# Patient Record
Sex: MALE | Race: WHITE | NOT HISPANIC OR LATINO
[De-identification: names, ages, dates, MRNs, and addresses within clinical notes are randomized per-mention and may not be internally consistent; named-entity substitution may affect disease eponyms.]

---

## 2017-01-09 ENCOUNTER — APPOINTMENT (OUTPATIENT)
Dept: INTERNAL MEDICINE | Facility: CLINIC | Age: 77
End: 2017-01-09

## 2017-01-09 VITALS — DIASTOLIC BLOOD PRESSURE: 80 MMHG | SYSTOLIC BLOOD PRESSURE: 140 MMHG | WEIGHT: 190 LBS

## 2017-01-09 VITALS — DIASTOLIC BLOOD PRESSURE: 80 MMHG | SYSTOLIC BLOOD PRESSURE: 140 MMHG

## 2017-01-09 DIAGNOSIS — Z78.9 OTHER SPECIFIED HEALTH STATUS: ICD-10-CM

## 2017-01-09 DIAGNOSIS — H61.23 IMPACTED CERUMEN, BILATERAL: ICD-10-CM

## 2017-01-09 DIAGNOSIS — Z82.49 FAMILY HISTORY OF ISCHEMIC HEART DISEASE AND OTHER DISEASES OF THE CIRCULATORY SYSTEM: ICD-10-CM

## 2017-01-09 DIAGNOSIS — Z83.3 FAMILY HISTORY OF DIABETES MELLITUS: ICD-10-CM

## 2017-01-10 ENCOUNTER — TRANSCRIPTION ENCOUNTER (OUTPATIENT)
Age: 77
End: 2017-01-10

## 2017-01-10 LAB
25(OH)D3 SERPL-MCNC: 33 NG/ML
ALBUMIN SERPL ELPH-MCNC: 4 G/DL
ALP BLD-CCNC: 55 U/L
ALT SERPL-CCNC: 24 U/L
ANION GAP SERPL CALC-SCNC: 13 MMOL/L
APPEARANCE: CLEAR
AST SERPL-CCNC: 26 U/L
BASOPHILS # BLD AUTO: 0.03 K/UL
BASOPHILS NFR BLD AUTO: 0.3 %
BILIRUB SERPL-MCNC: 0.7 MG/DL
BILIRUBIN URINE: NEGATIVE
BLOOD URINE: NEGATIVE
BUN SERPL-MCNC: 28 MG/DL
CALCIUM SERPL-MCNC: 9.7 MG/DL
CHLORIDE SERPL-SCNC: 99 MMOL/L
CHOLEST SERPL-MCNC: 145 MG/DL
CHOLEST/HDLC SERPL: 2.7 RATIO
CO2 SERPL-SCNC: 26 MMOL/L
COLOR: YELLOW
CREAT SERPL-MCNC: 1.18 MG/DL
EOSINOPHIL # BLD AUTO: 0.2 K/UL
EOSINOPHIL NFR BLD AUTO: 1.8 %
GLUCOSE QUALITATIVE U: NORMAL MG/DL
GLUCOSE SERPL-MCNC: 80 MG/DL
HCT VFR BLD CALC: 42.5 %
HDLC SERPL-MCNC: 53 MG/DL
HGB BLD-MCNC: 13.5 G/DL
IMM GRANULOCYTES NFR BLD AUTO: 0.6 %
KETONES URINE: NEGATIVE
LDLC SERPL CALC-MCNC: 80 MG/DL
LEUKOCYTE ESTERASE URINE: NEGATIVE
LYMPHOCYTES # BLD AUTO: 3.16 K/UL
LYMPHOCYTES NFR BLD AUTO: 29.2 %
MAN DIFF?: NORMAL
MCHC RBC-ENTMCNC: 31.5 PG
MCHC RBC-ENTMCNC: 31.8 GM/DL
MCV RBC AUTO: 99.1 FL
MONOCYTES # BLD AUTO: 1.14 K/UL
MONOCYTES NFR BLD AUTO: 10.5 %
NEUTROPHILS # BLD AUTO: 6.23 K/UL
NEUTROPHILS NFR BLD AUTO: 57.6 %
NITRITE URINE: NEGATIVE
PH URINE: 6
PLATELET # BLD AUTO: 182 K/UL
POTASSIUM SERPL-SCNC: 4.8 MMOL/L
PROT SERPL-MCNC: 8.1 G/DL
PROTEIN URINE: NEGATIVE MG/DL
PSA SERPL-MCNC: 5.78 NG/ML
RBC # BLD: 4.29 M/UL
RBC # FLD: 14.9 %
SODIUM SERPL-SCNC: 138 MMOL/L
SPECIFIC GRAVITY URINE: 1.01
T4 SERPL-MCNC: 5.8 UG/DL
TRIGL SERPL-MCNC: 61 MG/DL
TSH SERPL-ACNC: 2.87 UU/ML
UROBILINOGEN URINE: NORMAL MG/DL
WBC # FLD AUTO: 10.82 K/UL

## 2017-03-28 ENCOUNTER — APPOINTMENT (OUTPATIENT)
Dept: INTERNAL MEDICINE | Facility: CLINIC | Age: 77
End: 2017-03-28

## 2017-05-11 ENCOUNTER — NON-APPOINTMENT (OUTPATIENT)
Age: 77
End: 2017-05-11

## 2017-05-11 ENCOUNTER — APPOINTMENT (OUTPATIENT)
Dept: INTERNAL MEDICINE | Facility: CLINIC | Age: 77
End: 2017-05-11
Payer: MEDICARE

## 2017-05-11 VITALS
RESPIRATION RATE: 16 BRPM | BODY MASS INDEX: 24.92 KG/M2 | SYSTOLIC BLOOD PRESSURE: 120 MMHG | HEART RATE: 70 BPM | DIASTOLIC BLOOD PRESSURE: 70 MMHG | WEIGHT: 188 LBS | HEIGHT: 73 IN

## 2017-05-11 DIAGNOSIS — Z83.3 FAMILY HISTORY OF DIABETES MELLITUS: ICD-10-CM

## 2017-05-11 DIAGNOSIS — Z84.89 FAMILY HISTORY OF OTHER SPECIFIED CONDITIONS: ICD-10-CM

## 2017-05-11 DIAGNOSIS — Z87.19 PERSONAL HISTORY OF OTHER DISEASES OF THE DIGESTIVE SYSTEM: ICD-10-CM

## 2017-05-11 DIAGNOSIS — Z82.3 FAMILY HISTORY OF STROKE: ICD-10-CM

## 2017-05-11 DIAGNOSIS — Z82.49 FAMILY HISTORY OF ISCHEMIC HEART DISEASE AND OTHER DISEASES OF THE CIRCULATORY SYSTEM: ICD-10-CM

## 2017-05-11 PROCEDURE — 99213 OFFICE O/P EST LOW 20 MIN: CPT | Mod: 25

## 2017-05-11 PROCEDURE — G0439: CPT

## 2017-05-11 PROCEDURE — 99497 ADVNCD CARE PLAN 30 MIN: CPT | Mod: 33

## 2017-05-11 PROCEDURE — 93000 ELECTROCARDIOGRAM COMPLETE: CPT

## 2017-05-15 ENCOUNTER — RX RENEWAL (OUTPATIENT)
Age: 77
End: 2017-05-15

## 2017-05-15 DIAGNOSIS — R76.11 NONSPECIFIC REACTION TO TUBERCULIN SKIN TEST W/OUT ACTIVE TUBERCULOSIS: ICD-10-CM

## 2017-12-12 ENCOUNTER — APPOINTMENT (OUTPATIENT)
Dept: INTERNAL MEDICINE | Facility: CLINIC | Age: 77
End: 2017-12-12
Payer: MEDICARE

## 2017-12-12 DIAGNOSIS — Z23 ENCOUNTER FOR IMMUNIZATION: ICD-10-CM

## 2017-12-12 PROCEDURE — G0008: CPT

## 2017-12-12 PROCEDURE — 90662 IIV NO PRSV INCREASED AG IM: CPT

## 2017-12-14 LAB — PSA SERPL-MCNC: 6.02 NG/ML

## 2018-06-26 ENCOUNTER — APPOINTMENT (OUTPATIENT)
Dept: INTERNAL MEDICINE | Facility: CLINIC | Age: 78
End: 2018-06-26
Payer: MEDICARE

## 2018-06-26 ENCOUNTER — LABORATORY RESULT (OUTPATIENT)
Age: 78
End: 2018-06-26

## 2018-06-26 PROCEDURE — 36415 COLL VENOUS BLD VENIPUNCTURE: CPT

## 2018-06-28 LAB
25(OH)D3 SERPL-MCNC: 35.4 NG/ML
ALBUMIN SERPL ELPH-MCNC: 3.7 G/DL
ALP BLD-CCNC: 50 U/L
ALT SERPL-CCNC: 20 U/L
ANION GAP SERPL CALC-SCNC: 12 MMOL/L
APPEARANCE: CLEAR
AST SERPL-CCNC: 22 U/L
BASOPHILS # BLD AUTO: 0.04 K/UL
BASOPHILS NFR BLD AUTO: 0.5 %
BILIRUB SERPL-MCNC: 0.7 MG/DL
BILIRUBIN URINE: NEGATIVE
BLOOD URINE: NEGATIVE
BUN SERPL-MCNC: 30 MG/DL
CALCIUM SERPL-MCNC: 9.3 MG/DL
CHLORIDE SERPL-SCNC: 105 MMOL/L
CHOLEST SERPL-MCNC: 126 MG/DL
CHOLEST/HDLC SERPL: 2.3 RATIO
CO2 SERPL-SCNC: 25 MMOL/L
COLOR: YELLOW
CREAT SERPL-MCNC: 1.29 MG/DL
EOSINOPHIL # BLD AUTO: 0.16 K/UL
EOSINOPHIL NFR BLD AUTO: 1.9 %
GLUCOSE QUALITATIVE U: NEGATIVE MG/DL
GLUCOSE SERPL-MCNC: 99 MG/DL
HCT VFR BLD CALC: 40.5 %
HDLC SERPL-MCNC: 56 MG/DL
HGB BLD-MCNC: 12.7 G/DL
IMM GRANULOCYTES NFR BLD AUTO: 0.6 %
KETONES URINE: NEGATIVE
LDLC SERPL CALC-MCNC: 61 MG/DL
LEUKOCYTE ESTERASE URINE: NEGATIVE
LYMPHOCYTES # BLD AUTO: 2.44 K/UL
LYMPHOCYTES NFR BLD AUTO: 28.4 %
MAN DIFF?: NORMAL
MCHC RBC-ENTMCNC: 30.6 PG
MCHC RBC-ENTMCNC: 31.4 GM/DL
MCV RBC AUTO: 97.6 FL
MONOCYTES # BLD AUTO: 0.7 K/UL
MONOCYTES NFR BLD AUTO: 8.1 %
NEUTROPHILS # BLD AUTO: 5.21 K/UL
NEUTROPHILS NFR BLD AUTO: 60.5 %
NITRITE URINE: NEGATIVE
PH URINE: 5.5
PLATELET # BLD AUTO: 181 K/UL
POTASSIUM SERPL-SCNC: 4.7 MMOL/L
PROT SERPL-MCNC: 7.9 G/DL
PROTEIN URINE: NEGATIVE MG/DL
PSA SERPL-MCNC: 5.7 NG/ML
RBC # BLD: 4.15 M/UL
RBC # FLD: 14.8 %
SODIUM SERPL-SCNC: 142 MMOL/L
SPECIFIC GRAVITY URINE: 1.02
T4 SERPL-MCNC: 5.8 UG/DL
TRIGL SERPL-MCNC: 46 MG/DL
TSH SERPL-ACNC: 2.7 UIU/ML
UROBILINOGEN URINE: NEGATIVE MG/DL
WBC # FLD AUTO: 8.6 K/UL

## 2018-07-06 ENCOUNTER — LABORATORY RESULT (OUTPATIENT)
Age: 78
End: 2018-07-06

## 2018-07-06 ENCOUNTER — NON-APPOINTMENT (OUTPATIENT)
Age: 78
End: 2018-07-06

## 2018-07-06 ENCOUNTER — APPOINTMENT (OUTPATIENT)
Dept: INTERNAL MEDICINE | Facility: CLINIC | Age: 78
End: 2018-07-06
Payer: MEDICARE

## 2018-07-06 VITALS
DIASTOLIC BLOOD PRESSURE: 65 MMHG | BODY MASS INDEX: 25.31 KG/M2 | RESPIRATION RATE: 16 BRPM | SYSTOLIC BLOOD PRESSURE: 120 MMHG | HEART RATE: 56 BPM | HEIGHT: 73 IN | WEIGHT: 191 LBS

## 2018-07-06 DIAGNOSIS — R53.83 OTHER FATIGUE: ICD-10-CM

## 2018-07-06 DIAGNOSIS — M25.512 PAIN IN LEFT SHOULDER: ICD-10-CM

## 2018-07-06 DIAGNOSIS — Z23 ENCOUNTER FOR IMMUNIZATION: ICD-10-CM

## 2018-07-06 PROCEDURE — 99213 OFFICE O/P EST LOW 20 MIN: CPT | Mod: 25

## 2018-07-06 PROCEDURE — G0009: CPT

## 2018-07-06 PROCEDURE — 36415 COLL VENOUS BLD VENIPUNCTURE: CPT

## 2018-07-06 PROCEDURE — 99497 ADVNCD CARE PLAN 30 MIN: CPT | Mod: 33

## 2018-07-06 PROCEDURE — 93000 ELECTROCARDIOGRAM COMPLETE: CPT

## 2018-07-06 PROCEDURE — G0439: CPT

## 2018-07-06 PROCEDURE — 90670 PCV13 VACCINE IM: CPT

## 2018-07-06 NOTE — ASSESSMENT
[FreeTextEntry1] : Pt with above medical issues which requires extra work in addition to the well visit.\par New labs sent\par To see ENT  -for hoarseness\par To see ortho  - for shoulder and hip pain\par Fatigue may be multifactorial  -labs sent - pt may be depressed but declines meds\par No chest pains, palpitations or shortness of breath\par Health counseling given

## 2018-07-06 NOTE — HEALTH RISK ASSESSMENT
[Fair] :  ~his/her~ mood as fair [No falls in past year] : Patient reported no falls in the past year [None] : None [With Family] : lives with family [Retired] : retired [Graduate School] : graduate school [Feels Safe at Home] : Feels safe at home [Fully functional (bathing, dressing, toileting, transferring, walking, feeding)] : Fully functional (bathing, dressing, toileting, transferring, walking, feeding) [Fully functional (using the telephone, shopping, preparing meals, housekeeping, doing laundry, using] : Fully functional and needs no help or supervision to perform IADLs (using the telephone, shopping, preparing meals, housekeeping, doing laundry, using transportation, managing medications and managing finances) [Smoke Detector] : smoke detector [Seat Belt] :  uses seat belt [Discussed at today's visit] : Advance Directives Discussed at today's visit [] : No [Sunscreen] : does not use sunscreen [FreeTextEntry4] : HCP  -wife  -paperwork done - wishes known - will comply  -T=16

## 2018-07-06 NOTE — REVIEW OF SYSTEMS
[Hearing Loss] : hearing loss [Nocturia] : nocturia [Negative] : Heme/Lymph [FreeTextEntry3] : last optho  -1-2 years  -OK [FreeTextEntry8] : nocturia x 1 [FreeTextEntry9] : see HPI

## 2018-07-06 NOTE — PHYSICAL EXAM
[No Acute Distress] : no acute distress [Well Nourished] : well nourished [Well Developed] : well developed [Well-Appearing] : well-appearing [Normal Sclera/Conjunctiva] : normal sclera/conjunctiva [PERRL] : pupils equal round and reactive to light [EOMI] : extraocular movements intact [Normal Outer Ear/Nose] : the outer ears and nose were normal in appearance [Normal Oropharynx] : the oropharynx was normal [Normal TMs] : both tympanic membranes were normal [No JVD] : no jugular venous distention [Supple] : supple [No Lymphadenopathy] : no lymphadenopathy [Thyroid Normal, No Nodules] : the thyroid was normal and there were no nodules present [No Respiratory Distress] : no respiratory distress  [Clear to Auscultation] : lungs were clear to auscultation bilaterally [No Accessory Muscle Use] : no accessory muscle use [Normal Rate] : normal rate  [Regular Rhythm] : with a regular rhythm [Normal S1, S2] : normal S1 and S2 [No Murmur] : no murmur heard [No Carotid Bruits] : no carotid bruits [No Abdominal Bruit] : a ~M bruit was not heard ~T in the abdomen [No Varicosities] : no varicosities [Pedal Pulses Present] : the pedal pulses are present [No Edema] : there was no peripheral edema [No Extremity Clubbing/Cyanosis] : no extremity clubbing/cyanosis [No Palpable Aorta] : no palpable aorta [Soft] : abdomen soft [Non Tender] : non-tender [Non-distended] : non-distended [No Masses] : no abdominal mass palpated [No HSM] : no HSM [Normal Bowel Sounds] : normal bowel sounds [Inguinal Hernia Left] : a left inguinal hernia was present [] : which was reducible [Normal Sphincter Tone] : normal sphincter tone [No Mass] : no mass [Penis Abnormality] : normal circumcised penis [Testes Tenderness] : no tenderness of the testes [Testes Mass (___cm)] : there were no testicular masses [Prostate Tenderness] : the prostate was not tender [No Prostate Nodules] : no prostate nodules [Prostate Size ___ (0-4)] : prostate size [unfilled] (scale: 0-4) [Normal Posterior Cervical Nodes] : no posterior cervical lymphadenopathy [Normal Anterior Cervical Nodes] : no anterior cervical lymphadenopathy [No CVA Tenderness] : no CVA  tenderness [No Spinal Tenderness] : no spinal tenderness [No Joint Swelling] : no joint swelling [Grossly Normal Strength/Tone] : grossly normal strength/tone [No Rash] : no rash [Normal Gait] : normal gait [Coordination Grossly Intact] : coordination grossly intact [No Focal Deficits] : no focal deficits [Deep Tendon Reflexes (DTR)] : deep tendon reflexes were 2+ and symmetric [Speech Grossly Normal] : speech grossly normal [Memory Grossly Normal] : memory grossly normal [Normal Affect] : the affect was normal [Alert and Oriented x3] : oriented to person, place, and time [Normal Mood] : the mood was normal [Normal Insight/Judgement] : insight and judgment were intact [de-identified] : Left shoulder ratcheting

## 2018-07-06 NOTE — HISTORY OF PRESENT ILLNESS
[FreeTextEntry1] : Well visit and follow up for management of:\par Hyperlipidemia  -on meds\par \par Feels very fatigued\par Hoarse voice\par Left shoulder pain and left hip discomfort

## 2018-07-07 LAB
B BURGDOR IGG+IGM SER QL IB: NORMAL
BASOPHILS # BLD AUTO: 0.02 K/UL
BASOPHILS NFR BLD AUTO: 0.2 %
CORTIS SERPL-MCNC: 10.4 UG/DL
EOSINOPHIL # BLD AUTO: 0.13 K/UL
EOSINOPHIL NFR BLD AUTO: 1.6 %
HCT VFR BLD CALC: 42.2 %
HGB BLD-MCNC: 13.7 G/DL
IMM GRANULOCYTES NFR BLD AUTO: 0.4 %
IRON SATN MFR SERPL: 21 %
IRON SERPL-MCNC: 61 UG/DL
LYMPHOCYTES # BLD AUTO: 2.31 K/UL
LYMPHOCYTES NFR BLD AUTO: 27.7 %
MAN DIFF?: NORMAL
MCHC RBC-ENTMCNC: 32 PG
MCHC RBC-ENTMCNC: 32.5 GM/DL
MCV RBC AUTO: 98.6 FL
MONOCYTES # BLD AUTO: 0.83 K/UL
MONOCYTES NFR BLD AUTO: 10 %
NEUTROPHILS # BLD AUTO: 5.02 K/UL
NEUTROPHILS NFR BLD AUTO: 60.1 %
PLATELET # BLD AUTO: 201 K/UL
RBC # BLD: 4.28 M/UL
RBC # FLD: 14.9 %
TIBC SERPL-MCNC: 284 UG/DL
UIBC SERPL-MCNC: 223 UG/DL
WBC # FLD AUTO: 8.34 K/UL

## 2018-07-09 ENCOUNTER — TRANSCRIPTION ENCOUNTER (OUTPATIENT)
Age: 78
End: 2018-07-09

## 2018-07-09 LAB
FERRITIN SERPL-MCNC: 145 NG/ML
VIT B12 SERPL-MCNC: 478 PG/ML

## 2019-06-27 ENCOUNTER — TRANSCRIPTION ENCOUNTER (OUTPATIENT)
Age: 79
End: 2019-06-27

## 2019-08-21 ENCOUNTER — APPOINTMENT (OUTPATIENT)
Dept: INTERNAL MEDICINE | Facility: CLINIC | Age: 79
End: 2019-08-21
Payer: MEDICARE

## 2019-08-21 PROCEDURE — 36415 COLL VENOUS BLD VENIPUNCTURE: CPT

## 2019-08-22 DIAGNOSIS — R71.8 OTHER ABNORMALITY OF RED BLOOD CELLS: ICD-10-CM

## 2019-08-22 LAB
25(OH)D3 SERPL-MCNC: 36.8 NG/ML
ALBUMIN SERPL ELPH-MCNC: 4 G/DL
ALP BLD-CCNC: 54 U/L
ALT SERPL-CCNC: 17 U/L
ANION GAP SERPL CALC-SCNC: 13 MMOL/L
APPEARANCE: CLEAR
AST SERPL-CCNC: 17 U/L
BASOPHILS # BLD AUTO: 0.05 K/UL
BASOPHILS NFR BLD AUTO: 0.5 %
BILIRUB SERPL-MCNC: 0.5 MG/DL
BILIRUBIN URINE: NEGATIVE
BLOOD URINE: NEGATIVE
BUN SERPL-MCNC: 27 MG/DL
CALCIUM SERPL-MCNC: 9.4 MG/DL
CHLORIDE SERPL-SCNC: 104 MMOL/L
CHOLEST SERPL-MCNC: 146 MG/DL
CHOLEST/HDLC SERPL: 2.8 RATIO
CO2 SERPL-SCNC: 25 MMOL/L
COLOR: YELLOW
CREAT SERPL-MCNC: 1.09 MG/DL
EOSINOPHIL # BLD AUTO: 0.14 K/UL
EOSINOPHIL NFR BLD AUTO: 1.5 %
GLUCOSE QUALITATIVE U: NEGATIVE
GLUCOSE SERPL-MCNC: 95 MG/DL
HCT VFR BLD CALC: 41.2 %
HDLC SERPL-MCNC: 53 MG/DL
HGB BLD-MCNC: 12.9 G/DL
IMM GRANULOCYTES NFR BLD AUTO: 0.6 %
KETONES URINE: NEGATIVE
LDLC SERPL CALC-MCNC: 84 MG/DL
LEUKOCYTE ESTERASE URINE: NEGATIVE
LYMPHOCYTES # BLD AUTO: 2.81 K/UL
LYMPHOCYTES NFR BLD AUTO: 30 %
MAN DIFF?: NORMAL
MCHC RBC-ENTMCNC: 31.3 GM/DL
MCHC RBC-ENTMCNC: 31.9 PG
MCV RBC AUTO: 102 FL
MONOCYTES # BLD AUTO: 0.87 K/UL
MONOCYTES NFR BLD AUTO: 9.3 %
NEUTROPHILS # BLD AUTO: 5.44 K/UL
NEUTROPHILS NFR BLD AUTO: 58.1 %
NITRITE URINE: NEGATIVE
PH URINE: 6
PLATELET # BLD AUTO: 184 K/UL
POTASSIUM SERPL-SCNC: 4.5 MMOL/L
PROT SERPL-MCNC: 7.3 G/DL
PROTEIN URINE: NEGATIVE
PSA SERPL-MCNC: 8.47 NG/ML
RBC # BLD: 4.04 M/UL
RBC # FLD: 14.6 %
SODIUM SERPL-SCNC: 142 MMOL/L
SPECIFIC GRAVITY URINE: 1.02
T4 SERPL-MCNC: 5.1 UG/DL
TRIGL SERPL-MCNC: 45 MG/DL
TSH SERPL-ACNC: 2.04 UIU/ML
UROBILINOGEN URINE: NORMAL
WBC # FLD AUTO: 9.37 K/UL

## 2019-08-26 ENCOUNTER — APPOINTMENT (OUTPATIENT)
Dept: INTERNAL MEDICINE | Facility: CLINIC | Age: 79
End: 2019-08-26
Payer: MEDICARE

## 2019-08-26 ENCOUNTER — NON-APPOINTMENT (OUTPATIENT)
Age: 79
End: 2019-08-26

## 2019-08-26 VITALS
BODY MASS INDEX: 25.98 KG/M2 | RESPIRATION RATE: 16 BRPM | DIASTOLIC BLOOD PRESSURE: 70 MMHG | SYSTOLIC BLOOD PRESSURE: 110 MMHG | WEIGHT: 196 LBS | HEIGHT: 73 IN | HEART RATE: 72 BPM

## 2019-08-26 PROCEDURE — G0439: CPT

## 2019-08-26 PROCEDURE — 99213 OFFICE O/P EST LOW 20 MIN: CPT | Mod: 25

## 2019-08-26 PROCEDURE — 93000 ELECTROCARDIOGRAM COMPLETE: CPT

## 2019-08-26 PROCEDURE — G0444 DEPRESSION SCREEN ANNUAL: CPT | Mod: 59

## 2019-08-26 PROCEDURE — 99497 ADVNCD CARE PLAN 30 MIN: CPT | Mod: 33

## 2019-08-26 NOTE — PHYSICAL EXAM
[No Acute Distress] : no acute distress [Well Nourished] : well nourished [Well Developed] : well developed [Well-Appearing] : well-appearing [PERRL] : pupils equal round and reactive to light [Normal Sclera/Conjunctiva] : normal sclera/conjunctiva [Normal Oropharynx] : the oropharynx was normal [EOMI] : extraocular movements intact [Normal Outer Ear/Nose] : the outer ears and nose were normal in appearance [Normal TMs] : both tympanic membranes were normal [No JVD] : no jugular venous distention [Supple] : supple [No Lymphadenopathy] : no lymphadenopathy [No Respiratory Distress] : no respiratory distress  [Thyroid Normal, No Nodules] : the thyroid was normal and there were no nodules present [No Accessory Muscle Use] : no accessory muscle use [Clear to Auscultation] : lungs were clear to auscultation bilaterally [Regular Rhythm] : with a regular rhythm [Normal Rate] : normal rate  [Normal S1, S2] : normal S1 and S2 [No Carotid Bruits] : no carotid bruits [No Murmur] : no murmur heard [No Abdominal Bruit] : a ~M bruit was not heard ~T in the abdomen [Pedal Pulses Present] : the pedal pulses are present [No Varicosities] : no varicosities [No Palpable Aorta] : no palpable aorta [No Edema] : there was no peripheral edema [Soft] : abdomen soft [No Extremity Clubbing/Cyanosis] : no extremity clubbing/cyanosis [Non-distended] : non-distended [Non Tender] : non-tender [No Masses] : no abdominal mass palpated [No HSM] : no HSM [] : which was reducible [Inguinal Hernia Left] : a left inguinal hernia was present [Normal Bowel Sounds] : normal bowel sounds [No Mass] : no mass [Normal Sphincter Tone] : normal sphincter tone [Penis Abnormality] : normal circumcised penis [Testes Tenderness] : no tenderness of the testes [Testes Mass (___cm)] : there were no testicular masses [No Prostate Nodules] : no prostate nodules [Prostate Tenderness] : the prostate was not tender [Normal Supraclavicular Nodes] : no supraclavicular lymphadenopathy [Prostate Size ___ (0-4)] : prostate size [unfilled] (scale: 0-4) [Normal Posterior Cervical Nodes] : no posterior cervical lymphadenopathy [Normal Axillary Nodes] : no axillary lymphadenopathy [Normal Anterior Cervical Nodes] : no anterior cervical lymphadenopathy [Normal Inguinal Nodes] : no inguinal lymphadenopathy [Normal Femoral Nodes] : no femoral lymphadenopathy [No CVA Tenderness] : no CVA  tenderness [No Joint Swelling] : no joint swelling [No Spinal Tenderness] : no spinal tenderness [No Rash] : no rash [Coordination Grossly Intact] : coordination grossly intact [Grossly Normal Strength/Tone] : grossly normal strength/tone [No Focal Deficits] : no focal deficits [Normal Gait] : normal gait [Normal Affect] : the affect was normal [Deep Tendon Reflexes (DTR)] : deep tendon reflexes were 2+ and symmetric [Normal Insight/Judgement] : insight and judgment were intact [Normal] : affect was normal and insight and judgment were intact

## 2019-08-26 NOTE — HEALTH RISK ASSESSMENT
[Good] : ~his/her~  mood as  good [] : No [No falls in past year] : Patient reported no falls in the past year [No] : No [de-identified] : Walk dog [de-identified] : Low fat [Change in mental status noted] : No change in mental status noted [None] : None [With Family] : lives with family [Retired] : retired [] :  [Feels Safe at Home] : Feels safe at home [Fully functional (using the telephone, shopping, preparing meals, housekeeping, doing laundry, using] : Fully functional and needs no help or supervision to perform IADLs (using the telephone, shopping, preparing meals, housekeeping, doing laundry, using transportation, managing medications and managing finances) [Fully functional (bathing, dressing, toileting, transferring, walking, feeding)] : Fully functional (bathing, dressing, toileting, transferring, walking, feeding) [Smoke Detector] : smoke detector [Seat Belt] :  uses seat belt [Carbon Monoxide Detector] : carbon monoxide detector [Sunscreen] : does not use sunscreen [FreeTextEntry2] : Runs volunteer group at shelter [With Patient/Caregiver] : With Patient/Caregiver [Designated Healthcare Proxy] : Designated healthcare proxy [Name: ___] : Health Care Proxy's Name: [unfilled]  [AdvancecareDate] : 08/19 [FreeTextEntry4] : Had long discussion with the patient.\par Discussed with pt the need for a health care proxy.\par Discussed who can be a proxy, forms given if needed, and the need for the proxy to know their wishes and to make sure they will comply.\par The proxy will need to have a copy in their home and the patient should have a copy.\par \par

## 2019-08-26 NOTE — ASSESSMENT
[FreeTextEntry1] : Pt with above medical issues which requires extra work in addition to the well visit.\par B12 levels added to labs\par Discussed PSA  -pt wishes to observe.\par Strongly advised screening colonoscopy\par Health counseling given\par FU 6 months.

## 2019-08-26 NOTE — REVIEW OF SYSTEMS
[Fever] : no fever [Recent Change In Weight] : ~T recent weight change [Hearing Loss] : hearing loss [Negative] : Heme/Lymph [FreeTextEntry3] : last optho - last year  -OK [FreeTextEntry9] : shoulder issues

## 2019-08-27 LAB
FOLATE SERPL-MCNC: 11.9 NG/ML
VIT B12 SERPL-MCNC: 300 PG/ML

## 2019-08-28 ENCOUNTER — TRANSCRIPTION ENCOUNTER (OUTPATIENT)
Age: 79
End: 2019-08-28

## 2021-02-23 ENCOUNTER — APPOINTMENT (OUTPATIENT)
Dept: INTERNAL MEDICINE | Facility: CLINIC | Age: 81
End: 2021-02-23
Payer: MEDICARE

## 2021-02-23 ENCOUNTER — LABORATORY RESULT (OUTPATIENT)
Age: 81
End: 2021-02-23

## 2021-02-23 LAB — PSA SERPL-MCNC: 9.66 NG/ML

## 2021-02-23 PROCEDURE — 36415 COLL VENOUS BLD VENIPUNCTURE: CPT

## 2021-02-24 LAB
ALBUMIN SERPL ELPH-MCNC: 3.9 G/DL
ALP BLD-CCNC: 60 U/L
ALT SERPL-CCNC: 18 U/L
ANION GAP SERPL CALC-SCNC: 11 MMOL/L
APPEARANCE: CLEAR
AST SERPL-CCNC: 18 U/L
BASOPHILS # BLD AUTO: 0.04 K/UL
BASOPHILS NFR BLD AUTO: 0.5 %
BILIRUB SERPL-MCNC: 0.6 MG/DL
BILIRUBIN URINE: NEGATIVE
BLOOD URINE: NEGATIVE
BUN SERPL-MCNC: 30 MG/DL
CALCIUM SERPL-MCNC: 9.4 MG/DL
CHLORIDE SERPL-SCNC: 100 MMOL/L
CHOLEST SERPL-MCNC: 127 MG/DL
CO2 SERPL-SCNC: 26 MMOL/L
COLOR: NORMAL
CREAT SERPL-MCNC: 1.29 MG/DL
EOSINOPHIL # BLD AUTO: 0.26 K/UL
EOSINOPHIL NFR BLD AUTO: 3 %
GLUCOSE QUALITATIVE U: NEGATIVE
GLUCOSE SERPL-MCNC: 92 MG/DL
HCT VFR BLD CALC: 40.4 %
HDLC SERPL-MCNC: 49 MG/DL
HGB BLD-MCNC: 12.8 G/DL
IMM GRANULOCYTES NFR BLD AUTO: 0.3 %
KETONES URINE: NEGATIVE
LDLC SERPL CALC-MCNC: 69 MG/DL
LEUKOCYTE ESTERASE URINE: NEGATIVE
LYMPHOCYTES # BLD AUTO: 2.15 K/UL
LYMPHOCYTES NFR BLD AUTO: 24.8 %
MAN DIFF?: NORMAL
MCHC RBC-ENTMCNC: 31.4 PG
MCHC RBC-ENTMCNC: 31.7 GM/DL
MCV RBC AUTO: 99.3 FL
MONOCYTES # BLD AUTO: 0.98 K/UL
MONOCYTES NFR BLD AUTO: 11.3 %
NEUTROPHILS # BLD AUTO: 5.22 K/UL
NEUTROPHILS NFR BLD AUTO: 60.1 %
NITRITE URINE: NEGATIVE
NONHDLC SERPL-MCNC: 78 MG/DL
PH URINE: 6
PLATELET # BLD AUTO: 176 K/UL
POTASSIUM SERPL-SCNC: 4.3 MMOL/L
PROT SERPL-MCNC: 7.9 G/DL
PROTEIN URINE: ABNORMAL
RBC # BLD: 4.07 M/UL
RBC # FLD: 14.4 %
SODIUM SERPL-SCNC: 137 MMOL/L
SPECIFIC GRAVITY URINE: 1.02
T4 SERPL-MCNC: 5.7 UG/DL
TRIGL SERPL-MCNC: 46 MG/DL
TSH SERPL-ACNC: 2.26 UIU/ML
UROBILINOGEN URINE: NORMAL
WBC # FLD AUTO: 8.68 K/UL

## 2021-03-02 ENCOUNTER — NON-APPOINTMENT (OUTPATIENT)
Age: 81
End: 2021-03-02

## 2021-03-02 ENCOUNTER — APPOINTMENT (OUTPATIENT)
Dept: INTERNAL MEDICINE | Facility: CLINIC | Age: 81
End: 2021-03-02
Payer: MEDICARE

## 2021-03-02 VITALS
TEMPERATURE: 97.3 F | RESPIRATION RATE: 16 BRPM | HEIGHT: 73 IN | HEART RATE: 66 BPM | BODY MASS INDEX: 25.31 KG/M2 | WEIGHT: 191 LBS | SYSTOLIC BLOOD PRESSURE: 115 MMHG | DIASTOLIC BLOOD PRESSURE: 70 MMHG

## 2021-03-02 DIAGNOSIS — M54.5 LOW BACK PAIN: ICD-10-CM

## 2021-03-02 DIAGNOSIS — K40.90 UNILATERAL INGUINAL HERNIA, W/OUT OBSTRUCTION OR GANGRENE, NOT SPECIFIED AS RECURRENT: ICD-10-CM

## 2021-03-02 PROCEDURE — 93000 ELECTROCARDIOGRAM COMPLETE: CPT | Mod: 59

## 2021-03-02 PROCEDURE — 99213 OFFICE O/P EST LOW 20 MIN: CPT | Mod: 25

## 2021-03-02 PROCEDURE — G0444 DEPRESSION SCREEN ANNUAL: CPT | Mod: 59

## 2021-03-02 PROCEDURE — 99497 ADVNCD CARE PLAN 30 MIN: CPT | Mod: 33

## 2021-03-02 PROCEDURE — G0439: CPT

## 2021-03-02 NOTE — ASSESSMENT
[FreeTextEntry1] : Pt with above medical issues which requires extra work in addition to the well visit.\par Labs reviewed\par Meds adjusted\par Discussed hernia and size and should consider repair of hernia.\par Discussed elevated PSA - pt prefers watchful waiting - understands that he could have early prostate cancer\par Can do PT for low back pain\par Health counseling given\par FU 6 months.

## 2021-03-02 NOTE — PHYSICAL EXAM
[No Acute Distress] : no acute distress [Well Nourished] : well nourished [Well Developed] : well developed [Well-Appearing] : well-appearing [Normal Sclera/Conjunctiva] : normal sclera/conjunctiva [PERRL] : pupils equal round and reactive to light [EOMI] : extraocular movements intact [Normal Outer Ear/Nose] : the outer ears and nose were normal in appearance [Normal Oropharynx] : the oropharynx was normal [Normal TMs] : both tympanic membranes were normal [No JVD] : no jugular venous distention [No Lymphadenopathy] : no lymphadenopathy [Supple] : supple [Thyroid Normal, No Nodules] : the thyroid was normal and there were no nodules present [No Respiratory Distress] : no respiratory distress  [No Accessory Muscle Use] : no accessory muscle use [Clear to Auscultation] : lungs were clear to auscultation bilaterally [Normal Rate] : normal rate  [Regular Rhythm] : with a regular rhythm [Normal S1, S2] : normal S1 and S2 [No Murmur] : no murmur heard [No Carotid Bruits] : no carotid bruits [No Abdominal Bruit] : a ~M bruit was not heard ~T in the abdomen [No Varicosities] : no varicosities [Pedal Pulses Present] : the pedal pulses are present [No Edema] : there was no peripheral edema [No Palpable Aorta] : no palpable aorta [No Extremity Clubbing/Cyanosis] : no extremity clubbing/cyanosis [Soft] : abdomen soft [Non Tender] : non-tender [Non-distended] : non-distended [No Masses] : no abdominal mass palpated [No HSM] : no HSM [Normal Bowel Sounds] : normal bowel sounds [Normal Sphincter Tone] : normal sphincter tone [No Mass] : no mass [Penis Abnormality] : normal circumcised penis [Testes Tenderness] : no tenderness of the testes [Testes Mass (___cm)] : there were no testicular masses [Prostate Tenderness] : the prostate was not tender [No Prostate Nodules] : no prostate nodules [Prostate Size ___ (0-4)] : prostate size [unfilled] (scale: 0-4) [Normal Posterior Cervical Nodes] : no posterior cervical lymphadenopathy [Normal Anterior Cervical Nodes] : no anterior cervical lymphadenopathy [No CVA Tenderness] : no CVA  tenderness [No Spinal Tenderness] : no spinal tenderness [No Joint Swelling] : no joint swelling [Grossly Normal Strength/Tone] : grossly normal strength/tone [No Rash] : no rash [Coordination Grossly Intact] : coordination grossly intact [No Focal Deficits] : no focal deficits [Normal Gait] : normal gait [Deep Tendon Reflexes (DTR)] : deep tendon reflexes were 2+ and symmetric [Normal Affect] : the affect was normal [Normal Insight/Judgement] : insight and judgment were intact [Normal] : affect was normal and insight and judgment were intact [de-identified] : Large left inguinal hernia

## 2021-03-02 NOTE — REVIEW OF SYSTEMS
[Hearing Loss] : hearing loss [Nocturia] : nocturia [Back Pain] : back pain [Negative] : Heme/Lymph [FreeTextEntry3] : last optho - 2019 [FreeTextEntry8] : nocturia x 1

## 2021-03-09 ENCOUNTER — TRANSCRIPTION ENCOUNTER (OUTPATIENT)
Age: 81
End: 2021-03-09

## 2021-08-31 ENCOUNTER — APPOINTMENT (OUTPATIENT)
Dept: INTERNAL MEDICINE | Facility: CLINIC | Age: 81
End: 2021-08-31

## 2021-08-31 ENCOUNTER — APPOINTMENT (OUTPATIENT)
Dept: INTERNAL MEDICINE | Facility: CLINIC | Age: 81
End: 2021-08-31
Payer: MEDICARE

## 2021-08-31 PROCEDURE — 99442: CPT | Mod: 95

## 2021-08-31 NOTE — HISTORY OF PRESENT ILLNESS
[Home] : at home, [unfilled] , at the time of the visit. [Medical Office: ([facility])___] : at the medical office located in  [Verbal consent obtained from patient] : the patient, [unfilled] [FreeTextEntry1] : CO prostatism - and is likely going to have arthroscopic surgery and is concerned about urinary retention.\par Has nocturia x 2 - and urinary frequency during the day.\par

## 2021-08-31 NOTE — ASSESSMENT
[FreeTextEntry1] : Pt with BPH and symptoms - will start Uroxatral - discussed pros and cons\par To call for adverse reactions.\par Will fu in 1 month

## 2021-09-27 ENCOUNTER — RX CHANGE (OUTPATIENT)
Age: 81
End: 2021-09-27

## 2021-10-04 ENCOUNTER — LABORATORY RESULT (OUTPATIENT)
Age: 81
End: 2021-10-04

## 2021-10-04 ENCOUNTER — APPOINTMENT (OUTPATIENT)
Dept: INTERNAL MEDICINE | Facility: CLINIC | Age: 81
End: 2021-10-04
Payer: MEDICARE

## 2021-10-04 ENCOUNTER — NON-APPOINTMENT (OUTPATIENT)
Age: 81
End: 2021-10-04

## 2021-10-04 VITALS
BODY MASS INDEX: 25.31 KG/M2 | HEIGHT: 73 IN | DIASTOLIC BLOOD PRESSURE: 80 MMHG | SYSTOLIC BLOOD PRESSURE: 130 MMHG | HEART RATE: 70 BPM | WEIGHT: 191 LBS | RESPIRATION RATE: 16 BRPM

## 2021-10-04 DIAGNOSIS — R23.4 CHANGES IN SKIN TEXTURE: ICD-10-CM

## 2021-10-04 PROCEDURE — 99213 OFFICE O/P EST LOW 20 MIN: CPT | Mod: 25

## 2021-10-04 PROCEDURE — 36415 COLL VENOUS BLD VENIPUNCTURE: CPT

## 2021-10-04 NOTE — HISTORY OF PRESENT ILLNESS
[FreeTextEntry1] : FU for 10 days of indurated - areas on right leg  -faint came - got better and now a new area on higher area on leg.\par Not sick \par No fevers\par Started Alfuzosin few weeks ago.\par No bites\par

## 2021-10-04 NOTE — PHYSICAL EXAM
[Normal Sclera/Conjunctiva] : normal sclera/conjunctiva [No Edema] : there was no peripheral edema [Normal] : no posterior cervical lymphadenopathy and no anterior cervical lymphadenopathy [de-identified] : Right lower extremity - below knee- 2 areas of mild induration and warmth - minimally tender. Nothing on foot. Normal DP pulses

## 2021-10-04 NOTE — ASSESSMENT
[FreeTextEntry1] : Indurated skin - mild- unclear etiology\par Bloods drawn in office.\par Further evaluation based on labs.

## 2021-10-05 ENCOUNTER — NON-APPOINTMENT (OUTPATIENT)
Age: 81
End: 2021-10-05

## 2021-10-05 LAB
BASOPHILS # BLD AUTO: 0.03 K/UL
BASOPHILS NFR BLD AUTO: 0.4 %
EOSINOPHIL # BLD AUTO: 0.15 K/UL
EOSINOPHIL NFR BLD AUTO: 2 %
ERYTHROCYTE [SEDIMENTATION RATE] IN BLOOD BY WESTERGREN METHOD: 81 MM/HR
HCT VFR BLD CALC: 38.2 %
HGB BLD-MCNC: 12.3 G/DL
IMM GRANULOCYTES NFR BLD AUTO: 0.4 %
LYMPHOCYTES # BLD AUTO: 2.01 K/UL
LYMPHOCYTES NFR BLD AUTO: 27.2 %
MAN DIFF?: NORMAL
MCHC RBC-ENTMCNC: 32.2 GM/DL
MCHC RBC-ENTMCNC: 33.1 PG
MCV RBC AUTO: 102.7 FL
MONOCYTES # BLD AUTO: 0.73 K/UL
MONOCYTES NFR BLD AUTO: 9.9 %
NEUTROPHILS # BLD AUTO: 4.43 K/UL
NEUTROPHILS NFR BLD AUTO: 60.1 %
PLATELET # BLD AUTO: 173 K/UL
RBC # BLD: 3.72 M/UL
RBC # FLD: 14.3 %
WBC # FLD AUTO: 7.38 K/UL

## 2021-10-06 LAB
ALBUMIN SERPL ELPH-MCNC: 4.2 G/DL
ALP BLD-CCNC: 60 U/L
ALT SERPL-CCNC: 21 U/L
ANION GAP SERPL CALC-SCNC: 14 MMOL/L
AST SERPL-CCNC: 17 U/L
B BURGDOR IGG+IGM SER QL IB: NORMAL
BILIRUB SERPL-MCNC: 0.5 MG/DL
BUN SERPL-MCNC: 23 MG/DL
CALCIUM SERPL-MCNC: 9.4 MG/DL
CHLORIDE SERPL-SCNC: 101 MMOL/L
CO2 SERPL-SCNC: 24 MMOL/L
CREAT SERPL-MCNC: 1.17 MG/DL
GLUCOSE SERPL-MCNC: 65 MG/DL
POTASSIUM SERPL-SCNC: 4.2 MMOL/L
PROT SERPL-MCNC: 7.8 G/DL
SODIUM SERPL-SCNC: 140 MMOL/L

## 2021-10-08 ENCOUNTER — APPOINTMENT (OUTPATIENT)
Dept: DERMATOLOGY | Facility: CLINIC | Age: 81
End: 2021-10-08
Payer: MEDICARE

## 2021-10-08 ENCOUNTER — RESULT REVIEW (OUTPATIENT)
Age: 81
End: 2021-10-08

## 2021-10-08 ENCOUNTER — NON-APPOINTMENT (OUTPATIENT)
Age: 81
End: 2021-10-08

## 2021-10-08 PROCEDURE — 99203 OFFICE O/P NEW LOW 30 MIN: CPT

## 2021-10-12 ENCOUNTER — APPOINTMENT (OUTPATIENT)
Dept: INTERNAL MEDICINE | Facility: CLINIC | Age: 81
End: 2021-10-12
Payer: MEDICARE

## 2021-10-12 ENCOUNTER — APPOINTMENT (OUTPATIENT)
Dept: INTERNAL MEDICINE | Facility: CLINIC | Age: 81
End: 2021-10-12

## 2021-10-12 VITALS — SYSTOLIC BLOOD PRESSURE: 120 MMHG | HEART RATE: 72 BPM | DIASTOLIC BLOOD PRESSURE: 60 MMHG | RESPIRATION RATE: 16 BRPM

## 2021-10-12 DIAGNOSIS — H61.21 IMPACTED CERUMEN, RIGHT EAR: ICD-10-CM

## 2021-10-12 PROCEDURE — 69209 REMOVE IMPACTED EAR WAX UNI: CPT | Mod: 59

## 2021-10-12 PROCEDURE — G0008: CPT

## 2021-10-12 PROCEDURE — 36415 COLL VENOUS BLD VENIPUNCTURE: CPT

## 2021-10-12 PROCEDURE — 90662 IIV NO PRSV INCREASED AG IM: CPT

## 2021-10-12 PROCEDURE — 99214 OFFICE O/P EST MOD 30 MIN: CPT | Mod: 25

## 2021-10-12 RX ORDER — ASPIRIN 325 MG/1
325 TABLET, FILM COATED ORAL EVERY 6 HOURS
Refills: 0 | Status: DISCONTINUED | COMMUNITY
Start: 2017-01-09 | End: 2021-10-12

## 2021-10-12 NOTE — ASSESSMENT
[FreeTextEntry1] : Pt with above medical issues.\par Indurated areas have clearly resolved - unclear etiology\par Wax removed\par Bloods drawn in office.\par Meds to be adjusted.

## 2021-10-12 NOTE — HISTORY OF PRESENT ILLNESS
[FreeTextEntry1] : FU for hyperlipidemia, elevated PSA, and dermal issues which are much better\par BPH better - Alfuzosin helping - nocturia x 1\par Legs feel better\par

## 2021-10-12 NOTE — PHYSICAL EXAM
[Normal Sclera/Conjunctiva] : normal sclera/conjunctiva [Normal TMs] : both tympanic membranes were normal [No Edema] : there was no peripheral edema [Normal] : no posterior cervical lymphadenopathy and no anterior cervical lymphadenopathy [de-identified] : Right canal impacted with wax - irrigated and wax removed [de-identified] : Resolved indurated areas

## 2021-10-13 ENCOUNTER — NON-APPOINTMENT (OUTPATIENT)
Age: 81
End: 2021-10-13

## 2021-10-13 ENCOUNTER — APPOINTMENT (OUTPATIENT)
Dept: ULTRASOUND IMAGING | Facility: CLINIC | Age: 81
End: 2021-10-13
Payer: MEDICARE

## 2021-10-13 ENCOUNTER — OUTPATIENT (OUTPATIENT)
Dept: OUTPATIENT SERVICES | Facility: HOSPITAL | Age: 81
LOS: 1 days | End: 2021-10-13
Payer: MEDICARE

## 2021-10-13 DIAGNOSIS — R22.9 LOCALIZED SWELLING, MASS AND LUMP, UNSPECIFIED: ICD-10-CM

## 2021-10-13 LAB
ALBUMIN SERPL ELPH-MCNC: 4 G/DL
ALP BLD-CCNC: 66 U/L
ALT SERPL-CCNC: 16 U/L
ANION GAP SERPL CALC-SCNC: 11 MMOL/L
AST SERPL-CCNC: 16 U/L
BASOPHILS # BLD AUTO: 0.05 K/UL
BASOPHILS NFR BLD AUTO: 0.7 %
BILIRUB SERPL-MCNC: 0.5 MG/DL
BUN SERPL-MCNC: 24 MG/DL
CALCIUM SERPL-MCNC: 9.1 MG/DL
CHLORIDE SERPL-SCNC: 100 MMOL/L
CHOLEST SERPL-MCNC: 136 MG/DL
CO2 SERPL-SCNC: 28 MMOL/L
CREAT SERPL-MCNC: 1.11 MG/DL
EOSINOPHIL # BLD AUTO: 0.11 K/UL
EOSINOPHIL NFR BLD AUTO: 1.4 %
ERYTHROCYTE [SEDIMENTATION RATE] IN BLOOD BY WESTERGREN METHOD: 39 MM/HR
GLUCOSE SERPL-MCNC: 88 MG/DL
HCT VFR BLD CALC: 39.3 %
HDLC SERPL-MCNC: 49 MG/DL
HGB BLD-MCNC: 12.8 G/DL
IMM GRANULOCYTES NFR BLD AUTO: 0.4 %
LDLC SERPL CALC-MCNC: 69 MG/DL
LYMPHOCYTES # BLD AUTO: 1.5 K/UL
LYMPHOCYTES NFR BLD AUTO: 19.7 %
MAN DIFF?: NORMAL
MCHC RBC-ENTMCNC: 32.6 GM/DL
MCHC RBC-ENTMCNC: 33.5 PG
MCV RBC AUTO: 102.9 FL
MONOCYTES # BLD AUTO: 0.78 K/UL
MONOCYTES NFR BLD AUTO: 10.3 %
NEUTROPHILS # BLD AUTO: 5.13 K/UL
NEUTROPHILS NFR BLD AUTO: 67.5 %
NONHDLC SERPL-MCNC: 87 MG/DL
PLATELET # BLD AUTO: 192 K/UL
POTASSIUM SERPL-SCNC: 4.6 MMOL/L
PROT SERPL-MCNC: 7.9 G/DL
PSA SERPL-MCNC: 11.3 NG/ML
RBC # BLD: 3.82 M/UL
RBC # FLD: 14.3 %
SODIUM SERPL-SCNC: 139 MMOL/L
TRIGL SERPL-MCNC: 86 MG/DL
WBC # FLD AUTO: 7.6 K/UL

## 2021-10-13 PROCEDURE — 76882 US LMTD JT/FCL EVL NVASC XTR: CPT

## 2021-10-13 PROCEDURE — 76882 US LMTD JT/FCL EVL NVASC XTR: CPT | Mod: 26,RT

## 2021-10-13 PROCEDURE — 76882 US LMTD JT/FCL EVL NVASC XTR: CPT | Mod: 26,LT

## 2021-10-14 ENCOUNTER — TRANSCRIPTION ENCOUNTER (OUTPATIENT)
Age: 81
End: 2021-10-14

## 2021-10-14 ENCOUNTER — NON-APPOINTMENT (OUTPATIENT)
Age: 81
End: 2021-10-14

## 2021-11-03 NOTE — HEALTH RISK ASSESSMENT
[Good] : ~his/her~  mood as  good [No] : No [No falls in past year] : Patient reported no falls in the past year [Patient declined colonoscopy] : Patient declined colonoscopy [None] : None [With Family] : lives with family [Retired] : retired [] :  [Feels Safe at Home] : Feels safe at home [Fully functional (bathing, dressing, toileting, transferring, walking, feeding)] : Fully functional (bathing, dressing, toileting, transferring, walking, feeding) [Fully functional (using the telephone, shopping, preparing meals, housekeeping, doing laundry, using] : Fully functional and needs no help or supervision to perform IADLs (using the telephone, shopping, preparing meals, housekeeping, doing laundry, using transportation, managing medications and managing finances) [Smoke Detector] : smoke detector [Carbon Monoxide Detector] : carbon monoxide detector [Seat Belt] :  uses seat belt [With Patient/Caregiver] : With Patient/Caregiver [Designated Healthcare Proxy] : Designated healthcare proxy [Name: ___] : Health Care Proxy's Name: [unfilled]  [] : No [de-identified] : Walking [de-identified] : Low fat [[facility]] : 10 [Change in mental status noted] : No change in mental status noted [Sunscreen] : does not use sunscreen [FreeTextEntry2] : Rund [employer] [facility] [AdvancecareDate] : 03/21 [FreeTextEntry4] : Had long discussion with the patient.\par Discussed with pt the need for a health care proxy.\par Discussed who can be a proxy, forms given if needed, and the need for the proxy to know their wishes and to make sure they will comply.\par The proxy will need to have a copy in their home and the patient should have a copy.\par \par  Dressing: dry sterile dressing

## 2022-03-07 ENCOUNTER — APPOINTMENT (OUTPATIENT)
Dept: INTERNAL MEDICINE | Facility: CLINIC | Age: 82
End: 2022-03-07
Payer: MEDICARE

## 2022-03-07 VITALS
RESPIRATION RATE: 16 BRPM | HEART RATE: 70 BPM | WEIGHT: 191 LBS | SYSTOLIC BLOOD PRESSURE: 118 MMHG | DIASTOLIC BLOOD PRESSURE: 60 MMHG | HEIGHT: 73 IN | BODY MASS INDEX: 25.31 KG/M2

## 2022-03-07 PROCEDURE — 99213 OFFICE O/P EST LOW 20 MIN: CPT | Mod: 25

## 2022-03-07 PROCEDURE — 36415 COLL VENOUS BLD VENIPUNCTURE: CPT

## 2022-03-07 NOTE — PHYSICAL EXAM
[Normal] : no acute distress, well nourished, well developed and well-appearing [Normal Sclera/Conjunctiva] : normal sclera/conjunctiva [Normal TMs] : both tympanic membranes were normal [de-identified] : Large left inguinal hernia - mildly tender. Not incarerated

## 2022-03-07 NOTE — ASSESSMENT
[FreeTextEntry1] : Pt with large left hernia for years - now agrees that it is time to repair\par Given surgeons name\par Bloods drawn in office for upcoming physical.

## 2022-03-08 LAB
25(OH)D3 SERPL-MCNC: 25.1 NG/ML
ALBUMIN SERPL ELPH-MCNC: 4.4 G/DL
ALP BLD-CCNC: 70 U/L
ALT SERPL-CCNC: 17 U/L
ANION GAP SERPL CALC-SCNC: 12 MMOL/L
APPEARANCE: CLEAR
AST SERPL-CCNC: 18 U/L
BASOPHILS # BLD AUTO: 0.03 K/UL
BASOPHILS NFR BLD AUTO: 0.4 %
BILIRUB SERPL-MCNC: 0.6 MG/DL
BILIRUBIN URINE: NEGATIVE
BLOOD URINE: NEGATIVE
BUN SERPL-MCNC: 28 MG/DL
CALCIUM SERPL-MCNC: 9.2 MG/DL
CHLORIDE SERPL-SCNC: 105 MMOL/L
CHOLEST SERPL-MCNC: 140 MG/DL
CO2 SERPL-SCNC: 23 MMOL/L
COLOR: YELLOW
CREAT SERPL-MCNC: 1.12 MG/DL
EGFR: 66 ML/MIN/1.73M2
EOSINOPHIL # BLD AUTO: 0.11 K/UL
EOSINOPHIL NFR BLD AUTO: 1.3 %
GLUCOSE QUALITATIVE U: NEGATIVE
GLUCOSE SERPL-MCNC: 90 MG/DL
HCT VFR BLD CALC: 40.3 %
HDLC SERPL-MCNC: 54 MG/DL
HGB BLD-MCNC: 12.9 G/DL
IMM GRANULOCYTES NFR BLD AUTO: 0.2 %
KETONES URINE: NEGATIVE
LDLC SERPL CALC-MCNC: 73 MG/DL
LEUKOCYTE ESTERASE URINE: NEGATIVE
LYMPHOCYTES # BLD AUTO: 2.45 K/UL
LYMPHOCYTES NFR BLD AUTO: 28.9 %
MAN DIFF?: NORMAL
MCHC RBC-ENTMCNC: 31.8 PG
MCHC RBC-ENTMCNC: 32 GM/DL
MCV RBC AUTO: 99.3 FL
MONOCYTES # BLD AUTO: 0.88 K/UL
MONOCYTES NFR BLD AUTO: 10.4 %
NEUTROPHILS # BLD AUTO: 5 K/UL
NEUTROPHILS NFR BLD AUTO: 58.8 %
NITRITE URINE: NEGATIVE
NONHDLC SERPL-MCNC: 87 MG/DL
PH URINE: 5.5
PLATELET # BLD AUTO: 200 K/UL
POTASSIUM SERPL-SCNC: 4.4 MMOL/L
PROT SERPL-MCNC: 8.2 G/DL
PROTEIN URINE: NORMAL
PSA SERPL-MCNC: 11.6 NG/ML
RBC # BLD: 4.06 M/UL
RBC # FLD: 14.2 %
SODIUM SERPL-SCNC: 140 MMOL/L
SPECIFIC GRAVITY URINE: 1.03
TRIGL SERPL-MCNC: 70 MG/DL
TSH SERPL-ACNC: 3.03 UIU/ML
UROBILINOGEN URINE: NORMAL
WBC # FLD AUTO: 8.49 K/UL

## 2022-03-24 ENCOUNTER — RX RENEWAL (OUTPATIENT)
Age: 82
End: 2022-03-24

## 2022-03-30 ENCOUNTER — APPOINTMENT (OUTPATIENT)
Dept: SURGERY | Facility: CLINIC | Age: 82
End: 2022-03-30
Payer: MEDICARE

## 2022-03-30 VITALS
TEMPERATURE: 97 F | WEIGHT: 190 LBS | RESPIRATION RATE: 18 BRPM | SYSTOLIC BLOOD PRESSURE: 128 MMHG | DIASTOLIC BLOOD PRESSURE: 70 MMHG | BODY MASS INDEX: 25.18 KG/M2 | HEIGHT: 73 IN | OXYGEN SATURATION: 99 % | HEART RATE: 69 BPM

## 2022-03-30 PROCEDURE — 99204 OFFICE O/P NEW MOD 45 MIN: CPT

## 2022-03-30 NOTE — CONSULT LETTER
[Dear  ___] : Dear ~ANA, [FreeTextEntry2] : Dr. Piter Patricio [FreeTextEntry1] : At your recommendation I saw Rachid Ambrose in the office on March 30th for evaluation of a groin hernia. He stated that he has been aware of a left inguinal hernia for a number of years and that the hernia has gradually enlarged over time. More recently he has been experiencing a greater degree of left groin discomfort but he denied generalized abdominal symptoms or change in bowel habits.\par \par On exam, the abdomen was soft, nondistended and nontender without palpable mass or organomegaly. Examination of the right groin was unremarkable but on the left, a rather large, nontender, bowel containing hernia was present which was reducible with some manipulation. The remainder of the exam was noncontributory.\par \par I discussed the situation with Dr. Ambrose and advised elective repair of his gradually enlarging left inguinal hernia in the ambulatory OR. Once he comes to surgery I will update you on his status and thanks very much for allowing me to participate in this pleasant gentleman's care.

## 2022-03-30 NOTE — PHYSICAL EXAM
[Normal Breath Sounds] : Normal breath sounds [Normal Heart Sounds] : normal heart sounds [No Rash or Lesion] : No rash or lesion [Alert] : alert [Oriented to Person] : oriented to person [Oriented to Place] : oriented to place [Oriented to Time] : oriented to time [Calm] : calm [JVD] : no jugular venous distention  [de-identified] : NAD [de-identified] : Neuro- Cranial nerve grossly intact. Normal gait.  [de-identified] : Soft, nondistended, nontender. No palpable mass or organomegaly. Right groin-  no palpable hernia. Left groin-  fairly large, nontender, bowel containing inguinal hernia, reducible with some manipulation. [de-identified] : ROM WNL

## 2022-03-30 NOTE — HISTORY OF PRESENT ILLNESS
[de-identified] : MAEGAN  is a 82 year  male  here for a consultation for possible let inguinal hernia.  He reports left groin lump which has slowly enlarged over time.  He report occasional discomfort. No generalized abdominal symptoms or change in bowel habits noted.  Denies decreased in appetite. He is able to reduce the bulge. slowly enlarged over time. He denies the use of analgesics.  [de-identified] : Patient has had a known left inguinal hernia for a number of years which has gradually enlarged over time. More recently he has been experiencing an average degree of local discomfort generalized abdominal symptoms or change in bowel habits. Hernia remains readily reducible but patient has noted "gurgling sounds" in the area of the bulge.

## 2022-04-19 ENCOUNTER — APPOINTMENT (OUTPATIENT)
Dept: INTERNAL MEDICINE | Facility: CLINIC | Age: 82
End: 2022-04-19
Payer: MEDICARE

## 2022-04-19 ENCOUNTER — NON-APPOINTMENT (OUTPATIENT)
Age: 82
End: 2022-04-19

## 2022-04-19 VITALS
SYSTOLIC BLOOD PRESSURE: 120 MMHG | HEIGHT: 73 IN | HEART RATE: 60 BPM | BODY MASS INDEX: 25.18 KG/M2 | WEIGHT: 190 LBS | RESPIRATION RATE: 16 BRPM | DIASTOLIC BLOOD PRESSURE: 62 MMHG

## 2022-04-19 PROCEDURE — G0439: CPT

## 2022-04-19 PROCEDURE — 93000 ELECTROCARDIOGRAM COMPLETE: CPT | Mod: 59

## 2022-04-19 PROCEDURE — 99497 ADVNCD CARE PLAN 30 MIN: CPT

## 2022-04-19 PROCEDURE — 99213 OFFICE O/P EST LOW 20 MIN: CPT | Mod: 25

## 2022-04-19 PROCEDURE — G0444 DEPRESSION SCREEN ANNUAL: CPT | Mod: 59

## 2022-04-19 NOTE — REVIEW OF SYSTEMS
[Hearing Loss] : hearing loss [Negative] : Heme/Lymph [FreeTextEntry3] : last optho - 1 year [FreeTextEntry8] : nocturia x 1

## 2022-04-19 NOTE — PHYSICAL EXAM
[No Acute Distress] : no acute distress [Well Nourished] : well nourished [Well Developed] : well developed [Well-Appearing] : well-appearing [Normal Sclera/Conjunctiva] : normal sclera/conjunctiva [PERRL] : pupils equal round and reactive to light [EOMI] : extraocular movements intact [Normal Outer Ear/Nose] : the outer ears and nose were normal in appearance [Normal Oropharynx] : the oropharynx was normal [Normal TMs] : both tympanic membranes were normal [No JVD] : no jugular venous distention [No Lymphadenopathy] : no lymphadenopathy [Supple] : supple [Thyroid Normal, No Nodules] : the thyroid was normal and there were no nodules present [No Respiratory Distress] : no respiratory distress  [No Accessory Muscle Use] : no accessory muscle use [Clear to Auscultation] : lungs were clear to auscultation bilaterally [Normal Rate] : normal rate  [Regular Rhythm] : with a regular rhythm [Normal S1, S2] : normal S1 and S2 [No Murmur] : no murmur heard [No Carotid Bruits] : no carotid bruits [No Abdominal Bruit] : a ~M bruit was not heard ~T in the abdomen [No Varicosities] : no varicosities [Pedal Pulses Present] : the pedal pulses are present [No Edema] : there was no peripheral edema [No Palpable Aorta] : no palpable aorta [No Extremity Clubbing/Cyanosis] : no extremity clubbing/cyanosis [Soft] : abdomen soft [Non Tender] : non-tender [Non-distended] : non-distended [No Masses] : no abdominal mass palpated [No HSM] : no HSM [Normal Bowel Sounds] : normal bowel sounds [Normal Sphincter Tone] : normal sphincter tone [No Mass] : no mass [Penis Abnormality] : normal circumcised penis [Testes Tenderness] : no tenderness of the testes [Testes Mass (___cm)] : there were no testicular masses [Prostate Tenderness] : the prostate was not tender [No Prostate Nodules] : no prostate nodules [Prostate Size ___ (0-4)] : prostate size [unfilled] (scale: 0-4) [Normal Supraclavicular Nodes] : no supraclavicular lymphadenopathy [Normal Axillary Nodes] : no axillary lymphadenopathy [Normal Posterior Cervical Nodes] : no posterior cervical lymphadenopathy [Normal Anterior Cervical Nodes] : no anterior cervical lymphadenopathy [Normal Femoral Nodes] : no femoral lymphadenopathy [Normal Inguinal Nodes] : no inguinal lymphadenopathy [No CVA Tenderness] : no CVA  tenderness [No Spinal Tenderness] : no spinal tenderness [No Joint Swelling] : no joint swelling [Grossly Normal Strength/Tone] : grossly normal strength/tone [No Rash] : no rash [Coordination Grossly Intact] : coordination grossly intact [No Focal Deficits] : no focal deficits [Normal Gait] : normal gait [Deep Tendon Reflexes (DTR)] : deep tendon reflexes were 2+ and symmetric [Normal Affect] : the affect was normal [Alert and Oriented x3] : oriented to person, place, and time [Normal Insight/Judgement] : insight and judgment were intact [de-identified] : Large left inguinal hernia [de-identified] : JUAN DIEGO

## 2022-04-19 NOTE — ASSESSMENT
[FreeTextEntry1] : Pt with above medical issues which requires extra work in addition to the well visit.\par Labs reviewed.\par Meds adjusted.\par Is going to proceed with hernia repair next month.\par Discussed PSA  -pt does not wish to see urology at this time\par To start Vitamin D 1000 units per day\par Health counseling given.\par FU 6 months.

## 2022-04-19 NOTE — HISTORY OF PRESENT ILLNESS
[FreeTextEntry1] : Well visit and follow up for management of:\par Hyperlipidemia - on meds\par GERD  -on meds\par BPH  -on meds

## 2022-05-09 ENCOUNTER — APPOINTMENT (OUTPATIENT)
Dept: INTERNAL MEDICINE | Facility: CLINIC | Age: 82
End: 2022-05-09
Payer: MEDICARE

## 2022-05-09 VITALS
HEIGHT: 73 IN | RESPIRATION RATE: 16 BRPM | BODY MASS INDEX: 24.73 KG/M2 | HEART RATE: 72 BPM | DIASTOLIC BLOOD PRESSURE: 70 MMHG | WEIGHT: 186.6 LBS | SYSTOLIC BLOOD PRESSURE: 120 MMHG

## 2022-05-09 DIAGNOSIS — Z01.818 ENCOUNTER FOR OTHER PREPROCEDURAL EXAMINATION: ICD-10-CM

## 2022-05-09 PROCEDURE — 99214 OFFICE O/P EST MOD 30 MIN: CPT

## 2022-05-09 NOTE — PHYSICAL EXAM
[Normal Sclera/Conjunctiva] : normal sclera/conjunctiva [No JVD] : no jugular venous distention [Supple] : supple [No Carotid Bruits] : no carotid bruits [No Edema] : there was no peripheral edema [Normal] : soft, non-tender, non-distended, no masses palpated, no HSM and normal bowel sounds [No Focal Deficits] : no focal deficits [Alert and Oriented x3] : oriented to person, place, and time

## 2022-05-10 ENCOUNTER — APPOINTMENT (OUTPATIENT)
Dept: SURGERY | Facility: HOSPITAL | Age: 82
End: 2022-05-10

## 2022-05-13 ENCOUNTER — NON-APPOINTMENT (OUTPATIENT)
Age: 82
End: 2022-05-13

## 2022-05-13 DIAGNOSIS — R19.5 OTHER FECAL ABNORMALITIES: ICD-10-CM

## 2022-05-19 NOTE — ASSESSMENT
[Patient Optimized for Surgery] : Patient optimized for surgery [No Further Testing Recommended] : no further testing recommended [Continue medications as is] : Continue current medications [FreeTextEntry4] : Pt is an acceptable candidate for planned surgery.

## 2022-05-19 NOTE — RESULTS/DATA
[] : results reviewed [de-identified] : Incomplete RBBB - no significance [de-identified] : WNL [de-identified] : WNL [de-identified] : WNL

## 2022-05-19 NOTE — CONSULT LETTER
[Dear  ___] : Dear  [unfilled], [Consult Letter:] : I had the pleasure of evaluating your patient, [unfilled]. [Please see my note below.] : Please see my note below. [Consult Closing:] : Thank you very much for allowing me to participate in the care of this patient.  If you have any questions, please do not hesitate to contact me. [Sincerely,] : Sincerely, [FreeTextEntry1] : Pre-Op evaluation. [FreeTextEntry3] : Piter Patricio MD

## 2022-05-19 NOTE — HISTORY OF PRESENT ILLNESS
[No Pertinent Cardiac History] : no history of aortic stenosis, atrial fibrillation, coronary artery disease, recent myocardial infarction, or implantable device/pacemaker [No Pertinent Pulmonary History] : no history of asthma, COPD, sleep apnea, or smoking [No Adverse Anesthesia Reaction] : no adverse anesthesia reaction in self or family member [(Patient denies any chest pain, claudication, dyspnea on exertion, orthopnea, palpitations or syncope)] : Patient denies any chest pain, claudication, dyspnea on exertion, orthopnea, palpitations or syncope [Chronic Anticoagulation] : no chronic anticoagulation [Chronic Kidney Disease] : no chronic kidney disease [Diabetes] : no diabetes [FreeTextEntry1] : Left inguinal hernia repair [FreeTextEntry2] : May 26, 2022 [FreeTextEntry3] : Dr. Martinez

## 2022-05-26 ENCOUNTER — APPOINTMENT (OUTPATIENT)
Dept: SURGERY | Facility: AMBULATORY MEDICAL SERVICES | Age: 82
End: 2022-05-26
Payer: MEDICARE

## 2022-05-26 PROCEDURE — 49505 PRP I/HERN INIT REDUC >5 YR: CPT | Mod: LT

## 2022-05-26 PROCEDURE — 55520 REMOVAL OF SPERM CORD LESION: CPT | Mod: 59,LT

## 2022-06-08 ENCOUNTER — APPOINTMENT (OUTPATIENT)
Dept: SURGERY | Facility: CLINIC | Age: 82
End: 2022-06-08
Payer: MEDICARE

## 2022-06-08 VITALS
RESPIRATION RATE: 16 BRPM | TEMPERATURE: 98 F | OXYGEN SATURATION: 97 % | HEART RATE: 62 BPM | SYSTOLIC BLOOD PRESSURE: 111 MMHG | DIASTOLIC BLOOD PRESSURE: 68 MMHG

## 2022-06-08 PROCEDURE — 99024 POSTOP FOLLOW-UP VISIT: CPT

## 2022-06-08 NOTE — HISTORY OF PRESENT ILLNESS
[de-identified] : Rachid is a 81 y/o male here for post op. S/p left inguinal hernia repair with ventrio patch on 05/26/2022 [de-identified] : Status post left inguinal hernia repair on 5/26/22. At present has no complaints of pain but notes a fair amount of swelling below the incision.

## 2022-06-08 NOTE — PHYSICAL EXAM
[de-identified] : Soft, nondistended, nontender. Left groin incision healing well with very prominent ridge. Repair fully intact. Moderate sized seroma around the external inguinal ring but no tenderness or any signs of infection. Left testicle normal.

## 2022-06-08 NOTE — PLAN
[FreeTextEntry1] : Patient is to gradually liberalize activities as tolerated and return here in 2 weeks for recheck.

## 2022-06-08 NOTE — ASSESSMENT
[FreeTextEntry1] : Status post left femoral hernia repair; normal postop course except for moderate sized seroma.

## 2022-06-22 ENCOUNTER — APPOINTMENT (OUTPATIENT)
Dept: SURGERY | Facility: CLINIC | Age: 82
End: 2022-06-22
Payer: MEDICARE

## 2022-06-22 VITALS
HEART RATE: 58 BPM | TEMPERATURE: 96.6 F | DIASTOLIC BLOOD PRESSURE: 73 MMHG | SYSTOLIC BLOOD PRESSURE: 147 MMHG | RESPIRATION RATE: 15 BRPM | OXYGEN SATURATION: 98 %

## 2022-06-22 DIAGNOSIS — K40.90 UNILATERAL INGUINAL HERNIA, W/OUT OBSTRUCTION OR GANGRENE, NOT SPECIFIED AS RECURRENT: ICD-10-CM

## 2022-06-22 PROCEDURE — 99024 POSTOP FOLLOW-UP VISIT: CPT

## 2022-06-22 NOTE — HISTORY OF PRESENT ILLNESS
[de-identified] : Rachid is a 83 y/o male here for post op. S/p left inguinal hernia repair with ventrio patch on 05/26/2022.  [de-identified] : Status post left inguinal hernia repair on 5/26/22. Course complicated by development of a seroma in the left groin. No decrease in size since last exam.

## 2022-06-22 NOTE — PLAN
[FreeTextEntry1] : Patient to be scheduled for ultrasound and possible aspiration. Return in 2 weeks for recheck.

## 2022-06-22 NOTE — PHYSICAL EXAM
[de-identified] : Soft, nondistended, nontender. Left groin incision clean and healing well with prominent ridge. Repair fully intact. Seroma over pubic tubercle area approximately 6 x 8 cm (no smaller than on prior exam).

## 2022-06-23 ENCOUNTER — APPOINTMENT (OUTPATIENT)
Dept: INTERVENTIONAL RADIOLOGY/VASCULAR | Facility: CLINIC | Age: 82
End: 2022-06-23
Payer: MEDICARE

## 2022-06-23 DIAGNOSIS — L76.34 POSTPROCEDURAL SEROMA OF SKIN AND SUBCUTANEOUS TISSUE FOLLOWING OTHER PROCEDURE: ICD-10-CM

## 2022-06-23 PROCEDURE — XXXXX: CPT | Mod: 1L

## 2022-06-28 ENCOUNTER — OUTPATIENT (OUTPATIENT)
Dept: OUTPATIENT SERVICES | Facility: HOSPITAL | Age: 82
LOS: 1 days | End: 2022-06-28
Payer: MEDICARE

## 2022-06-28 DIAGNOSIS — Z11.52 ENCOUNTER FOR SCREENING FOR COVID-19: ICD-10-CM

## 2022-06-28 LAB — SARS-COV-2 RNA SPEC QL NAA+PROBE: SIGNIFICANT CHANGE UP

## 2022-06-28 PROCEDURE — U0005: CPT

## 2022-06-28 PROCEDURE — C9803: CPT

## 2022-06-28 PROCEDURE — U0003: CPT

## 2022-07-01 ENCOUNTER — RESULT REVIEW (OUTPATIENT)
Age: 82
End: 2022-07-01

## 2022-07-01 ENCOUNTER — OUTPATIENT (OUTPATIENT)
Dept: OUTPATIENT SERVICES | Facility: HOSPITAL | Age: 82
LOS: 1 days | End: 2022-07-01
Payer: MEDICARE

## 2022-07-01 ENCOUNTER — TRANSCRIPTION ENCOUNTER (OUTPATIENT)
Age: 82
End: 2022-07-01

## 2022-07-01 VITALS
DIASTOLIC BLOOD PRESSURE: 77 MMHG | RESPIRATION RATE: 16 BRPM | OXYGEN SATURATION: 100 % | HEART RATE: 67 BPM | HEIGHT: 73 IN | WEIGHT: 190.04 LBS | TEMPERATURE: 98 F | SYSTOLIC BLOOD PRESSURE: 134 MMHG

## 2022-07-01 DIAGNOSIS — T81.89XA OTHER COMPLICATIONS OF PROCEDURES, NOT ELSEWHERE CLASSIFIED, INITIAL ENCOUNTER: ICD-10-CM

## 2022-07-01 DIAGNOSIS — L76.34 POSTPROCEDURAL SEROMA OF SKIN AND SUBCUTANEOUS TISSUE FOLLOWING OTHER PROCEDURE: ICD-10-CM

## 2022-07-01 LAB
GRAM STN FLD: SIGNIFICANT CHANGE UP
SPECIMEN SOURCE: SIGNIFICANT CHANGE UP

## 2022-07-01 PROCEDURE — 76942 ECHO GUIDE FOR BIOPSY: CPT

## 2022-07-01 PROCEDURE — 87205 SMEAR GRAM STAIN: CPT

## 2022-07-01 PROCEDURE — 87070 CULTURE OTHR SPECIMN AEROBIC: CPT

## 2022-07-01 PROCEDURE — 76942 ECHO GUIDE FOR BIOPSY: CPT | Mod: 26

## 2022-07-01 PROCEDURE — 10160 PNXR ASPIR ABSC HMTMA BULLA: CPT

## 2022-07-01 PROCEDURE — 87075 CULTR BACTERIA EXCEPT BLOOD: CPT

## 2022-07-01 PROCEDURE — C1729: CPT

## 2022-07-01 NOTE — ASU PATIENT PROFILE, ADULT - REASON FOR ADMISSION, PROFILE
Seroma Drainage no vertigo/no nasal congestion/no tinnitus/no hearing difficulty/no ear pain/no dysphagia/no sinus symptoms

## 2022-07-01 NOTE — ASU DISCHARGE PLAN (ADULT/PEDIATRIC) - NS MD DC FALL RISK RISK
For information on Fall & Injury Prevention, visit: [email or web address] OR  [email or web address] OR  https://www.cdc.gov/steadi/patient.html

## 2022-07-01 NOTE — ASU DISCHARGE PLAN (ADULT/PEDIATRIC) - ASU DC SPECIAL INSTRUCTIONSFT
You had the fluid collection in your groin aspirated. If it comes back, please contact our office for a repeat drainage.

## 2022-07-01 NOTE — PROCEDURE NOTE - PROCEDURE FINDINGS AND DETAILS
Successful right inguinal fluid aspiration yielding 200 mL serosanginous fluid. Patient deferred drain placement.

## 2022-07-01 NOTE — PRE PROCEDURE NOTE - PRE PROCEDURE EVALUATION
Interventional Radiology Pre Procedure Note    HPI: 82y Male with post operative fluid collection. Drainage is requested.     Allergies:   Medications (Abx/Cardiac/Anticoagulation/Blood Products)      Data:  185.4  86.2  T(C): 36.6  HR: 67  BP: 134/77  RR: 16  SpO2: 100%    Exam  General: No acute distress  Chest: Non labored breathing    Plan: 82y Male presents for post operative fluid collection. Procedure and risks discussed with patient and he is agreeable to proceed.   -Risks/Benefits/alternatives explained with the patient and/or healthcare proxy and witnessed informed consent obtained.

## 2022-07-01 NOTE — ASU DISCHARGE PLAN (ADULT/PEDIATRIC) - NURSING INSTRUCTIONS
Please feel free to contact us at [phone] if any problems arise. After 6PM, Monday through Friday, on weekends and on holidays, please call [phone] and ask for the radiology resident on call to be paged. 1.Ongoing staff support and encouragement

## 2022-07-06 LAB
CULTURE RESULTS: SIGNIFICANT CHANGE UP
SPECIMEN SOURCE: SIGNIFICANT CHANGE UP

## 2022-07-07 ENCOUNTER — OUTPATIENT (OUTPATIENT)
Dept: OUTPATIENT SERVICES | Facility: HOSPITAL | Age: 82
LOS: 1 days | End: 2022-07-07
Payer: MEDICARE

## 2022-07-07 DIAGNOSIS — Z11.52 ENCOUNTER FOR SCREENING FOR COVID-19: ICD-10-CM

## 2022-07-07 LAB
BASOPHILS # BLD AUTO: 0.03 K/UL
BASOPHILS NFR BLD AUTO: 0.3 %
EOSINOPHIL # BLD AUTO: 0.11 K/UL
EOSINOPHIL NFR BLD AUTO: 1.2 %
HCT VFR BLD CALC: 37.7 %
HGB BLD-MCNC: 11.9 G/DL
IMM GRANULOCYTES NFR BLD AUTO: 0.4 %
LYMPHOCYTES # BLD AUTO: 2.92 K/UL
LYMPHOCYTES NFR BLD AUTO: 31.7 %
MAN DIFF?: NORMAL
MCHC RBC-ENTMCNC: 31.4 PG
MCHC RBC-ENTMCNC: 31.6 GM/DL
MCV RBC AUTO: 99.5 FL
MONOCYTES # BLD AUTO: 1.04 K/UL
MONOCYTES NFR BLD AUTO: 11.3 %
NEUTROPHILS # BLD AUTO: 5.06 K/UL
NEUTROPHILS NFR BLD AUTO: 55.1 %
PLATELET # BLD AUTO: 199 K/UL
RBC # BLD: 3.79 M/UL
RBC # FLD: 14.2 %
SARS-COV-2 RNA SPEC QL NAA+PROBE: SIGNIFICANT CHANGE UP
WBC # FLD AUTO: 9.2 K/UL

## 2022-07-07 PROCEDURE — C9803: CPT

## 2022-07-07 PROCEDURE — U0005: CPT

## 2022-07-07 PROCEDURE — U0003: CPT

## 2022-07-15 PROBLEM — E78.5 HYPERLIPIDEMIA, UNSPECIFIED: Chronic | Status: ACTIVE | Noted: 2022-07-08

## 2022-07-17 ENCOUNTER — OUTPATIENT (OUTPATIENT)
Dept: OUTPATIENT SERVICES | Facility: HOSPITAL | Age: 82
LOS: 1 days | End: 2022-07-17
Payer: MEDICARE

## 2022-07-17 DIAGNOSIS — Z11.52 ENCOUNTER FOR SCREENING FOR COVID-19: ICD-10-CM

## 2022-07-17 LAB — SARS-COV-2 RNA SPEC QL NAA+PROBE: SIGNIFICANT CHANGE UP

## 2022-07-17 PROCEDURE — U0005: CPT

## 2022-07-17 PROCEDURE — C9803: CPT

## 2022-07-17 PROCEDURE — U0003: CPT

## 2022-07-20 ENCOUNTER — TRANSCRIPTION ENCOUNTER (OUTPATIENT)
Age: 82
End: 2022-07-20

## 2022-07-20 ENCOUNTER — RESULT REVIEW (OUTPATIENT)
Age: 82
End: 2022-07-20

## 2022-07-20 ENCOUNTER — OUTPATIENT (OUTPATIENT)
Dept: OUTPATIENT SERVICES | Facility: HOSPITAL | Age: 82
LOS: 1 days | End: 2022-07-20
Payer: MEDICARE

## 2022-07-20 VITALS
OXYGEN SATURATION: 97 % | TEMPERATURE: 98 F | HEART RATE: 65 BPM | RESPIRATION RATE: 19 BRPM | HEIGHT: 73 IN | WEIGHT: 190.04 LBS

## 2022-07-20 DIAGNOSIS — L76.34 POSTPROCEDURAL SEROMA OF SKIN AND SUBCUTANEOUS TISSUE FOLLOWING OTHER PROCEDURE: ICD-10-CM

## 2022-07-20 PROCEDURE — 10160 PNXR ASPIR ABSC HMTMA BULLA: CPT

## 2022-07-20 PROCEDURE — 76942 ECHO GUIDE FOR BIOPSY: CPT | Mod: 26

## 2022-07-20 PROCEDURE — C1769: CPT

## 2022-07-20 PROCEDURE — 76942 ECHO GUIDE FOR BIOPSY: CPT

## 2022-07-20 PROCEDURE — C1729: CPT

## 2022-07-20 NOTE — ASU PATIENT PROFILE, ADULT - FALL HARM RISK - UNIVERSAL INTERVENTIONS
Bed in lowest position, wheels locked, appropriate side rails in place/Call bell, personal items and telephone in reach/Instruct patient to call for assistance before getting out of bed or chair/Non-slip footwear when patient is out of bed/[place] to call system/Physically safe environment - no spills, clutter or unnecessary equipment/Purposeful Proactive Rounding/Room/bathroom lighting operational, light cord in reach

## 2022-07-20 NOTE — ASU DISCHARGE PLAN (ADULT/PEDIATRIC) - NS MD DC FALL RISK RISK
For information on Fall & Injury Prevention, visit: [email or web address] OR  [email or web address] OR  https://www.cdc.gov/steadi/patient.html

## 2022-07-20 NOTE — ASU DISCHARGE PLAN (ADULT/PEDIATRIC) - NURSING INSTRUCTIONS
Please feel free to contact us at [phone] if any problems arise. After 6PM, Monday through Friday, on weekends and on holidays, please call [phone] and ask for the radiology resident on call to be paged.

## 2022-07-20 NOTE — ASU DISCHARGE PLAN (ADULT/PEDIATRIC) - ASU DC SPECIAL INSTRUCTIONSFT
Fluid collection aspiration    Discharge Instructions  - You have had an aspiration of a fluid collection.  - You may shower in 48 hours. No soaking or swimming until the site is completely healed.  - Keep the area covered and dry for the next 48 hours.  - Do not perform any heavy lifting for the next few days or until the site is healed.  - You may resume your normal diet.  - It is normal to experience some pain over the site for the next few days. You may take apply ice to the area (20 minutes on, 20 minutes off) and take Tylenol for that pain. Do not take more frequently than every 6 hours and do not exceed more than 3000mg of Tylenol in a 24 hour period.    Notify your primary physician and/or Interventional Radiology IMMEDIATELY if you experience any of the following       - Fever of 100.4F or 38C       - Chills or Rigors/ Shakes       - Swelling and/or Redness in the area around the catheter removal site       - Worsening Pain       - Blood soaked bandages or worsening bleeding       - Lightheadedness and/or dizziness upon standing       - Chest Pain/ Tightness       - Shortness of Breath       - Difficulty walking    If you have a problem that you believe requires IMMEDIATE attention, please go to your NEAREST Emergency Room. If you believe your problem can safely wait until you speak to a physician, please call Interventional Radiology for any concerns.    During Normal Weekday Business Hours- You can contact the Interventional Radiology department during normal business hours via telephone.  During Evenings and Weekends- If you need to contact Interventional Radiology during off hours, do so by calling the hospital and requesting to be connected to the Interventional Radiologist on call.

## 2022-07-20 NOTE — PRE PROCEDURE NOTE - PRE PROCEDURE EVALUATION
Interventional Radiology    HPI: 82y Male with   Presents to IR for left inguinal seroma aspiration.    Allergies: penicillin (Other)    Medications (Abx/Cardiac/Anticoagulation/Blood Products)      Data:  185.4  86.2  T(C): 36.6  HR: 65  BP: --  RR: 19  SpO2: 97%    Postprocedural seroma of skin and subcutaneous tissue following other procedure    HLD (hyperlipidemia)    SysAdmin_VstLnk        Exam  General: No acute distress  Chest: Non labored breathing          Plan: 82y Male presents for left inguinal seroma aspiration.  - Discussed with patient regarding recommending drain placement, but patient defers drain placement and would rather have repeat aspirations than a drain. Discussed between patient and Dr. Woods.  -Risks/Benefits/alternatives explained with the patient and/or healthcare proxy and witnessed informed consent obtained.

## 2022-07-20 NOTE — PROCEDURE NOTE - PROCEDURE FINDINGS AND DETAILS
Successful ultrasound guided left inguinal fluid aspiration of loculated collection, yielded total of 70cc dark red serosanguinous fluid.     Patient deferred drain placement. Successful ultrasound guided left inguinal fluid aspiration of loculated collection, yielded total of 70cc serosanguinous fluid.     Patient deferred drain placement.

## 2022-08-29 ENCOUNTER — NON-APPOINTMENT (OUTPATIENT)
Age: 82
End: 2022-08-29

## 2022-09-25 ENCOUNTER — RX RENEWAL (OUTPATIENT)
Age: 82
End: 2022-09-25

## 2022-10-23 ENCOUNTER — NON-APPOINTMENT (OUTPATIENT)
Age: 82
End: 2022-10-23

## 2023-01-09 ENCOUNTER — NON-APPOINTMENT (OUTPATIENT)
Age: 83
End: 2023-01-09

## 2023-03-28 ENCOUNTER — NON-APPOINTMENT (OUTPATIENT)
Age: 83
End: 2023-03-28

## 2023-04-06 ENCOUNTER — RX RENEWAL (OUTPATIENT)
Age: 83
End: 2023-04-06

## 2023-06-23 ENCOUNTER — APPOINTMENT (OUTPATIENT)
Dept: INTERNAL MEDICINE | Facility: CLINIC | Age: 83
End: 2023-06-23
Payer: MEDICARE

## 2023-06-23 PROCEDURE — 36415 COLL VENOUS BLD VENIPUNCTURE: CPT

## 2023-06-24 LAB
25(OH)D3 SERPL-MCNC: 31.8 NG/ML
ALBUMIN SERPL ELPH-MCNC: 4.2 G/DL
ALP BLD-CCNC: 63 U/L
ALT SERPL-CCNC: 27 U/L
ANION GAP SERPL CALC-SCNC: 10 MMOL/L
APPEARANCE: CLEAR
AST SERPL-CCNC: 23 U/L
BASOPHILS # BLD AUTO: 0.04 K/UL
BASOPHILS NFR BLD AUTO: 0.5 %
BILIRUB SERPL-MCNC: 0.7 MG/DL
BILIRUBIN URINE: NEGATIVE
BLOOD URINE: NEGATIVE
BUN SERPL-MCNC: 31 MG/DL
CALCIUM SERPL-MCNC: 9.5 MG/DL
CHLORIDE SERPL-SCNC: 104 MMOL/L
CHOLEST SERPL-MCNC: 146 MG/DL
CO2 SERPL-SCNC: 27 MMOL/L
COLOR: YELLOW
CREAT SERPL-MCNC: 1.09 MG/DL
EGFR: 67 ML/MIN/1.73M2
EOSINOPHIL # BLD AUTO: 0.13 K/UL
EOSINOPHIL NFR BLD AUTO: 1.6 %
FERRITIN SERPL-MCNC: 180 NG/ML
GLUCOSE QUALITATIVE U: NEGATIVE MG/DL
GLUCOSE SERPL-MCNC: 93 MG/DL
HCT VFR BLD CALC: 39.9 %
HDLC SERPL-MCNC: 57 MG/DL
HGB BLD-MCNC: 12.8 G/DL
IMM GRANULOCYTES NFR BLD AUTO: 0.5 %
IRON SATN MFR SERPL: 26 %
IRON SERPL-MCNC: 78 UG/DL
KETONES URINE: NEGATIVE MG/DL
LDLC SERPL CALC-MCNC: 77 MG/DL
LEUKOCYTE ESTERASE URINE: NEGATIVE
LYMPHOCYTES # BLD AUTO: 2.2 K/UL
LYMPHOCYTES NFR BLD AUTO: 26.8 %
MAN DIFF?: NORMAL
MCHC RBC-ENTMCNC: 32.1 GM/DL
MCHC RBC-ENTMCNC: 32.6 PG
MCV RBC AUTO: 101.5 FL
MONOCYTES # BLD AUTO: 0.78 K/UL
MONOCYTES NFR BLD AUTO: 9.5 %
NEUTROPHILS # BLD AUTO: 5.03 K/UL
NEUTROPHILS NFR BLD AUTO: 61.1 %
NITRITE URINE: NEGATIVE
NONHDLC SERPL-MCNC: 89 MG/DL
PH URINE: 5.5
PLATELET # BLD AUTO: 166 K/UL
POTASSIUM SERPL-SCNC: 4.4 MMOL/L
PROT SERPL-MCNC: 8.2 G/DL
PROTEIN URINE: NEGATIVE MG/DL
PSA SERPL-MCNC: 14.3 NG/ML
RBC # BLD: 3.93 M/UL
RBC # FLD: 14.9 %
SODIUM SERPL-SCNC: 142 MMOL/L
SPECIFIC GRAVITY URINE: 1.02
T4 SERPL-MCNC: 5.8 UG/DL
TIBC SERPL-MCNC: 303 UG/DL
TRIGL SERPL-MCNC: 60 MG/DL
TSH SERPL-ACNC: 2.54 UIU/ML
UIBC SERPL-MCNC: 225 UG/DL
UROBILINOGEN URINE: 0.2 MG/DL
WBC # FLD AUTO: 8.22 K/UL

## 2023-06-27 ENCOUNTER — APPOINTMENT (OUTPATIENT)
Dept: INTERNAL MEDICINE | Facility: CLINIC | Age: 83
End: 2023-06-27
Payer: MEDICARE

## 2023-06-27 ENCOUNTER — NON-APPOINTMENT (OUTPATIENT)
Age: 83
End: 2023-06-27

## 2023-06-27 VITALS
BODY MASS INDEX: 24.52 KG/M2 | WEIGHT: 185 LBS | DIASTOLIC BLOOD PRESSURE: 70 MMHG | HEIGHT: 73 IN | HEART RATE: 52 BPM | SYSTOLIC BLOOD PRESSURE: 120 MMHG | RESPIRATION RATE: 14 BRPM

## 2023-06-27 DIAGNOSIS — Z00.00 ENCOUNTER FOR GENERAL ADULT MEDICAL EXAMINATION W/OUT ABNORMAL FINDINGS: ICD-10-CM

## 2023-06-27 DIAGNOSIS — N40.0 BENIGN PROSTATIC HYPERPLASIA WITHOUT LOWER URINARY TRACT SYMPMS: ICD-10-CM

## 2023-06-27 DIAGNOSIS — I80.9 PHLEBITIS AND THROMBOPHLEBITIS OF UNSPECIFIED SITE: ICD-10-CM

## 2023-06-27 DIAGNOSIS — E55.9 VITAMIN D DEFICIENCY, UNSPECIFIED: ICD-10-CM

## 2023-06-27 PROCEDURE — 93000 ELECTROCARDIOGRAM COMPLETE: CPT

## 2023-06-27 PROCEDURE — G0439: CPT

## 2023-06-27 PROCEDURE — 99497 ADVNCD CARE PLAN 30 MIN: CPT

## 2023-06-27 PROCEDURE — 99213 OFFICE O/P EST LOW 20 MIN: CPT | Mod: 25

## 2023-06-27 RX ORDER — CHOLECALCIFEROL (VITAMIN D3) 25 MCG
25 MCG TABLET ORAL DAILY
Qty: 90 | Refills: 2 | Status: DISCONTINUED | COMMUNITY
Start: 2022-04-19 | End: 2023-06-27

## 2023-06-27 RX ORDER — MULTIVIT-MIN/FA/LYCOPEN/LUTEIN .4-300-25
TABLET ORAL DAILY
Qty: 90 | Refills: 2 | Status: ACTIVE | COMMUNITY
Start: 2023-06-27

## 2023-06-27 NOTE — REVIEW OF SYSTEMS
[Hearing Loss] : hearing loss [Negative] : Heme/Lymph [FreeTextEntry3] : last optho - 1 year [FreeTextEntry8] : nocturia x 1-2

## 2023-06-27 NOTE — ASSESSMENT
[FreeTextEntry1] : Pt with above medical issues which requires extra work in addition to the well visit.\par Labs reviewed\par meds adjusted\par Can do US of upper extremity vein\par Discussed PSA - pt offered urology opinion - does not want to do yet\par Health counseling given.\par FU 6 months

## 2023-06-27 NOTE — HEALTH RISK ASSESSMENT
[Good] : ~his/her~  mood as  good [No] : No [No falls in past year] : Patient reported no falls in the past year [PHQ-2 Negative - No further assessment needed] : PHQ-2 Negative - No further assessment needed [None] : None [With Family] : lives with family [Retired] : retired [] :  [Feels Safe at Home] : Feels safe at home [Fully functional (bathing, dressing, toileting, transferring, walking, feeding)] : Fully functional (bathing, dressing, toileting, transferring, walking, feeding) [Fully functional (using the telephone, shopping, preparing meals, housekeeping, doing laundry, using] : Fully functional and needs no help or supervision to perform IADLs (using the telephone, shopping, preparing meals, housekeeping, doing laundry, using transportation, managing medications and managing finances) [Smoke Detector] : smoke detector [Carbon Monoxide Detector] : carbon monoxide detector [Seat Belt] :  uses seat belt [Sunscreen] : uses sunscreen [With Patient/Caregiver] : , with patient/caregiver [Designated Healthcare Proxy] : Designated healthcare proxy [Name: ___] : Health Care Proxy's Name: [unfilled]  [Time Spent: ___ minutes] : Time Spent: [unfilled] minutes [Former] : Former [> 15 Years] : > 15 Years [de-identified] : Walking [de-identified] : Low fat [Change in mental status noted] : No change in mental status noted [AdvancecareDate] : 06/23 [FreeTextEntry4] : Had long discussion with the patient.\par Discussed with pt the need for a health care proxy.\par Discussed who can be a proxy, forms given if needed, and the need for the proxy to know their wishes and to make sure they will comply.\par The proxy will need to have a copy in their home and the patient should have a copy.\par \par Copy is in the house - wishes to be discussed.

## 2023-06-27 NOTE — HISTORY OF PRESENT ILLNESS
[FreeTextEntry1] : Well visit and fu\par Hyperlipidemia - on meds\par BPH - on meds\par Low Vit D - on meds

## 2023-06-27 NOTE — PHYSICAL EXAM
[No Acute Distress] : no acute distress [Well Nourished] : well nourished [Well Developed] : well developed [Well-Appearing] : well-appearing [Normal Sclera/Conjunctiva] : normal sclera/conjunctiva [PERRL] : pupils equal round and reactive to light [EOMI] : extraocular movements intact [Normal Outer Ear/Nose] : the outer ears and nose were normal in appearance [Normal Oropharynx] : the oropharynx was normal [Normal TMs] : both tympanic membranes were normal [No JVD] : no jugular venous distention [No Lymphadenopathy] : no lymphadenopathy [Supple] : supple [Thyroid Normal, No Nodules] : the thyroid was normal and there were no nodules present [No Respiratory Distress] : no respiratory distress  [No Accessory Muscle Use] : no accessory muscle use [Clear to Auscultation] : lungs were clear to auscultation bilaterally [Regular Rhythm] : with a regular rhythm [Normal S1, S2] : normal S1 and S2 [No Murmur] : no murmur heard [Bradycardia] : bradycardic [No Carotid Bruits] : no carotid bruits [No Abdominal Bruit] : a ~M bruit was not heard ~T in the abdomen [No Varicosities] : no varicosities [Pedal Pulses Present] : the pedal pulses are present [No Edema] : there was no peripheral edema [No Palpable Aorta] : no palpable aorta [No Extremity Clubbing/Cyanosis] : no extremity clubbing/cyanosis [Soft] : abdomen soft [Non Tender] : non-tender [Non-distended] : non-distended [No Masses] : no abdominal mass palpated [No HSM] : no HSM [Normal Bowel Sounds] : normal bowel sounds [No Hernias] : no hernias [Normal Sphincter Tone] : normal sphincter tone [No Mass] : no mass [Penis Abnormality] : normal circumcised penis [Testes Tenderness] : no tenderness of the testes [Testes Mass (___cm)] : there were no testicular masses [Prostate Tenderness] : the prostate was not tender [No Prostate Nodules] : no prostate nodules [Prostate Size ___ (0-4)] : prostate size [unfilled] (scale: 0-4) [Normal Supraclavicular Nodes] : no supraclavicular lymphadenopathy [Normal Axillary Nodes] : no axillary lymphadenopathy [Normal Posterior Cervical Nodes] : no posterior cervical lymphadenopathy [Normal Anterior Cervical Nodes] : no anterior cervical lymphadenopathy [Normal Inguinal Nodes] : no inguinal lymphadenopathy [Normal Femoral Nodes] : no femoral lymphadenopathy [No CVA Tenderness] : no CVA  tenderness [No Spinal Tenderness] : no spinal tenderness [No Joint Swelling] : no joint swelling [Grossly Normal Strength/Tone] : grossly normal strength/tone [No Rash] : no rash [Coordination Grossly Intact] : coordination grossly intact [No Focal Deficits] : no focal deficits [Normal Gait] : normal gait [Deep Tendon Reflexes (DTR)] : deep tendon reflexes were 2+ and symmetric [Normal Affect] : the affect was normal [Alert and Oriented x3] : oriented to person, place, and time [Normal Insight/Judgement] : insight and judgment were intact [de-identified] : superficial phlebitis in right upper arm - not tender - at an old IV site [de-identified] : JUAN DIEGO

## 2023-07-05 ENCOUNTER — OUTPATIENT (OUTPATIENT)
Dept: OUTPATIENT SERVICES | Facility: HOSPITAL | Age: 83
LOS: 1 days | End: 2023-07-05
Payer: MEDICARE

## 2023-07-05 ENCOUNTER — APPOINTMENT (OUTPATIENT)
Dept: ULTRASOUND IMAGING | Facility: CLINIC | Age: 83
End: 2023-07-05
Payer: MEDICARE

## 2023-07-05 DIAGNOSIS — I80.9 PHLEBITIS AND THROMBOPHLEBITIS OF UNSPECIFIED SITE: ICD-10-CM

## 2023-07-05 PROCEDURE — 93971 EXTREMITY STUDY: CPT

## 2023-07-05 PROCEDURE — 93971 EXTREMITY STUDY: CPT | Mod: 26,RT

## 2023-07-06 ENCOUNTER — NON-APPOINTMENT (OUTPATIENT)
Age: 83
End: 2023-07-06

## 2023-09-20 ENCOUNTER — RX RENEWAL (OUTPATIENT)
Age: 83
End: 2023-09-20

## 2024-01-02 ENCOUNTER — APPOINTMENT (OUTPATIENT)
Dept: INTERNAL MEDICINE | Facility: CLINIC | Age: 84
End: 2024-01-02
Payer: MEDICARE

## 2024-01-02 VITALS — SYSTOLIC BLOOD PRESSURE: 135 MMHG | DIASTOLIC BLOOD PRESSURE: 70 MMHG | RESPIRATION RATE: 14 BRPM | HEART RATE: 60 BPM

## 2024-01-02 DIAGNOSIS — M79.18 MYALGIA, OTHER SITE: ICD-10-CM

## 2024-01-02 DIAGNOSIS — R97.20 ELEVATED PROSTATE, SPECIFIC ANTIGEN [PSA]: ICD-10-CM

## 2024-01-02 DIAGNOSIS — R19.7 DIARRHEA, UNSPECIFIED: ICD-10-CM

## 2024-01-02 PROCEDURE — 99214 OFFICE O/P EST MOD 30 MIN: CPT | Mod: 25

## 2024-01-02 PROCEDURE — 36415 COLL VENOUS BLD VENIPUNCTURE: CPT

## 2024-01-02 NOTE — ASSESSMENT
[FreeTextEntry1] : Pt. with above medical issues To do stoo studies Bloods drawn in office If PSA elevated significantly more  - will get bone scan To see orthopedics

## 2024-01-02 NOTE — PHYSICAL EXAM
[Normal Sclera/Conjunctiva] : normal sclera/conjunctiva [Normal] : soft, non-tender, non-distended, no masses palpated, no HSM and normal bowel sounds [No CVA Tenderness] : no CVA  tenderness [No Spinal Tenderness] : no spinal tenderness [de-identified] : Left patellar tenderness  - not red or swollen  Full motion in left hip. No sciatic notch tenderness

## 2024-01-02 NOTE — HISTORY OF PRESENT ILLNESS
[FreeTextEntry1] : Dog with Giardia - pt having more gas and soft stools and some watery- No fever, chills No BRBPR Using Pepto Bismaol  Right Patella tendon bothering him Trouble bending over at right hip

## 2024-01-03 LAB
ALBUMIN SERPL ELPH-MCNC: 4.4 G/DL
ALP BLD-CCNC: 69 U/L
ALT SERPL-CCNC: 24 U/L
ANION GAP SERPL CALC-SCNC: 13 MMOL/L
AST SERPL-CCNC: 22 U/L
BILIRUB SERPL-MCNC: 0.4 MG/DL
BUN SERPL-MCNC: 27 MG/DL
CALCIUM SERPL-MCNC: 9.7 MG/DL
CHLORIDE SERPL-SCNC: 101 MMOL/L
CHOLEST SERPL-MCNC: 154 MG/DL
CK SERPL-CCNC: 122 U/L
CO2 SERPL-SCNC: 26 MMOL/L
CREAT SERPL-MCNC: 1.04 MG/DL
EGFR: 71 ML/MIN/1.73M2
GLUCOSE SERPL-MCNC: 90 MG/DL
HDLC SERPL-MCNC: 55 MG/DL
LDLC SERPL CALC-MCNC: 86 MG/DL
NONHDLC SERPL-MCNC: 99 MG/DL
POTASSIUM SERPL-SCNC: 4.3 MMOL/L
PROT SERPL-MCNC: 8.7 G/DL
PSA SERPL-MCNC: 17.9 NG/ML
SODIUM SERPL-SCNC: 140 MMOL/L
TRIGL SERPL-MCNC: 60 MG/DL

## 2024-01-04 ENCOUNTER — NON-APPOINTMENT (OUTPATIENT)
Age: 84
End: 2024-01-04

## 2024-01-04 ENCOUNTER — TRANSCRIPTION ENCOUNTER (OUTPATIENT)
Age: 84
End: 2024-01-04

## 2024-01-04 LAB
CDIFF BY PCR: NOT DETECTED
GI PCR PANEL: NOT DETECTED

## 2024-01-10 ENCOUNTER — APPOINTMENT (OUTPATIENT)
Dept: NUCLEAR MEDICINE | Facility: IMAGING CENTER | Age: 84
End: 2024-01-10
Payer: MEDICARE

## 2024-01-10 ENCOUNTER — RESULT REVIEW (OUTPATIENT)
Age: 84
End: 2024-01-10

## 2024-01-10 ENCOUNTER — OUTPATIENT (OUTPATIENT)
Dept: OUTPATIENT SERVICES | Facility: HOSPITAL | Age: 84
LOS: 1 days | End: 2024-01-10
Payer: MEDICARE

## 2024-01-10 DIAGNOSIS — R97.20 ELEVATED PROSTATE SPECIFIC ANTIGEN [PSA]: ICD-10-CM

## 2024-01-10 PROCEDURE — 78306 BONE IMAGING WHOLE BODY: CPT | Mod: 26,MH

## 2024-01-10 PROCEDURE — 78306 BONE IMAGING WHOLE BODY: CPT

## 2024-01-10 PROCEDURE — 78830 RP LOCLZJ TUM SPECT W/CT 1: CPT | Mod: 26

## 2024-01-10 PROCEDURE — 78830 RP LOCLZJ TUM SPECT W/CT 1: CPT

## 2024-01-10 PROCEDURE — A9561: CPT

## 2024-01-17 DIAGNOSIS — R94.8 ABNORMAL RESULTS OF FUNCTION STUDIES OF OTHER ORGANS AND SYSTEMS: ICD-10-CM

## 2024-02-12 ENCOUNTER — EMERGENCY (EMERGENCY)
Facility: HOSPITAL | Age: 84
LOS: 0 days | Discharge: HOME | End: 2024-02-12
Attending: EMERGENCY MEDICINE
Payer: MEDICARE

## 2024-02-12 ENCOUNTER — APPOINTMENT (OUTPATIENT)
Dept: INTERNAL MEDICINE | Facility: CLINIC | Age: 84
End: 2024-02-12
Payer: MEDICARE

## 2024-02-12 ENCOUNTER — NON-APPOINTMENT (OUTPATIENT)
Age: 84
End: 2024-02-12

## 2024-02-12 VITALS — HEIGHT: 73 IN | WEIGHT: 184.97 LBS | OXYGEN SATURATION: 95 % | HEART RATE: 82 BPM

## 2024-02-12 VITALS
OXYGEN SATURATION: 95 % | SYSTOLIC BLOOD PRESSURE: 155 MMHG | TEMPERATURE: 98 F | HEART RATE: 74 BPM | DIASTOLIC BLOOD PRESSURE: 79 MMHG | RESPIRATION RATE: 17 BRPM

## 2024-02-12 DIAGNOSIS — K21.9 GASTRO-ESOPHAGEAL REFLUX DISEASE WITHOUT ESOPHAGITIS: ICD-10-CM

## 2024-02-12 DIAGNOSIS — R51.9 HEADACHE, UNSPECIFIED: ICD-10-CM

## 2024-02-12 DIAGNOSIS — G44.52 NEW DAILY PERSISTENT HEADACHE (NDPH): ICD-10-CM

## 2024-02-12 DIAGNOSIS — E78.5 HYPERLIPIDEMIA, UNSPECIFIED: ICD-10-CM

## 2024-02-12 DIAGNOSIS — H53.2 DIPLOPIA: ICD-10-CM

## 2024-02-12 DIAGNOSIS — Z88.0 ALLERGY STATUS TO PENICILLIN: ICD-10-CM

## 2024-02-12 DIAGNOSIS — Z86.16 PERSONAL HISTORY OF COVID-19: ICD-10-CM

## 2024-02-12 DIAGNOSIS — N40.0 BENIGN PROSTATIC HYPERPLASIA WITHOUT LOWER URINARY TRACT SYMPTOMS: ICD-10-CM

## 2024-02-12 LAB
ABO RH CONFIRMATION: SIGNIFICANT CHANGE UP
ALBUMIN SERPL ELPH-MCNC: 2.7 G/DL — LOW (ref 3.3–5)
ALP SERPL-CCNC: 73 U/L — SIGNIFICANT CHANGE UP (ref 40–120)
ALT FLD-CCNC: 31 U/L — SIGNIFICANT CHANGE UP (ref 12–78)
APTT BLD: 30.9 SEC — SIGNIFICANT CHANGE UP (ref 24.5–35.6)
AST SERPL-CCNC: 14 U/L — LOW (ref 15–37)
BASOPHILS # BLD AUTO: 0.03 K/UL — SIGNIFICANT CHANGE UP (ref 0–0.2)
BASOPHILS NFR BLD AUTO: 0.4 % — SIGNIFICANT CHANGE UP (ref 0–2)
BILIRUB SERPL-MCNC: 0.4 MG/DL — SIGNIFICANT CHANGE UP (ref 0.2–1.2)
BLD GP AB SCN SERPL QL: SIGNIFICANT CHANGE UP
BUN SERPL-MCNC: 35 MG/DL — HIGH (ref 7–23)
CALCIUM SERPL-MCNC: 8.4 MG/DL — LOW (ref 8.5–10.1)
CHLORIDE SERPL-SCNC: 110 MMOL/L — HIGH (ref 96–108)
CO2 SERPL-SCNC: 32 MMOL/L — HIGH (ref 22–31)
CREAT SERPL-MCNC: 1.13 MG/DL — SIGNIFICANT CHANGE UP (ref 0.5–1.3)
EGFR: 64 ML/MIN/1.73M2 — SIGNIFICANT CHANGE UP
EOSINOPHIL # BLD AUTO: 0.12 K/UL — SIGNIFICANT CHANGE UP (ref 0–0.5)
EOSINOPHIL NFR BLD AUTO: 1.4 % — SIGNIFICANT CHANGE UP (ref 0–6)
GLUCOSE SERPL-MCNC: 103 MG/DL — HIGH (ref 70–99)
HCT VFR BLD CALC: 31.1 % — LOW (ref 39–50)
HGB BLD-MCNC: 10.2 G/DL — LOW (ref 13–17)
IMM GRANULOCYTES NFR BLD AUTO: 0.5 % — SIGNIFICANT CHANGE UP (ref 0–0.9)
INR BLD: 1.05 RATIO — SIGNIFICANT CHANGE UP (ref 0.85–1.18)
LYMPHOCYTES # BLD AUTO: 1.6 K/UL — SIGNIFICANT CHANGE UP (ref 1–3.3)
LYMPHOCYTES # BLD AUTO: 19.1 % — SIGNIFICANT CHANGE UP (ref 13–44)
MCHC RBC-ENTMCNC: 32.5 PG — SIGNIFICANT CHANGE UP (ref 27–34)
MCHC RBC-ENTMCNC: 32.8 GM/DL — SIGNIFICANT CHANGE UP (ref 32–36)
MCV RBC AUTO: 99 FL — SIGNIFICANT CHANGE UP (ref 80–100)
MONOCYTES # BLD AUTO: 1.12 K/UL — HIGH (ref 0–0.9)
MONOCYTES NFR BLD AUTO: 13.4 % — SIGNIFICANT CHANGE UP (ref 2–14)
NEUTROPHILS # BLD AUTO: 5.46 K/UL — SIGNIFICANT CHANGE UP (ref 1.8–7.4)
NEUTROPHILS NFR BLD AUTO: 65.2 % — SIGNIFICANT CHANGE UP (ref 43–77)
PLATELET # BLD AUTO: 224 K/UL — SIGNIFICANT CHANGE UP (ref 150–400)
POTASSIUM SERPL-MCNC: 4.1 MMOL/L — SIGNIFICANT CHANGE UP (ref 3.5–5.3)
POTASSIUM SERPL-SCNC: 4.1 MMOL/L — SIGNIFICANT CHANGE UP (ref 3.5–5.3)
PROT SERPL-MCNC: 7.5 GM/DL — SIGNIFICANT CHANGE UP (ref 6–8.3)
PROTHROM AB SERPL-ACNC: 11.8 SEC — SIGNIFICANT CHANGE UP (ref 9.5–13)
RBC # BLD: 3.14 M/UL — LOW (ref 4.2–5.8)
RBC # FLD: 14.5 % — SIGNIFICANT CHANGE UP (ref 10.3–14.5)
SODIUM SERPL-SCNC: 142 MMOL/L — SIGNIFICANT CHANGE UP (ref 135–145)
TROPONIN I, HIGH SENSITIVITY RESULT: 10.01 NG/L — SIGNIFICANT CHANGE UP
WBC # BLD: 8.37 K/UL — SIGNIFICANT CHANGE UP (ref 3.8–10.5)
WBC # FLD AUTO: 8.37 K/UL — SIGNIFICANT CHANGE UP (ref 3.8–10.5)

## 2024-02-12 PROCEDURE — 93010 ELECTROCARDIOGRAM REPORT: CPT

## 2024-02-12 PROCEDURE — 85730 THROMBOPLASTIN TIME PARTIAL: CPT

## 2024-02-12 PROCEDURE — 0042T: CPT | Mod: MA

## 2024-02-12 PROCEDURE — 80053 COMPREHEN METABOLIC PANEL: CPT

## 2024-02-12 PROCEDURE — 99442: CPT | Mod: 93

## 2024-02-12 PROCEDURE — 86850 RBC ANTIBODY SCREEN: CPT

## 2024-02-12 PROCEDURE — 70496 CT ANGIOGRAPHY HEAD: CPT | Mod: 26,MA

## 2024-02-12 PROCEDURE — 99285 EMERGENCY DEPT VISIT HI MDM: CPT

## 2024-02-12 PROCEDURE — 36415 COLL VENOUS BLD VENIPUNCTURE: CPT

## 2024-02-12 PROCEDURE — 85610 PROTHROMBIN TIME: CPT

## 2024-02-12 PROCEDURE — 86901 BLOOD TYPING SEROLOGIC RH(D): CPT

## 2024-02-12 PROCEDURE — 70496 CT ANGIOGRAPHY HEAD: CPT | Mod: MA

## 2024-02-12 PROCEDURE — 70498 CT ANGIOGRAPHY NECK: CPT | Mod: 26,MA

## 2024-02-12 PROCEDURE — 86900 BLOOD TYPING SEROLOGIC ABO: CPT

## 2024-02-12 PROCEDURE — 70498 CT ANGIOGRAPHY NECK: CPT | Mod: MA

## 2024-02-12 PROCEDURE — 99285 EMERGENCY DEPT VISIT HI MDM: CPT | Mod: 25

## 2024-02-12 PROCEDURE — 93005 ELECTROCARDIOGRAM TRACING: CPT

## 2024-02-12 PROCEDURE — 84484 ASSAY OF TROPONIN QUANT: CPT

## 2024-02-12 PROCEDURE — 85025 COMPLETE CBC W/AUTO DIFF WBC: CPT

## 2024-02-12 NOTE — ED ADULT TRIAGE NOTE - CHIEF COMPLAINT QUOTE
Pt A&OX3, presenting to the ER with c/o headache and double vision. Pt reports having COVID back on January 20th. On Saturday began having a a diffuse headache and double vision. Spoke with his primary care MD today that advised he come to the ER immediately.   Denies CP, SOB, fevers. Pt A&OX3, presenting to the ER with c/o headache and double vision. Pt reports having COVID back on January 20th. On Saturday began having a a diffuse headache and double vision. Spoke with his primary care MD today that advised he come to the ER immediately.   Denies CP, SOB, fevers.  Taken for EKG

## 2024-02-12 NOTE — ASU PREOP CHECKLIST - HEIGHT IN INCHES
Pt received in intake 2. Pt alert and oriented x 3, ambulatory at baseline. Hx of asthma. Pt c/o congestion, body aches, fever, chills, that began 2 days ago. Respirations even and unlabored, NAD. Pt denies chest pain, shortness of breath, N/V/D, headache, dizziness, weakness, fever, chills,  symptoms. 20G IV in the right AC, labs drawn and pt medicated per MD orders. 1

## 2024-02-12 NOTE — ED PROVIDER NOTE - CARE PROVIDER_API CALL
Piter Patricio  Internal Medicine  [address], [address]  [place] [address]  Phone: [phone]  Fax: [phone]  Follow Up Time:

## 2024-02-12 NOTE — ED ADULT NURSE NOTE - NSICDXPASTMEDICALHX_GEN_ALL_CORE_FT
PAST MEDICAL HISTORY:  HLD (hyperlipidemia)       BPH (benign prostatic hyperplasia)     GERD (gastroesophageal reflux disease)

## 2024-02-12 NOTE — ED ADULT NURSE NOTE - OBJECTIVE STATEMENT
Pt arrived to ED with c/o headache and double vision. Pt states he was dx with covid on January 20th and since then his symptoms have been going away but he started having headaches 2 weeks ago on and off. Pt states today he had double vision and headache. Pt states he has been taking Advil and Tylenol on and off last dose of Tylenol was 500mg at 5pm. Pt AOx4 denies chest pain, sob, fevers, n/v/d, dizziness, or weakness. Pt states he spoke with his primary and they advised him to come to ER. Pt arrived to ED with c/o headache and double vision. Pt states he was dx with covid on January 20th and since then his symptoms have been going away but he started having headaches 2 weeks ago on and off. Pt states for the past 2 days he had double vision and headache. Pt states he has been taking Advil and Tylenol on and off last dose of Tylenol was 500mg at 5pm. Pt AOx4 denies chest pain, sob, fevers, n/v/d, dizziness, or weakness. Pt states he spoke with his primary and they advised him to come to ER.

## 2024-02-12 NOTE — ED PROVIDER NOTE - CLINICAL SUMMARY MEDICAL DECISION MAKING FREE TEXT BOX
Pt presents with double vision and HA. Will get CT, CTA r/o stroke, labs. Pt presents with double vision and HA. Will get CT, CTA r/o stroke, labs.    JG: Labs, CTs reviewed with patient.  Offered admission for MRI but refused.  Will get MRI within 1-2 days with internist or neurologist. Will also follow up with eye doctor and take calcium and aspirin at home.  Due to follow up for PET scan as PSA levels increased outpatient before Covid and thought to have prostate cancer.  Understands he needs to follow up.

## 2024-02-12 NOTE — ED ADULT NURSE NOTE - NSFALLRISKINTERV_ED_ALL_ED

## 2024-02-12 NOTE — ED PROVIDER NOTE - OBJECTIVE STATEMENT
83 y/o M with PMHx of HLD, BPH, GERD presents to the ED c/o HA and double vision. Pt reports that he had COVID on January 20th, but was not on any medication for it. 2 days ago, pt began having a diffuse double vision. Pt endorses having the HA for weeks that is all over. Pt endorses that each individual eye, he can see fine, but when he uses both eyes, he sees double, with the image being side by side. Denies use of contacts. Pt spoke with his PCP today and was advised to come to the ED immediately for evaluation. Pt on Atorvastatin. No hx of TIA or stroke, and no kidney issues. Pt took Tylenol and Advil but no ASA.

## 2024-02-12 NOTE — ED PROVIDER NOTE - PATIENT PORTAL LINK FT
You can access the FollowMyHealth Patient Portal offered by [facility] by registering at the following website: [email or web address]. By joining [employer]’s FollowMyHealth portal, you will also be able to view your health information using other applications (apps) compatible with our system.

## 2024-02-12 NOTE — HISTORY OF PRESENT ILLNESS
[FreeTextEntry1] : Covid 19 [de-identified] : Started with Covid - January 20th - tested positive. Did not treat except for symptoms Got better - in terms of cough, sore throat - not SOB Developed a headache - took Ibuprofen and Tylenol Developed Diplopia Now tests negative for Covid. No loss of field of vision.

## 2024-02-12 NOTE — ED PROVIDER NOTE - NSFOLLOWUPCLINICS_GEN_ALL_ED_FT
[facility]  Ophthalmology  [address] [place] [address], [facility] [address]  [place] [address]  Phone: [phone]  Fax:

## 2024-02-12 NOTE — ED PROVIDER NOTE - NSFOLLOWUPINSTRUCTIONS_ED_ALL_ED_FT
SEE A YOUR INTERNIST OR A NEUROLOGIST WITHIN 1-2 days for outpatient MRI.  Take aspirin 81mg daily.  SEE AN OPHTHALMOLOGIST WITHIN 1-2 days.    Take calcium carbonate (2 tums) due to slightly low calcium.    Return for any returned or worsening symptoms.

## 2024-02-13 ENCOUNTER — NON-APPOINTMENT (OUTPATIENT)
Age: 84
End: 2024-02-13

## 2024-02-15 PROBLEM — K21.9 GASTRO-ESOPHAGEAL REFLUX DISEASE WITHOUT ESOPHAGITIS: Chronic | Status: ACTIVE | Noted: 2024-02-12

## 2024-02-15 PROBLEM — N40.0 BENIGN PROSTATIC HYPERPLASIA WITHOUT LOWER URINARY TRACT SYMPTOMS: Chronic | Status: ACTIVE | Noted: 2024-02-12

## 2024-02-17 ENCOUNTER — APPOINTMENT (OUTPATIENT)
Dept: INTERNAL MEDICINE | Facility: CLINIC | Age: 84
End: 2024-02-17
Payer: MEDICARE

## 2024-02-17 ENCOUNTER — LABORATORY RESULT (OUTPATIENT)
Age: 84
End: 2024-02-17

## 2024-02-17 ENCOUNTER — NON-APPOINTMENT (OUTPATIENT)
Age: 84
End: 2024-02-17

## 2024-02-17 VITALS — HEIGHT: 73 IN | WEIGHT: 180 LBS | BODY MASS INDEX: 23.86 KG/M2

## 2024-02-17 VITALS — RESPIRATION RATE: 14 BRPM | DIASTOLIC BLOOD PRESSURE: 60 MMHG | HEART RATE: 62 BPM | SYSTOLIC BLOOD PRESSURE: 120 MMHG

## 2024-02-17 DIAGNOSIS — G44.201 TENSION-TYPE HEADACHE, UNSPECIFIED, INTRACTABLE: ICD-10-CM

## 2024-02-17 LAB
DATE COLLECTED: NORMAL
HEMOCCULT SP1 STL QL: NEGATIVE

## 2024-02-17 PROCEDURE — 99214 OFFICE O/P EST MOD 30 MIN: CPT

## 2024-02-17 PROCEDURE — 82272 OCCULT BLD FECES 1-3 TESTS: CPT

## 2024-02-17 PROCEDURE — 36415 COLL VENOUS BLD VENIPUNCTURE: CPT

## 2024-02-17 NOTE — HISTORY OF PRESENT ILLNESS
[FreeTextEntry1] : Had Covid - tested positive - 1/20/2024 Developed double vision - 1 week ago and headaches on 1/30/2024 No n,v Was in ER - had anemia and low albumin Has been eating No BRBPR,or melena No fever Occasional chills Saw optho - told of right 6th nerve palsy Still with headaches - using Ibuprofen and Tylenol. Headache is diffuse.

## 2024-02-17 NOTE — ASSESSMENT
[FreeTextEntry1] : Pt. with above medical issues Bloods drawn in office. [facility] believes this is from [facility] Seeing neurology Monday ROCHA in ER was negative with negative CT scans

## 2024-02-17 NOTE — PHYSICAL EXAM
[No Acute Distress] : no acute distress [Well Nourished] : well nourished [Normal Sclera/Conjunctiva] : normal sclera/conjunctiva [PERRL] : pupils equal round and reactive to light [Normal Oropharynx] : the oropharynx was normal [No Edema] : there was no peripheral edema [Normal Sphincter Tone] : normal sphincter tone [No Mass] : no mass [Stool Occult Blood] : stool negative for occult blood [Normal] : no CVA or spinal tenderness [Coordination Grossly Intact] : coordination grossly intact [Normal Gait] : normal gait [Deep Tendon Reflexes (DTR)] : deep tendon reflexes were 2+ and symmetric [Alert and Oriented x3] : oriented to person, place, and time [de-identified] : Right 6th nerve palsy  [de-identified] : No TA prominence or tenderness [de-identified] : right 6th nerve palsy  - otherwise normal

## 2024-02-18 LAB
ALBUMIN SERPL ELPH-MCNC: 3.9 G/DL
ALP BLD-CCNC: 81 U/L
ALT SERPL-CCNC: 23 U/L
ANION GAP SERPL CALC-SCNC: 12 MMOL/L
AST SERPL-CCNC: 22 U/L
BASOPHILS # BLD AUTO: 0.03 K/UL
BASOPHILS NFR BLD AUTO: 0.3 %
BILIRUB SERPL-MCNC: 0.6 MG/DL
BUN SERPL-MCNC: 24 MG/DL
CALCIUM SERPL-MCNC: 9.1 MG/DL
CHLORIDE SERPL-SCNC: 100 MMOL/L
CO2 SERPL-SCNC: 26 MMOL/L
CREAT SERPL-MCNC: 0.85 MG/DL
CRP SERPL-MCNC: 4 MG/L
EGFR: 86 ML/MIN/1.73M2
EOSINOPHIL # BLD AUTO: 0.1 K/UL
EOSINOPHIL NFR BLD AUTO: 1.1 %
ERYTHROCYTE [SEDIMENTATION RATE] IN BLOOD BY WESTERGREN METHOD: 87 MM/HR
FERRITIN SERPL-MCNC: 263 NG/ML
FOLATE SERPL-MCNC: >20 NG/ML
GLUCOSE SERPL-MCNC: 95 MG/DL
HAPTOGLOB SERPL-MCNC: 306 MG/DL
HCT VFR BLD CALC: 33.6 %
HGB BLD-MCNC: 10.9 G/DL
IMM GRANULOCYTES NFR BLD AUTO: 0.7 %
IRON SATN MFR SERPL: 16 %
IRON SERPL-MCNC: 45 UG/DL
LYMPHOCYTES # BLD AUTO: 1.42 K/UL
LYMPHOCYTES NFR BLD AUTO: 16.2 %
MAN DIFF?: NORMAL
MCHC RBC-ENTMCNC: 32.2 PG
MCHC RBC-ENTMCNC: 32.4 GM/DL
MCV RBC AUTO: 99.1 FL
MONOCYTES # BLD AUTO: 0.98 K/UL
MONOCYTES NFR BLD AUTO: 11.2 %
NEUTROPHILS # BLD AUTO: 6.19 K/UL
NEUTROPHILS NFR BLD AUTO: 70.5 %
PLATELET # BLD AUTO: 236 K/UL
POTASSIUM SERPL-SCNC: 3.9 MMOL/L
PROT SERPL-MCNC: 8 G/DL
RBC # BLD: 3.39 M/UL
RBC # BLD: 3.39 M/UL
RBC # FLD: 14.8 %
RETICS # AUTO: 1.8 %
RETICS AGGREG/RBC NFR: 62.4 K/UL
SODIUM SERPL-SCNC: 138 MMOL/L
TIBC SERPL-MCNC: 284 UG/DL
UIBC SERPL-MCNC: 239 UG/DL
VIT B12 SERPL-MCNC: 401 PG/ML
WBC # FLD AUTO: 8.78 K/UL

## 2024-02-20 ENCOUNTER — NON-APPOINTMENT (OUTPATIENT)
Age: 84
End: 2024-02-20

## 2024-02-20 LAB — M PROTEIN SPEC IFE-MCNC: NORMAL

## 2024-02-23 ENCOUNTER — APPOINTMENT (OUTPATIENT)
Dept: MRI IMAGING | Facility: CLINIC | Age: 84
End: 2024-02-23
Payer: MEDICARE

## 2024-02-23 ENCOUNTER — OUTPATIENT (OUTPATIENT)
Dept: OUTPATIENT SERVICES | Facility: HOSPITAL | Age: 84
LOS: 1 days | End: 2024-02-23
Payer: MEDICARE

## 2024-02-23 DIAGNOSIS — R94.8 ABNORMAL RESULTS OF FUNCTION STUDIES OF OTHER ORGANS AND SYSTEMS: ICD-10-CM

## 2024-02-23 PROCEDURE — 73718 MRI LOWER EXTREMITY W/O DYE: CPT | Mod: 26,RT,MH

## 2024-02-23 PROCEDURE — 73718 MRI LOWER EXTREMITY W/O DYE: CPT

## 2024-02-27 ENCOUNTER — NON-APPOINTMENT (OUTPATIENT)
Age: 84
End: 2024-02-27

## 2024-02-28 ENCOUNTER — NON-APPOINTMENT (OUTPATIENT)
Age: 84
End: 2024-02-28

## 2024-03-06 ENCOUNTER — APPOINTMENT (OUTPATIENT)
Dept: OPHTHALMOLOGY | Facility: CLINIC | Age: 84
End: 2024-03-06
Payer: MEDICARE

## 2024-03-06 ENCOUNTER — NON-APPOINTMENT (OUTPATIENT)
Age: 84
End: 2024-03-06

## 2024-03-06 PROCEDURE — 92083 EXTENDED VISUAL FIELD XM: CPT

## 2024-03-06 PROCEDURE — 99204 OFFICE O/P NEW MOD 45 MIN: CPT

## 2024-03-06 PROCEDURE — 92133 CPTRZD OPH DX IMG PST SGM ON: CPT

## 2024-03-07 ENCOUNTER — NON-APPOINTMENT (OUTPATIENT)
Age: 84
End: 2024-03-07

## 2024-03-08 ENCOUNTER — NON-APPOINTMENT (OUTPATIENT)
Age: 84
End: 2024-03-08

## 2024-03-08 ENCOUNTER — APPOINTMENT (OUTPATIENT)
Dept: RHEUMATOLOGY | Facility: CLINIC | Age: 84
End: 2024-03-08
Payer: MEDICARE

## 2024-03-08 VITALS
WEIGHT: 175 LBS | OXYGEN SATURATION: 97 % | HEIGHT: 73 IN | HEART RATE: 75 BPM | SYSTOLIC BLOOD PRESSURE: 134 MMHG | DIASTOLIC BLOOD PRESSURE: 76 MMHG | RESPIRATION RATE: 15 BRPM | BODY MASS INDEX: 23.19 KG/M2

## 2024-03-08 DIAGNOSIS — U07.1 COVID-19: ICD-10-CM

## 2024-03-08 DIAGNOSIS — Z29.89 ENCOUNTER. FOR OTHER SPECIFIED PROPHYLACTIC MEASURES: ICD-10-CM

## 2024-03-08 LAB
ALBUMIN SERPL ELPH-MCNC: 3.9 G/DL
ALP BLD-CCNC: 74 U/L
ALT SERPL-CCNC: 22 U/L
ANION GAP SERPL CALC-SCNC: 13 MMOL/L
AST SERPL-CCNC: 14 U/L
BASOPHILS # BLD AUTO: 0.03 K/UL
BASOPHILS NFR BLD AUTO: 0.2 %
BILIRUB SERPL-MCNC: 0.6 MG/DL
BUN SERPL-MCNC: 29 MG/DL
CALCIUM SERPL-MCNC: 9.2 MG/DL
CHLORIDE SERPL-SCNC: 100 MMOL/L
CO2 SERPL-SCNC: 27 MMOL/L
CREAT SERPL-MCNC: 1.07 MG/DL
CRP SERPL-MCNC: <3 MG/L
EGFR: 68 ML/MIN/1.73M2
EOSINOPHIL # BLD AUTO: 0.01 K/UL
EOSINOPHIL NFR BLD AUTO: 0.1 %
ERYTHROCYTE [SEDIMENTATION RATE] IN BLOOD BY WESTERGREN METHOD: 71 MM/HR
GLUCOSE SERPL-MCNC: 118 MG/DL
HCT VFR BLD CALC: 39 %
HGB BLD-MCNC: 12 G/DL
IMM GRANULOCYTES NFR BLD AUTO: 1.1 %
LYMPHOCYTES # BLD AUTO: 1.01 K/UL
LYMPHOCYTES NFR BLD AUTO: 7.1 %
MAN DIFF?: NORMAL
MCHC RBC-ENTMCNC: 30.7 PG
MCHC RBC-ENTMCNC: 30.8 GM/DL
MCV RBC AUTO: 99.7 FL
MONOCYTES # BLD AUTO: 0.55 K/UL
MONOCYTES NFR BLD AUTO: 3.9 %
NEUTROPHILS # BLD AUTO: 12.48 K/UL
NEUTROPHILS NFR BLD AUTO: 87.6 %
PLATELET # BLD AUTO: 209 K/UL
POTASSIUM SERPL-SCNC: 4.2 MMOL/L
PROT SERPL-MCNC: 7.8 G/DL
RBC # BLD: 3.91 M/UL
RBC # FLD: 15.2 %
SODIUM SERPL-SCNC: 139 MMOL/L
WBC # FLD AUTO: 14.23 K/UL

## 2024-03-08 PROCEDURE — 99204 OFFICE O/P NEW MOD 45 MIN: CPT

## 2024-03-08 PROCEDURE — G2211 COMPLEX E/M VISIT ADD ON: CPT

## 2024-03-09 PROBLEM — U07.1 COVID-19 VIRUS INFECTION: Status: RESOLVED | Noted: 2024-02-12 | Resolved: 2024-03-09

## 2024-03-09 PROBLEM — Z29.89 NEED FOR PNEUMOCYSTIS PROPHYLAXIS: Status: ACTIVE | Noted: 2024-03-09

## 2024-03-09 RX ORDER — CALCIUM CARBONATE/VITAMIN D3 600MG-5MCG
600-5 TABLET ORAL
Qty: 180 | Refills: 0 | Status: ACTIVE | COMMUNITY
Start: 2024-03-09 | End: 1900-01-01

## 2024-03-09 NOTE — PHYSICAL EXAM
[Sclera] : the sclera and conjunctiva were normal [General Appearance - Alert] : alert [General Appearance - In No Acute Distress] : in no acute distress [Outer Ear] : the ears and nose were normal in appearance [Jugular Venous Distention Increased] : there was no jugular-venous distention [] : no respiratory distress [Nasal Cavity] : the nasal mucosa and septum were normal [Heart Rate And Rhythm] : heart rate was normal and rhythm regular [Respiration, Rhythm And Depth] : normal respiratory rhythm and effort [Heart Sounds] : normal S1 and S2 [Edema] : there was no peripheral edema [Abdomen Tenderness] : non-tender [Abdomen Soft] : soft [Axillary Lymph Nodes Enlarged Bilaterally] : axillary [Cervical Lymph Nodes Enlarged Anterior Bilaterally] : anterior cervical [Cervical Lymph Nodes Enlarged Posterior Bilaterally] : posterior cervical [Abnormal Walk] : normal gait [Skin Color & Pigmentation] : normal skin color and pigmentation [Skin Turgor] : normal skin turgor [Oriented To Time, Place, And Person] : oriented to person, place, and time [Impaired Insight] : insight and judgment were intact [No Focal Deficits] : no focal deficits [FreeTextEntry1] : temporal aa palpable but not tender, decreased pulse on right and no pulse on left

## 2024-03-09 NOTE — ASSESSMENT
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 Although no jaw claudication, no temporal tenderness ESR 84-78 and CRP4 on hIgh dose prednisone on and off since 2/21/2024 Extensive vascular srudies with MRA- negative Planned PET scan to evaluate for malignancy   = repeat labs = we discussed the methods of dx of GCA - and importance of temporal artery bx , while risk of low yeild with long exposure to steroids - discussed with [facility] sx -plan to schedule for 3/12/2024 = we discussed the high concern for diagnosis and importance of tx ( steroids and anti ILD to allow lower exposure to steroids - will continue Prednisone 40 mg a day for now - I counseled the patient on side effects and benefits of Actemra We reviewed side effects: infusion reaction, liver toxicity, cytopenia and risk of bowel perforation.  = will need to add PCP priohylaxis = will add ca and VIt D  RTO 2-3 weeks

## 2024-03-09 NOTE — HISTORY OF PRESENT ILLNESS
[FreeTextEntry1] : The patient was referred for rheumatological evaluation to r/o GCA  1/20/24- had covid with moderate upper respiratory symptoms,  1/30/24- developed diffuse mild to moderate headache, began taking advil and Tylenol fairly consistently because of headache 2/10/24- diplopia side to side, mostly on distance. 3/12/24-ER [facility] had CT Head, CT Angio BraiN, CT Angio neck, CT Brain Perfusion Maps Stroke 2/14/24- Ophthalmologist Dr. 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 Patricio: Neuro exam- no deficits, labs drawn 2/18/24- Dr. Patricio: ESR 87; start 60mg Prednisone daily because of possibi of Temporal Arteritis, Prednisone 60mg. 2/19/24- headache gone, diplopia improved 2/19/24- Neurology consult (attached). Prednisone 60 mg. 2/18 to 2/20 Prednisone 60mg daily 2/21 ;. Ev headache returned. Prednisone 40 mg 2/22 Prednisone 20mg 2/23 No Prednisone 2/24- Total loss of vision right eye, lasted approximately half hour; late eye had shade across upper right eyefield, lower remained and returned. No Prednisone 2/25- intermittent loss of vision right eye; scotomata, intermittent diplopia; headache. Called Dr. Garrison- told to stay on Prednisone 60mg daily 2/26- seen in office, told to see Dr. Lowery as quickly as possible. Prednisone 60mg. 2/27- Prednisone 60 mg. Visual defect right eye persists. Intermittent mild headache. MRI of brain and orbits- vascular atherosclerotic changes 2/28- 2/29 - Prednisone 60 mg. 3/1-3/3 - Prednisone 50 mg.   3/6- Had consult with Rosanna Ram: - optic disc atrophy, no edema 3/7- Prednisone 40 mg. ESR 71; CRP less than 3 (all lab results attached) 3/8- Prednisone 40 mg  PMH : Remote history of GI bleed, no history of diverticulitis; BPH; Hyperlipidemia

## 2024-03-09 NOTE — REVIEW OF SYSTEMS
[Fever] : fever [Chills] : chills [Recent Weight Loss (___ Lbs)] : recent [unfilled] ~Ulb weight loss [As noted in HPI] : as noted in HPI [As Noted in HPI] : as noted in HPI [Negative] : Neurological [Feeling Tired] : not feeling tired

## 2024-03-09 NOTE — DATA REVIEWED
[FreeTextEntry1] : MRA chest- no aneurisms MRA of the carotid arteries was performed utilizing time-of-flight technique. The examination was performed on a 1.5T MR system. Comparison: None 3/8/2524 Findings: The right common carotid artery demonstrates an appropriate course and appearance. The carotid bifurcation appears widely patent. The right internal carotid artery is seen to the skull base without evidence of stenosis. The right external carotid artery is within normal limits. The left common carotid artery demonstrates an appropriate course and appearance. The left carotid bifurcation is widely patent. The left internal carotid artery is seen to the skull base without evidence of stenosis. The left external carotid artery is unremarkable. There are bilateral patent vertebral arteries. Impression: No evidence of carotid artery stenosis. Measurement of carotid stenosis are based on parameters that correlate to the residual internal carotid diameter of the proximal and distal vessels in accordance with a method utilized in the [employer] Symptomatic Carotid Endarterectomy Trial (NASCET).

## 2024-03-09 NOTE — CONSULT LETTER
[Dear  ___] : Dear  [unfilled], [Consult Letter:] : I had the pleasure of evaluating your patient, [unfilled]. [Please see my note below.] : Please see my note below. [Sincerely,] : Sincerely, [Consult Closing:] : Thank you very much for allowing me to participate in the care of this patient.  If you have any questions, please do not hesitate to contact me. [FreeTextEntry2] : Harley Marte MD [FreeTextEntry3] : Nicole De La Garza MD Director, [facility],  Rheumatology Division, Department of Medicine ,  [employer]  at [address], [address] [place] [address] Tel: [phone]

## 2024-03-11 LAB
ALBUMIN SERPL ELPH-MCNC: 3.8 G/DL
ALP BLD-CCNC: 66 U/L
ALT SERPL-CCNC: 18 U/L
ANION GAP SERPL CALC-SCNC: 12 MMOL/L
AST SERPL-CCNC: 12 U/L
BASOPHILS # BLD AUTO: 0.02 K/UL
BASOPHILS NFR BLD AUTO: 0.2 %
BILIRUB SERPL-MCNC: 0.5 MG/DL
BUN SERPL-MCNC: 25 MG/DL
CALCIUM SERPL-MCNC: 9.3 MG/DL
CHLORIDE SERPL-SCNC: 100 MMOL/L
CO2 SERPL-SCNC: 28 MMOL/L
CREAT SERPL-MCNC: 0.99 MG/DL
CRP SERPL-MCNC: <3 MG/L
EGFR: 75 ML/MIN/1.73M2
EOSINOPHIL # BLD AUTO: 0 K/UL
EOSINOPHIL NFR BLD AUTO: 0 %
ERYTHROCYTE [SEDIMENTATION RATE] IN BLOOD BY WESTERGREN METHOD: 41 MM/HR
GLUCOSE SERPL-MCNC: 141 MG/DL
HBV CORE IGG+IGM SER QL: REACTIVE
HBV CORE IGM SER QL: NONREACTIVE
HBV SURFACE AB SER QL: REACTIVE
HBV SURFACE AG SER QL: NONREACTIVE
HCT VFR BLD CALC: 37.9 %
HCV AB SER QL: NONREACTIVE
HCV S/CO RATIO: 0.06 S/CO
HGB BLD-MCNC: 12.1 G/DL
IMM GRANULOCYTES NFR BLD AUTO: 1.4 %
LYMPHOCYTES # BLD AUTO: 1.07 K/UL
LYMPHOCYTES NFR BLD AUTO: 8.7 %
MAN DIFF?: NORMAL
MCHC RBC-ENTMCNC: 31.4 PG
MCHC RBC-ENTMCNC: 31.9 GM/DL
MCV RBC AUTO: 98.4 FL
MONOCYTES # BLD AUTO: 0.46 K/UL
MONOCYTES NFR BLD AUTO: 3.7 %
NEUTROPHILS # BLD AUTO: 10.58 K/UL
NEUTROPHILS NFR BLD AUTO: 86 %
PLATELET # BLD AUTO: 203 K/UL
POTASSIUM SERPL-SCNC: 4.5 MMOL/L
PROT SERPL-MCNC: 8.1 G/DL
RBC # BLD: 3.85 M/UL
RBC # FLD: 15.3 %
SODIUM SERPL-SCNC: 140 MMOL/L
WBC # FLD AUTO: 12.3 K/UL

## 2024-03-12 ENCOUNTER — OUTPATIENT (OUTPATIENT)
Dept: OUTPATIENT SERVICES | Facility: HOSPITAL | Age: 84
LOS: 1 days | End: 2024-03-12
Payer: MEDICARE

## 2024-03-12 VITALS
OXYGEN SATURATION: 97 % | DIASTOLIC BLOOD PRESSURE: 79 MMHG | WEIGHT: 175.27 LBS | HEIGHT: 73 IN | SYSTOLIC BLOOD PRESSURE: 135 MMHG | RESPIRATION RATE: 18 BRPM | TEMPERATURE: 98 F | HEART RATE: 73 BPM

## 2024-03-12 DIAGNOSIS — Z98.890 OTHER SPECIFIED POSTPROCEDURAL STATES: Chronic | ICD-10-CM

## 2024-03-12 DIAGNOSIS — Z01.818 ENCOUNTER FOR OTHER PREPROCEDURAL EXAMINATION: ICD-10-CM

## 2024-03-12 DIAGNOSIS — M31.6 OTHER GIANT CELL ARTERITIS: ICD-10-CM

## 2024-03-12 DIAGNOSIS — Z87.39 PERSONAL HISTORY OF OTHER DISEASES OF THE MUSCULOSKELETAL SYSTEM AND CONNECTIVE TISSUE: ICD-10-CM

## 2024-03-12 PROCEDURE — G0463: CPT

## 2024-03-12 RX ORDER — FAMOTIDINE 10 MG/ML
0 INJECTION INTRAVENOUS
Qty: 0 | Refills: 0 | DISCHARGE

## 2024-03-12 NOTE — H&P PST ADULT - NSICDXPASTSURGICALHX_GEN_ALL_CORE_FT
PAST SURGICAL HISTORY:  History of colonoscopy     S/P left inguinal hernia repair     Status post ORIF of fracture of ankle

## 2024-03-12 NOTE — H&P PST ADULT - NSICDXPASTMEDICALHX_GEN_ALL_CORE_FT
PAST MEDICAL HISTORY:  BPH (benign prostatic hyperplasia)     GERD (gastroesophageal reflux disease)     H/O constipation     HLD (hyperlipidemia)     [place] (hard of hearing)     Snores

## 2024-03-12 NOTE — H&P PST ADULT - ASSESSMENT
Dasi activities: walking 30 mins daily   Patient with removable top front teeth  Dasi activities: walking 30 mins daily   Dasi Score: 5.79  Patient with removable top front teeth

## 2024-03-12 NOTE — H&P PST ADULT - HISTORY OF PRESENT ILLNESS
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 His medical history significant BPH, hypercholesterolemia, GERD, snores, constipation.

## 2024-03-12 NOTE — H&P PST ADULT - PROBLEM SELECTOR PLAN 1
Scheduled right temporal artery biopsy  preop instruction provided in writing and verbally, both patient and his wife verbalized understanding

## 2024-03-13 ENCOUNTER — APPOINTMENT (OUTPATIENT)
Dept: NUCLEAR MEDICINE | Facility: CLINIC | Age: 84
End: 2024-03-13
Payer: MEDICARE

## 2024-03-13 ENCOUNTER — OUTPATIENT (OUTPATIENT)
Dept: OUTPATIENT SERVICES | Facility: HOSPITAL | Age: 84
LOS: 1 days | End: 2024-03-13
Payer: MEDICARE

## 2024-03-13 DIAGNOSIS — Z98.890 OTHER SPECIFIED POSTPROCEDURAL STATES: Chronic | ICD-10-CM

## 2024-03-13 DIAGNOSIS — R94.8 ABNORMAL RESULTS OF FUNCTION STUDIES OF OTHER ORGANS AND SYSTEMS: ICD-10-CM

## 2024-03-13 PROBLEM — R06.83 SNORING: Chronic | Status: ACTIVE | Noted: 2024-03-12

## 2024-03-13 PROBLEM — H91.90 UNSPECIFIED HEARING LOSS, UNSPECIFIED EAR: Chronic | Status: ACTIVE | Noted: 2024-03-12

## 2024-03-13 PROBLEM — Z87.19 PERSONAL HISTORY OF OTHER DISEASES OF THE DIGESTIVE SYSTEM: Chronic | Status: ACTIVE | Noted: 2024-03-12

## 2024-03-13 LAB
M TB IFN-G BLD-IMP: NEGATIVE
QUANTIFERON TB PLUS MITOGEN MINUS NIL: 0.85 IU/ML
QUANTIFERON TB PLUS NIL: 0.02 IU/ML
QUANTIFERON TB PLUS TB1 MINUS NIL: 0.05 IU/ML
QUANTIFERON TB PLUS TB2 MINUS NIL: 0.03 IU/ML

## 2024-03-13 PROCEDURE — 78816 PET IMAGE W/CT FULL BODY: CPT | Mod: 26,PI,MH

## 2024-03-13 PROCEDURE — 78816 PET IMAGE W/CT FULL BODY: CPT

## 2024-03-13 PROCEDURE — A9595: CPT

## 2024-03-14 ENCOUNTER — TRANSCRIPTION ENCOUNTER (OUTPATIENT)
Age: 84
End: 2024-03-14

## 2024-03-14 LAB
ALBUMIN MFR SERPL ELPH: 45.4 %
ALBUMIN SERPL-MCNC: 4.1 G/DL
ALBUMIN/GLOB SERPL: 0.8 RATIO
ALPHA1 GLOB MFR SERPL ELPH: 4.2 %
ALPHA1 GLOB SERPL ELPH-MCNC: 0.4 G/DL
ALPHA2 GLOB MFR SERPL ELPH: 12 %
ALPHA2 GLOB SERPL ELPH-MCNC: 1.1 G/DL
B-GLOBULIN MFR SERPL ELPH: 8.7 %
B-GLOBULIN SERPL ELPH-MCNC: 0.8 G/DL
DEPRECATED KAPPA LC FREE/LAMBDA SER: 10.48 RATIO
GAMMA GLOB FLD ELPH-MCNC: 2.7 G/DL
GAMMA GLOB MFR SERPL ELPH: 29.7 %
IGA SER QL IEP: 94 MG/DL
IGG SER QL IEP: 2383 MG/DL
IGM SER QL IEP: 60 MG/DL
INTERPRETATION SERPL IEP-IMP: NORMAL
KAPPA LC CSF-MCNC: 0.86 MG/DL
KAPPA LC SERPL-MCNC: 9.01 MG/DL
M PROTEIN MFR SERPL ELPH: 25.2 %
M PROTEIN SPEC IFE-MCNC: NORMAL
MONOCLON BAND OBS SERPL: 2.3 G/DL
PROT SERPL-MCNC: 9 G/DL
PROT SERPL-MCNC: 9 G/DL

## 2024-03-15 ENCOUNTER — OUTPATIENT (OUTPATIENT)
Dept: OUTPATIENT SERVICES | Facility: HOSPITAL | Age: 84
LOS: 1 days | End: 2024-03-15
Payer: MEDICARE

## 2024-03-15 ENCOUNTER — RESULT REVIEW (OUTPATIENT)
Age: 84
End: 2024-03-15

## 2024-03-15 ENCOUNTER — APPOINTMENT (OUTPATIENT)
Dept: VASCULAR SURGERY | Facility: HOSPITAL | Age: 84
End: 2024-03-15

## 2024-03-15 ENCOUNTER — TRANSCRIPTION ENCOUNTER (OUTPATIENT)
Age: 84
End: 2024-03-15

## 2024-03-15 VITALS
SYSTOLIC BLOOD PRESSURE: 116 MMHG | RESPIRATION RATE: 14 BRPM | OXYGEN SATURATION: 95 % | HEART RATE: 64 BPM | DIASTOLIC BLOOD PRESSURE: 60 MMHG | TEMPERATURE: 97 F

## 2024-03-15 VITALS
HEIGHT: 73 IN | TEMPERATURE: 99 F | DIASTOLIC BLOOD PRESSURE: 65 MMHG | RESPIRATION RATE: 18 BRPM | HEART RATE: 60 BPM | OXYGEN SATURATION: 97 % | SYSTOLIC BLOOD PRESSURE: 126 MMHG | WEIGHT: 175.27 LBS

## 2024-03-15 DIAGNOSIS — Z98.890 OTHER SPECIFIED POSTPROCEDURAL STATES: Chronic | ICD-10-CM

## 2024-03-15 DIAGNOSIS — M31.6 OTHER GIANT CELL ARTERITIS: ICD-10-CM

## 2024-03-15 PROCEDURE — 37609 LIGATION/BX TEMPORAL ARTERY: CPT | Mod: RT

## 2024-03-15 PROCEDURE — 88313 SPECIAL STAINS GROUP 2: CPT | Mod: 26

## 2024-03-15 PROCEDURE — 88305 TISSUE EXAM BY PATHOLOGIST: CPT

## 2024-03-15 PROCEDURE — C1889: CPT

## 2024-03-15 PROCEDURE — 88313 SPECIAL STAINS GROUP 2: CPT

## 2024-03-15 PROCEDURE — 88305 TISSUE EXAM BY PATHOLOGIST: CPT | Mod: 26

## 2024-03-15 DEVICE — SURGICEL FIBRILLAR 2 X 4": Type: IMPLANTABLE DEVICE | Site: RIGHT | Status: FUNCTIONAL

## 2024-03-15 DEVICE — CLIP APPLIER COVIDIEN SURGICLIP III 9" SM: Type: IMPLANTABLE DEVICE | Site: RIGHT | Status: FUNCTIONAL

## 2024-03-15 RX ORDER — ONDANSETRON 8 MG/1
4 TABLET, FILM COATED ORAL ONCE
Refills: 0 | Status: DISCONTINUED | OUTPATIENT
Start: 2024-03-15 | End: 2024-03-15

## 2024-03-15 RX ORDER — HYDROMORPHONE HYDROCHLORIDE 2 MG/ML
0.25 INJECTION INTRAMUSCULAR; INTRAVENOUS; SUBCUTANEOUS
Refills: 0 | Status: DISCONTINUED | OUTPATIENT
Start: 2024-03-15 | End: 2024-03-15

## 2024-03-15 RX ORDER — LIDOCAINE HCL 20 MG/ML
0.2 VIAL (ML) INJECTION ONCE
Refills: 0 | Status: DISCONTINUED | OUTPATIENT
Start: 2024-03-15 | End: 2024-03-15

## 2024-03-15 RX ORDER — SODIUM CHLORIDE 9 MG/ML
3 INJECTION INTRAMUSCULAR; INTRAVENOUS; SUBCUTANEOUS EVERY 8 HOURS
Refills: 0 | Status: DISCONTINUED | OUTPATIENT
Start: 2024-03-15 | End: 2024-03-15

## 2024-03-15 NOTE — ASU PATIENT PROFILE, ADULT - FALL HARM RISK - HARM RISK INTERVENTIONS

## 2024-03-15 NOTE — ASU PATIENT PROFILE, ADULT - VISION (WITH CORRECTIVE LENSES IF THE PATIENT USUALLY WEARS THEM):
Patient reports right eye blurry vision/Partially impaired: cannot see medication labels or newsprint, but can see obstacles in path, and the surrounding layout; can count fingers at arm's length

## 2024-03-15 NOTE — ASU PATIENT PROFILE, ADULT - REASON FOR ADMISSION, PROFILE
I am here for temporal artery biopsy Detail Level: Detailed Depth Of Biopsy: dermis Was A Bandage Applied: Yes Size Of Lesion In Cm: 0.5 Biopsy Type: H and E Biopsy Method: Personna blade Anesthesia Type: 1% lidocaine with epinephrine Additional Anesthesia Volume In Cc (Will Not Render If 0): 0 Hemostasis: Electrocautery Wound Care: Bacitracin Dressing: bandage Destruction After The Procedure: No Type Of Destruction Used: Curettage Cryotherapy Text: The wound bed was treated with cryotherapy after the biopsy was performed. Electrodesiccation Text: The wound bed was treated with electrodesiccation after the biopsy was performed. Electrodesiccation And Curettage Text: The wound bed was treated with electrodesiccation and curettage after the biopsy was performed. Silver Nitrate Text: The wound bed was treated with silver nitrate after the biopsy was performed. Lab: [id number] Lab Facility: [id number] Consent was obtained and risks were reviewed including but not limited to scarring, infection, bleeding, scabbing, incomplete removal, nerve damage and allergy to anesthesia. Post-Care Instructions: I reviewed with the patient in detail post-care instructions. Patient is to keep the biopsy site dry overnight, and then apply bacitracin twice daily until healed. Patient may apply hydrogen peroxide soaks to remove any crusting. Notification Instructions: Patient will be notified of biopsy results. However, patient instructed to call the office if not contacted within 2 weeks. Billing Type: Patient Bill Information: Selecting Yes will display possible errors in your note based on the variables you have selected. This validation is only offered as a suggestion for you. PLEASE NOTE THAT THE VALIDATION TEXT WILL BE REMOVED WHEN YOU FINALIZE YOUR NOTE. IF YOU WANT TO FAX A PRELIMINARY NOTE YOU WILL NEED TO TOGGLE THIS TO 'NO' IF YOU DO NOT WANT IT IN YOUR FAXED NOTE.

## 2024-03-15 NOTE — ASU DISCHARGE PLAN (ADULT/PEDIATRIC) - CARE PROVIDER_API CALL
Bart Silva  Vascular Surgery  [address] [place] [address]  Phone: [phone]  Fax: [phone]  Follow Up Time: 2 weeks

## 2024-03-15 NOTE — ASU PATIENT PROFILE, ADULT - NSICDXPASTMEDICALHX_GEN_ALL_CORE_FT
PAST MEDICAL HISTORY:  BPH (benign prostatic hyperplasia)     GERD (gastroesophageal reflux disease)     H/O constipation     HLD (hyperlipidemia)     [place] (hard of hearing)     Snores

## 2024-03-19 DIAGNOSIS — R91.8 OTHER NONSPECIFIC ABNORMAL FINDING OF LUNG FIELD: ICD-10-CM

## 2024-03-20 ENCOUNTER — APPOINTMENT (OUTPATIENT)
Dept: MRI IMAGING | Facility: CLINIC | Age: 84
End: 2024-03-20

## 2024-03-20 ENCOUNTER — NON-APPOINTMENT (OUTPATIENT)
Age: 84
End: 2024-03-20

## 2024-03-20 ENCOUNTER — APPOINTMENT (OUTPATIENT)
Dept: NEUROLOGY | Facility: CLINIC | Age: 84
End: 2024-03-20
Payer: MEDICARE

## 2024-03-20 ENCOUNTER — EMERGENCY (EMERGENCY)
Facility: HOSPITAL | Age: 84
LOS: 0 days | Discharge: LEFT BEFORE DISCHARGE | End: 2024-03-20
Payer: MEDICARE

## 2024-03-20 ENCOUNTER — INPATIENT (INPATIENT)
Facility: HOSPITAL | Age: 84
LOS: 1 days | Discharge: HOME HEALTH | DRG: 66 | End: 2024-03-22
Attending: STUDENT IN AN ORGANIZED HEALTH CARE EDUCATION/TRAINING PROGRAM | Admitting: STUDENT IN AN ORGANIZED HEALTH CARE EDUCATION/TRAINING PROGRAM
Payer: MEDICARE

## 2024-03-20 ENCOUNTER — APPOINTMENT (OUTPATIENT)
Dept: MRI IMAGING | Facility: CLINIC | Age: 84
End: 2024-03-20
Payer: MEDICARE

## 2024-03-20 ENCOUNTER — OUTPATIENT (OUTPATIENT)
Dept: OUTPATIENT SERVICES | Facility: HOSPITAL | Age: 84
LOS: 1 days | End: 2024-03-20
Payer: MEDICARE

## 2024-03-20 VITALS
HEART RATE: 80 BPM | DIASTOLIC BLOOD PRESSURE: 58 MMHG | HEIGHT: 73 IN | OXYGEN SATURATION: 98 % | WEIGHT: 178.35 LBS | SYSTOLIC BLOOD PRESSURE: 123 MMHG | RESPIRATION RATE: 15 BRPM

## 2024-03-20 VITALS — HEIGHT: 73 IN | WEIGHT: 169.98 LBS

## 2024-03-20 VITALS
WEIGHT: 175 LBS | DIASTOLIC BLOOD PRESSURE: 67 MMHG | HEART RATE: 65 BPM | BODY MASS INDEX: 23.19 KG/M2 | HEIGHT: 73 IN | SYSTOLIC BLOOD PRESSURE: 129 MMHG

## 2024-03-20 DIAGNOSIS — Z98.890 OTHER SPECIFIED POSTPROCEDURAL STATES: Chronic | ICD-10-CM

## 2024-03-20 DIAGNOSIS — Z53.21 PROCEDURE AND TREATMENT NOT CARRIED OUT DUE TO PATIENT LEAVING PRIOR TO BEING SEEN BY HEALTH CARE PROVIDER: ICD-10-CM

## 2024-03-20 DIAGNOSIS — Z79.899 OTHER LONG TERM (CURRENT) DRUG THERAPY: ICD-10-CM

## 2024-03-20 DIAGNOSIS — I63.40 CEREBRAL INFARCTION DUE TO EMBOLISM OF UNSPECIFIED CEREBRAL ARTERY: ICD-10-CM

## 2024-03-20 DIAGNOSIS — Z79.52 LONG TERM (CURRENT) USE OF SYSTEMIC STEROIDS: ICD-10-CM

## 2024-03-20 DIAGNOSIS — H53.2 DIPLOPIA: ICD-10-CM

## 2024-03-20 DIAGNOSIS — I63.9 CEREBRAL INFARCTION, UNSPECIFIED: ICD-10-CM

## 2024-03-20 DIAGNOSIS — K21.9 GASTRO-ESOPHAGEAL REFLUX DISEASE WITHOUT ESOPHAGITIS: ICD-10-CM

## 2024-03-20 DIAGNOSIS — I10 ESSENTIAL (PRIMARY) HYPERTENSION: ICD-10-CM

## 2024-03-20 DIAGNOSIS — Z88.0 ALLERGY STATUS TO PENICILLIN: ICD-10-CM

## 2024-03-20 DIAGNOSIS — T38.0X5A ADVERSE EFFECT OF GLUCOCORTICOIDS AND SYNTHETIC ANALOGUES, INITIAL ENCOUNTER: ICD-10-CM

## 2024-03-20 DIAGNOSIS — D72.829 ELEVATED WHITE BLOOD CELL COUNT, UNSPECIFIED: ICD-10-CM

## 2024-03-20 DIAGNOSIS — R73.03 PREDIABETES: ICD-10-CM

## 2024-03-20 DIAGNOSIS — Z86.16 PERSONAL HISTORY OF COVID-19: ICD-10-CM

## 2024-03-20 DIAGNOSIS — E78.5 HYPERLIPIDEMIA, UNSPECIFIED: ICD-10-CM

## 2024-03-20 DIAGNOSIS — Z86.73 PERSONAL HISTORY OF TRANSIENT ISCHEMIC ATTACK (TIA), AND CEREBRAL INFARCTION WITHOUT RESIDUAL DEFICITS: ICD-10-CM

## 2024-03-20 DIAGNOSIS — N40.0 BENIGN PROSTATIC HYPERPLASIA WITHOUT LOWER URINARY TRACT SYMPTOMS: ICD-10-CM

## 2024-03-20 DIAGNOSIS — M31.6 OTHER GIANT CELL ARTERITIS: ICD-10-CM

## 2024-03-20 DIAGNOSIS — R29.702 NIHSS SCORE 2: ICD-10-CM

## 2024-03-20 DIAGNOSIS — E43 UNSPECIFIED SEVERE PROTEIN-CALORIE MALNUTRITION: ICD-10-CM

## 2024-03-20 DIAGNOSIS — Z79.02 LONG TERM (CURRENT) USE OF ANTITHROMBOTICS/ANTIPLATELETS: ICD-10-CM

## 2024-03-20 LAB
ALBUMIN SERPL ELPH-MCNC: 3.1 G/DL — LOW (ref 3.3–5)
ALP SERPL-CCNC: 60 U/L — SIGNIFICANT CHANGE UP (ref 40–120)
ALT FLD-CCNC: 23 U/L — SIGNIFICANT CHANGE UP (ref 12–78)
ANION GAP SERPL CALC-SCNC: 5 MMOL/L — SIGNIFICANT CHANGE UP (ref 5–17)
APPEARANCE UR: ABNORMAL
AST SERPL-CCNC: 14 U/L — LOW (ref 15–37)
BACTERIA # UR AUTO: NEGATIVE /HPF — SIGNIFICANT CHANGE UP
BASOPHILS # BLD AUTO: 0.01 K/UL — SIGNIFICANT CHANGE UP (ref 0–0.2)
BASOPHILS NFR BLD AUTO: 0.1 % — SIGNIFICANT CHANGE UP (ref 0–2)
BILIRUB SERPL-MCNC: 0.6 MG/DL — SIGNIFICANT CHANGE UP (ref 0.2–1.2)
BILIRUB UR-MCNC: NEGATIVE — SIGNIFICANT CHANGE UP
BUN SERPL-MCNC: 25 MG/DL — HIGH (ref 7–23)
CALCIUM SERPL-MCNC: 9.8 MG/DL — SIGNIFICANT CHANGE UP (ref 8.5–10.1)
CAST: 2 /LPF — SIGNIFICANT CHANGE UP (ref 0–4)
CHLORIDE SERPL-SCNC: 104 MMOL/L — SIGNIFICANT CHANGE UP (ref 96–108)
CO2 SERPL-SCNC: 29 MMOL/L — SIGNIFICANT CHANGE UP (ref 22–31)
COLOR SPEC: YELLOW — SIGNIFICANT CHANGE UP
CREAT SERPL-MCNC: 1.07 MG/DL — SIGNIFICANT CHANGE UP (ref 0.5–1.3)
DIFF PNL FLD: NEGATIVE — SIGNIFICANT CHANGE UP
EGFR: 68 ML/MIN/1.73M2 — SIGNIFICANT CHANGE UP
EOSINOPHIL # BLD AUTO: 0.01 K/UL — SIGNIFICANT CHANGE UP (ref 0–0.5)
EOSINOPHIL NFR BLD AUTO: 0.1 % — SIGNIFICANT CHANGE UP (ref 0–6)
GLUCOSE SERPL-MCNC: 116 MG/DL — HIGH (ref 70–99)
GLUCOSE UR QL: 250 MG/DL
HCT VFR BLD CALC: 37.5 % — LOW (ref 39–50)
HGB BLD-MCNC: 12.4 G/DL — LOW (ref 13–17)
IMM GRANULOCYTES NFR BLD AUTO: 0.6 % — SIGNIFICANT CHANGE UP (ref 0–0.9)
KETONES UR-MCNC: NEGATIVE MG/DL — SIGNIFICANT CHANGE UP
LEUKOCYTE ESTERASE UR-ACNC: NEGATIVE — SIGNIFICANT CHANGE UP
LYMPHOCYTES # BLD AUTO: 1.55 K/UL — SIGNIFICANT CHANGE UP (ref 1–3.3)
LYMPHOCYTES # BLD AUTO: 13.8 % — SIGNIFICANT CHANGE UP (ref 13–44)
MCHC RBC-ENTMCNC: 32.6 PG — SIGNIFICANT CHANGE UP (ref 27–34)
MCHC RBC-ENTMCNC: 33.1 GM/DL — SIGNIFICANT CHANGE UP (ref 32–36)
MCV RBC AUTO: 98.7 FL — SIGNIFICANT CHANGE UP (ref 80–100)
MONOCYTES # BLD AUTO: 0.77 K/UL — SIGNIFICANT CHANGE UP (ref 0–0.9)
MONOCYTES NFR BLD AUTO: 6.8 % — SIGNIFICANT CHANGE UP (ref 2–14)
NEUTROPHILS # BLD AUTO: 8.85 K/UL — HIGH (ref 1.8–7.4)
NEUTROPHILS NFR BLD AUTO: 78.6 % — HIGH (ref 43–77)
NITRITE UR-MCNC: NEGATIVE — SIGNIFICANT CHANGE UP
NT-PROBNP SERPL-SCNC: 175 PG/ML — SIGNIFICANT CHANGE UP (ref 0–450)
PH UR: 5.5 — SIGNIFICANT CHANGE UP (ref 5–8)
PLATELET # BLD AUTO: 197 K/UL — SIGNIFICANT CHANGE UP (ref 150–400)
POTASSIUM SERPL-MCNC: 4 MMOL/L — SIGNIFICANT CHANGE UP (ref 3.5–5.3)
POTASSIUM SERPL-SCNC: 4 MMOL/L — SIGNIFICANT CHANGE UP (ref 3.5–5.3)
PROT SERPL-MCNC: 7.9 GM/DL — SIGNIFICANT CHANGE UP (ref 6–8.3)
PROT UR-MCNC: 30 MG/DL
RBC # BLD: 3.8 M/UL — LOW (ref 4.2–5.8)
RBC # FLD: 14.7 % — HIGH (ref 10.3–14.5)
RBC CASTS # UR COMP ASSIST: 1 /HPF — SIGNIFICANT CHANGE UP (ref 0–4)
SODIUM SERPL-SCNC: 138 MMOL/L — SIGNIFICANT CHANGE UP (ref 135–145)
SP GR SPEC: 1.03 — SIGNIFICANT CHANGE UP (ref 1–1.03)
SQUAMOUS # UR AUTO: 1 /HPF — SIGNIFICANT CHANGE UP (ref 0–5)
SURGICAL PATHOLOGY STUDY: SIGNIFICANT CHANGE UP
TROPONIN I, HIGH SENSITIVITY RESULT: 13.11 NG/L — SIGNIFICANT CHANGE UP
UROBILINOGEN FLD QL: 1 MG/DL — SIGNIFICANT CHANGE UP (ref 0.2–1)
WBC # BLD: 11.26 K/UL — HIGH (ref 3.8–10.5)
WBC # FLD AUTO: 11.26 K/UL — HIGH (ref 3.8–10.5)
WBC UR QL: 2 /HPF — SIGNIFICANT CHANGE UP (ref 0–5)

## 2024-03-20 PROCEDURE — 99205 OFFICE O/P NEW HI 60 MIN: CPT

## 2024-03-20 PROCEDURE — 99285 EMERGENCY DEPT VISIT HI MDM: CPT

## 2024-03-20 PROCEDURE — 80061 LIPID PANEL: CPT

## 2024-03-20 PROCEDURE — 97530 THERAPEUTIC ACTIVITIES: CPT | Mod: GO

## 2024-03-20 PROCEDURE — 92523 SPEECH SOUND LANG COMPREHEN: CPT | Mod: GN

## 2024-03-20 PROCEDURE — L9991: CPT

## 2024-03-20 PROCEDURE — 93010 ELECTROCARDIOGRAM REPORT: CPT

## 2024-03-20 PROCEDURE — 92610 EVALUATE SWALLOWING FUNCTION: CPT | Mod: GN

## 2024-03-20 PROCEDURE — 70496 CT ANGIOGRAPHY HEAD: CPT | Mod: 26,MC

## 2024-03-20 PROCEDURE — 97163 PT EVAL HIGH COMPLEX 45 MIN: CPT | Mod: GP

## 2024-03-20 PROCEDURE — 83036 HEMOGLOBIN GLYCOSYLATED A1C: CPT

## 2024-03-20 PROCEDURE — 71045 X-RAY EXAM CHEST 1 VIEW: CPT | Mod: 26

## 2024-03-20 PROCEDURE — 70551 MRI BRAIN STEM W/O DYE: CPT | Mod: 26,MH

## 2024-03-20 PROCEDURE — G2211 COMPLEX E/M VISIT ADD ON: CPT

## 2024-03-20 PROCEDURE — 93306 TTE W/DOPPLER COMPLETE: CPT

## 2024-03-20 PROCEDURE — 97166 OT EVAL MOD COMPLEX 45 MIN: CPT | Mod: GO

## 2024-03-20 PROCEDURE — 36415 COLL VENOUS BLD VENIPUNCTURE: CPT

## 2024-03-20 PROCEDURE — 70551 MRI BRAIN STEM W/O DYE: CPT

## 2024-03-20 PROCEDURE — 80048 BASIC METABOLIC PNL TOTAL CA: CPT

## 2024-03-20 PROCEDURE — 70498 CT ANGIOGRAPHY NECK: CPT | Mod: 26,MC

## 2024-03-20 PROCEDURE — 97116 GAIT TRAINING THERAPY: CPT | Mod: GP

## 2024-03-20 PROCEDURE — 85027 COMPLETE CBC AUTOMATED: CPT

## 2024-03-20 RX ORDER — ASPIRIN/CALCIUM CARB/MAGNESIUM 324 MG
325 TABLET ORAL ONCE
Refills: 0 | Status: COMPLETED | OUTPATIENT
Start: 2024-03-20 | End: 2024-03-20

## 2024-03-20 RX ORDER — CLOPIDOGREL BISULFATE 75 MG/1
1 TABLET, FILM COATED ORAL
Refills: 0 | DISCHARGE
Start: 2024-03-20

## 2024-03-20 RX ORDER — CLOPIDOGREL BISULFATE 75 MG/1
225 TABLET, FILM COATED ORAL ONCE
Refills: 0 | Status: COMPLETED | OUTPATIENT
Start: 2024-03-20 | End: 2024-03-20

## 2024-03-20 RX ADMIN — Medication 325 MILLIGRAM(S): at 22:55

## 2024-03-20 RX ADMIN — CLOPIDOGREL BISULFATE 225 MILLIGRAM(S): 75 TABLET, FILM COATED ORAL at 22:55

## 2024-03-20 NOTE — ED PROVIDER NOTE - NEUROLOGICAL, MLM
Alert and oriented, no focal deficits, mild L pronator drift, loss of dexterity of L arm, no slurred speech

## 2024-03-20 NOTE — ED ADULT NURSE NOTE - NSICDXPASTMEDICALHX_GEN_ALL_CORE_FT
PAST MEDICAL HISTORY:  BPH (benign prostatic hyperplasia)     GERD (gastroesophageal reflux disease)     H/O constipation     HLD (hyperlipidemia)     [place] (hard of hearing)     Snores

## 2024-03-20 NOTE — ED PROVIDER NOTE - OBJECTIVE STATEMENT
85 y/o M with PMHx of HLD, BPH, GERD presents to the ED c/o difficulty finding words, remembering things, and decreased fine motor skills since 11am this morning. Pt had MRI at 6pm today at [facility], but unsure of results. 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 Pt is currently on steroids. 83 y/o M with PMHx of HLD, BPH, GERD presents to the ED c/o difficulty finding words, remembering things, and decreased fine motor skills for the past couple days. Pt had visit with his neurologist yesterday, who ordered him to get an MRI.  Pt had MRI at 6pm today at [facility], but unsure of results and was told to come to the ED. 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 Pt is currently on steroids.

## 2024-03-20 NOTE — PATIENT PROFILE ADULT - FALL HARM RISK - HARM RISK INTERVENTIONS
Communicate Risk of Fall with Harm to all staff/Reinforce activity limits and safety measures with patient and family/Tailored Fall Risk Interventions/Visual Cue: Yellow wristband and red socks/Bed in lowest position, wheels locked, appropriate side rails in place/Call bell, personal items and telephone in reach/Instruct patient to call for assistance before getting out of bed or chair/Non-slip footwear when patient is out of bed/[place] to call system/Physically safe environment - no spills, clutter or unnecessary equipment/Purposeful Proactive Rounding/Room/bathroom lighting operational, light cord in reach Assistance with ambulation/Assistance OOB with selected safe patient handling equipment/Communicate Risk of Fall with Harm to all staff/Reinforce activity limits and safety measures with patient and family/Tailored Fall Risk Interventions/Visual Cue: Yellow wristband and red socks/Bed in lowest position, wheels locked, appropriate side rails in place/Call bell, personal items and telephone in reach/Instruct patient to call for assistance before getting out of bed or chair/Non-slip footwear when patient is out of bed/[place] to call system/Physically safe environment - no spills, clutter or unnecessary equipment/Purposeful Proactive Rounding/Room/bathroom lighting operational, light cord in reach

## 2024-03-20 NOTE — PHYSICAL EXAM
[FreeTextEntry1] : General physical examination: The patient is well-appearing. VS are stable There is no tenderness over the scalp or neck and no bruits over the eyes or at the neck. There is no proptosis, lid swelling, conjunctival injection, or chemosis. Cardiac exam shows a regular rate and no murmur. Neurologic examination: Mental status: The patient is alert, attentive, and oriented but he needed about 5 minutes to recall date. Speech is clear and fluent with good repetition, comprehension, and naming. Unable to recall 2/3 items at 5 minutes.  Cranial nerves: CN II: Visual fields are full to confrontation. Pupils are 4 mm and briskly reactive to light. bilaterally. CN III, IV, VI: At primary gaze, there is no eye deviation.EOMI. No ptosis but patient reports his right vision is dark and blurry.  CN V: Facial sensation is intact bilaterally.  CN VII: Face is symmetric with normal eye closure and smile. CN VII: Hearing is normal to rubbing fingers CN IX, X: Palate elevates symmetrically. Phonation is normal. CN XI: Head turning and shoulder shrug are intact CN XII: Tongue is midline with normal movements and no atrophy. Motor: slight left sided pronator drift  4+/5 on left. 5/5 on right  Sensory: decreased on left hand.  Coordination: moderate dysmetria on left hand  Gait/Stance: Posture is normal. Gait is hesitant with shuffled gait.

## 2024-03-20 NOTE — DISCUSSION/SUMMARY
[Antithrombotic therapy with ___] : antithrombotic therapy with  [unfilled] [Lipid Lowering Therapy] : lipid lowering therapy [Intensive Blood Pressure Control] : intensive blood pressure control [Patient encouraged to discuss with Primary MD] : I encouraged the patient to discuss these important issues with ~his/her~ primary care doctor [Goals and Counseling] : I have reviewed the goals of stroke risk factor modification. I counseled the patient on measures to reduce stroke risk, including the importance of medication compliance, risk factor control, exercise, healthy diet and avoidance of smoking. I reviewed stroke warning signs and symptoms and appropriate actions to take if such occur. 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 He is being treated by neuro ophthalmology and rheumatology for possible GCA. He had a TA biopsy and is pending results. He remains on steroids. 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 The etiology of this infarct is consistent with ESUS and vessel imaging as been negative. Temporal arteritis itself can cause stroke, but usually you would see abnormalities in the major blood vessels on CTA so it seems unlikely that his presumed stroke would be due to giant cell arteritis. On 3/9 he developed left hand weakness and gait instability. It is possible he had a small cortical infarct involving the motor cortex, but MRI should be obtained. Since then, he feels that his short-term memory has worsened. Prior to visit he had a 5-minute episode of worsening left-hand weakness but did not want to go to ED  I have ordered an MRI head to be done urgently. He had PET scan that shows possible metastatic disease that is in the process of being worked up. He has a remote hx of GI bleed 40 + years ago with no known pathology and is slightly apprehensive about starting an antiplatelet. We discussed starting Plavix 75 mg PO QD - sent into pharmacy. He will follow up with Dr. Patricio for TTE and holter monitor as part of ESUS workup.   I will call them after MRI results and review everything again with Dr. Libman

## 2024-03-20 NOTE — PATIENT PROFILE ADULT - STATED REASON FOR ADMISSION
Pt stated that he thought he had a stroke due to loss of strength in left side, brain fog, and difficulty word finding.

## 2024-03-20 NOTE — ED ADULT TRIAGE NOTE - CHIEF COMPLAINT QUOTE
Brought in by wife for neuro evaluation. Per wife, patient is being worked up for giant cell arteritis. States he has been having chronic decline in neurologic function. States he has been having worsening difficulty with coordination and balance. A&O x4 at triage, denies any new symptoms. BEFAST negative, last known normal is unknown. Patient scheduled for outpatient MRI tonight, wife requesting MRI to be done in ED. Ambulated into ED with no distress noted.

## 2024-03-20 NOTE — HISTORY OF PRESENT ILLNESS
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 On 2/10 he had BL diplopia, when looking in the distance that lasted until 2/12 when he went to [facility] and CT head/CTA head and neck did not reveal any stenosis or infarcts. He saw ophthalmologist Dr. 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 On 2/17/2024 he had ESR drawn that was noted to be 87 and prednisone was started for possible GCA. By 2/19 his headache was gone and the diplopia improved. On 2/19 he also consulted with neurologist Dr. Goodwin who noted no abnormalities on his neuro exam and wanted to work him up for myasthenia gravis. He also ordered MRI, MRA head and advised to continue steroids. Throughout all of this he denied temporal pain or jaw claudication. 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 MRA head and neck were unremarkable. That night the diffuse headache returned. On 2/24 he had total loss of vision in the right eye, lasting 30 minutes. When vision returned he felt a shade across upper right visual field and diplopia returned for a few hours. On 2/25 he had intermittent loss of vision in the right eye with diplopia and was having scotomas. He called ophthalmology who recommended he stay on 60 mg of Prednisone and see neuro ophthalmology. By 2/27 his visual deficit has persisted. On 3/6 he consulted with Dr. 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 He suggested rheumatology workup to discuss tapering off steroids and possibly starting a biologic to keep vasculitis under control. On 3/8 he saw Dr. De La Garza who suggested temporal artery bx - done and pending results. On 3/9 he developed ataxic gait, listing to left side and left-hand weakness and dysmetria that has persisted. On 3/13 he had PET scan and it revealed lesions in the prostate concerning for prostate CA and a rib lesion and avid lymph nodes concerning for metastases - pending evaluation.   Today he remains with loss of right eye vision (says it looks dark and is blurry) along with left hand weakness. He has also been complaining of balance instability and brain fog. Since March he has had intermittent BL feet paresthesia's that occurs randomly and varies in duration. Prior to me meeting patient he noted a few minutes of worsening left hand weakness lasting a few minutes but did not want to go to ED. He is not on any antiplatelet due to hx of remote GI bleed 40 + years ago. He notes that workup at that time and thereafter had been negative for any pathology. He denies any hx of stroke like symptoms in the past. He is a retired nephrologist. .

## 2024-03-20 NOTE — PHARMACOTHERAPY INTERVENTION NOTE - COMMENTS
Medication reconciliation completed.  Reviewed Medication list and confirmed med allergies with patient; confirmed with Dr. Azevedo Med[facility].  pt confirms that he started Plavix 75mg today, took first dose at 1:30pm.

## 2024-03-20 NOTE — ED ADULT NURSE NOTE - NSFALLRISKFACTORS_ED_ALL_ED
on steroids/Bone Condition: Including osteoporosis, prolonged steroid use or metastatic bone disease/cancer

## 2024-03-20 NOTE — ED ADULT NURSE NOTE - OBJECTIVE STATEMENT
Pt presents to the ED AO x 4, sent by radiologist for Giant Cell Arteritis. Pt received MRI at [facility] at 6 pm today. Pt reports over last 2 weeks diplopia, decreased fine motor skills, memory loss, and impaired gait. Pt states at baseline he has decreased vision on right side. Clear speech. Negative facial droop. Pt denies any chest pain, SOB, n/v/d, dizziness. Respirations even and unlabored. Pt placed on cardiac monitor.

## 2024-03-20 NOTE — ED PROVIDER NOTE - NSICDXPASTMEDICALHX_GEN_ALL_CORE_FT
PAST MEDICAL HISTORY:  BPH (benign prostatic hyperplasia)     GERD (gastroesophageal reflux disease)     H/O constipation     HLD (hyperlipidemia)     [place] (hard of hearing)     Snores

## 2024-03-20 NOTE — ED PROVIDER NOTE - PROGRESS NOTE DETAILS
Pt MRI shows acute stroke. Neuro consult Dr. Kim recommends CT angio of head, neck, 300 plavix of which pt took 75 mg earlier today and aspirin, and admission. Pt out of window for TNK, intervention.

## 2024-03-20 NOTE — ED ADULT TRIAGE NOTE - CHIEF COMPLAINT QUOTE
Pt ambulatory to the ED. Pt was told after he had his MRI to come to [facility] by the radiologist "Jarret". Pt had a MRI due to being worked up for Giant cell Arteritis. Pt's wife endorses around 11a today the pt was having more weakness "couldn't  a fork" and memory loss. Pt's wife also endorses has been having more weakness "over the last few weeks". Pt left [facility] at 17:11 and wasn't seen by an MD. Pt seen by MD Weber and no code stroke called. .

## 2024-03-20 NOTE — ED ADULT NURSE NOTE - CHIEF COMPLAINT QUOTE
Pt ambulatory to the ED. Pt was told after he had his MRI to come to [facility] by the radiologist "Jarret". Pt had a MRI due to being worked up for Giant cell Arteritis. Pt's wife endorses around 11a today the pt was having more weakness "couldn't  a fork" and memory loss. Pt's wife also endorses has been having more weakness "over the last few weeks". Pt left [facility] at 17:11 and wasn't seen by an MD. Pt seen by MD Weber and no code stroke called. .

## 2024-03-21 LAB
A1C WITH ESTIMATED AVERAGE GLUCOSE RESULT: 6.2 % — HIGH (ref 4–5.6)
ANION GAP SERPL CALC-SCNC: 4 MMOL/L — LOW (ref 5–17)
BUN SERPL-MCNC: 22 MG/DL — SIGNIFICANT CHANGE UP (ref 7–23)
CALCIUM SERPL-MCNC: 9 MG/DL — SIGNIFICANT CHANGE UP (ref 8.5–10.1)
CHLORIDE SERPL-SCNC: 104 MMOL/L — SIGNIFICANT CHANGE UP (ref 96–108)
CHOLEST SERPL-MCNC: 149 MG/DL — SIGNIFICANT CHANGE UP
CO2 SERPL-SCNC: 30 MMOL/L — SIGNIFICANT CHANGE UP (ref 22–31)
CREAT SERPL-MCNC: 0.96 MG/DL — SIGNIFICANT CHANGE UP (ref 0.5–1.3)
EGFR: 78 ML/MIN/1.73M2 — SIGNIFICANT CHANGE UP
ESTIMATED AVERAGE GLUCOSE: 131 MG/DL — HIGH (ref 68–114)
GLUCOSE SERPL-MCNC: 102 MG/DL — HIGH (ref 70–99)
HDLC SERPL-MCNC: 66 MG/DL — SIGNIFICANT CHANGE UP
LIPID PNL WITH DIRECT LDL SERPL: 69 MG/DL — SIGNIFICANT CHANGE UP
NON HDL CHOLESTEROL: 83 MG/DL — SIGNIFICANT CHANGE UP
POTASSIUM SERPL-MCNC: 3.6 MMOL/L — SIGNIFICANT CHANGE UP (ref 3.5–5.3)
POTASSIUM SERPL-SCNC: 3.6 MMOL/L — SIGNIFICANT CHANGE UP (ref 3.5–5.3)
SODIUM SERPL-SCNC: 138 MMOL/L — SIGNIFICANT CHANGE UP (ref 135–145)
TRIGL SERPL-MCNC: 67 MG/DL — SIGNIFICANT CHANGE UP

## 2024-03-21 PROCEDURE — 99223 1ST HOSP IP/OBS HIGH 75: CPT

## 2024-03-21 PROCEDURE — 93306 TTE W/DOPPLER COMPLETE: CPT | Mod: 26

## 2024-03-21 PROCEDURE — 99222 1ST HOSP IP/OBS MODERATE 55: CPT

## 2024-03-21 RX ORDER — FAMOTIDINE 10 MG/ML
40 INJECTION INTRAVENOUS
Refills: 0 | Status: DISCONTINUED | OUTPATIENT
Start: 2024-03-21 | End: 2024-03-22

## 2024-03-21 RX ORDER — ASPIRIN/CALCIUM CARB/MAGNESIUM 324 MG
81 TABLET ORAL DAILY
Refills: 0 | Status: DISCONTINUED | OUTPATIENT
Start: 2024-03-21 | End: 2024-03-22

## 2024-03-21 RX ORDER — ATORVASTATIN CALCIUM 80 MG/1
80 TABLET, FILM COATED ORAL AT BEDTIME
Refills: 0 | Status: DISCONTINUED | OUTPATIENT
Start: 2024-03-21 | End: 2024-03-22

## 2024-03-21 RX ORDER — HEPARIN SODIUM 5000 [USP'U]/ML
5000 INJECTION INTRAVENOUS; SUBCUTANEOUS EVERY 8 HOURS
Refills: 0 | Status: DISCONTINUED | OUTPATIENT
Start: 2024-03-21 | End: 2024-03-22

## 2024-03-21 RX ORDER — CLOPIDOGREL BISULFATE 75 MG/1
75 TABLET, FILM COATED ORAL DAILY
Refills: 0 | Status: DISCONTINUED | OUTPATIENT
Start: 2024-03-21 | End: 2024-03-21

## 2024-03-21 RX ORDER — TAMSULOSIN HYDROCHLORIDE 0.4 MG/1
0.8 CAPSULE ORAL AT BEDTIME
Refills: 0 | Status: DISCONTINUED | OUTPATIENT
Start: 2024-03-21 | End: 2024-03-22

## 2024-03-21 RX ORDER — ASPIRIN/CALCIUM CARB/MAGNESIUM 324 MG
300 TABLET ORAL DAILY
Refills: 0 | Status: DISCONTINUED | OUTPATIENT
Start: 2024-03-21 | End: 2024-03-21

## 2024-03-21 RX ADMIN — Medication 1 TABLET(S): at 09:50

## 2024-03-21 RX ADMIN — HEPARIN SODIUM 5000 UNIT(S): 5000 INJECTION INTRAVENOUS; SUBCUTANEOUS at 21:10

## 2024-03-21 RX ADMIN — FAMOTIDINE 40 MILLIGRAM(S): 10 INJECTION INTRAVENOUS at 09:50

## 2024-03-21 RX ADMIN — TAMSULOSIN HYDROCHLORIDE 0.8 MILLIGRAM(S): 0.4 CAPSULE ORAL at 21:07

## 2024-03-21 RX ADMIN — Medication 20 MILLIGRAM(S): at 16:41

## 2024-03-21 RX ADMIN — Medication 81 MILLIGRAM(S): at 16:00

## 2024-03-21 RX ADMIN — ATORVASTATIN CALCIUM 80 MILLIGRAM(S): 80 TABLET, FILM COATED ORAL at 21:08

## 2024-03-21 RX ADMIN — FAMOTIDINE 40 MILLIGRAM(S): 10 INJECTION INTRAVENOUS at 21:08

## 2024-03-21 RX ADMIN — CLOPIDOGREL BISULFATE 75 MILLIGRAM(S): 75 TABLET, FILM COATED ORAL at 09:50

## 2024-03-21 RX ADMIN — Medication 40 MILLIGRAM(S): at 09:50

## 2024-03-21 NOTE — CONSULT NOTE ADULT - ASSESSMENT
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  On 2/10 he had diplopia and HA with ESR in the 80's and was treated for temporal arteritis with a prednisone taper as an outpatient.  He went to see a neurologist Dr. uHnt but was not happy with his care so did not go back.  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  For the last 10 days he has had slight L sided weakness, L hand decreased fine motor skills, and memory problems.  He is left handed and cannot sign his name or hold things properly.  Yesterday he went to see neurology Dr. 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 He is very frustrated because he does not remember why he came to the hospital.  He has been having cognitive decline over the last 10 days as per his wife at bedside.  His wife brought him to the ED because of the MRI findings and above sx's.  On PE he has trouble remembering things, slight LUE drift, decreased R eye visual acuity, and very subtle LLE weakness.      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  Will hold of on plavix as patient has h/o sig GI Bleed requiring 7 units of blood  -check LDL, A1c  -DVT prophylaxis  -Neurochecks/VS q 4, FS q 6 x 24 hours  -TTE  -Will need ILR most likely-cardiology eval pending  -PT/OT/acute rehab eval  -Rheumatology and Cardiology f/u    D/W Dr. Lang, Dr. 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  On 2/10 he had diplopia, HA - ESR in the 80's - temporal artery bs which was positive for GCA, satrted on pRednisone for temporal arteritis. For the last 10 days he has had slight L sided weakness, L hand decreased fine motor skills, and memory problems, is left handed and cannot sign his name or hold things properly. Yesterday he went to see neurology Dr. Libman who ordered an MRI brain - showed acute cortical infarctions in R frontal, parietal, occipital cortex and a tiny old hemorrhagic cortical infarct L parietal lobe. CT angio, no LVO or significant stenosis.  On PE he has trouble remembering things, slight LUE drift, decreased R eye visual acuity, and very subtle LLE weakness.    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  Will hold of on plavix as patient has h/o sig GI Bleed requiring 7 units of blood  -check LDL, A1c  -DVT prophylaxis  -Neurochecks/VS q 4, FS q 6 x 24 hours  -TTE  -Will need ILR most likely-cardiology eval pending  -PT/OT/acute rehab eval  -Rheumatology and Cardiology f/u    D/W Dr. Lang, Dr. Lara, patient and his wife and daughter

## 2024-03-21 NOTE — H&P ADULT - NSICDXPASTMEDICALHX_GEN_ALL_CORE_FT
PAST MEDICAL HISTORY:  BPH (benign prostatic hyperplasia)     GERD (gastroesophageal reflux disease)     H/O constipation     HLD (hyperlipidemia)     [place] (hard of hearing)     Snores

## 2024-03-21 NOTE — DIETITIAN INITIAL EVALUATION ADULT - ORAL INTAKE PTA/DIET HISTORY
Pt lives at home w/ wife that cooks/grocery shops w/o difficulty. 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 Does not consume ONS at home.

## 2024-03-21 NOTE — DIETITIAN INITIAL EVALUATION ADULT - ADD RECOMMEND
1) Liberalize diet to regular to maximize caloric and nutrient intake; confirm consistency of diet as soon as medically feasible  2) Encourage protein-rich foods, maximize food preferences  3) Monitor bowel movements, if no BM for >3 days, consider implementing bowel regimen.  4) Monitor lytes/ min and replete prn.  5) MVI w/ minerals daily to ensure 100% RDA met  6) Consider adding thiamine 100 mg daily 2/2 poor PO intake/ malnutrition  7) Confirm goals of care regarding nutrition support  RD will continue to monitor PO intake, labs, hydration, and wt prn.

## 2024-03-21 NOTE — H&P ADULT - NSHPLABSRESULTS_GEN_ALL_CORE
12.4   11.26 )-----------( 197      ( 20 Mar 2024 20:07 )             37.5     03-20    138  |  104  |  25<H>  ----------------------------<  116<H>  4.0   |  29  |  1.07    Ca    9.8      20 Mar 2024 20:07    TPro  7.9  /  Alb  3.1<L>  /  TBili  0.6  /  DBili  x   /  AST  14<L>  /  ALT  23  /  AlkPhos  60  03-20    CAPILLARY BLOOD GLUCOSE      POCT Blood Glucose.: 112 mg/dL (20 Mar 2024 19:44)      Urinalysis Basic - ( 20 Mar 2024 21:35 )    Color: Yellow / Appearance: Cloudy / S.026 / pH: x  Gluc: x / Ketone: Negative mg/dL  / Bili: Negative / Urobili: 1.0 mg/dL   Blood: x / Protein: 30 mg/dL / Nitrite: Negative   Leuk Esterase: Negative / RBC: 1 /HPF / WBC 2 /HPF   Sq Epi: x / Non Sq Epi: 1 /HPF / Bacteria: Negative /HPF    MRI     IMPRESSION:   Acute cortical infarctions in the RIGHT frontal, parietal and occipital cortex with underlying subcortical white matter, demonstrating restricted diffusion in these regions. Tiny old hemorrhagic cortical infarction is seen in the posterior LEFT parietal lobe.    NONCONTRAST HEAD CT SCAN:  Redemonstration of acute infarcts in the right frontal lobe, better seen   on recent MRI. Additional smaller infarcts in the right occipital lobe   are inconspicuous on this noncontrast CT.    No intracranial hemorrhage or midline shift.    CT ANGIOGRAPHY BRAIN:  No large vessel occlusion, significant stenosis, aneurysm or vascular   malformation.    CT ANGIOGRAPHY NECK:  The cervical vasculature is patent without significant stenosis or   dissection.

## 2024-03-21 NOTE — CONSULT NOTE ADULT - SUBJECTIVE AND OBJECTIVE BOX
CC: SANDEEPA      HPI:  83 yo M with PMH of HLD, BPH, GERD presents to the ED complains of difficulty finding words, remembering things, and decreased fine motor skills for the past couple days. He saw his neurologist yesterday, who ordered an MRI. He had MRI and was told to go to the ED. 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  Patient was getting frustrated as he could not remember why he came to the hospital. Denies chest pain, sob, fever, chill, n/v, urinary complaints.     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)      PAST MEDICAL & SURGICAL HISTORY:  HLD (hyperlipidemia)      BPH (benign prostatic hyperplasia)      GERD (gastroesophageal reflux disease)      Snores      [place] (hard of hearing)      H/O constipation      Status post ORIF of fracture of ankle      History of colonoscopy      S/P left inguinal hernia repair          FAMILY HISTORY: No pertinent hx in first degree relatives      Social Hx:  Nonsmoker, no drug or alcohol use    MEDICATIONS  (STANDING):  aspirin Suppository 300 milliGRAM(s) Rectal daily  atorvastatin 80 milliGRAM(s) Oral at bedtime  calcium carbonate 1250 mG  + Vitamin D (OsCal 500 + D) 1 Tablet(s) Oral daily  clopidogrel Tablet 75 milliGRAM(s) Oral daily  famotidine    Tablet 40 milliGRAM(s) Oral two times a day  multivitamin 1 Tablet(s) Oral daily  predniSONE   Tablet 40 milliGRAM(s) Oral daily  tamsulosin 0.8 milliGRAM(s) Oral at bedtime  trimethoprim  160 mG/sulfamethoxazole 800 mG 1 Tablet(s) Oral <User Schedule>       Allergies  penicillin (Other)        ROS: Pertinent positives in HPI, all other ROS were reviewed and are negative.      Vital Signs Last 24 Hrs  T(C): 36.9 (21 Mar 2024 08:57), Max: 36.9 (21 Mar 2024 05:15)  T(F): 98.5 (21 Mar 2024 08:57), Max: 98.5 (21 Mar 2024 05:15)  HR: 52 (21 Mar 2024 08:57) (52 - 80)  BP: 124/58 (21 Mar 2024 08:57) (123/58 - 138/60)  BP(mean): --  RR: 18 (21 Mar 2024 08:57) (15 - 18)  SpO2: 100% (21 Mar 2024 08:57) (95% - 100%)        Constitutional: awake and alert.  HEAD: Normocephalic   Neck: Supple.  Extremities:  no edema  Musculoskeletal: no abnormal movements  Skin: No rashes    Neurological exam:  HF: A x O x 3. Appropriately interactive, normal affect, +memory impairment, Speech fluent, No Aphasia or paraphasic errors. Naming /repetition intact   CN: ABIGAIL, EOMI, VFF-subjective grey patches R visual field, facial sensation normal, no NLFD, tongue midline, Palate moves equally, SCM equal bilaterally  Motor: No pronator drift, Strength 5/5 in all 4 ext  Sens: Intact to light touch   Reflexes: BJ 2+, BR 2+, KJ 2+, AJ 2+, downgoing toes b/l  Coord:  No FNFA, HTS intact b/l   Gait/Balance: Cannot test    NIHSS: 0          Labs:   03-21    138  |  104  |  22  ----------------------------<  102<H>  3.6   |  30  |  0.96    Ca    9.0      21 Mar 2024 07:56    TPro  7.9  /  Alb  3.1<L>  /  TBili  0.6  /  DBili  x   /  AST  14<L>  /  ALT  23  /  AlkPhos  60  03-20                              12.4   11.26 )-----------( 197      ( 20 Mar 2024 20:07 )             37.5       Radiology:  < from: CT Angio Neck w/ IV Cont (03.20.24 @ 21:56) >    IMPRESSION:  NONCONTRAST HEAD CT SCAN:  Redemonstration of acute infarcts in the right frontal lobe, better seen   on recent MRI. Additional smaller infarcts in the right occipital lobe   are inconspicuous on this noncontrast CT.    No intracranial hemorrhage or midline shift.    CT ANGIOGRAPHY BRAIN:  No large vessel occlusion, significant stenosis, aneurysm or vascular   malformation.    CT ANGIOGRAPHY NECK:  The cervical vasculature is patent without significant stenosis or   dissection.           CC: SANDEEPA      HPI:  85 yo M with PMH of HLD, BPH, GERD presents to the ED complains of difficulty finding words, remembering things, and decreased fine motor skills for the past couple days. He saw his neurologist yesterday, who ordered an MRI. He had MRI and was told to go to the ED. 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  Patient was getting frustrated as he could not remember why he came to the hospital. Denies chest pain, sob, fever, chill, n/v, urinary complaints.     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)      PAST MEDICAL & SURGICAL HISTORY:  HLD (hyperlipidemia)      BPH (benign prostatic hyperplasia)      GERD (gastroesophageal reflux disease)      Snores      [place] (hard of hearing)      H/O constipation      Status post ORIF of fracture of ankle      History of colonoscopy      S/P left inguinal hernia repair          FAMILY HISTORY: No pertinent hx in first degree relatives      Social Hx:  Nonsmoker, no drug or alcohol use    MEDICATIONS  (STANDING):  aspirin Suppository 300 milliGRAM(s) Rectal daily  atorvastatin 80 milliGRAM(s) Oral at bedtime  calcium carbonate 1250 mG  + Vitamin D (OsCal 500 + D) 1 Tablet(s) Oral daily  clopidogrel Tablet 75 milliGRAM(s) Oral daily  famotidine    Tablet 40 milliGRAM(s) Oral two times a day  multivitamin 1 Tablet(s) Oral daily  predniSONE   Tablet 40 milliGRAM(s) Oral daily  tamsulosin 0.8 milliGRAM(s) Oral at bedtime  trimethoprim  160 mG/sulfamethoxazole 800 mG 1 Tablet(s) Oral <User Schedule>       Allergies  penicillin (Other)        ROS: Pertinent positives in HPI, all other ROS were reviewed and are negative.      Vital Signs Last 24 Hrs  T(C): 36.9 (21 Mar 2024 08:57), Max: 36.9 (21 Mar 2024 05:15)  T(F): 98.5 (21 Mar 2024 08:57), Max: 98.5 (21 Mar 2024 05:15)  HR: 52 (21 Mar 2024 08:57) (52 - 80)  BP: 124/58 (21 Mar 2024 08:57) (123/58 - 138/60)  BP(mean): --  RR: 18 (21 Mar 2024 08:57) (15 - 18)  SpO2: 100% (21 Mar 2024 08:57) (95% - 100%)        Constitutional: awake and alert.  HEAD: Normocephalic   Neck: Supple.  Extremities:  no edema  Musculoskeletal: no abnormal movements  Skin: No rashes    Neurological exam:  HF: A x O x 3. Appropriately interactive, normal affect, +memory impairment, Speech fluent, No Aphasia or paraphasic errors. Naming /repetition intact   CN: ABIGAIL, EOMI, VFF-subjective grey patches R visual field, decreased R eye visual acuity, facial sensation normal, no NLFD, tongue midline, Palate moves equally, SCM equal bilaterally  Motor: Very subtle LUE drift, Strength 5/5 RUE, LUE, RLE.  5-/5 LLE  Sens: Intact to light touch   Reflexes: BJ 2+, BR 2+, KJ 2+, AJ 2+, downgoing toes b/l  Coord:  No FNFA, HTS intact b/l   Gait/Balance: Cannot test    NIHSS: 1          Labs:   03-21    138  |  104  |  22  ----------------------------<  102<H>  3.6   |  30  |  0.96    Ca    9.0      21 Mar 2024 07:56    TPro  7.9  /  Alb  3.1<L>  /  TBili  0.6  /  DBili  x   /  AST  14<L>  /  ALT  23  /  AlkPhos  60  03-20                              12.4   11.26 )-----------( 197      ( 20 Mar 2024 20:07 )             37.5       Radiology:  < from: CT Angio Neck w/ IV Cont (03.20.24 @ 21:56) >    IMPRESSION:  NONCONTRAST HEAD CT SCAN:  Redemonstration of acute infarcts in the right frontal lobe, better seen   on recent MRI. Additional smaller infarcts in the right occipital lobe   are inconspicuous on this noncontrast CT.    No intracranial hemorrhage or midline shift.    CT ANGIOGRAPHY BRAIN:  No large vessel occlusion, significant stenosis, aneurysm or vascular   malformation.      CT ANGIOGRAPHY NECK:  The cervical vasculature is patent without significant stenosis or   dissection.    outpatient MRI done 3/20 reviewed: acute cortical infarcts in the right frontal, parietal and occipital cortex with underlying subcortical white matter demonstrating restricted diffusion in these regions.  Tiny old hemorrhagic cortical infarct is seen in the posterior Left parietal lobe.             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  On 2/10 he had diplopia and HA with ESR in the 80's and was treated for temporal arteritis with a prednisone taper as an outpatient.  He went to see a neurologist Dr. Hunt but was not happy with his care so did not go back.  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  For the last 10 days he has had slight L sided weakness, L hand decreased fine motor skills, and memory problems.  He is left handed and cannot sign his name or hold things properly.  Yesterday he went to see neurology Dr. 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 He is very frustrated because he does not remember why he came to the hospital.  He has been having cognitive decline over the last 10 days as per his wife at bedside.  His wife brought him to the ED because of the MRI findings and above sx's.  Of note: Plavix and ASA was started yesterday by outpatient Neurologist.        PAST MEDICAL & SURGICAL HISTORY:  HLD (hyperlipidemia)      BPH (benign prostatic hyperplasia)      GERD (gastroesophageal reflux disease)      Snores      [place] (hard of hearing)      H/O constipation      Status post ORIF of fracture of ankle      History of colonoscopy      S/P left inguinal hernia repair          FAMILY HISTORY: No pertinent hx in first degree relatives      Social Hx:  Nonsmoker, no drug or alcohol use    MEDICATIONS  (STANDING):  aspirin Suppository 300 milliGRAM(s) Rectal daily  atorvastatin 80 milliGRAM(s) Oral at bedtime  calcium carbonate 1250 mG  + Vitamin D (OsCal 500 + D) 1 Tablet(s) Oral daily  clopidogrel Tablet 75 milliGRAM(s) Oral daily  famotidine    Tablet 40 milliGRAM(s) Oral two times a day  multivitamin 1 Tablet(s) Oral daily  predniSONE   Tablet 40 milliGRAM(s) Oral daily  tamsulosin 0.8 milliGRAM(s) Oral at bedtime  trimethoprim  160 mG/sulfamethoxazole 800 mG 1 Tablet(s) Oral <User Schedule>       Allergies  penicillin (Other)        ROS: Pertinent positives in HPI, all other ROS were reviewed and are negative.      Vital Signs Last 24 Hrs  T(C): 36.9 (21 Mar 2024 08:57), Max: 36.9 (21 Mar 2024 05:15)  T(F): 98.5 (21 Mar 2024 08:57), Max: 98.5 (21 Mar 2024 05:15)  HR: 52 (21 Mar 2024 08:57) (52 - 80)  BP: 124/58 (21 Mar 2024 08:57) (123/58 - 138/60)  BP(mean): --  RR: 18 (21 Mar 2024 08:57) (15 - 18)  SpO2: 100% (21 Mar 2024 08:57) (95% - 100%)        Constitutional: awake and alert.  HEAD: Normocephalic   Neck: Supple.  Extremities:  no edema  Musculoskeletal: no abnormal movements  Skin: No rashes    Neurological exam:  HF: A x O x 3. Appropriately interactive, normal affect, +memory impairment, Speech fluent, No Aphasia or paraphasic errors. Naming /repetition intact   CN: ABIGAIL, EOMI, VFF-subjective grey patches R visual field, decreased R eye visual acuity, facial sensation normal, no NLFD, tongue midline, Palate moves equally, SCM equal bilaterally  Motor: Very subtle LUE drift, Strength 5/5 RUE, LUE, RLE.  5-/5 LLE  Sens: Intact to light touch   Reflexes: BJ 2+, BR 2+, KJ 2+, AJ 2+, downgoing toes b/l  Coord:  No FNFA, HTS intact b/l   Gait/Balance: Cannot test    NIHSS: 1          Labs:   03-21    138  |  104  |  22  ----------------------------<  102<H>  3.6   |  30  |  0.96    Ca    9.0      21 Mar 2024 07:56    TPro  7.9  /  Alb  3.1<L>  /  TBili  0.6  /  DBili  x   /  AST  14<L>  /  ALT  23  /  AlkPhos  60  03-20                              12.4   11.26 )-----------( 197      ( 20 Mar 2024 20:07 )             37.5       Radiology:  < from: CT Angio Neck w/ IV Cont (03.20.24 @ 21:56) >    IMPRESSION:  NONCONTRAST HEAD CT SCAN:  Redemonstration of acute infarcts in the right frontal lobe, better seen   on recent MRI. Additional smaller infarcts in the right occipital lobe   are inconspicuous on this noncontrast CT.    No intracranial hemorrhage or midline shift.    CT ANGIOGRAPHY BRAIN:  No large vessel occlusion, significant stenosis, aneurysm or vascular   malformation.      CT ANGIOGRAPHY NECK:  The cervical vasculature is patent without significant stenosis or   dissection.    outpatient MRI done 3/20 reviewed: acute cortical infarcts in the right frontal, parietal and occipital cortex with underlying subcortical white matter demonstrating restricted diffusion in these regions.  Tiny old hemorrhagic cortical infarct is seen in the posterior Left parietal lobe.             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  On 2/10 he had diplopia and HA with ESR in the 80's and was treated for temporal arteritis with a prednisone taper as an outpatient. He went to see a neurologist Dr. Ocampo but was not happy with his care so did not go back, he went ot see a neuro-opthalmologist who said he had R optic nerve damage and was sent to a Rheumatologist who did a temporal artery bs which was positive for GCA.  For the last 10 days he has had slight L sided weakness, L hand decreased fine motor skills, and memory problems. He is left handed and cannot sign his name or hold things properly.  Yesterday he went to see neurology Dr. Libman who ordered an MRI brain which showed acute cortical infarctions in the R frontal, parietal, occipital cortex and a tiny old hemorrhagic cortical infarct L posterior L parietal lobe. He is very frustrated because he does not remember why he came to the hospital.  He has been having cognitive decline over the last 10 days as per his wife at bedside. Of note: Plavix and ASA was started yesterday by outpatient Neurologist.        PAST MEDICAL & SURGICAL HISTORY:  HLD (hyperlipidemia)  BPH (benign prostatic hyperplasia)  GERD (gastroesophageal reflux disease)  [place] (hard of hearing)  H/O constipation  Status post ORIF of fracture of ankle  History of colonoscopy  S/P left inguinal hernia repair      FAMILY HISTORY: No pertinent hx in first degree relatives      Social Hx:  Nonsmoker, no drug or alcohol use      MEDICATIONS  (STANDING):  aspirin Suppository 300 milliGRAM(s) Rectal daily  atorvastatin 80 milliGRAM(s) Oral at bedtime  calcium carbonate 1250 mG  + Vitamin D (OsCal 500 + D) 1 Tablet(s) Oral daily  clopidogrel Tablet 75 milliGRAM(s) Oral daily  famotidine    Tablet 40 milliGRAM(s) Oral two times a day  multivitamin 1 Tablet(s) Oral daily  predniSONE   Tablet 40 milliGRAM(s) Oral daily  tamsulosin 0.8 milliGRAM(s) Oral at bedtime  trimethoprim  160 mG/sulfamethoxazole 800 mG 1 Tablet(s) Oral <User Schedule>       Allergies  penicillin (Other)        ROS: Pertinent positives in HPI, all other ROS were reviewed and are negative.      Vital Signs Last 24 Hrs  T(C): 36.9 (21 Mar 2024 08:57), Max: 36.9 (21 Mar 2024 05:15)  T(F): 98.5 (21 Mar 2024 08:57), Max: 98.5 (21 Mar 2024 05:15)  HR: 52 (21 Mar 2024 08:57) (52 - 80)  BP: 124/58 (21 Mar 2024 08:57) (123/58 - 138/60)  BP(mean): --  RR: 18 (21 Mar 2024 08:57) (15 - 18)  SpO2: 100% (21 Mar 2024 08:57) (95% - 100%)        Constitutional: awake and alert.  HEAD: Normocephalic   Neck: Supple.  Extremities:  no edema  Musculoskeletal: no abnormal movements  Skin: No rashes    Neurological exam:  HF: A x O x 3. Appropriately interactive, normal affect, +memory impairment, Speech fluent, No Aphasia or paraphasic errors. Naming /repetition intact   CN: ABIGAIL, EOMI, VFF-subjective grey patches R visual field, decreased R eye visual acuity, facial sensation normal, no NLFD, tongue midline, Palate moves equally, SCM equal bilaterally  Motor: Very subtle LUE drift, Strength 5/5 RUE, LUE, RLE.  5-/5 LLE  Sens: Intact to light touch   Reflexes: BJ 2+, BR 2+, KJ 2+, AJ 2+, downgoing toes b/l  Coord:  No FNFA, HTS intact b/l   Gait/Balance: Cannot test    NIHSS: 1          Labs:   03-21    138  |  104  |  22  ----------------------------<  102<H>  3.6   |  30  |  0.96    Ca    9.0      21 Mar 2024 07:56    TPro  7.9  /  Alb  3.1<L>  /  TBili  0.6  /  DBili  x   /  AST  14<L>  /  ALT  23  /  AlkPhos  60  03-20                              12.4   11.26 )-----------( 197      ( 20 Mar 2024 20:07 )             37.5       Radiology:  < from: CT Angio Neck w/ IV Cont (03.20.24 @ 21:56) >    IMPRESSION:  NONCONTRAST HEAD CT SCAN:  Redemonstration of acute infarcts in the right frontal lobe, better seen   on recent MRI. Additional smaller infarcts in the right occipital lobe   are inconspicuous on this noncontrast CT.    No intracranial hemorrhage or midline shift.    CT ANGIOGRAPHY BRAIN:  No large vessel occlusion, significant stenosis, aneurysm or vascular   malformation.      CT ANGIOGRAPHY NECK:  The cervical vasculature is patent without significant stenosis or   dissection.    Outpatient MRI done 3/20 reviewed: acute cortical infarcts in the right frontal, parietal and occipital cortex with underlying subcortical white matter demonstrating restricted diffusion in these regions.  Tiny old hemorrhagic cortical infarct is seen in the posterior Left parietal lobe.

## 2024-03-21 NOTE — H&P ADULT - NSHPPHYSICALEXAM_GEN_ALL_CORE
T(C): 36.7 (03-20-24 @ 23:30), Max: 36.7 (03-20-24 @ 23:30)  HR: 58 (03-20-24 @ 23:30) (58 - 80)  BP: 138/60 (03-20-24 @ 23:30) (123/58 - 138/60)  RR: 18 (03-20-24 @ 23:30) (15 - 18)  SpO2: 95% (03-20-24 @ 23:30) (95% - 98%)  Wt(kg): --  General:     Well appearing, well nourished in no distress   HEENT: MMM, EOMI, anicteric sclera  CV: RRR   Lungs: CTAB  Ab: +BS, soft, NTND   Ext: no edema  Neuro:  alert and oriented, no focal deficits,   no slurred speech  Skin: no rashes

## 2024-03-21 NOTE — DIETITIAN INITIAL EVALUATION ADULT - PERTINENT MEDS FT
MEDICATIONS  (STANDING):  aspirin Suppository 300 milliGRAM(s) Rectal daily  atorvastatin 80 milliGRAM(s) Oral at bedtime  calcium carbonate 1250 mG  + Vitamin D (OsCal 500 + D) 1 Tablet(s) Oral daily  clopidogrel Tablet 75 milliGRAM(s) Oral daily  famotidine    Tablet 40 milliGRAM(s) Oral two times a day  multivitamin 1 Tablet(s) Oral daily  predniSONE   Tablet 40 milliGRAM(s) Oral daily  tamsulosin 0.8 milliGRAM(s) Oral at bedtime  trimethoprim  160 mG/sulfamethoxazole 800 mG 1 Tablet(s) Oral <User Schedule>    MEDICATIONS  (PRN):

## 2024-03-21 NOTE — SWALLOW BEDSIDE ASSESSMENT ADULT - COMMENTS
Pt admitted to  with verbal expression difficulties according to admitting notes. Brain MRI is remarkable for acute cortical infarcts in right Frontal/Parietal/Occipital regions and tiny old hemorrhagic infarct in left Parietal lobe. Pt has a ho hearing loss. See below for additional prior medical information.

## 2024-03-21 NOTE — DIETITIAN INITIAL EVALUATION ADULT - SIGNS/SYMPTOMS
From: Sharon Noguera  To: Jeannine Short  Sent: 2/5/2024 6:27 AM CST  Subject: Cycle    Just wanted to update that my cycle has started.  
AEB mod-severe muscle/fat wasting, consuming <50% of ENN > 5 days, 8.6% wt loss x 2-3 weeks

## 2024-03-21 NOTE — PHYSICAL THERAPY INITIAL EVALUATION ADULT - PERTINENT HX OF CURRENT PROBLEM, REHAB EVAL
Pt admitted to  secondary to word finding difficulty, diminished memory, and decrease fine motor skills. Pt saw his neurologist on 3/19/2024 and had MRI. 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 CT head: neg, CTA head and neck: neg. MRI head: acute cortical infarctions in the right frontal, parietal, and occipital cortex. Tiny old hemorrhagic cortical infarction post left parietal lobe.

## 2024-03-21 NOTE — SWALLOW BEDSIDE ASSESSMENT ADULT - SLP GENERAL OBSERVATIONS
On encounter, the pt was alert and interactive. He was [place]. 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 He followed complex commands, denied reading problems and verbalized during communicative probes without evidence of a primary motor speech or primary linguistic pathology. Moreover, his voice was functionally/audibly presbyphonic. Pt was oriented X 3 and his short term memory was preserved on exam. The pt was able to verbalize his needs and pt/wife stated that they feel that his speech-language integrity are at baseline

## 2024-03-21 NOTE — DIETITIAN INITIAL EVALUATION ADULT - NSICDXPASTMEDICALHX_GEN_ALL_CORE_FT
PAST MEDICAL HISTORY:  BPH (benign prostatic hyperplasia)     GERD (gastroesophageal reflux disease)     H/O constipation     HLD (hyperlipidemia)     [place] (hard of hearing)     Snores

## 2024-03-21 NOTE — H&P ADULT - HISTORY OF PRESENT ILLNESS
83 yo M with PMH of HLD, BPH, GERD presents to the ED complains of difficulty finding words, remembering things, and decreased fine motor skills for the past couple days. He saw his neurologist yesterday, who ordered an MRI. He had MRI and was told to go to the ED. 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  Patient was getting frustrated as he could not remember why he came to the hospital. Denies chest pain, sob, fever, chill, n/v, urinary complaints.     He is a nephrologist.

## 2024-03-21 NOTE — CONSULT NOTE ADULT - ASSESSMENT
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 MRI brain showing multiple acute CVA's. Notably recent PET scan also showing enhancement in prostate with suspected metastases as well (new finding- has not been further evaluated for this).    Question of etiology of CVA's- may be from undiagnosed metastatic malignancy originating from prostate, versus GCA itself (although CTA did not show vessel stenoses or thickening) versus HLD.    He is currently on prednisone 40 mg QD which needs to be increased in setting of active GCA- will increase to 60 mg QD and continue this dose  upon discharge.  Continue Bactrim DS three times weekly for PCP pneumonia prophylaxis, given he is on high dose prednisone.     Long term treatment plan was discussed with his outpatient rheumatologist Dr De La Garza- SubQ actemra will be started as outpatient- will allow us to taper the prednisone more quickly.         Aurora Lane MD   [facility]- Rheumatology at [place]  Office #: [phone]

## 2024-03-21 NOTE — SWALLOW BEDSIDE ASSESSMENT ADULT - SWALLOW EVAL: DIAGNOSIS
1) On encounter, the pt was alert and interactive. He was [place]. 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 He followed complex commands, denied reading problems and verbalized during communicative probes without evidence of a primary motor speech or primary linguistic pathology. Moreover, his voice was functionally/audibly presbyphonic. Pt was oriented X 3 and his short term memory was preserved on exam. The pt was able to verbalize his needs and pt/wife stated that they feel that his speech-language integrity are at baseline.

## 2024-03-21 NOTE — H&P ADULT - ASSESSMENT
85 yo M with PMH of HLD, BPH, GERD presents to the ED complains of difficulty finding words, remembering things, and decreased fine motor skills for the past couple days found to have acute stroke.    Acute stroke  MRI findings: Acute cortical infarctions in the RIGHT frontal, parietal and occipital cortex with underlying subcortical white matter, demonstrating restricted diffusion in these regions. Tiny old hemorrhagic cortical infarction is seen in the posterior LEFT parietal lobe.  CTA angio of  head  CT neck w/ and w/o  Neurology consulted  aspirin and plavix  Speech eval  PT eval  SW eval

## 2024-03-21 NOTE — DIETITIAN INITIAL EVALUATION ADULT - PERTINENT LABORATORY DATA
03-21    138  |  104  |  22  ----------------------------<  102<H>  3.6   |  30  |  0.96    Ca    9.0      21 Mar 2024 07:56    TPro  7.9  /  Alb  3.1<L>  /  TBili  0.6  /  DBili  x   /  AST  14<L>  /  ALT  23  /  AlkPhos  60  03-20  POCT Blood Glucose.: 112 mg/dL (03-20-24 @ 19:44)

## 2024-03-21 NOTE — SWALLOW BEDSIDE ASSESSMENT ADULT - NS SPL SWALLOW CLINIC TRIAL FT
The pt exhibited Oropharyngeal Swallowing integrity which subjectively appeared to be within functional parameters for age. Bolus formation/transfer were mechanically functional for age, swallow was triggered in an acceptable time frame for age and laryngeal lift on palpation during swallowing trials was felt to be functional for age as well. No behavioral aspiration signs exhibited. No change in O2 sats noted. Odynophagia was denied.

## 2024-03-21 NOTE — DIETITIAN INITIAL EVALUATION ADULT - OTHER INFO
85 yo M with PMH of HLD, BPH, GERD presents to the ED complains of difficulty finding words, remembering things, and decreased fine motor skills for the past couple days. He saw his neurologist yesterday, who ordered an MRI. 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  Admit for acute stroke    Was NPO, now upgraded to DASH/TLC diet w/ regular consistency & thin liquids. Pt reports being seen this morning by SLP today on 3/21; however, note is not in yet by SLP. Reports UBW of ~185# x 2-3 weeks ago; endorses rapid wt loss. Bed scale wt of 169# taken by RD on 3/21/24. Unintentional wt loss of 16# / 8.6% wt loss x 2-3 weeks ; severe & clinically significant. NFPE reveals moderate-severe muscle/ fat wasting - appears thin and frail. Liberalize diet to regular to maximize caloric and nutrient intake; confirm consistency of diet as soon as medically feasible. Denies ALL ONS at this time - Encourage protein-rich foods, maximize food preferences. See below for other recommendations.

## 2024-03-21 NOTE — SWALLOW BEDSIDE ASSESSMENT ADULT - SWALLOW EVAL: PROGNOSIS
2) The pt exhibits Oropharyngeal Swallowing integrity which subjectively appeared to be within functional parameters for age. Bolus formation/transfer were mechanically functional for age, swallow was triggered in an acceptable time frame for age and laryngeal lift on palpation during swallowing trials was felt to be functional for age as well. No behavioral aspiration signs exhibited. No change in O2 sats noted. Odynophagia was denied.

## 2024-03-21 NOTE — SWALLOW BEDSIDE ASSESSMENT ADULT - SWALLOW EVAL: CRITERIA FOR SKILLED INTERVENTION MET
DO NOT FEEL THAT ACUTE SPEECH PATHOLOGY FOLLOW UP WOULD CHANGE CLINICAL MANAGEMENT/OUTCOME IN HOSPITAL SETTING. PT'S SPEECH-LANGUAGE AND OROPHARYNGEAL SWALLOWING INTEGRITY ARE FUNCTIONAL/AT USUAL STATE/ARE MAXIMIZED. GIVEN ABOVE, THIS SERVICE WILL NOT ACTIVELY FOLLOW. RECONSULT PRN SHOULD STATUS CHANGE AND CONDITION WARRANT.

## 2024-03-21 NOTE — PHYSICAL THERAPY INITIAL EVALUATION ADULT - ORIENTATION, REHAB EVAL
Patient Contact    Attempt # 3    Was call answered?  No.  Left message on voicemail with information to call me back.     oriented to person, place, time and situation

## 2024-03-21 NOTE — CONSULT NOTE ADULT - SUBJECTIVE AND OBJECTIVE BOX
MAEGAN EDGAR  [id number]    HISTORY OF PRESENT ILLNESS:    PAST MEDICAL & SURGICAL HISTORY:  HLD (hyperlipidemia)      BPH (benign prostatic hyperplasia)      GERD (gastroesophageal reflux disease)      Snores      [place] (hard of hearing)      H/O constipation      Status post ORIF of fracture of ankle      History of colonoscopy      S/P left inguinal hernia repair          Review of Systems:  Gen:  No fevers/chills, weight loss  HEENT: No blurry vision, no difficulty swallowing  CVS: No chest pain/palpitations  Resp: No SOB/wheezing  GI: No N/V/C/D/abdominal pain  MSK:  Skin: No new rashes  Neuro: No headaches    MEDICATIONS  (STANDING):  aspirin Suppository 300 milliGRAM(s) Rectal daily  atorvastatin 80 milliGRAM(s) Oral at bedtime  calcium carbonate 1250 mG  + Vitamin D (OsCal 500 + D) 1 Tablet(s) Oral daily  clopidogrel Tablet 75 milliGRAM(s) Oral daily  famotidine    Tablet 40 milliGRAM(s) Oral two times a day  multivitamin 1 Tablet(s) Oral daily  predniSONE   Tablet 40 milliGRAM(s) Oral daily  tamsulosin 0.8 milliGRAM(s) Oral at bedtime  trimethoprim  160 mG/sulfamethoxazole 800 mG 1 Tablet(s) Oral <User Schedule>    MEDICATIONS  (PRN):      Allergies    penicillin (Other)    Intolerances        PERTINENT MEDICATION HISTORY:    SOCIAL HISTORY:  OCCUPATION:  TRAVEL HISTORY:    FAMILY HISTORY:      Vital Signs Last 24 Hrs  T(C): 36.9 (21 Mar 2024 08:57), Max: 36.9 (21 Mar 2024 05:15)  T(F): 98.5 (21 Mar 2024 08:57), Max: 98.5 (21 Mar 2024 05:15)  HR: 52 (21 Mar 2024 08:57) (52 - 80)  BP: 124/58 (21 Mar 2024 08:57) (123/58 - 138/60)  BP(mean): --  RR: 18 (21 Mar 2024 08:57) (15 - 18)  SpO2: 100% (21 Mar 2024 08:57) (95% - 100%)    Parameters below as of 21 Mar 2024 08:57  Patient On (Oxygen Delivery Method): room air        Physical Exam:  General: No apparent distress  HEENT: EOMI, MMM  CVS: +S1/S2, RRR, no murmurs/rubs/gallops  Resp: CTA b/l. No crackles/wheezing  GI: Soft, NT/ND +BS  MSK:  Shoulders: wnl  Elbows: wnl  Wrists: wnl  MCPs: wnl  PIPs: wnl  DIPs: wnl   Hips: wnl  Knees: wnl   Ankle: wnl  Neuro: AAOx3  Skin: no visible rashes    LABS:                        12.4   11.26 )-----------( 197      ( 20 Mar 2024 20:07 )             37.5     03-21    138  |  104  |  22  ----------------------------<  102<H>  3.6   |  30  |  0.96    Ca    9.0      21 Mar 2024 07:56    TPro  7.9  /  Alb  3.1<L>  /  TBili  0.6  /  DBili  x   /  AST  14<L>  /  ALT  23  /  AlkPhos  60  03-20      Urinalysis Basic - ( 21 Mar 2024 07:56 )    Color: x / Appearance: x / SG: x / pH: x  Gluc: 102 mg/dL / Ketone: x  / Bili: x / Urobili: x   Blood: x / Protein: x / Nitrite: x   Leuk Esterase: x / RBC: x / WBC x   Sq Epi: x / Non Sq Epi: x / Bacteria: x        RADIOLOGY & ADDITIONAL STUDIES: MAEGAN HERNÁNDEZ  [id number]    HISTORY OF PRESENT ILLNESS:  85 yo M with PMH of HLD, BPH, GERD presents to the ED complains of difficulty finding words, remembering things, and decreased fine motor skills for the past couple days. He saw his neurologist yesterday, who ordered an MRI. He had MRI and was told to go to the ED. 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  Patient was getting frustrated as he could not remember why he came to the hospital. Denies chest pain, sob, fever, chill, n/v, urinary complaints.   Brain imaging reveals multiple acute R. sided infarcts- per report, "Acute cortical infarctions in the RIGHT frontal, parietal and occipital cortex with underlying subcortical white matter, demonstrating restricted diffusion in these regions. Tiny old hemorrhagic cortical infarction is seen in the posterior LEFT parietal lobe."    Rheumatology consulted as patient has been worked up for GCA recently and has been on steroids for GCA. He follows with rheumatologist Dr. De La Garza.     PAST MEDICAL & SURGICAL HISTORY:  HLD (hyperlipidemia)      BPH (benign prostatic hyperplasia)      GERD (gastroesophageal reflux disease)      Snores      [place] (hard of hearing)      H/O constipation      Status post ORIF of fracture of ankle      History of colonoscopy      S/P left inguinal hernia repair          Review of Systems:  Gen:  No fevers/chills, weight loss  HEENT: No blurry vision, no difficulty swallowing  CVS: No chest pain/palpitations  Resp: No SOB/wheezing  GI: No N/V/C/D/abdominal pain  MSK:  Skin: No new rashes  Neuro: No headaches    MEDICATIONS  (STANDING):  aspirin Suppository 300 milliGRAM(s) Rectal daily  atorvastatin 80 milliGRAM(s) Oral at bedtime  calcium carbonate 1250 mG  + Vitamin D (OsCal 500 + D) 1 Tablet(s) Oral daily  clopidogrel Tablet 75 milliGRAM(s) Oral daily  famotidine    Tablet 40 milliGRAM(s) Oral two times a day  multivitamin 1 Tablet(s) Oral daily  predniSONE   Tablet 40 milliGRAM(s) Oral daily  tamsulosin 0.8 milliGRAM(s) Oral at bedtime  trimethoprim  160 mG/sulfamethoxazole 800 mG 1 Tablet(s) Oral <User Schedule>    MEDICATIONS  (PRN):      Allergies    penicillin (Other)    Intolerances        PERTINENT MEDICATION HISTORY:    SOCIAL HISTORY:  OCCUPATION:  TRAVEL HISTORY:    FAMILY HISTORY:      Vital Signs Last 24 Hrs  T(C): 36.9 (21 Mar 2024 08:57), Max: 36.9 (21 Mar 2024 05:15)  T(F): 98.5 (21 Mar 2024 08:57), Max: 98.5 (21 Mar 2024 05:15)  HR: 52 (21 Mar 2024 08:57) (52 - 80)  BP: 124/58 (21 Mar 2024 08:57) (123/58 - 138/60)  BP(mean): --  RR: 18 (21 Mar 2024 08:57) (15 - 18)  SpO2: 100% (21 Mar 2024 08:57) (95% - 100%)    Parameters below as of 21 Mar 2024 08:57  Patient On (Oxygen Delivery Method): room air        Physical Exam:  General: No apparent distress  HEENT: EOMI, MMM  CVS: +S1/S2, RRR, no murmurs/rubs/gallops  Resp: CTA b/l. No crackles/wheezing  GI: Soft, NT/ND +BS  MSK:  Shoulders: wnl  Elbows: wnl  Wrists: wnl  MCPs: wnl  PIPs: wnl  DIPs: wnl   Hips: wnl  Knees: wnl   Ankle: wnl  Neuro: AAOx3  Skin: no visible rashes    LABS:                        12.4   11.26 )-----------( 197      ( 20 Mar 2024 20:07 )             37.5     03-21    138  |  104  |  22  ----------------------------<  102<H>  3.6   |  30  |  0.96    Ca    9.0      21 Mar 2024 07:56    TPro  7.9  /  Alb  3.1<L>  /  TBili  0.6  /  DBili  x   /  AST  14<L>  /  ALT  23  /  AlkPhos  60  03-20      Urinalysis Basic - ( 21 Mar 2024 07:56 )    Color: x / Appearance: x / SG: x / pH: x  Gluc: 102 mg/dL / Ketone: x  / Bili: x / Urobili: x   Blood: x / Protein: x / Nitrite: x   Leuk Esterase: x / RBC: x / WBC x   Sq Epi: x / Non Sq Epi: x / Bacteria: x        RADIOLOGY & ADDITIONAL STUDIES: MAEGAN HERNÁNDEZ  [id number]    HISTORY OF PRESENT ILLNESS:  85 yo M with active GCA (positive temporal artery biopsy done on 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 He saw his neurologist yesterday, who ordered an MRI. He had MRI and was told to go to the ED. 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  Patient was getting frustrated as he could not remember why he came to the hospital. Denies chest pain, sob, fever, chill, n/v, urinary complaints.   Brain imaging reveals multiple acute R. sided infarcts- per report, "Acute cortical infarctions in the RIGHT frontal, parietal and occipital cortex with underlying subcortical white matter, demonstrating restricted diffusion in these regions. Tiny old hemorrhagic cortical infarction is seen in the posterior LEFT parietal lobe."    Rheumatology consulted as patient has been worked up for GCA recently and has been on steroids for GCA. He follows with rheumatologist Dr. De La Garza. Currently on prednisone 40 mg daily.     PAST MEDICAL & SURGICAL HISTORY:  HLD (hyperlipidemia)      BPH (benign prostatic hyperplasia)      GERD (gastroesophageal reflux disease)      Snores      [place] (hard of hearing)      H/O constipation      Status post ORIF of fracture of ankle      History of colonoscopy      S/P left inguinal hernia repair          Review of Systems:  Gen:  No fevers/chills, weight loss  HEENT: No blurry vision, no difficulty swallowing  CVS: No chest pain/palpitations  Resp: No SOB/wheezing  GI: No N/V/C/D/abdominal pain  MSK:  Skin: No new rashes  Neuro: No headaches    MEDICATIONS  (STANDING):  aspirin Suppository 300 milliGRAM(s) Rectal daily  atorvastatin 80 milliGRAM(s) Oral at bedtime  calcium carbonate 1250 mG  + Vitamin D (OsCal 500 + D) 1 Tablet(s) Oral daily  clopidogrel Tablet 75 milliGRAM(s) Oral daily  famotidine    Tablet 40 milliGRAM(s) Oral two times a day  multivitamin 1 Tablet(s) Oral daily  predniSONE   Tablet 40 milliGRAM(s) Oral daily  tamsulosin 0.8 milliGRAM(s) Oral at bedtime  trimethoprim  160 mG/sulfamethoxazole 800 mG 1 Tablet(s) Oral <User Schedule>    MEDICATIONS  (PRN):      Allergies    penicillin (Other)    Intolerances        PERTINENT MEDICATION HISTORY:    SOCIAL HISTORY:  OCCUPATION:  TRAVEL HISTORY:    FAMILY HISTORY:      Vital Signs Last 24 Hrs  T(C): 36.9 (21 Mar 2024 08:57), Max: 36.9 (21 Mar 2024 05:15)  T(F): 98.5 (21 Mar 2024 08:57), Max: 98.5 (21 Mar 2024 05:15)  HR: 52 (21 Mar 2024 08:57) (52 - 80)  BP: 124/58 (21 Mar 2024 08:57) (123/58 - 138/60)  BP(mean): --  RR: 18 (21 Mar 2024 08:57) (15 - 18)  SpO2: 100% (21 Mar 2024 08:57) (95% - 100%)    Parameters below as of 21 Mar 2024 08:57  Patient On (Oxygen Delivery Method): room air        Physical Exam:  General: No apparent distress  HEENT: EOMI, MMM  CVS: +S1/S2, RRR, no murmurs/rubs/gallops  Resp: CTA b/l. No crackles/wheezing  GI: Soft, NT/ND +BS  MSK:  Shoulders: wnl  Elbows: wnl  Wrists: wnl  MCPs: wnl  PIPs: wnl  DIPs: wnl   Hips: wnl  Knees: wnl   Ankle: wnl  Neuro: AAOx3  Skin: no visible rashes    LABS:                        12.4   11.26 )-----------( 197      ( 20 Mar 2024 20:07 )             37.5     03-21    138  |  104  |  22  ----------------------------<  102<H>  3.6   |  30  |  0.96    Ca    9.0      21 Mar 2024 07:56    TPro  7.9  /  Alb  3.1<L>  /  TBili  0.6  /  DBili  x   /  AST  14<L>  /  ALT  23  /  AlkPhos  60  03-20      Urinalysis Basic - ( 21 Mar 2024 07:56 )    Color: x / Appearance: x / SG: x / pH: x  Gluc: 102 mg/dL / Ketone: x  / Bili: x / Urobili: x   Blood: x / Protein: x / Nitrite: x   Leuk Esterase: x / RBC: x / WBC x   Sq Epi: x / Non Sq Epi: x / Bacteria: x        RADIOLOGY & ADDITIONAL STUDIES: MAEGAN HERNÁNDEZ  [id number]    HISTORY OF PRESENT ILLNESS:  85 yo M with active GCA (positive temporal artery biopsy done on 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 He saw his neurologist yesterday, who ordered an MRI. He had MRI and was told to go to the ED. 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  Patient was getting frustrated as he could not remember why he came to the hospital. Denies chest pain, sob, fever, chill, n/v, urinary complaints.   Brain imaging reveals multiple acute R. sided infarcts- per report, "Acute cortical infarctions in the RIGHT frontal, parietal and occipital cortex with underlying subcortical white matter, demonstrating restricted diffusion in these regions. Tiny old hemorrhagic cortical infarction is seen in the posterior LEFT parietal lobe."    Rheumatology consulted as patient has been worked up for GCA recently and has been on steroids for GCA. He follows with rheumatologist Dr. De La Garza. Currently on prednisone 40 mg daily. He was on 60 mg QD which he may have self tapered since 2/2024;  Patient denies PMR symptoms; no joint pain; no vision changes; no slurred speech; no facial droop; does have changes in sensation to LUE.       PAST MEDICAL & SURGICAL HISTORY:  HLD (hyperlipidemia)      BPH (benign prostatic hyperplasia)      GERD (gastroesophageal reflux disease)      Snores      [place] (hard of hearing)      H/O constipation      Status post ORIF of fracture of ankle      History of colonoscopy      S/P left inguinal hernia repair          Review of Systems:  Gen:  No fevers/chills, weight loss  HEENT: No blurry vision, no difficulty swallowing  CVS: No chest pain/palpitations  Resp: No SOB/wheezing  GI: No N/V/C/D/abdominal pain  MSK: No joint pains  Skin: No new rashes  Neuro: No headaches at present    MEDICATIONS  (STANDING):  aspirin Suppository 300 milliGRAM(s) Rectal daily  atorvastatin 80 milliGRAM(s) Oral at bedtime  calcium carbonate 1250 mG  + Vitamin D (OsCal 500 + D) 1 Tablet(s) Oral daily  clopidogrel Tablet 75 milliGRAM(s) Oral daily  famotidine    Tablet 40 milliGRAM(s) Oral two times a day  multivitamin 1 Tablet(s) Oral daily  predniSONE   Tablet 40 milliGRAM(s) Oral daily  tamsulosin 0.8 milliGRAM(s) Oral at bedtime  trimethoprim  160 mG/sulfamethoxazole 800 mG 1 Tablet(s) Oral <User Schedule>    MEDICATIONS  (PRN):      Allergies    penicillin (Other)    Intolerances        PERTINENT MEDICATION HISTORY:    SOCIAL HISTORY:  OCCUPATION: nephrologist  TRAVEL HISTORY:    FAMILY HISTORY:      Vital Signs Last 24 Hrs  T(C): 36.9 (21 Mar 2024 08:57), Max: 36.9 (21 Mar 2024 05:15)  T(F): 98.5 (21 Mar 2024 08:57), Max: 98.5 (21 Mar 2024 05:15)  HR: 52 (21 Mar 2024 08:57) (52 - 80)  BP: 124/58 (21 Mar 2024 08:57) (123/58 - 138/60)  BP(mean): --  RR: 18 (21 Mar 2024 08:57) (15 - 18)  SpO2: 100% (21 Mar 2024 08:57) (95% - 100%)    Parameters below as of 21 Mar 2024 08:57  Patient On (Oxygen Delivery Method): room air        Physical Exam:  General: No apparent distress  HEENT: intact temporal artery pulses, no scalap tenderness, s/p R sided temporal artery biopsy; EOMI, MMM  CVS: +S1/S2, RRR, no murmurs/rubs/gallops  Resp: no respiratory distress  MSK:  Shoulders: wnl  Elbows: wnl  Wrists: wnl  MCPs: wnl  PIPs: wnl  DIPs: wnl   Hips: wnl  Knees: wnl   Ankle: wnl  Neuro: AAOx3  Skin: no visible rashes    LABS:                        12.4   11.26 )-----------( 197      ( 20 Mar 2024 20:07 )             37.5     03-21    138  |  104  |  22  ----------------------------<  102<H>  3.6   |  30  |  0.96    Ca    9.0      21 Mar 2024 07:56    TPro  7.9  /  Alb  3.1<L>  /  TBili  0.6  /  DBili  x   /  AST  14<L>  /  ALT  23  /  AlkPhos  60  03-20      Urinalysis Basic - ( 21 Mar 2024 07:56 )    Color: x / Appearance: x / SG: x / pH: x  Gluc: 102 mg/dL / Ketone: x  / Bili: x / Urobili: x   Blood: x / Protein: x / Nitrite: x   Leuk Esterase: x / RBC: x / WBC x   Sq Epi: x / Non Sq Epi: x / Bacteria: x        RADIOLOGY & ADDITIONAL STUDIES:

## 2024-03-22 ENCOUNTER — TRANSCRIPTION ENCOUNTER (OUTPATIENT)
Age: 84
End: 2024-03-22

## 2024-03-22 VITALS
DIASTOLIC BLOOD PRESSURE: 62 MMHG | RESPIRATION RATE: 18 BRPM | HEART RATE: 61 BPM | OXYGEN SATURATION: 96 % | TEMPERATURE: 98 F | SYSTOLIC BLOOD PRESSURE: 126 MMHG

## 2024-03-22 LAB
HCT VFR BLD CALC: 35 % — LOW (ref 39–50)
HGB BLD-MCNC: 11.5 G/DL — LOW (ref 13–17)
MCHC RBC-ENTMCNC: 32.9 GM/DL — SIGNIFICANT CHANGE UP (ref 32–36)
MCHC RBC-ENTMCNC: 32.9 PG — SIGNIFICANT CHANGE UP (ref 27–34)
MCV RBC AUTO: 100 FL — SIGNIFICANT CHANGE UP (ref 80–100)
PLATELET # BLD AUTO: 165 K/UL — SIGNIFICANT CHANGE UP (ref 150–400)
RBC # BLD: 3.5 M/UL — LOW (ref 4.2–5.8)
RBC # FLD: 14.7 % — HIGH (ref 10.3–14.5)
WBC # BLD: 12.96 K/UL — HIGH (ref 3.8–10.5)
WBC # FLD AUTO: 12.96 K/UL — HIGH (ref 3.8–10.5)

## 2024-03-22 PROCEDURE — 99232 SBSQ HOSP IP/OBS MODERATE 35: CPT

## 2024-03-22 PROCEDURE — 99239 HOSP IP/OBS DSCHRG MGMT >30: CPT

## 2024-03-22 PROCEDURE — 99223 1ST HOSP IP/OBS HIGH 75: CPT

## 2024-03-22 RX ORDER — CLOPIDOGREL BISULFATE 75 MG/1
75 TABLET, FILM COATED ORAL DAILY
Refills: 0 | Status: DISCONTINUED | OUTPATIENT
Start: 2024-03-22 | End: 2024-03-22

## 2024-03-22 RX ORDER — PANTOPRAZOLE SODIUM 20 MG/1
1 TABLET, DELAYED RELEASE ORAL
Qty: 30 | Refills: 0
Start: 2024-03-22 | End: 2024-04-20

## 2024-03-22 RX ORDER — TOCILIZUMAB 20 MG/ML
23 INJECTION, SOLUTION, CONCENTRATE INTRAVENOUS
Qty: 0 | Refills: 0 | DISCHARGE
Start: 2024-03-22

## 2024-03-22 RX ORDER — ATORVASTATIN CALCIUM 80 MG/1
1 TABLET, FILM COATED ORAL
Qty: 90 | Refills: 0
Start: 2024-03-22 | End: 2024-06-19

## 2024-03-22 RX ORDER — TOCILIZUMAB 20 MG/ML
460 INJECTION, SOLUTION, CONCENTRATE INTRAVENOUS
Refills: 0 | Status: DISCONTINUED | OUTPATIENT
Start: 2024-03-22 | End: 2024-03-22

## 2024-03-22 RX ORDER — ATORVASTATIN CALCIUM 80 MG/1
1 TABLET, FILM COATED ORAL
Refills: 0 | DISCHARGE

## 2024-03-22 RX ADMIN — TOCILIZUMAB 100 MILLIGRAM(S): 20 INJECTION, SOLUTION, CONCENTRATE INTRAVENOUS at 14:23

## 2024-03-22 RX ADMIN — HEPARIN SODIUM 5000 UNIT(S): 5000 INJECTION INTRAVENOUS; SUBCUTANEOUS at 05:47

## 2024-03-22 RX ADMIN — Medication 1 TABLET(S): at 10:40

## 2024-03-22 RX ADMIN — Medication 81 MILLIGRAM(S): at 10:40

## 2024-03-22 RX ADMIN — CLOPIDOGREL BISULFATE 75 MILLIGRAM(S): 75 TABLET, FILM COATED ORAL at 14:30

## 2024-03-22 RX ADMIN — FAMOTIDINE 40 MILLIGRAM(S): 10 INJECTION INTRAVENOUS at 10:40

## 2024-03-22 RX ADMIN — Medication 60 MILLIGRAM(S): at 10:39

## 2024-03-22 NOTE — DISCHARGE NOTE NURSING/CASE MANAGEMENT/SOCIAL WORK - FLU SEASON?
-- DO NOT REPLY / DO NOT REPLY ALL --  -- Message is from the [facility]--    General Patient Message      Reason for Call: father is asking if the appointment confirmation can be emailed. Needs proof of appointment for school. Please call back to assist or email to [email or web address]    Caller Information       Type Contact Phone    08/05/2021 09:26 AM CDT Phone (Incoming) father [phone]          Alternative phone number: n    Turnaround time given to caller:   \"This message will be sent to [state Provider's name]. The clinical team will fulfill your request as soon as they review your message.\"    
Called and informed the patient that we don't send email for appt time and dates.  He will come in to get an appt card to show the school that his daughter have an appt.  
Yes...

## 2024-03-22 NOTE — OCCUPATIONAL THERAPY INITIAL EVALUATION ADULT - PERTINENT HX OF CURRENT PROBLEM, REHAB EVAL
Addended by: Arnulfo Fuentes on: 12/15/2023 05:52 PM     Modules accepted: Orders
Pt is a 85 y/o male with PMHx of HLD, BPH, GERD presents to the ED complains of difficulty finding words, remembering things, and decreased fine motor skills for the past couple days. He saw his neurologist yesterday, who ordered an MRI. He had MRI and was told to go to the ED. 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 MRI head: acute cortical infarctions in the right frontal, parietal, and occipital cortex. Tiny old hemorrhagic cortical infarction post left parietal lobe.    CT PERFUSION: No core infarct or penumbra of ischemic tissue is identified by CT perfusion.  CT ANGIOGRAPHY NECK: Patent cervical vasculature. No hemodynamically significant carotid or vertebral artery stenosis. No evidence of dissection.  CT ANGIOGRAPHY BRAIN: No vessel occlusion, flow-limiting stenosis or aneurysm.  PET CT scan: 1. PSMA-avid lesions in the prostate, concerning for prostate cancer. The lesion with the highest PSMA expression shows score 2, intermediate expression.

## 2024-03-22 NOTE — DISCHARGE NOTE PROVIDER - NSDCCPCAREPLAN_GEN_ALL_CORE_FT
PRINCIPAL DISCHARGE DIAGNOSIS  Diagnosis: CVA (cerebrovascular accident)  Assessment and Plan of Treatment: You were admitted to the hospital because you had a stroke. This is when a blood clot blocks a blood vessel in your brain, and causes damage to part of the brain that control parts of the body.   Please continue Atorvastatin 40 mg once daily  DISCONTINUE home Aspirin and START Plavix 75 mg daily  Continue outpatient management with Dr. Libman.  In the event of an unexplained stroke, we recommend having your heart rhythm recorded using a cardiac monitor. Mobile Cardiac Telemetry (MCT or MCOT) is an advanced cardiac monitoring device that automatically monitors, records, and transmits the abnormal heart rhythm for 24 hours up to 30 days. It picks up the heart's electrical signals just as an ECG throughout the day and night, even when the patient is asleep. You will follow up with the Electrophysiologist to review the findings of the cardiac monitor.        SECONDARY DISCHARGE DIAGNOSES  Diagnosis: Giant cell arteritis  Assessment and Plan of Treatment: Continue Prednisone 60 mg upon discharge and follow up with your Rheumatologist, Dr. De La Garza, within 2-3 weeks of discharge.   Please continue Bactrim double strength three times weekly for pneumonia prophylaxis while on high dose steroids.    Diagnosis: Prediabetes  Assessment and Plan of Treatment: Your A1c was 6.2 while in the hospital. A hemoglobin A1c is a blood test that measures your average blood sugar level for the past 2 to 3 months. An A1c of 5.7% to 6.4% means you have prediabetes.  We recommend making lifestyle modifications including dietary changes. Eat more vegetables, fruit, whole-grains, low-fat dairy, healthy fats, fish, and lean meat or protein foods. Eat fewer sweets, such as candy, cookies, regular soda, and sweetened drinks. You can also decrease calories by eating smaller portion sizes. Work with your outpatient healthcare provider or dietitian to develop a meal plan that is right for you.     PRINCIPAL DISCHARGE DIAGNOSIS  Diagnosis: CVA (cerebrovascular accident)  Assessment and Plan of Treatment: You were admitted to the hospital because you had a stroke. This is when a blood clot blocks a blood vessel in your brain, and causes damage to part of the brain that control parts of the body.   Please continue Atorvastatin 40 mg once daily  DISCONTINUE home Aspirin and START Plavix 75 mg daily  Continue outpatient management with Dr. Libman.  In the event of an unexplained stroke, we recommend having your heart rhythm recorded using a cardiac monitor. Mobile Cardiac Telemetry (MCT or MCOT) is an advanced cardiac monitoring device that automatically monitors, records, and transmits the abnormal heart rhythm for 24 hours up to 30 days. It picks up the heart's electrical signals just as an ECG throughout the day and night, even when the patient is asleep. You will follow up with the Electrophysiologist to review the findings of the cardiac monitor.        SECONDARY DISCHARGE DIAGNOSES  Diagnosis: Giant cell arteritis  Assessment and Plan of Treatment: Continue Prednisone 60 mg upon discharge and follow up with your Rheumatologist, Dr. De La Garza, within 2-3 weeks of discharge.   Please continue Bactrim double strength three times weekly for pneumonia prophylaxis while on high dose steroids. Please take Protonix 40 mg once day to protect your stomach lining for the next 30 days while you are on high dose steroids.    Diagnosis: Prediabetes  Assessment and Plan of Treatment: Your A1c was 6.2 while in the hospital. A hemoglobin A1c is a blood test that measures your average blood sugar level for the past 2 to 3 months. An A1c of 5.7% to 6.4% means you have prediabetes.  We recommend making lifestyle modifications including dietary changes. Eat more vegetables, fruit, whole-grains, low-fat dairy, healthy fats, fish, and lean meat or protein foods. Eat fewer sweets, such as candy, cookies, regular soda, and sweetened drinks. You can also decrease calories by eating smaller portion sizes. Work with your outpatient healthcare provider or dietitian to develop a meal plan that is right for you.

## 2024-03-22 NOTE — OCCUPATIONAL THERAPY INITIAL EVALUATION ADULT - FINE MOTOR COORDINATION, LEFT HAND, FINGER TO NOSE, OT EVAL
Problem: Patient Care Overview  Goal: Plan of Care Review  Continues to have severe pain even c multi-modal meds; blood admin completed; NST completed       normal performance

## 2024-03-22 NOTE — PROGRESS NOTE ADULT - SUBJECTIVE AND OBJECTIVE BOX
No acute events overnight, no new complaints    ROS: As stated above otherwise neg    MEDICATIONS  (STANDING):  aspirin enteric coated 81 milliGRAM(s) Oral daily  atorvastatin 80 milliGRAM(s) Oral at bedtime  calcium carbonate 1250 mG  + Vitamin D (OsCal 500 + D) 1 Tablet(s) Oral daily  famotidine    Tablet 40 milliGRAM(s) Oral two times a day  heparin   Injectable 5000 Unit(s) SubCutaneous every 8 hours  multivitamin 1 Tablet(s) Oral daily  predniSONE   Tablet 60 milliGRAM(s) Oral daily  tamsulosin 0.8 milliGRAM(s) Oral at bedtime  trimethoprim  160 mG/sulfamethoxazole 800 mG 1 Tablet(s) Oral <User Schedule>      Vital Signs Last 24 Hrs  T(C): 37.1 (22 Mar 2024 08:41), Max: 37.1 (22 Mar 2024 08:41)  T(F): 98.7 (22 Mar 2024 08:41), Max: 98.7 (22 Mar 2024 08:41)  HR: 51 (22 Mar 2024 08:41) (51 - 61)  BP: 118/56 (22 Mar 2024 08:41) (118/56 - 145/72)  BP(mean): --  RR: 18 (22 Mar 2024 08:41) (18 - 18)  SpO2: 98% (22 Mar 2024 08:41) (95% - 98%)    Parameters below as of 22 Mar 2024 08:41  Patient On (Oxygen Delivery Method): room air    Neurological exam:  HF: A x O x 3. Appropriately interactive, normal affect, +memory impairment, Speech fluent, No Aphasia or paraphasic errors. Naming /repetition intact   CN: ABIGAIL, EOMI, VFF-subjective grey patches R visual field, decreased R eye visual acuity, facial sensation normal, no NLFD, tongue midline, Palate moves equally, SCM equal bilaterally  Motor: NO drift on todays exam, Strength 5/5 RUE, LUE, RLE.  5/5 LLE  Sens: Intact to light touch   Reflexes: BJ 2+, BR 2+, KJ 2+, AJ 2+, downgoing toes b/l  Coord:  No FNFA, HTS intact b/l   Gait/Balance: Cannot test    NIHSS: 1                          11.5   12.96 )-----------( 165      ( 22 Mar 2024 07:07 )             35.0     03-21    138  |  104  |  22  ----------------------------<  102<H>  3.6   |  30  |  0.96    Ca    9.0      21 Mar 2024 07:56    TPro  7.9  /  Alb  3.1<L>  /  TBili  0.6  /  DBili  x   /  AST  14<L>  /  ALT  23  /  AlkPhos  60  03-20 03-21 Chol 149 LDL -- HDL 66 Trig 67    Radiology report:  < from: CT Angio Neck w/ IV Cont (03.20.24 @ 21:56) >    IMPRESSION:  NONCONTRAST HEAD CT SCAN:  Redemonstration of acute infarcts in the right frontal lobe, better seen   on recent MRI. Additional smaller infarcts in the right occipital lobe   are inconspicuous on this noncontrast CT.    No intracranial hemorrhage or midline shift.    CT ANGIOGRAPHY BRAIN:  No large vessel occlusion, significant stenosis, aneurysm or vascular   malformation.      CT ANGIOGRAPHY NECK:  The cervical vasculature is patent without significant stenosis or   dissection.    Outpatient MRI done 3/20 reviewed: acute cortical infarcts in the right frontal, parietal and occipital cortex with underlying subcortical white matter demonstrating restricted diffusion in these regions.  Tiny old hemorrhagic cortical infarct is seen in the posterior Left parietal lobe.      < from: TTE Echo Complete w/o Contrast w/ Doppler (03.21.24 @ 16:10) >     Impression     Summary     The left ventricle is normalin size, wall thickness, wall motion and   contractility as seen in limited views.   Estimated left ventricular ejection fraction is 60-65 %.   Normal diastolic function.   The mitral valve leaflets appear thickened.   Mild (1+) mitral regurgitation is present.   Normal appearing tricuspid valve structure.   Trace tricuspid valve regurgitation is present.   The IVC is dilated.   Limited subcostal window.     Pt's mental sensorium has improved, able to have a conversation, no acute events overnight, no new complaints    ROS: As stated above otherwise neg    MEDICATIONS  (STANDING):  aspirin enteric coated 81 milliGRAM(s) Oral daily  atorvastatin 80 milliGRAM(s) Oral at bedtime  calcium carbonate 1250 mG  + Vitamin D (OsCal 500 + D) 1 Tablet(s) Oral daily  famotidine    Tablet 40 milliGRAM(s) Oral two times a day  heparin   Injectable 5000 Unit(s) SubCutaneous every 8 hours  multivitamin 1 Tablet(s) Oral daily  predniSONE   Tablet 60 milliGRAM(s) Oral daily  tamsulosin 0.8 milliGRAM(s) Oral at bedtime  trimethoprim  160 mG/sulfamethoxazole 800 mG 1 Tablet(s) Oral <User Schedule>      Vital Signs Last 24 Hrs  T(C): 37.1 (22 Mar 2024 08:41), Max: 37.1 (22 Mar 2024 08:41)  T(F): 98.7 (22 Mar 2024 08:41), Max: 98.7 (22 Mar 2024 08:41)  HR: 51 (22 Mar 2024 08:41) (51 - 61)  BP: 118/56 (22 Mar 2024 08:41) (118/56 - 145/72)  BP(mean): --  RR: 18 (22 Mar 2024 08:41) (18 - 18)  SpO2: 98% (22 Mar 2024 08:41) (95% - 98%)    Parameters below as of 22 Mar 2024 08:41  Patient On (Oxygen Delivery Method): room air    Neurological exam:  HF: A x O x 3. Appropriately interactive, memory impairment, Speech fluent, No Aphasia or paraphasic errors. Naming /repetition intact   CN: ABIGAIL, EOMI, VFF - subjective grey patches R visual field, decreased R eye visual acuity, facial sensation normal, no NLFD, tongue midline, Palate moves equally, SCM equal bilaterally  Motor: NO drift on today, strength 5/5 RUE, LUE, RLE.  5/5 LLE  Sens: Intact to light touch   Reflexes: BJ 2+, BR 2+, KJ 2+, AJ 2+, downgoing toes b/l  Coord:  No FNFA, HTS intact b/l   Gait/Balance: Not tested    NIHSS: 1                          11.5   12.96 )-----------( 165      ( 22 Mar 2024 07:07 )             35.0     03-21    138  |  104  |  22  ----------------------------<  102<H>  3.6   |  30  |  0.96    Ca    9.0      21 Mar 2024 07:56    TPro  7.9  /  Alb  3.1<L>  /  TBili  0.6  /  DBili  x   /  AST  14<L>  /  ALT  23  /  AlkPhos  60  03-20 03-21 Chol 149 LDL -- HDL 66 Trig 67    Radiology report:  < from: CT Angio Neck w/ IV Cont (03.20.24 @ 21:56) >    IMPRESSION:  NONCONTRAST HEAD CT SCAN:  Redemonstration of acute infarcts in the right frontal lobe, better seen   on recent MRI. Additional smaller infarcts in the right occipital lobe   are inconspicuous on this noncontrast CT.    No intracranial hemorrhage or midline shift.    CT ANGIOGRAPHY BRAIN:  No large vessel occlusion, significant stenosis, aneurysm or vascular   malformation.      CT ANGIOGRAPHY NECK:  The cervical vasculature is patent without significant stenosis or   dissection.    Outpatient MRI done 3/20 reviewed: acute cortical infarcts in the right frontal, parietal and occipital cortex with underlying subcortical white matter demonstrating restricted diffusion in these regions.  Tiny old hemorrhagic cortical infarct is seen in the posterior Left parietal lobe.      < from: TTE Echo Complete w/o Contrast w/ Doppler (03.21.24 @ 16:10) >     Impression     Summary     The left ventricle is normal in size, wall thickness, wall motion and   contractility as seen in limited views.   Estimated left ventricular ejection fraction is 60-65 %.   Normal diastolic function.   The mitral valve leaflets appear thickened.   Mild (1+) mitral regurgitation is present.   Normal appearing tricuspid valve structure.   Trace tricuspid valve regurgitation is present.   The IVC is dilated.   Limited subcostal window.

## 2024-03-22 NOTE — CONSULT NOTE ADULT - ASSESSMENT
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 No arrythmia noted on telemetry, normal LV function, normal appearing atria on echo.      Patient would benefit from outpatient cardiac monitoring to rule out atrial fibrillation as cause of CVA  Discussed MCOT and possible ILR in the future with patient and wife with verbalized understanding  Patient is agreeable to MCOT  MCOT on discharge  Outpatient EP follow up  Further management and dispo per medicine and neuro  Plan discussed with Dr. Sharp

## 2024-03-22 NOTE — DISCHARGE NOTE PROVIDER - DETAILS OF MALNUTRITION DIAGNOSIS/DIAGNOSES
This patient has been assessed with a concern for Malnutrition and was treated during this hospitalization for the following Nutrition diagnosis/diagnoses:     -  03/21/2024: Severe protein-calorie malnutrition

## 2024-03-22 NOTE — DISCHARGE NOTE PROVIDER - CARE PROVIDERS DIRECT ADDRESSES
[email or web address]

## 2024-03-22 NOTE — DISCHARGE NOTE PROVIDER - NSDCCAREPROVSEEN_GEN_ALL_CORE_FT
Alberto, Ina Stockton, Gypsy Lane, Aurora Domingo, Frantz Garner, Zunilda Lang, Qamar Hoffman, Lowell JC

## 2024-03-22 NOTE — PROGRESS NOTE ADULT - SUBJECTIVE AND OBJECTIVE BOX
MAEGAN HERNÁNDEZ  [id number]    INTERVAL HPI/OVERNIGHT EVENTS:    MEDICATIONS  (STANDING):  aspirin enteric coated 81 milliGRAM(s) Oral daily  atorvastatin 80 milliGRAM(s) Oral at bedtime  calcium carbonate 1250 mG  + Vitamin D (OsCal 500 + D) 1 Tablet(s) Oral daily  famotidine    Tablet 40 milliGRAM(s) Oral two times a day  heparin   Injectable 5000 Unit(s) SubCutaneous every 8 hours  multivitamin 1 Tablet(s) Oral daily  predniSONE   Tablet 60 milliGRAM(s) Oral daily  tamsulosin 0.8 milliGRAM(s) Oral at bedtime  tocilizumab IVPB 460 milliGRAM(s) IV Intermittent every 4 weeks  trimethoprim  160 mG/sulfamethoxazole 800 mG 1 Tablet(s) Oral <User Schedule>    MEDICATIONS  (PRN):      Allergies    penicillin (Other)    Intolerances        Review of Systems:   General: No fevers/chills, no fatigue  HEENT: No blurry vision, dysphagia, or odynophagia  CVS: No CP/palpitations  Resp: No SOB/wheezing  GI: No N/V/C/D/abdominal pain  MSK:   Skin: No new rashes  Neuro: No headaches      Vital Signs Last 24 Hrs  T(C): 37.1 (22 Mar 2024 08:41), Max: 37.1 (22 Mar 2024 08:41)  T(F): 98.7 (22 Mar 2024 08:41), Max: 98.7 (22 Mar 2024 08:41)  HR: 51 (22 Mar 2024 08:41) (51 - 61)  BP: 118/56 (22 Mar 2024 08:41) (118/56 - 145/72)  BP(mean): --  RR: 18 (22 Mar 2024 08:41) (18 - 18)  SpO2: 98% (22 Mar 2024 08:41) (95% - 98%)    Parameters below as of 22 Mar 2024 08:41  Patient On (Oxygen Delivery Method): room air        Physical Exam:  General: NAD  HEENT: EOMI, MMM  Cardio: +S1/S2, RRR  Resp: CTA b/l  GI: +BS, soft, NT/ND  MSK:  Neuro: AAOx3  Psych: wnl    LABS:                        11.5   12.96 )-----------( 165      ( 22 Mar 2024 07:07 )             35.0     03-21    138  |  104  |  22  ----------------------------<  102<H>  3.6   |  30  |  0.96    Ca    9.0      21 Mar 2024 07:56    TPro  7.9  /  Alb  3.1<L>  /  TBili  0.6  /  DBili  x   /  AST  14<L>  /  ALT  23  /  AlkPhos  60  03-20      Urinalysis Basic - ( 21 Mar 2024 07:56 )    Color: x / Appearance: x / SG: x / pH: x  Gluc: 102 mg/dL / Ketone: x  / Bili: x / Urobili: x   Blood: x / Protein: x / Nitrite: x   Leuk Esterase: x / RBC: x / WBC x   Sq Epi: x / Non Sq Epi: x / Bacteria: x          RADIOLOGY & ADDITIONAL TESTS:

## 2024-03-22 NOTE — DISCHARGE NOTE PROVIDER - NSDCFUSCHEDAPPT_GEN_ALL_CORE_FT
Liu Sharp  [facility]  [address]  Scheduled Appointment: 05/10/2024

## 2024-03-22 NOTE — OCCUPATIONAL THERAPY INITIAL EVALUATION ADULT - BALANCE TRAINING, PT EVAL
Pt will demonstrate improved static/dynamic balance by 1/2 grade in order to increase safety and independence in ADLs within 1 week.

## 2024-03-22 NOTE — DISCHARGE NOTE PROVIDER - PROVIDER TOKENS
PROVIDER:[TOKEN:[[id number]],SCHEDULEDAPPT:[05/10/2024],SCHEDULEDAPPTTIME:[10:00 AM]],PROVIDER:[TOKEN:[[id number]],FOLLOWUP:[2 weeks]],PROVIDER:[TOKEN:[[id number]],FOLLOWUP:[2 weeks],ESTABLISHEDPATIENT:[T]]

## 2024-03-22 NOTE — DISCHARGE NOTE NURSING/CASE MANAGEMENT/SOCIAL WORK - PATIENT PORTAL LINK FT
You can access the FollowMyHealth Patient Portal offered by [facility] by registering at the following website: [email or web address]. By joining "[employer]’s FollowMyHealth portal, you will also be able to view your health information using other applications (apps) compatible with our system.

## 2024-03-22 NOTE — OCCUPATIONAL THERAPY INITIAL EVALUATION ADULT - MD ORDER
"OT Evaluate and Treat"- MD orders received. Chart reviewed, contents noted, conferred with RN. "OT Evaluate and Treat"- MD orders received. Chart reviewed, contents noted, conferred with RN

## 2024-03-22 NOTE — OCCUPATIONAL THERAPY INITIAL EVALUATION ADULT - ADDITIONAL COMMENTS
Pt reports he resides in a  with elevator assess with his wife and daughter. Pt states PTA he was (I) with all ADL/IADL tasks, walked without AD. Pt has a walk in shower, standard toilet. D

## 2024-03-22 NOTE — PROGRESS NOTE ADULT - ASSESSMENT
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  On 2/10 he had diplopia, HA - ESR in the 80's - temporal artery bs which was positive for GCA, satrted on pRednisone for temporal arteritis. For the last 10 days he has had slight L sided weakness, L hand decreased fine motor skills, and memory problems, is left handed and cannot sign his name or hold things properly. Yesterday he went to see neurology Dr. Libman who ordered an MRI brain - showed acute cortical infarctions in R frontal, parietal, occipital cortex and a tiny old hemorrhagic cortical infarct L parietal lobe. CT angio, no LVO or significant stenosis.  On PE he has trouble remembering things, slight LUE drift, decreased R eye visual acuity, and very subtle LLE weakness.    # Acute cortical infarcts in the right frontal, parietal and occipital cortex- likely embolic    # GCA - on prednisone    Recommendations  -Monitor on tele  -continue MPW60if qd,  atorvastatin 40mg qd (goal LDL should be <70, it is currently 69).  Will hold of on plavix as patient has h/o sig GI Bleed requiring 7 units of blood  -DVT prophylaxis  -Neurochecks/VS q 4, FS q 6 x 24 hours  -Will need ILR most likely-cardiology eval pending  -PT/OT/acute rehab eval  -Rheumatology and Cardiology f/u    D/W Dr. 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  On 2/10 he had diplopia, HA - ESR in the 80's - temporal artery bs which was positive for GCA, satrted on prednisone for temporal arteritis. For the last 10 days he has had slight L sided weakness, L hand decreased fine motor skills, and memory problems, is left handed and cannot sign his name or hold things properly. Yesterday he went to see neurology Dr. Libman who ordered an MRI brain - showed acute cortical infarctions in R frontal, parietal, occipital cortex and a tiny old hemorrhagic cortical infarct L parietal lobe. CT angio, no LVO or significant stenosis.  On PE he has trouble remembering things, slight LUE drift, decreased R eye visual acuity, and very subtle LLE weakness.    # Acute cortical infarcts in the right frontal, parietal and occipital cortex- likely embolic    # GCA - on prednisone    Recommendations  -Monitor on tele  -continue ASA 81mg qd,  atorvastatin 40 mg qd (goal LDL should be <70, it is currently 69).  Will hold of on plavix as patient has left parietal cortical hemorrhagic infract and prior GI Bleed   -DVT prophylaxis  -Neurochecks/VS q 4  -Will need ILR most likely-cardiology eval pending  -PT/OT/acute rehab assessment

## 2024-03-22 NOTE — DISCHARGE NOTE PROVIDER - CARE PROVIDER_API CALL
Liu Sharp.  Cardiac Electrophysiology  [address] [place] [address]  Phone: [phone]  Fax: [phone]  Scheduled Appointment: 05/10/2024 10:00 AM    Libman, Richard Benjamin  Neurology  [address] [facility] [address] [facility] [address]  [place] [address]  Phone: [phone]  Fax: [phone]  Follow Up Time: 2 weeks    Nicole De La Garza  Rheumatology  [address], [address]  [place] [address]  Phone: [phone]  Fax: [phone]  Established Patient  Follow Up Time: 2 weeks

## 2024-03-22 NOTE — OCCUPATIONAL THERAPY INITIAL EVALUATION ADULT - PRECAUTIONS/LIMITATIONS, REHAB EVAL
diet: DASH/TLC, supervise meals, elevate HOB 30 degrees, notify if SpO2 <92%/aspiration precautions/cardiac precautions/fall precautions

## 2024-03-22 NOTE — OCCUPATIONAL THERAPY INITIAL EVALUATION ADULT - VISUAL ASSESSMENT: TRACKING
pt wears glasses, reports in his R eye he has black spots when isolating his vision to that side, smooth pursuits, saccades intact, visual field appears intact

## 2024-03-22 NOTE — DISCHARGE NOTE NURSING/CASE MANAGEMENT/SOCIAL WORK - NSDCPEFALRISK_GEN_ALL_CORE
For information on Fall & Injury Prevention, visit: [email or web address] OR  [email or web address] OR  https://www.cdc.gov/steadi/patient.html

## 2024-03-22 NOTE — CONSULT NOTE ADULT - NS ATTEND AMEND GEN_ALL_CORE FT
This is an 84-year-old gentleman who presents with an acute CVA imaging demonstrates infarcts in the right frontal lobe better seen on MRI additional small infarct in the right occipital lobe c/w ESUS.     Recommend an MCOT followed likely by a Implantable Cardiac monitor for long term monitoring.
Patient seen and examined.    Above plan discussed with the patient and his wife

## 2024-03-22 NOTE — CONSULT NOTE ADULT - SUBJECTIVE AND OBJECTIVE BOX
HPI:  85 yo M with PMH of HLD, BPH, GERD presents to the ED complains of difficulty finding words, remembering things, and decreased fine motor skills for the past couple days. He saw his neurologist yesterday, who ordered an MRI. He had MRI and was told to go to the ED. 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 Patient was getting frustrated as he could not remember why he came to the hospital. Denies chest pain, sob, fever, chill, n/v, urinary complaints. He is a nephrologist.  Patient admitted with acute CVA, R frontal, parietal, occipital cortex.  Patient seen at bedside with wife present, denies any CP, palpitations, SOB, dizziness,  lightheadedness.  EP asked to evaluate for long term cardiac monitoring.       PAST MEDICAL & SURGICAL HISTORY:  HLD (hyperlipidemia)  BPH (benign prostatic hyperplasia)  GERD (gastroesophageal reflux disease)  Snores  [place] (hard of hearing)  H/O constipation  Status post ORIF of fracture of ankle  History of colonoscopy  S/P left inguinal hernia repair          MEDICATIONS  (STANDING):  atorvastatin 80 milliGRAM(s) Oral at bedtime  calcium carbonate 1250 mG  + Vitamin D (OsCal 500 + D) 1 Tablet(s) Oral daily  clopidogrel Tablet 75 milliGRAM(s) Oral daily  famotidine    Tablet 40 milliGRAM(s) Oral two times a day  heparin   Injectable 5000 Unit(s) SubCutaneous every 8 hours  multivitamin 1 Tablet(s) Oral daily  predniSONE   Tablet 60 milliGRAM(s) Oral daily  tamsulosin 0.8 milliGRAM(s) Oral at bedtime  tocilizumab IVPB 460 milliGRAM(s) IV Intermittent every 4 weeks  trimethoprim  160 mG/sulfamethoxazole 800 mG 1 Tablet(s) Oral <User Schedule>    MEDICATIONS  (PRN):      Allergies    penicillin (Other)      SOCIAL HISTORY: Denies tobacco, etoh abuse or illicit drug use      Vital Signs Last 24 Hrs  T(C): 37.1 (22 Mar 2024 08:41), Max: 37.1 (22 Mar 2024 08:41)  T(F): 98.7 (22 Mar 2024 08:41), Max: 98.7 (22 Mar 2024 08:41)  HR: 51 (22 Mar 2024 08:41) (51 - 61)  BP: 118/56 (22 Mar 2024 08:41) (118/56 - 145/72)  RR: 18 (22 Mar 2024 08:41) (18 - 18)  SpO2: 98% (22 Mar 2024 08:41) (95% - 98%)    Parameters below as of 22 Mar 2024 08:41  Patient On (Oxygen Delivery Method): room air        REVIEW OF SYSTEMS:    CONSTITUTIONAL:  As per HPI.  HEENT:  Eyes:  No diplopia or blurred vision. ENT:  No earache, sore throat or runny nose.  CARDIOVASCULAR:  No pressure, squeezing, strangling, tightness, heaviness or aching about the chest, neck, axilla or epigastrium.  RESPIRATORY:  No cough, shortness of breath, PND or orthopnea.  GASTROINTESTINAL:  No nausea, vomiting or diarrhea.  GENITOURINARY:  No dysuria, frequency or urgency.  MUSCULOSKELETAL:  As per HPI.  SKIN:  No change in skin, hair or nails.  NEUROLOGIC:  No paresthesias, fasciculations, seizures or weakness.  PSYCHIATRIC:  No disorder of thought or mood.  ENDOCRINE:  No heat or cold intolerance, polyuria or polydipsia.  HEMATOLOGICAL:  No easy bruising or bleedings:  .     PHYSICAL EXAMINATION:    GENERAL APPEARANCE:  Pt. is not currently dyspneic, in no distress. Pt. is alert, oriented, and pleasant.  HEENT:  Pupils are normal and react normally. No icterus. Mucous membranes well colored.  NECK:  Supple. No lymphadenopathy. Jugular venous pressure not elevated. Carotids equal.   HEART:   The cardiac impulse has a normal quality. There are no murmurs, rubs or gallops noted  CHEST:  Chest is clear to auscultation. Normal respiratory effort.  ABDOMEN:  Soft and nontender.   EXTREMITIES:  There is no edema.   SKIN:  No rash or significant lesions are noted.    I&O's Summary    21 Mar 2024 07:01  -  22 Mar 2024 07:00  --------------------------------------------------------  IN: 0 mL / OUT: 600 mL / NET: -600 mL        LABS:                        11.5   12.96 )-----------( 165      ( 22 Mar 2024 07:07 )             35.0     03-21    138  |  104  |  22  ----------------------------<  102<H>  3.6   |  30  |  0.96    Ca    9.0      21 Mar 2024 07:56    TPro  7.9  /  Alb  3.1<L>  /  TBili  0.6  /  DBili  x   /  AST  14<L>  /  ALT  23  /  AlkPhos  60  03-20    LIVER FUNCTIONS - ( 20 Mar 2024 20:07 )  Alb: 3.1 g/dL / Pro: 7.9 gm/dL / ALK PHOS: 60 U/L / ALT: 23 U/L / AST: 14 U/L / GGT: x                 Urinalysis Basic - ( 21 Mar 2024 07:56 )    Color: x / Appearance: x / SG: x / pH: x  Gluc: 102 mg/dL / Ketone: x  / Bili: x / Urobili: x   Blood: x / Protein: x / Nitrite: x   Leuk Esterase: x / RBC: x / WBC x   Sq Epi: x / Non Sq Epi: x / Bacteria: x      EKG: NSR@64bbpm  DE: 142ms  QRS: 82ms  QT/QTc: 396/408ms    TELEMETRY: SR, nocturnal bradycardia, occ PVC    CARDIAC TESTS:   Impression     Summary     The left ventricle is normalin size, wall thickness, wall motion and   contractility as seen in limited views.   Estimated left ventricular ejection fraction is 60-65 %.   Normal diastolic function.   The mitral valve leaflets appear thickened.    Mild (1+) mitral regurgitation is present.   Normal appearing tricuspid valve structure.   Trace tricuspid valve regurgitation is present.   The IVC is dilated.   Limited subcostal window.     Signature     ----------------------------------------------------------------   Electronically signed by Liu Mejia MD(Interpreting   physician) on 03/21/2024 09:02 PM      RADIOLOGY & ADDITIONAL STUDIES:    IMPRESSION:  NONCONTRAST HEAD CT SCAN:  Redemonstration of acute infarcts in the right frontal lobe, better seen   on recent MRI. Additional smaller infarcts in the right occipital lobe   are inconspicuous on this noncontrast CT.    No intracranial hemorrhage or midline shift.    CT ANGIOGRAPHY BRAIN:  No large vessel occlusion, significant stenosis, aneurysm or vascular   malformation.    CT ANGIOGRAPHY NECK:  The cervical vasculature is patent without significant stenosis or   dissection.    --- End of Report ---      MING MARIE MD; Attending Radiologist  This document has been electronically signed. Mar 20 2024 10:18PM

## 2024-03-22 NOTE — DISCHARGE NOTE PROVIDER - NSDCMRMEDTOKEN_GEN_ALL_CORE_FT
acetaminophen 500 mg oral tablet: 2 tab(s) orally 2 times a day as needed for  headache  alfuzosin 10 mg oral tablet, extended release: 1 tab(s) orally once a day with dinner  atorvastatin 10 mg oral tablet: 1 tab(s) orally once a day (in the evening)  Caltrate 600 + D oral tablet: 1 tab(s) orally once a day  clopidogrel 75 mg oral tablet: 1 tab(s) orally once a day ***NEW MEDICATION, pt took 1st dose today at ~1:30pm***  Colace 100 mg oral capsule: 3 cap(s) orally once a day (in the morning)  Multiple Vitamins oral tablet: 1 tab(s) orally  Pepcid 40 mg oral tablet: 1 tab(s) orally once a day (at bedtime)  predniSONE 20 mg oral tablet: 2 tab(s) orally once a day  sulfamethoxazole-trimethoprim 800 mg-160 mg oral tablet: 1 tab(s) orally Monday, Wednesday, and Friday   acetaminophen 500 mg oral tablet: 2 tab(s) orally 2 times a day as needed for  headache  alfuzosin 10 mg oral tablet, extended release: 1 tab(s) orally once a day with dinner  atorvastatin 40 mg oral tablet: 1 tab(s) orally once a day (at bedtime)  Caltrate 600 + D oral tablet: 1 tab(s) orally once a day  clopidogrel 75 mg oral tablet: 1 tab(s) orally once a day ***NEW MEDICATION, pt took 1st dose today at ~1:30pm***  Colace 100 mg oral capsule: 3 cap(s) orally once a day (in the morning)  Multiple Vitamins oral tablet: 1 tab(s) orally once a day  Pepcid 40 mg oral tablet: 1 tab(s) orally once a day (at bedtime)  predniSONE 20 mg oral tablet: 3 tab(s) orally once a day  sulfamethoxazole-trimethoprim 800 mg-160 mg oral tablet: 1 tab(s) orally Monday, Wednesday, and Friday  tocilizumab 20 mg/mL intravenous solution: 23 milliliter(s) intravenous every 4 weeks   acetaminophen 500 mg oral tablet: 2 tab(s) orally 2 times a day as needed for  headache  alfuzosin 10 mg oral tablet, extended release: 1 tab(s) orally once a day with dinner  atorvastatin 40 mg oral tablet: 1 tab(s) orally once a day (at bedtime)  Caltrate 600 + D oral tablet: 1 tab(s) orally once a day  clopidogrel 75 mg oral tablet: 1 tab(s) orally once a day ***NEW MEDICATION, pt took 1st dose today at ~1:30pm***  Colace 100 mg oral capsule: 3 cap(s) orally once a day (in the morning)  Multiple Vitamins oral tablet: 1 tab(s) orally once a day  Pepcid 40 mg oral tablet: 1 tab(s) orally once a day (at bedtime)  predniSONE 20 mg oral tablet: 3 tab(s) orally once a day  Protonix 40 mg oral delayed release tablet: 1 tab(s) orally once a day  sulfamethoxazole-trimethoprim 800 mg-160 mg oral tablet: 1 tab(s) orally Monday, Wednesday, and Friday  tocilizumab 20 mg/mL intravenous solution: 23 milliliter(s) intravenous every 4 weeks

## 2024-03-22 NOTE — PROGRESS NOTE ADULT - NS ATTEND AMEND GEN_ALL_CORE FT
Patient seen and examined.  Patient seems mentally alert and conversant today significant improvement since yesterday.    Above plan discussed with the patient and his family, patient will follow-up with Dr. Libman, the Stroke director / neurologist  as OP next week. Patient seen and examined.  Patient seems mentally alert and conversant today significant improvement since yesterday.    Above plan discussed with the patient and his wife, As per the wife, patient had been started on Plavix by Dr. Libman, I agree to stay on Plavix, discontinue aspirin.  Patient will follow-up with Dr. Libman, the Stroke director / neurologist as OP next week.    Above plan D/W Dr. Garner

## 2024-03-22 NOTE — PROGRESS NOTE ADULT - ASSESSMENT
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 MRI brain showing multiple acute CVA's. Notably recent PET scan also showing enhancement in prostate with suspected metastases as well (new finding- has not been further evaluated for this).    Question of etiology of CVA's- may be from undiagnosed metastatic malignancy originating from prostate, versus GCA itself (although CTA did not show vessel stenoses or thickening) versus HLD.    Continue PO prednisone 60 mg QD and discharge patient on this dose (30 days supply)  Continue Bactrim DS three times weekly for PCP pneumonia prophylaxis, given he is on high dose prednisone.     Long term treatment plan was discussed with his outpatient rheumatologist Dr De La Garza- SubQ actemra is not covered by insurance; will plan to give IV actemra first dose (6 mg/kg [id number]) while inpatient prior to his discharge.       Aurora Lane MD   [facility]- Rheumatology at [place]  Office #: [phone]

## 2024-03-22 NOTE — DISCHARGE NOTE PROVIDER - HOSPITAL COURSE
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  On 2/10 he had diplopia and HA with ESR in the 80's and was treated for temporal arteritis with a prednisone taper as an outpatient. He went to see a neurologist Dr. Ocampo but was not happy with his care so did not go back, he went ot see a neuro-opthalmologist who said he had R optic nerve damage and was sent to a Rheumatologist who did a temporal artery bs which was positive for GCA.  For the last 10 days he has had slight L sided weakness, L hand decreased fine motor skills, and memory problems. He is left handed and cannot sign his name or hold things properly.  Yesterday he went to see neurology Dr. Libman who ordered an MRI brain which showed acute cortical infarctions in the R frontal, parietal, occipital cortex and a tiny old hemorrhagic cortical infarct L posterior L parietal lobe. He is very frustrated because he does not remember why he came to the hospital.  He has been having cognitive decline over the last 10 days as per his wife at bedside. Of note: Plavix and ASA was started yesterday by outpatient Neurologist.        Hospital course (by problem):     Patient was discharged to: (home/[facility]/acute rehab/hospice, etc, and with what services – home health PT/RN? 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  On 2/10 he had diplopia and HA with ESR in the 80's and was treated for temporal arteritis with a prednisone taper as an outpatient. He went to see a neurologist Dr. Ocampo but was not happy with his care so did not go back, he went ot see a neuro-opthalmologist who said he had R optic nerve damage and was sent to a Rheumatologist who did a temporal artery bs which was positive for GCA.  For the last 10 days he has had slight L sided weakness, L hand decreased fine motor skills, and memory problems. He is left handed and cannot sign his name or hold things properly.  Yesterday he went to see neurology Dr. Libman who ordered an MRI brain which showed acute cortical infarctions in the R frontal, parietal, occipital cortex and a tiny old hemorrhagic cortical infarct L posterior L parietal lobe. He is very frustrated because he does not remember why he came to the hospital.  He has been having cognitive decline over the last 10 days as per his wife at bedside. Of note: Plavix was started yesterday by outpatient Neurologist.      3/22: Patient seen and examined at bedside. Family also present and updated. Patient overall improving - still with very mild L sided weakness. No further complaints. MCOT placed prior to discharge. Will switch from ASA to Plavix --> discussed plan with Neurology and patient/family.     Hospital course (by problem):   #CVA  #Acute cortical infarcts in the right frontal, parietal and occipital cortex- likely embolic  -Outpatient MRI findings: Acute cortical infarctions in the RIGHT frontal, parietal and occipital cortex with underlying subcortical white matter, demonstrating restricted diffusion in these regions. Tiny old hemorrhagic cortical infarction is seen in the posterior LEFT parietal lobe.  -CT angio, no LVO or significant stenosis.  -Neurology consult appreciated; Plan to switch home Aspirin to Plavix daily  -Continue statin  -Speech eval appreciated  -Echo normal EF, no acute findings. Not candidate for bubble study due to age  -No arrhythmia on tele. EP consult appreciated. MCOT placed prior to discharge  -PT eval appreciated; discharge home with home care  =Outpatient follow up with Dr. Libman    #Active GCA  -Rheumatology consult appreciated  -Continue PO prednisone 60 mg QD and discharge patient on this dose (30 days supply)  -Continue Bactrim DS three times weekly for PCP pneumonia prophylaxis, given he is on high dose prednisone.   -Rheum ordered for first dose of IV actemra inpatient   -Outpatient follow up with Rheumatologist, Dr. De La Garza, for further management     #Prediabetes  Carb consistent diet    #Mild Leukocytosis  Likely from chronic steroids. No signs of active infection    Patient was discharged to: Home with home care    Physical exam at the time of discharge:  LOS: 2d    VITALS:   T(C): 36.7 (03-22-24 @ 16:12), Max: 37.1 (03-22-24 @ 08:41)  HR: 61 (03-22-24 @ 16:12) (51 - 61)  BP: 126/62 (03-22-24 @ 16:12) (118/56 - 145/72)  RR: 18 (03-22-24 @ 16:12) (18 - 18)  SpO2: 96% (03-22-24 @ 16:12) (95% - 98%)    PHYSICAL EXAM:  GENERAL: No acute distress  HEAD:  Atraumatic, Normocephalic  EYES: EOMI, PERRLA, conjunctiva and sclera clear  ENMT: No tonsillar erythema, exudates, or enlargement; MMM  NECK: Supple, No JVD, Normal thyroid  HEART: Regular rate and rhythm; No murmurs, rubs, or gallops  RESPIRATORY: Unlabored respirations. 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  On 2/10 he had diplopia and HA with ESR in the 80's and was treated for temporal arteritis with a prednisone taper as an outpatient. He went to see a neurologist Dr. Ocampo but was not happy with his care so did not go back, he went ot see a neuro-opthalmologist who said he had R optic nerve damage and was sent to a Rheumatologist who did a temporal artery bs which was positive for GCA.  For the last 10 days he has had slight L sided weakness, L hand decreased fine motor skills, and memory problems. He is left handed and cannot sign his name or hold things properly.  Yesterday he went to see neurology Dr. Libman who ordered an MRI brain which showed acute cortical infarctions in the R frontal, parietal, occipital cortex and a tiny old hemorrhagic cortical infarct L posterior L parietal lobe. He is very frustrated because he does not remember why he came to the hospital.  He has been having cognitive decline over the last 10 days as per his wife at bedside. Of note: Plavix was started yesterday by outpatient Neurologist.      3/22: Patient seen and examined at bedside. Family also present and updated. Patient overall improving - still with very mild L sided weakness. No further complaints. MCOT placed prior to discharge. Will switch from ASA to Plavix --> discussed plan with Neurology and patient/family.     Hospital course (by problem):   #CVA  #Acute cortical infarcts in the right frontal, parietal and occipital cortex- likely embolic  -Outpatient MRI findings: Acute cortical infarctions in the RIGHT frontal, parietal and occipital cortex with underlying subcortical white matter, demonstrating restricted diffusion in these regions. Tiny old hemorrhagic cortical infarction is seen in the posterior LEFT parietal lobe.  -CT angio, no LVO or significant stenosis.  -Neurology consult appreciated; Plan to switch home Aspirin to Plavix daily  -Continue statin  -Speech eval appreciated  -Echo normal EF, no acute findings. Not candidate for bubble study due to age  -No arrhythmia on tele. EP consult appreciated. MCOT placed prior to discharge  -PT eval appreciated; discharge home with home care  -Outpatient follow up with Dr. Libman    #Active GCA  -Rheumatology consult appreciated  -Per Rheum, Continue PO prednisone 60 mg QD and discharge patient on this dose (30 days supply)  -Continue Bactrim DS three times weekly for PCP pneumonia prophylaxis, given he is on high dose prednisone.   -Rheum ordered for first dose of IV actemra inpatient   -Outpatient follow up with Rheumatologist, Dr. De La Garza, for further management     #Prediabetes  Carb consistent diet    #Mild Leukocytosis  Likely from chronic steroids. No signs of active infection    Patient was discharged to: Home with home care    Physical exam at the time of discharge:  LOS: 2d    VITALS:   T(C): 36.7 (03-22-24 @ 16:12), Max: 37.1 (03-22-24 @ 08:41)  HR: 61 (03-22-24 @ 16:12) (51 - 61)  BP: 126/62 (03-22-24 @ 16:12) (118/56 - 145/72)  RR: 18 (03-22-24 @ 16:12) (18 - 18)  SpO2: 96% (03-22-24 @ 16:12) (95% - 98%)    PHYSICAL EXAM:  GENERAL: No acute distress  HEAD:  Atraumatic, Normocephalic  EYES: EOMI, PERRLA, conjunctiva and sclera clear  ENMT: No tonsillar erythema, exudates, or enlargement; MMM  NECK: Supple, No JVD, Normal thyroid  HEART: Regular rate and rhythm; No murmurs, rubs, or gallops  RESPIRATORY: Unlabored respirations. CTA B/L, No W/R/R  ABDOMEN: Soft, Nontender, Nondistended; Bowel sounds present  EXTREMITIES:  2+ Peripheral Pulses, No clubbing, cyanosis, or edema  NEUROLOGY: A&Ox3, nonfocal, strength 5/5 x4, no facial asymmetry

## 2024-03-22 NOTE — OCCUPATIONAL THERAPY INITIAL EVALUATION ADULT - NSACTIVITYREC_GEN_A_OT
Pt presents with impaired balance, endurance and muscle strength that will benefit from skilled OT to improve independence in ADLs, reduce fall risk and chance for readmission. Pt provided with HEP for fine and gross motor coordination exercises.

## 2024-03-26 ENCOUNTER — APPOINTMENT (OUTPATIENT)
Dept: NEUROLOGY | Facility: CLINIC | Age: 84
End: 2024-03-26
Payer: MEDICARE

## 2024-03-26 VITALS
BODY MASS INDEX: 23.19 KG/M2 | WEIGHT: 175 LBS | DIASTOLIC BLOOD PRESSURE: 65 MMHG | HEIGHT: 73 IN | SYSTOLIC BLOOD PRESSURE: 127 MMHG | HEART RATE: 89 BPM

## 2024-03-26 PROCEDURE — 99215 OFFICE O/P EST HI 40 MIN: CPT

## 2024-03-26 PROCEDURE — G2212 PROLONG OUTPT/OFFICE VIS: CPT

## 2024-03-26 NOTE — PHYSICAL EXAM
[FreeTextEntry1] : Neurological exam: HF: A x O x 3. Appropriately interactive, memory impairment, Speech fluent, No Aphasia or paraphasic errors. Naming /repetition intact  CN: ABIGAIL, EOMI, VFF - subjective grey patches R visual field, decreased R eye visual acuity, facial sensation normal, no NLFD, tongue midline, Palate moves equally, SCM equal bilaterally Motor: NO drift on today, strength 5/5 RUE, LUE, RLE.  5/5 LLE Sens: Intact to light touch  Reflexes: BJ 2+, BR 2+, KJ 2+, AJ 2+, downgoing toes b/l Coord:  No FNFA, HTS intact b/l  Gait/Balance: Not tested

## 2024-03-26 NOTE — REASON FOR VISIT
[Consultation] : a consultation visit [Post Hospitalization] : a post hospitalization visit [Spouse] : spouse [FreeTextEntry1] : stroke

## 2024-03-26 NOTE — HISTORY OF PRESENT ILLNESS
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 On 2/10 he had BL diplopia, when looking in the distance that lasted until 2/12 when he went to [place] ED and CT head/CTA head and neck did not reveal any stenosis or infarcts. He saw ophthalmologist Dr. 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 On 2/17/2024 he had ESR drawn that was noted to be 87 and prednisone was started for possible GCA. By 2/19 his headache was gone and the diplopia improved. On 2/19 he also consulted with neurologist Dr. Goodwin who noted no abnormalities on his neuro exam and wanted to work him up for myasthenia gravis. He also ordered MRI, MRA head and advised to continue steroids. Throughout all of this he denied temporal pain or jaw claudication. 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 MRA head and neck were unremarkable. That night the diffuse headache returned. On 2/24 he had total loss of vision in the right eye, lasting 30 minutes. When vision returned he felt a shade across upper right visual field and diplopia returned for a few hours. On 2/25 he had intermittent loss of vision in the right eye with diplopia and was having scotomas. He called ophthalmology who recommended he stay on 60 mg of Prednisone and see neuro ophthalmology. By 2/27 his visual deficit has persisted. On 3/6 he consulted with Dr. 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 He suggested rheumatology workup to discuss tapering off steroids and possibly starting a biologic to keep vasculitis under control. On 3/8 he saw Dr. De La Garza who suggested temporal artery bx - done and pending results. On 3/9 he developed ataxic gait, listing to left side and left-hand weakness and dysmetria that has persisted. On 3/13 he had PET scan and it revealed lesions in the prostate concerning for prostate CA and a rib lesion and avid lymph nodes concerning for metastases - pending evaluation.   Today he remains with loss of right eye vision (says it looks dark and is blurry) along with left hand weakness. He has also been complaining of balance instability and brain fog. Since March he has had intermittent BL feet paresthesia's that occurs randomly and varies in duration. Prior to me meeting patient he noted a few minutes of worsening left hand weakness lasting a few minutes but did not want to go to ED. He is not on any antiplatelet due to hx of remote GI bleed 40 + years ago. He notes that workup at that time and thereafter had been negative for any pathology. He denies any hx of stroke like symptoms in the past. He is a retired nephrologist. .   3/26/2024 On 3/20 after his visit with me, his wife called that he was increasingly confused and wanted to know if they should go to [place] ED. He had an MRI that day that revealed acute cortical infarcts in the right frontal, parietal and occipital cortex. CT angio, no LVO or significant stenosis. His temporal artery bx came positive for GCA so his prednisone was increased, and he started Actemra, under the care of Dr. De La Garza (rheumatology). [place] [facility] [place]. He will be following up with urology for + PET scan. Overall he feels better but remains with left hand weakness, though improved. He feels fatigued but energy is increasing. He has in home PT and OT eval for this Friday. Notes compliance with Plavix. He has had some excess urination over the last few weeks. At baseline due to his prostate issues, he urinates frequently but small amounts. Again, they will be making urology appt

## 2024-03-26 NOTE — DISCUSSION/SUMMARY
[Antithrombotic therapy with ___] : antithrombotic therapy with  [unfilled] [Intensive Blood Pressure Control] : intensive blood pressure control [Lipid Lowering Therapy] : lipid lowering therapy [Patient encouraged to discuss with Primary MD] : I encouraged the patient to discuss these important issues with ~his/her~ primary care doctor [Goals and Counseling] : I have reviewed the goals of stroke risk factor modification. I counseled the patient on measures to reduce stroke risk, including the importance of medication compliance, risk factor control, exercise, healthy diet and avoidance of smoking. I reviewed stroke warning signs and symptoms and appropriate actions to take if such occur. 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 He is being treated by neuro ophthalmology and rheumatology for possible GCA. He had a TA biopsy and is pending results. He remains on steroids. 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 The etiology of this infarct is consistent with ESUS and vessel imaging as been negative. Temporal arteritis itself can cause stroke, but usually you would see abnormalities in the major blood vessels on CTA so it seems unlikely that his presumed stroke would be due to giant cell arteritis. On 3/9 he developed left hand weakness and gait instability. It is possible he had a small cortical infarct involving the motor cortex, but MRI should be obtained. Since then, he feels that his short-term memory has worsened. Prior to visit he had a 5-minute episode of worsening left-hand weakness but did not want to go to ED  I have ordered an MRI head to be done urgently. He had PET scan that shows possible metastatic disease that is in the process of being worked up. He has a remote hx of GI bleed 40 + years ago with no known pathology and is slightly apprehensive about starting an antiplatelet. We discussed starting Plavix 75 mg PO QD - sent into pharmacy. He will follow up with Dr. Patricio for TTE and holter monitor as part of ESUS workup.   I will call them after MRI results and review everything again with Dr. Libman   3/26/2024 After worsening cognitive function, he went to [place] ED after our visit. MRI noted (3/20) acute cortical infarcts in the right frontal, parietal and occipital cortex with underlying subcortical white matter demonstrating restricted diffusion in these regions.  Tiny old hemorrhagic cortical infarct is seen in the posterior Left parietal lobe.    This case was reviewed with Dr. Libman in detail. His 3/20 MRI showed acute right hemispheric border zone infarcts. These types of infarcts usually occur in the context of a large artery severe stenosis or occlusion, but both his previous and more recent CTA did not reveal any significant large vessel stenosis.  The chronic Left parietal lobe infarct is also in an ambiguous vascular territory, either a distal MCA branch occlusion or possibly also in a border zone. Emboli can certainly go to the border zones, but it is very unusual for emboli to go exclusively to border zones, and not to simultaneously result in branch occlusions with typical vascular distribution infarct and would be visualized on CTA, so once again this seems relatively unlikely. Therefore, due to ambiguity of case he should be worked up for ESUS  Recommend he continue Plavix for secondary stroke prevention. He will follow up with urology to discuss positive PET scan. If metastatic malignancy is found, we will discuss the need for AC. Continue with MCOT and an ILR implant if MCOT is negative. TTE did not reveal any pathology during admission. Continue to follow up with rheumatology for treatment of GCA and screening for hypercoagulable state. Signs of stroke discussed in detail. He has had some excess urination, so CMP and urine electrolytes ordered, as requested by patient. Follow up with us in 1 month or sooner if needed.

## 2024-03-27 ENCOUNTER — APPOINTMENT (OUTPATIENT)
Dept: RHEUMATOLOGY | Facility: CLINIC | Age: 84
End: 2024-03-27
Payer: MEDICARE

## 2024-03-27 VITALS
TEMPERATURE: 97.6 F | RESPIRATION RATE: 16 BRPM | HEART RATE: 80 BPM | HEIGHT: 73 IN | DIASTOLIC BLOOD PRESSURE: 79 MMHG | BODY MASS INDEX: 22.4 KG/M2 | WEIGHT: 169 LBS | OXYGEN SATURATION: 98 % | SYSTOLIC BLOOD PRESSURE: 134 MMHG

## 2024-03-27 DIAGNOSIS — R35.89 OTHER POLYURIA: ICD-10-CM

## 2024-03-27 LAB
ALBUMIN SERPL ELPH-MCNC: 3.7 G/DL
ALP BLD-CCNC: 73 U/L
ALT SERPL-CCNC: 41 U/L
ANION GAP SERPL CALC-SCNC: 12 MMOL/L
AST SERPL-CCNC: 23 U/L
BILIRUB SERPL-MCNC: 0.4 MG/DL
BUN SERPL-MCNC: 32 MG/DL
CALCIUM ?TM UR-MCNC: 16.1 MG/DL
CALCIUM SERPL-MCNC: 9.5 MG/DL
CHLORIDE ?TM UR-SCNC: 68 MMOL/L
CHLORIDE SERPL-SCNC: 100 MMOL/L
CO2 SERPL-SCNC: 26 MMOL/L
CREAT SERPL-MCNC: 1.23 MG/DL
CREAT SPEC-SCNC: 100 MG/DL
EGFR: 58 ML/MIN/1.73M2
GLUCOSE SERPL-MCNC: 162 MG/DL
MAGNESIUM UR-MCNC: 7 MG/DL
MICROALBUMIN 24H UR DL<=1MG/L-MCNC: 2.5 MG/DL
MICROALBUMIN/CREAT 24H UR-RTO: 25 MG/G
OSMOLALITY UR: 616 MOSM/KG
PHOSPHATE 24H UR-MCNC: 18.6 MG/DL
POTASSIUM SERPL-SCNC: 4.6 MMOL/L
POTASSIUM UR-SCNC: 47.3 MMOL/L
PROT SERPL-MCNC: 7.5 G/DL
SODIUM ?TM SUB UR QN: 62 MMOL/L
SODIUM SERPL-SCNC: 138 MMOL/L
UUN UR-MCNC: 1046 MG/DL

## 2024-03-27 PROCEDURE — G2211 COMPLEX E/M VISIT ADD ON: CPT

## 2024-03-27 PROCEDURE — 99215 OFFICE O/P EST HI 40 MIN: CPT

## 2024-03-27 NOTE — REVIEW OF SYSTEMS
[Chills] : chills [Fever] : fever [Feeling Tired] : not feeling tired [Recent Weight Loss (___ Lbs)] : recent [unfilled] ~Ulb weight loss [As noted in HPI] : as noted in HPI [As Noted in HPI] : as noted in HPI [Negative] : Psychiatric

## 2024-03-27 NOTE — PHYSICAL EXAM
[General Appearance - In No Acute Distress] : in no acute distress [General Appearance - Alert] : alert [Outer Ear] : the ears and nose were normal in appearance [Sclera] : the sclera and conjunctiva were normal [Jugular Venous Distention Increased] : there was no jugular-venous distention [Nasal Cavity] : the nasal mucosa and septum were normal [] : no respiratory distress [Respiration, Rhythm And Depth] : normal respiratory rhythm and effort [Heart Rate And Rhythm] : heart rate was normal and rhythm regular [Heart Sounds] : normal S1 and S2 [Edema] : there was no peripheral edema [FreeTextEntry1] : temporal aa palpable but not tender, decreased pulse on right and no pulse on left [Abdomen Soft] : soft [Abdomen Tenderness] : non-tender [Cervical Lymph Nodes Enlarged Anterior Bilaterally] : anterior cervical [Cervical Lymph Nodes Enlarged Posterior Bilaterally] : posterior cervical [Abnormal Walk] : normal gait [Axillary Lymph Nodes Enlarged Bilaterally] : axillary [Skin Color & Pigmentation] : normal skin color and pigmentation [Skin Turgor] : normal skin turgor [No Focal Deficits] : no focal deficits [Impaired Insight] : insight and judgment were intact [Oriented To Time, Place, And Person] : oriented to person, place, and time

## 2024-03-27 NOTE — ASSESSMENT
[FreeTextEntry1] : ## GCA  = diffuse mild to moderate headache, history of with 6th cranial nerve palsy progressing to right eye vision loss with ophthalmology exam showing optic nerve atrophy  = Temporal artery bx on 3/14 - active giant cell (temporal) arteritis,  = ESR 84-78 and CRP4 = on Prednisone 60 mg a day started on Actemra 3/22/2024 lower to 40 mg a day ## acute cortical infarcts in the right frontal, parietal and occipital cortex. CT angio head showed no LV occlusion or significant stenosis- possibly related to GCA/ vs hypercoagulability due to malignancy. ## Polyuria ? cause - DM ## Metastatic prostate ca with rib lesions on PET scan    = repeat labs = will continue Prednisone 60 mg a day for x 2-3 more weeks, then  = continue Actemra IV for now. I counseled the patient on side effects and benefits of Actemra We reviewed side effects: infusion reaction, liver toxicity, cytopenia and risk of bowel perforation.  = continue Bactrim = DEXA and Ca and VIt D supplementation  RTO 2-3 weeks

## 2024-03-29 ENCOUNTER — APPOINTMENT (OUTPATIENT)
Dept: INTERNAL MEDICINE | Facility: CLINIC | Age: 84
End: 2024-03-29
Payer: MEDICARE

## 2024-03-29 VITALS
BODY MASS INDEX: 22.48 KG/M2 | WEIGHT: 166 LBS | HEIGHT: 72 IN | SYSTOLIC BLOOD PRESSURE: 118 MMHG | RESPIRATION RATE: 14 BRPM | HEART RATE: 78 BPM | DIASTOLIC BLOOD PRESSURE: 60 MMHG

## 2024-03-29 PROCEDURE — 99495 TRANSJ CARE MGMT MOD F2F 14D: CPT

## 2024-03-29 RX ORDER — FAMOTIDINE 40 MG/1
40 TABLET, FILM COATED ORAL
Qty: 90 | Refills: 2 | Status: ACTIVE | COMMUNITY
Start: 2024-03-29

## 2024-03-29 RX ORDER — CLOPIDOGREL 75 MG/1
75 TABLET, FILM COATED ORAL
Qty: 90 | Refills: 1 | Status: ACTIVE | COMMUNITY
Start: 2024-03-29

## 2024-03-29 RX ORDER — BLOOD-GLUCOSE,RECEIVER,CONT
EACH MISCELLANEOUS
Qty: 1 | Refills: 0 | Status: ACTIVE | COMMUNITY
Start: 2024-03-29 | End: 1900-01-01

## 2024-03-29 RX ORDER — BLOOD-GLUCOSE SENSOR
EACH MISCELLANEOUS
Qty: 4 | Refills: 5 | Status: ACTIVE | COMMUNITY
Start: 2024-03-29 | End: 1900-01-01

## 2024-03-29 RX ORDER — FAMOTIDINE 10 MG/1
10 TABLET, FILM COATED ORAL AT BEDTIME
Refills: 0 | Status: DISCONTINUED | COMMUNITY
Start: 2017-01-09 | End: 2024-03-29

## 2024-03-29 RX ORDER — TOCILIZUMAB 180 MG/ML
162 INJECTION, SOLUTION SUBCUTANEOUS
Qty: 12 | Refills: 0 | Status: DISCONTINUED | COMMUNITY
Start: 2024-03-21 | End: 2024-03-29

## 2024-03-29 NOTE — PHYSICAL EXAM
[Normal Sclera/Conjunctiva] : normal sclera/conjunctiva [PERRL] : pupils equal round and reactive to light [No Edema] : there was no peripheral edema [Speech Grossly Normal] : speech grossly normal [Normal] : no posterior cervical lymphadenopathy and no anterior cervical lymphadenopathy [de-identified] : Motor all 5+/5+ No focality

## 2024-03-29 NOTE — HISTORY OF PRESENT ILLNESS
[Post-hospitalization from ___ Hospital] : Post-hospitalization from [unfilled] Hospital [Admitted on: ___] : The patient was admitted on [unfilled] [Discharged on ___] : discharged on [unfilled] [Pertinent Labs] : pertinent labs [Discharge Med List] : discharge medication list [Med Reconciliation] : medication reconciliation has been completed [Patient Contacted By: ____] : and contacted by [unfilled] [FreeTextEntry2] : Pt with giant cell arteritis - admitted with multiple CVA's Unclear etiology. Was dragging left foot and left hand was not good Had personality changes Now better Headaches are on and off Right eye vision - not great - left eye is OK

## 2024-03-29 NOTE — ASSESSMENT
[FreeTextEntry1] : Pt is sp hospitalization for CVA and GCA Unclear why pt had multiple CVA's Will refer to heme for hypercoagulable hernandes Will start to check sugars on prednisone - will use CGM  -understands not covered by insurance FU with rheum, neuro, cardiology - has monitor on FU 1 month

## 2024-04-08 ENCOUNTER — RX RENEWAL (OUTPATIENT)
Age: 84
End: 2024-04-08

## 2024-04-08 RX ORDER — ALFUZOSIN HYDROCHLORIDE 10 MG/1
10 TABLET, EXTENDED RELEASE ORAL DAILY
Qty: 90 | Refills: 1 | Status: ACTIVE | COMMUNITY
Start: 2021-08-31 | End: 1900-01-01

## 2024-04-18 NOTE — CDI QUERY NOTE - [id number]_GEN_ALL_CORE_HH
There is conflicting documentation regarding the patient's nutrition status. The patient received a nutrition assessment by a Registered Dietician on 3/21/2024. The documentation of this assessment states: severe protein calorie malnutrition.  Chart documentation also states the patient is well nourished.    Can the nutrition diagnosis and criteria be clarified, after study?    - Patient was not well-nourished and was found to have severe protein-calorie malnutrition  (please also refer to the Dietician Assessment for further details)  - Patient was well nourished   - Other (please specify)    Chart Documentation:    HP Adult 3/21/2024   General: well nourished     Dietitian Initial consult 3/21/2024   Body Mass Index: 22.3  Pt meets criteria for severe malnutrition in the context of acute illness r/t inability to meet increased needs 2/2 CVA, neuronitis AEB mod-severe muscle/fat wasting, consuming <50% of ENN > 5 days, 8.6% wt loss x 2-3 weeks

## 2024-04-19 ENCOUNTER — MED ADMIN CHARGE (OUTPATIENT)
Age: 84
End: 2024-04-19

## 2024-04-19 ENCOUNTER — APPOINTMENT (OUTPATIENT)
Dept: RHEUMATOLOGY | Facility: CLINIC | Age: 84
End: 2024-04-19
Payer: MEDICARE

## 2024-04-19 ENCOUNTER — NON-APPOINTMENT (OUTPATIENT)
Age: 84
End: 2024-04-19

## 2024-04-19 VITALS
RESPIRATION RATE: 18 BRPM | DIASTOLIC BLOOD PRESSURE: 73 MMHG | OXYGEN SATURATION: 96 % | HEART RATE: 68 BPM | SYSTOLIC BLOOD PRESSURE: 134 MMHG | TEMPERATURE: 96 F

## 2024-04-19 VITALS — HEART RATE: 56 BPM | SYSTOLIC BLOOD PRESSURE: 105 MMHG | DIASTOLIC BLOOD PRESSURE: 61 MMHG | OXYGEN SATURATION: 96 %

## 2024-04-19 PROCEDURE — 96413 CHEMO IV INFUSION 1 HR: CPT

## 2024-04-19 RX ORDER — TOCILIZUMAB 20 MG/ML
80 INJECTION, SOLUTION, CONCENTRATE INTRAVENOUS
Qty: 1 | Refills: 0 | Status: COMPLETED
Start: 2024-03-22

## 2024-04-19 RX ORDER — TOCILIZUMAB 20 MG/ML
200 INJECTION, SOLUTION, CONCENTRATE INTRAVENOUS
Qty: 1 | Refills: 0 | Status: COMPLETED
Start: 2024-03-22

## 2024-04-19 RX ORDER — TOCILIZUMAB 20 MG/ML
80 INJECTION, SOLUTION, CONCENTRATE INTRAVENOUS
Refills: 0 | Status: ACTIVE | OUTPATIENT
Start: 2024-03-22

## 2024-04-22 ENCOUNTER — RX RENEWAL (OUTPATIENT)
Age: 84
End: 2024-04-22

## 2024-04-25 ENCOUNTER — NON-APPOINTMENT (OUTPATIENT)
Age: 84
End: 2024-04-25

## 2024-04-26 ENCOUNTER — OUTPATIENT (OUTPATIENT)
Dept: OUTPATIENT SERVICES | Facility: HOSPITAL | Age: 84
LOS: 1 days | Discharge: HOME | End: 2024-04-26

## 2024-04-26 ENCOUNTER — APPOINTMENT (OUTPATIENT)
Dept: NEUROLOGY | Facility: CLINIC | Age: 84
End: 2024-04-26
Payer: MEDICARE

## 2024-04-26 ENCOUNTER — RX RENEWAL (OUTPATIENT)
Age: 84
End: 2024-04-26

## 2024-04-26 VITALS
HEIGHT: 72 IN | BODY MASS INDEX: 22.48 KG/M2 | SYSTOLIC BLOOD PRESSURE: 110 MMHG | DIASTOLIC BLOOD PRESSURE: 63 MMHG | HEART RATE: 61 BPM | WEIGHT: 166 LBS

## 2024-04-26 DIAGNOSIS — Z98.890 OTHER SPECIFIED POSTPROCEDURAL STATES: Chronic | ICD-10-CM

## 2024-04-26 DIAGNOSIS — R74.8 ABNORMAL LEVELS OF OTHER SERUM ENZYMES: ICD-10-CM

## 2024-04-26 DIAGNOSIS — I63.89 OTHER CEREBRAL INFARCTION: ICD-10-CM

## 2024-04-26 DIAGNOSIS — T38.0X5A OTHER OSTEOPOROSIS W/OUT CURRENT PATHOLOGICAL FRACTURE: ICD-10-CM

## 2024-04-26 DIAGNOSIS — M81.8 OTHER OSTEOPOROSIS W/OUT CURRENT PATHOLOGICAL FRACTURE: ICD-10-CM

## 2024-04-26 PROCEDURE — G2212 PROLONG OUTPT/OFFICE VIS: CPT

## 2024-04-26 PROCEDURE — G2211 COMPLEX E/M VISIT ADD ON: CPT

## 2024-04-26 PROCEDURE — 99215 OFFICE O/P EST HI 40 MIN: CPT

## 2024-04-26 NOTE — PHYSICAL EXAM
[General Appearance - Alert] : alert [General Appearance - In No Acute Distress] : in no acute distress [Impaired Insight] : insight and judgment were intact [Affect] : the affect was normal [Sclera] : the sclera and conjunctiva were normal [Extraocular Movements] : extraocular movements were intact [Full Visual Field] : full visual field [Outer Ear] : the ears and nose were normal in appearance [Examination Of The Oral Cavity] : the lips and gums were normal [Neck Appearance] : the appearance of the neck was normal [Auscultation Breath Sounds / Voice Sounds] : lungs were clear to auscultation bilaterally [Heart Sounds] : normal S1 and S2 [Abnormal Walk] : normal gait [Nail Clubbing] : no clubbing  or cyanosis of the fingernails [Musculoskeletal - Swelling] : no joint swelling seen [Motor Tone] : muscle strength and tone were normal [Skin Color & Pigmentation] : normal skin color and pigmentation [Skin Turgor] : normal skin turgor [] : no rash [FreeTextEntry1] : Neurological exam: HF: A x O x 3. Appropriately interactive, memory impairment, Speech fluent, No Aphasia or paraphasic errors. Naming /repetition intact  CN: ABIGAIL, EOMI, VFF - subjective grey patches R visual field, facial sensation normal, no NLFD, tongue midline, Palate moves equally, SCM equal bilaterally Motor: strength 5/5 RUE, LUE, RLE.  5/5 LLE Sens: Intact to light touch  Coord:  No FNFA, HTS intact b/l  Gait/Balance: Mildly unsteady gait. Tandem was not tested as he reports difficulty with doing so. Romberg is absent.

## 2024-04-26 NOTE — HISTORY OF PRESENT ILLNESS
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 On 2/10 he had BL diplopia, when looking in the distance that lasted until 2/12 when he went to [place] ED and CT head/CTA head and neck did not reveal any stenosis or infarcts. He saw ophthalmologist Dr. 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 On 2/17/2024 he had ESR drawn that was noted to be 87 and prednisone was started for possible GCA. By 2/19 his headache was gone and the diplopia improved. On 2/19 he also consulted with neurologist Dr. Goodwin who noted no abnormalities on his neuro exam and wanted to work him up for myasthenia gravis. He also ordered MRI, MRA head and advised to continue steroids. Throughout all of this he denied temporal pain or jaw claudication. 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 MRA head and neck were unremarkable. That night the diffuse headache returned. On 2/24 he had total loss of vision in the right eye, lasting 30 minutes. When vision returned he felt a shade across upper right visual field and diplopia returned for a few hours. On 2/25 he had intermittent loss of vision in the right eye with diplopia and was having scotomas. He called ophthalmology who recommended he stay on 60 mg of Prednisone and see neuro ophthalmology. By 2/27 his visual deficit has persisted. On 3/6 he consulted with Dr. 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 He suggested rheumatology workup to discuss tapering off steroids and possibly starting a biologic to keep vasculitis under control. On 3/8 he saw Dr. De La Garza who suggested temporal artery bx - done and pending results. On 3/9 he developed ataxic gait, listing to left side and left-hand weakness and dysmetria that has persisted. On 3/13 he had PET scan and it revealed lesions in the prostate concerning for prostate CA and a rib lesion and avid lymph nodes concerning for metastases - pending evaluation.   Today he remains with loss of right eye vision (says it looks dark and is blurry) along with left hand weakness. He has also been complaining of balance instability and brain fog. Since March he has had intermittent BL feet paresthesia's that occurs randomly and varies in duration. Prior to me meeting patient he noted a few minutes of worsening left hand weakness lasting a few minutes but did not want to go to ED. He is not on any antiplatelet due to hx of remote GI bleed 40 + years ago. He notes that workup at that time and thereafter had been negative for any pathology. He denies any hx of stroke like symptoms in the past. He is a retired nephrologist. .   3/26/2024 On 3/20 after his visit with me, his wife called that he was increasingly confused and wanted to know if they should go to [place] ED. He had an MRI that day that revealed acute cortical infarcts in the right frontal, parietal and occipital cortex. CT angio, no LVO or significant stenosis. His temporal artery bx came positive for GCA so his prednisone was increased, and he started Actemra, under the care of Dr. De La Garza (rheumatology). He was sent home with MCOT and will get ILR outpatient. He will be following up with urology for + PET scan. Overall he feels better but remains with left hand weakness, though improved. He feels fatigued but energy is increasing. He has in home PT and OT eval for this Friday. Notes compliance with Plavix. He has had some excess urination over the last few weeks. At baseline due to his prostate issues, he urinates frequently but small amounts. Again, they will be making urology appt    4/26/24 He presents to the office today with his wife. He will be seen by cardiology next week for evaluation of his MCOT. He has a hematology appointment on 5/2/24 and will be seen by urology later in May for evaluation/interpretation of his PET scan. His strength has been improving and he is feeling well. He denies any new focal neurologic symptoms.

## 2024-04-26 NOTE — DISCUSSION/SUMMARY
[Antithrombotic therapy with ___] : antithrombotic therapy with  [unfilled] [Intensive Blood Pressure Control] : intensive blood pressure control [Lipid Lowering Therapy] : lipid lowering therapy [Patient encouraged to discuss with Primary MD] : I encouraged the patient to discuss these important issues with ~his/her~ primary care doctor [Goals and Counseling] : I have reviewed the goals of stroke risk factor modification. I counseled the patient on measures to reduce stroke risk, including the importance of medication compliance, risk factor control, exercise, healthy diet and avoidance of smoking. I reviewed stroke warning signs and symptoms and appropriate actions to take if such occur. 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 He is being treated by neuro ophthalmology and rheumatology for possible GCA. He had a TA biopsy and is pending results. He remains on steroids. 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 The etiology of this infarct is consistent with ESUS and vessel imaging as been negative. Temporal arteritis itself can cause stroke, but usually you would see abnormalities in the major blood vessels on CTA so it seems unlikely that his presumed stroke would be due to giant cell arteritis. On 3/9 he developed left hand weakness and gait instability. It is possible he had a small cortical infarct involving the motor cortex, but MRI should be obtained. Since then, he feels that his short-term memory has worsened. Prior to visit he had a 5-minute episode of worsening left-hand weakness but did not want to go to ED  I have ordered an MRI head to be done urgently. He had PET scan that shows possible metastatic disease that is in the process of being worked up. He has a remote hx of GI bleed 40 + years ago with no known pathology and is slightly apprehensive about starting an antiplatelet. We discussed starting Plavix 75 mg PO QD - sent into pharmacy. He will follow up with Dr. Patricio for TTE and holter monitor as part of ESUS workup.   I will call them after MRI results and review everything again with Dr. Libman   3/26/2024 After worsening cognitive function, he went to [place] ED after our visit. MRI noted (3/20) acute cortical infarcts in the right frontal, parietal and occipital cortex with underlying subcortical white matter demonstrating restricted diffusion in these regions.  Tiny old hemorrhagic cortical infarct is seen in the posterior Left parietal lobe.    This case was reviewed with Dr. Libman in detail. His 3/20 MRI showed acute right hemispheric border zone infarcts. These types of infarcts usually occur in the context of a large artery severe stenosis or occlusion, but both his previous and more recent CTA did not reveal any significant large vessel stenosis.  The chronic Left parietal lobe infarct is also in an ambiguous vascular territory, either a distal MCA branch occlusion or possibly also in a border zone. Emboli can certainly go to the border zones, but it is very unusual for emboli to go exclusively to border zones, and not to simultaneously result in branch occlusions with typical vascular distribution infarct and would be visualized on CTA, so once again this seems relatively unlikely. Therefore, due to ambiguity of case he should be worked up for ESUS  Recommend he continue Plavix for secondary stroke prevention. He will follow up with urology to discuss positive PET scan. If metastatic malignancy is found, we will discuss the need for AC. Continue with MCOT and an ILR implant if MCOT is negative. TTE did not reveal any pathology during admission. Continue to follow up with rheumatology for treatment of GCA and screening for hypercoagulable state. Signs of stroke discussed in detail. He has had some excess urination, so CMP and urine electrolytes ordered, as requested by patient. Follow up with us in 1 month or sooner if needed.    4/26/24 - Overall he is neurologically stable and doing well. - He wore an MCOT monitor and sent it back; results pending. If no atrial fibrillation is detected, he should have an ILR placed to screen for occult AF. - He is treating the GCA with steroids, under the care of rheumatology. - His Pet CT on 3/19/24 demonstrated lesions in the prostate concerning for prostate cancer. He has an appointment with urology on 5/16/24 to discuss the findings and next steps. We discussed that it is possible that he has hypercoagulability of malignancy, which caused/contributed to his strokes. If he has confirmed prostate carcinoma, there may be a role to switch hm from Aspirin to anticoagulation. - He is taking Plavix for secondary stroke prevention.    He can follow up in 3 months. I hope he remains free of further serious trouble.

## 2024-04-29 DIAGNOSIS — Z79.52 LONG TERM (CURRENT) USE OF SYSTEMIC STEROIDS: ICD-10-CM

## 2024-04-30 DIAGNOSIS — D64.9 ANEMIA, UNSPECIFIED: ICD-10-CM

## 2024-05-02 ENCOUNTER — APPOINTMENT (OUTPATIENT)
Dept: HEMATOLOGY ONCOLOGY | Facility: CLINIC | Age: 84
End: 2024-05-02
Payer: MEDICARE

## 2024-05-02 ENCOUNTER — RESULT REVIEW (OUTPATIENT)
Age: 84
End: 2024-05-02

## 2024-05-02 VITALS
HEIGHT: 72.13 IN | OXYGEN SATURATION: 96 % | RESPIRATION RATE: 16 BRPM | WEIGHT: 180.78 LBS | BODY MASS INDEX: 24.49 KG/M2 | HEART RATE: 60 BPM | SYSTOLIC BLOOD PRESSURE: 121 MMHG | DIASTOLIC BLOOD PRESSURE: 71 MMHG | TEMPERATURE: 98 F

## 2024-05-02 LAB
BASOPHILS # BLD AUTO: 0.02 K/UL — SIGNIFICANT CHANGE UP (ref 0–0.2)
BASOPHILS NFR BLD AUTO: 0.2 % — SIGNIFICANT CHANGE UP (ref 0–2)
EOSINOPHIL # BLD AUTO: 0.02 K/UL — SIGNIFICANT CHANGE UP (ref 0–0.5)
EOSINOPHIL NFR BLD AUTO: 0.2 % — SIGNIFICANT CHANGE UP (ref 0–6)
HCT VFR BLD CALC: 40 % — SIGNIFICANT CHANGE UP (ref 39–50)
HGB BLD-MCNC: 13.4 G/DL — SIGNIFICANT CHANGE UP (ref 13–17)
IMM GRANULOCYTES NFR BLD AUTO: 1.6 % — HIGH (ref 0–0.9)
LYMPHOCYTES # BLD AUTO: 1.07 K/UL — SIGNIFICANT CHANGE UP (ref 1–3.3)
LYMPHOCYTES # BLD AUTO: 11.8 % — LOW (ref 13–44)
MCHC RBC-ENTMCNC: 33 PG — SIGNIFICANT CHANGE UP (ref 27–34)
MCHC RBC-ENTMCNC: 33.5 G/DL — SIGNIFICANT CHANGE UP (ref 32–36)
MCV RBC AUTO: 98.5 FL — SIGNIFICANT CHANGE UP (ref 80–100)
MONOCYTES # BLD AUTO: 0.4 K/UL — SIGNIFICANT CHANGE UP (ref 0–0.9)
MONOCYTES NFR BLD AUTO: 4.4 % — SIGNIFICANT CHANGE UP (ref 2–14)
NEUTROPHILS # BLD AUTO: 7.44 K/UL — HIGH (ref 1.8–7.4)
NEUTROPHILS NFR BLD AUTO: 81.8 % — HIGH (ref 43–77)
NRBC # BLD: 0 /100 WBCS — SIGNIFICANT CHANGE UP (ref 0–0)
PLATELET # BLD AUTO: 155 K/UL — SIGNIFICANT CHANGE UP (ref 150–400)
RBC # BLD: 4.06 M/UL — LOW (ref 4.2–5.8)
RBC # FLD: 15.7 % — HIGH (ref 10.3–14.5)
WBC # BLD: 9.1 K/UL — SIGNIFICANT CHANGE UP (ref 3.8–10.5)
WBC # FLD AUTO: 9.1 K/UL — SIGNIFICANT CHANGE UP (ref 3.8–10.5)

## 2024-05-02 PROCEDURE — 99205 OFFICE O/P NEW HI 60 MIN: CPT

## 2024-05-02 NOTE — ASSESSMENT
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   Today we reviewed his prior imaging in detail including MRI brain, CTA denita and neck completed on 3/20/24 which showed acute cortical infarctions in the RIGHT frontal, parietal and occipital cortex, but not arterial stenosis.  TTE also reviewed which showed no significant findings.    Of note he had serum electrophoresis on 3/14/24 which showed mild protein elevation to 9, mild increase in Gamma (2.7) which corresponded to the Gamma-migrating paraprotein leading to M-spike of 2.3, IGG level of 2383, Kappa/Mary Anne ratio of 10.48 (9.01/0.86). No anemia, hypercalcemia, elevated creatinine or other clear myeloma defining findings (bone lesions noted are attributed to prostate disease). Will continue to monitor these levels   We discussed that there are potentially various reasons for his hypercoagulability, including rheumatologic disorder, malignancy or atherosclerotic disease. Nevertheless, given his unclear etiology, we will assess for hematologic etiologies. Will obtain blood work including beta-2 glycoprotein, anticardiolipin, Lupus Anticoagulant (silica clotting time and DRVVT),antithrombin III,  protein C and S, Jak2, fibrinogen/fibrin split products   All questions answered to the best of our abilities and to the patient's and his wife's apparent satisfaction.  Patient seen/examined and discussed with Dr. Phill Clarke MD PGY6 Hematology/Oncology Fellow

## 2024-05-02 NOTE — END OF VISIT
[] : Fellow [FreeTextEntry3] : Patient seen and examined; images including right sided cerebral infarctions reviewed. Evaluation for hypercoagulation state is requested. Distribution of lesions suggest right sided atherosclerosis with embolic platelet induced infarction. Suggest use of ASA 81 mg in addition to clopidogrel. There may be metastatic prostate carcinoma ; usual association is with venous thrombosis rather than arterial thrmobosi.  [Time Spent: ___ minutes] : I have spent [unfilled] minutes of time on the encounter.

## 2024-05-02 NOTE — REASON FOR VISIT
[Initial Consultation] : an initial consultation for [Spouse] : spouse [FreeTextEntry2] : Hypercoagulopathy

## 2024-05-02 NOTE — HISTORY OF PRESENT ILLNESS
[de-identified] : 84 year old male presenting for initial consultation for hypercoagulability work up. He has a history of giant cell arteritis for which he follows with rheumatology. Recently he has had three cerebral vascular accidents that are thought not to be related to his rheumatologic disorder. Chronology of events is as follows:  On 1/29/24 he developed COVID followed by diffuse headache on 1/30/24. On 2/10/24 he developed BL diplopia (lasted on 2/19/24). He was hospitalized at [facility] for a portion of that time, and work-up was not revealing.  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 MRA head and neck were unremarkable. He subsequently had other episodes of vision loss and was treated with prednisone by neuro-ophtho. On 3/9 he developed ataxic gait, listing to left side and left-hand weakness and dysmetria that has persisted. On 3/13 he had PET scan and it revealed lesions in the prostate concerning for prostate CA and a rib lesion and avid lymph nodes concerning for metastases (patient opted for observation). On 3/20/24 he had MRI Brain which showed  acute cortical infarctions in the RIGHT frontal, parietal and occipital cortex with underlying subcortical white matter, demonstrating restricted diffusion in these regions. Tiny old hemorrhagic cortical infarction is seen in the posterior LEFT parietal lobe. He also  had non contrast CTH which redemonstration of acute infarcts in the right frontal lobe, better seen on recent MRI. Additional smaller infarcts in the right occipital lobe are inconspicuous on this non-contrast CT. No intracranial hemorrhage or midline shift. CT angiography brain showed no large vessel occlusion, significant stenosis, aneurysm or vascular malformation. CT angio neck showed the cervical vasculature is patent without significant stenosis or dissection. Echocardiogram 3/21/24 showed no significant abnormalities; EF 60-65%)   Social History: Retired nephrologist. .  [de-identified] : Today the patient reports he is feeling well. He reports improvement in his visual symptoms and denies any current headache.

## 2024-05-03 ENCOUNTER — LABORATORY RESULT (OUTPATIENT)
Age: 84
End: 2024-05-03

## 2024-05-03 ENCOUNTER — APPOINTMENT (OUTPATIENT)
Dept: RHEUMATOLOGY | Facility: CLINIC | Age: 84
End: 2024-05-03
Payer: MEDICARE

## 2024-05-03 VITALS
RESPIRATION RATE: 16 BRPM | TEMPERATURE: 98.1 F | DIASTOLIC BLOOD PRESSURE: 58 MMHG | OXYGEN SATURATION: 97 % | WEIGHT: 177 LBS | SYSTOLIC BLOOD PRESSURE: 113 MMHG | BODY MASS INDEX: 23.98 KG/M2 | HEIGHT: 72.13 IN | HEART RATE: 64 BPM

## 2024-05-03 LAB
APTT BLD: 28 SEC
FSP TITR PPP LA: < 5 UG/ML

## 2024-05-03 PROCEDURE — 99214 OFFICE O/P EST MOD 30 MIN: CPT

## 2024-05-03 PROCEDURE — G2211 COMPLEX E/M VISIT ADD ON: CPT

## 2024-05-03 RX ORDER — PANTOPRAZOLE SODIUM 40 MG/1
40 GRANULE, DELAYED RELEASE ORAL
Qty: 30 | Refills: 3 | Status: DISCONTINUED | COMMUNITY
Start: 2024-03-29 | End: 2024-05-03

## 2024-05-04 NOTE — REVIEW OF SYSTEMS
[Fever] : fever [Chills] : chills [Feeling Tired] : not feeling tired [Recent Weight Loss (___ Lbs)] : recent [unfilled] ~Ulb weight loss [As noted in HPI] : as noted in HPI [As Noted in HPI] : as noted in HPI [Negative] : Psychiatric

## 2024-05-04 NOTE — PHYSICAL EXAM
[General Appearance - Alert] : alert [General Appearance - In No Acute Distress] : in no acute distress [Sclera] : the sclera and conjunctiva were normal [Outer Ear] : the ears and nose were normal in appearance [Nasal Cavity] : the nasal mucosa and septum were normal [Jugular Venous Distention Increased] : there was no jugular-venous distention [] : no respiratory distress [Respiration, Rhythm And Depth] : normal respiratory rhythm and effort [Heart Rate And Rhythm] : heart rate was normal and rhythm regular [Heart Sounds] : normal S1 and S2 [Edema] : there was no peripheral edema [FreeTextEntry1] : temporal aa palpable but not tender, decreased pulse on right and no pulse on left [Abdomen Soft] : soft [Cervical Lymph Nodes Enlarged Posterior Bilaterally] : posterior cervical [Abdomen Tenderness] : non-tender [Cervical Lymph Nodes Enlarged Anterior Bilaterally] : anterior cervical [Axillary Lymph Nodes Enlarged Bilaterally] : axillary [Skin Color & Pigmentation] : normal skin color and pigmentation [Skin Turgor] : normal skin turgor [No Focal Deficits] : no focal deficits [Oriented To Time, Place, And Person] : oriented to person, place, and time [Impaired Insight] : insight and judgment were intact [Abnormal Walk] : normal gait

## 2024-05-04 NOTE — HISTORY OF PRESENT ILLNESS
[de-identified] : Last was seen on March, 2024 [FreeTextEntry1] : Interval History: ----------------------- = on prednisone 40 mg a day, dose reduced few days ago; on Actemra since 3/22/2024.  = overall feels better, only occasional frontal headache, no jaw claudication, no vision changes

## 2024-05-04 NOTE — ASSESSMENT
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 CT angio head showed no LV occlusion or significant stenosis- possibly related to GCA  ## Polyuria - steroid induced DM  ## Metastatic prostate ca with rib lesions on PET scan    = repeat labs = Prednisone 40 mg a day for x 10 days, 30 mg a day for x 10 days, 20 mg a day for x 10 days, 15mg a day for x 10 days,  = continue Actemra IV  = continue Bactrim for now = DEXA and Ca and VIt D supplementation  RTO 2-3 weeks

## 2024-05-06 LAB
APCR PPP: 2.76 RATIO
AT III PPP CHRO-ACNC: 116 %
B2 GLYCOPROT1 AB SER QL: NEGATIVE
CARDIOLIPIN AB SER IA-ACNC: NEGATIVE
CARDIOLIPIN IGM SER-MCNC: 8.3 MPL
CARDIOLIPIN IGM SER-MCNC: <5 GPL
CONFIRM: 28.5 SEC
DRVVT IMM 1:2 NP PPP: NORMAL
DRVVT SCREEN TO CONFIRM RATIO: 0.87 RATIO
FVL BLANK: 32.7
FVL TEST: 90.3
PROT S AG ACT/NOR PPP IA: 97 %
PROT S FREE PPP-ACNC: 81 %
SCREEN DRVVT: 29.4 SEC
SILICA CLOTTING TIME INTERPRETATION: NORMAL
SILICA CLOTTING TIME S/C: 1.16 RATIO

## 2024-05-07 LAB
FIBRINOGEN AG PPP IA-MCNC: 335 MG/DL
PTR INTERP: NORMAL

## 2024-05-10 ENCOUNTER — APPOINTMENT (OUTPATIENT)
Dept: ELECTROPHYSIOLOGY | Facility: CLINIC | Age: 84
End: 2024-05-10
Payer: MEDICARE

## 2024-05-10 VITALS
SYSTOLIC BLOOD PRESSURE: 112 MMHG | RESPIRATION RATE: 16 BRPM | OXYGEN SATURATION: 98 % | HEIGHT: 72 IN | HEART RATE: 60 BPM | DIASTOLIC BLOOD PRESSURE: 57 MMHG | BODY MASS INDEX: 23.98 KG/M2 | WEIGHT: 177 LBS

## 2024-05-10 PROCEDURE — 99214 OFFICE O/P EST MOD 30 MIN: CPT

## 2024-05-10 PROCEDURE — 99204 OFFICE O/P NEW MOD 45 MIN: CPT

## 2024-05-10 PROCEDURE — G2211 COMPLEX E/M VISIT ADD ON: CPT

## 2024-05-10 PROCEDURE — 93000 ELECTROCARDIOGRAM COMPLETE: CPT

## 2024-05-10 RX ORDER — CALCIUM CARBONATE/VITAMIN D3 600MG-5MCG
600-5 TABLET ORAL
Qty: 180 | Refills: 0 | Status: DISCONTINUED | COMMUNITY
Start: 2024-04-26 | End: 2024-05-10

## 2024-05-10 NOTE — PHYSICAL EXAM
[Moves all extremities] : moves all extremities [Normal Speech] : normal speech [Normal] : alert and oriented, normal memory

## 2024-05-14 LAB
JAK2 P.V617F BLD/T QL: NORMAL
REFLEX:: NORMAL

## 2024-05-16 ENCOUNTER — APPOINTMENT (OUTPATIENT)
Dept: UROLOGY | Facility: CLINIC | Age: 84
End: 2024-05-16
Payer: MEDICARE

## 2024-05-16 VITALS
HEIGHT: 72 IN | DIASTOLIC BLOOD PRESSURE: 70 MMHG | HEART RATE: 57 BPM | SYSTOLIC BLOOD PRESSURE: 121 MMHG | OXYGEN SATURATION: 97 % | WEIGHT: 167 LBS | BODY MASS INDEX: 22.62 KG/M2

## 2024-05-16 DIAGNOSIS — N40.1 BENIGN PROSTATIC HYPERPLASIA WITH LOWER URINARY TRACT SYMPMS: ICD-10-CM

## 2024-05-16 DIAGNOSIS — R35.1 BENIGN PROSTATIC HYPERPLASIA WITH LOWER URINARY TRACT SYMPMS: ICD-10-CM

## 2024-05-16 PROCEDURE — G2211 COMPLEX E/M VISIT ADD ON: CPT

## 2024-05-16 PROCEDURE — 99204 OFFICE O/P NEW MOD 45 MIN: CPT

## 2024-05-16 RX ORDER — BICALUTAMIDE 50 MG/1
50 TABLET ORAL
Qty: 90 | Refills: 1 | Status: ACTIVE | COMMUNITY
Start: 2024-05-16 | End: 1900-01-01

## 2024-05-16 RX ORDER — KRILL/OM-3/DHA/EPA/PHOSPHO/AST 1000-230MG
CAPSULE ORAL
Refills: 0 | Status: ACTIVE | COMMUNITY

## 2024-05-17 ENCOUNTER — APPOINTMENT (OUTPATIENT)
Dept: RHEUMATOLOGY | Facility: CLINIC | Age: 84
End: 2024-05-17
Payer: MEDICARE

## 2024-05-17 ENCOUNTER — MED ADMIN CHARGE (OUTPATIENT)
Age: 84
End: 2024-05-17

## 2024-05-17 VITALS
TEMPERATURE: 97.6 F | SYSTOLIC BLOOD PRESSURE: 101 MMHG | OXYGEN SATURATION: 95 % | HEART RATE: 70 BPM | RESPIRATION RATE: 18 BRPM | DIASTOLIC BLOOD PRESSURE: 53 MMHG

## 2024-05-17 VITALS
DIASTOLIC BLOOD PRESSURE: 55 MMHG | OXYGEN SATURATION: 93 % | RESPIRATION RATE: 18 BRPM | HEART RATE: 62 BPM | SYSTOLIC BLOOD PRESSURE: 100 MMHG

## 2024-05-17 PROCEDURE — 96413 CHEMO IV INFUSION 1 HR: CPT

## 2024-05-17 RX ORDER — TOCILIZUMAB 20 MG/ML
400 INJECTION, SOLUTION, CONCENTRATE INTRAVENOUS
Qty: 1 | Refills: 0 | Status: COMPLETED
Start: 2024-03-22

## 2024-05-17 RX ORDER — TOCILIZUMAB 20 MG/ML
80 INJECTION, SOLUTION, CONCENTRATE INTRAVENOUS
Qty: 1 | Refills: 0 | Status: COMPLETED
Start: 2024-03-22

## 2024-05-17 NOTE — HISTORY OF PRESENT ILLNESS
[N/A] : N/A [Denies] : Denies [No] : No [Yes] : Yes [Declined] : Declined [TB] : Tuberculosis screening [Hep acute panel] : Hepatitis acute panel [Right upper extremity] : Right upper extremity [24g] : 24g [Start Time: ___] : Medication Start Time: [unfilled] [End Time: ___] : Medication End Time: [unfilled] [Medication Name: ___] : Medication Name: [unfilled] [Total Amount Administered: ___] : Total Amount Administered: [unfilled] [IV discontinued. Intact. No signs or symptoms of IV complications noted. Time: ___] : IV discontinued. Intact. No signs or symptoms of IV complications noted. Time: [unfilled] [Patient  instructed to seek medical attention with signs and symptoms of adverse effects] : Patient  instructed to seek medical attention with signs and symptoms of adverse effects [Patient left unit in no acute distress] : Patient left unit in no acute distress [Medications administered as ordered and tolerated well.] : Medications administered as ordered and tolerated well. [de-identified] : 12:35 PM

## 2024-05-18 NOTE — ASSESSMENT
[FreeTextEntry1] : The findings of the PSA elevation and PSMA PET scan are both concerning that he has underlying prostate cancer which appears to be metastatic.  Options for management were reviewed today.  I do think he can avoid a biopsy in this setting as I think it would unlikely change his management for prostate cancer.  The PET scan would suggest that local therapy for the prostate cancer would not be advisable given the presence of metastasis at this time and I do think that systemic therapy here would be in his best interest.  I think we can start him on androgen deprivation therapy to help treat and suppress the prostate cancer understanding that this is done with the intent to control the disease rather than to cure the disease.  Hormone therapy was reviewed with the patient today.  I have explained to the patient that the purpose of this treatment and side effects were reviewed including the common presence of hot flashes, loss of libido, loss of erectile function, change in mood, possible depression, possible impact on memory function.  We also reviewed fatigue, loss of muscle mass and loss of bone density as additional potentials.  He communicates his understanding of the discussion above and the plan for moving forward.  Bicalutamide will be started today and I will plan to see him back in a few weeks to initiate leuprolide treatment.  We discussed the potential treatment of his nocturia.  I explained that the androgen deprivation therapy will provide effects to shrink the prostate with time and may provide him with benefits for the lower urinary tract.  Rather than initiate any additional medications immediately, I think we should give the androgen deprivation therapy some time to see if it provides any benefit with the nocturia.  We also discussed restricting nighttime fluid intake.

## 2024-05-18 NOTE — HISTORY OF PRESENT ILLNESS
[FreeTextEntry1] : MAEGAN HERNÁNDEZ presents to the office today. 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 He also had bone pain, especially in his hip. Dr. Patricio did a PSMA scan which was concerning for metastasis.   He had a CVA with multiple infarcts in his brain of unclear origin.  He has also been diagnosed with giant cell arteritis. He has weakness in his left side, unsteady gait, and memory loss. He has lost vision in right eye.  He retains very good cognitive function.  He reports nocturia x2-3, variable stream but mostly weak, hesitancy, intermittency, and incomplete bladder emptying on Alfuzosin.  His nocturia is particularly bothersome to him.  He is interested in additional treatment.  He denies hematuria or dysuria. There have been no episodes of retention or urinary tract infections. He has not required catheterization of his bladder.    Denies any family history of prostate cancer.

## 2024-05-21 PROBLEM — R22.9 SUBCUTANEOUS NODULES: Status: ACTIVE | Noted: 2021-10-08

## 2024-05-21 PROBLEM — R73.9 HYPERGLYCEMIA: Status: ACTIVE | Noted: 2024-03-27

## 2024-05-22 ENCOUNTER — APPOINTMENT (OUTPATIENT)
Dept: HEMATOLOGY ONCOLOGY | Facility: CLINIC | Age: 84
End: 2024-05-22

## 2024-05-22 VITALS
SYSTOLIC BLOOD PRESSURE: 118 MMHG | OXYGEN SATURATION: 97 % | DIASTOLIC BLOOD PRESSURE: 67 MMHG | TEMPERATURE: 98.7 F | HEIGHT: 72 IN | HEART RATE: 66 BPM | BODY MASS INDEX: 24.28 KG/M2 | RESPIRATION RATE: 18 BRPM | WEIGHT: 179.24 LBS

## 2024-05-22 DIAGNOSIS — R22.9 LOCALIZED SWELLING, MASS AND LUMP, UNSPECIFIED: ICD-10-CM

## 2024-05-22 DIAGNOSIS — R73.9 HYPERGLYCEMIA, UNSPECIFIED: ICD-10-CM

## 2024-05-22 PROCEDURE — 99213 OFFICE O/P EST LOW 20 MIN: CPT

## 2024-05-22 NOTE — RESULTS/DATA
[FreeTextEntry1] : review of data in EMHR with patient and his wife; normal coagulation testing; and normal testing for genetically determined/collagen vascular associated clotting abnormalities. Patient and I reviewed all individually; he has access through follow [facility] portal.

## 2024-05-22 NOTE — HISTORY OF PRESENT ILLNESS
[Date: ____________] : Patient's last distress assessment performed on [unfilled]. [0 - No Distress] : Distress Level: 0 [90: Able to carry normal activity; minor signs or symptoms of disease.] : 90: Able to carry normal activity; minor signs or symptoms of disease.  [ECOG Performance Status: 1 - Restricted in physically strenuous activity but ambulatory and able to carry out work of a light or sedentary nature] : Performance Status: 1 - Restricted in physically strenuous activity but ambulatory and able to carry out work of a light or sedentary nature, e.g., light house work, office work [de-identified] : 84 year old male presenting for initial consultation for hypercoagulability work up. He has a history of giant cell arteritis for which he follows with rheumatology. Recently he has had three cerebral vascular accidents that are thought not to be related to his rheumatologic disorder. Chronology of events is as follows:  On 1/29/24 he developed COVID followed by diffuse headache on 1/30/24. On 2/10/24 he developed BL diplopia (lasted on 2/19/24). He was hospitalized at [facility] for a portion of that time, and work-up was not revealing.  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 MRA head and neck were unremarkable. He subsequently had other episodes of vision loss and was treated with prednisone by neuro-ophtho. On 3/9 he developed ataxic gait, listing to left side and left-hand weakness and dysmetria that has persisted. On 3/13 he had PET scan and it revealed lesions in the prostate concerning for prostate CA and a rib lesion and avid lymph nodes concerning for metastases (patient opted for observation). On 3/20/24 he had MRI Brain which showed  acute cortical infarctions in the RIGHT frontal, parietal and occipital cortex with underlying subcortical white matter, demonstrating restricted diffusion in these regions. Tiny old hemorrhagic cortical infarction is seen in the posterior LEFT parietal lobe. He also  had non contrast CTH which redemonstration of acute infarcts in the right frontal lobe, better seen on recent MRI. Additional smaller infarcts in the right occipital lobe are inconspicuous on this non-contrast CT. No intracranial hemorrhage or midline shift. CT angiography brain showed no large vessel occlusion, significant stenosis, aneurysm or vascular malformation. CT angio neck showed the cervical vasculature is patent without significant stenosis or dissection. Echocardiogram 3/21/24 showed no significant abnormalities; EF 60-65%)   Social History: Retired nephrologist. .  [de-identified] : Today the patient reports he is feeling well. He reports improvement in his visual symptoms and denies any current headache.

## 2024-05-22 NOTE — ASSESSMENT
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   Today we reviewed his prior imaging in detail including MRI brain, CTA denita and neck completed on 3/20/24 which showed acute cortical infarctions in the RIGHT frontal, parietal and occipital cortex, but not arterial stenosis.  TTE also reviewed which showed no significant findings.    Of note he had serum electrophoresis on 3/14/24 which showed mild protein elevation to 9, mild increase in Gamma (2.7) which corresponded to the Gamma-migrating paraprotein leading to M-spike of 2.3, IGG level of 2383, Kappa/Mary Anne ratio of 10.48 (9.01/0.86). No anemia, hypercalcemia, elevated creatinine or other clear myeloma defining findings (bone lesions noted are attributed to prostate disease). Will continue to monitor these levels   We discussed that there are potentially various reasons for his hypercoagulability, including rheumatologic disorder, malignancy or atherosclerotic disease. Nevertheless, given his unclear etiology, we will assess for hematologic etiologies. Will obtain blood work including beta-2 glycoprotein, anticardiolipin, Lupus Anticoagulant (silica clotting time and DRVVT), antithrombin III, protein C and S, Jak2, fibrinogen/fibrin split products   All questions answered to the best of our abilities and to the patient's and his wife's apparent satisfaction.  Patient seen/examined and discussed with Dr. Phill Clarke MD PGY6 Hematology/Oncology Fellow  05/22/2024 review with Dr Ambrose the current results that are normal and do not provide sufficient explanation for the right sided stroke syndrome. I do not think that the CVA represent systemic blood protein abnormalities; he has localization of disease to the right cerebral circulation and platelet embolic phenomena either form an atheroma or to an area of cerebral atheroma may be the cause. He will remain on aspirin 81 mg PO daily with clopidogrel. He will follow up with Liu Ga of cardiology and he is welcome to see me on a PRN baissi.

## 2024-05-22 NOTE — END OF VISIT
[] : Fellow [Time Spent: ___ minutes] : I have spent [unfilled] minutes of time on the encounter. [FreeTextEntry3] : Patient seen and examined; images including right sided cerebral infarctions reviewed. Evaluation for hypercoagulation state is requested. Distribution of lesions suggest right sided atherosclerosis with embolic platelet induced infarction. Suggest use of ASA 81 mg in addition to clopidogrel. There may be metastatic prostate carcinoma ; usual association is with venous thrombosis rather than arterial thrmobosi.

## 2024-05-23 ENCOUNTER — APPOINTMENT (OUTPATIENT)
Dept: INTERNAL MEDICINE | Facility: CLINIC | Age: 84
End: 2024-05-23
Payer: MEDICARE

## 2024-05-23 ENCOUNTER — APPOINTMENT (OUTPATIENT)
Dept: INTERNAL MEDICINE | Facility: CLINIC | Age: 84
End: 2024-05-23

## 2024-05-23 VITALS
RESPIRATION RATE: 16 BRPM | HEART RATE: 68 BPM | HEIGHT: 72 IN | WEIGHT: 177 LBS | SYSTOLIC BLOOD PRESSURE: 110 MMHG | BODY MASS INDEX: 23.98 KG/M2 | DIASTOLIC BLOOD PRESSURE: 60 MMHG

## 2024-05-23 DIAGNOSIS — E78.5 HYPERLIPIDEMIA, UNSPECIFIED: ICD-10-CM

## 2024-05-23 DIAGNOSIS — Z86.73 PERSONAL HISTORY OF TRANSIENT ISCHEMIC ATTACK (TIA), AND CEREBRAL INFARCTION W/OUT RESIDUAL DEFICITS: ICD-10-CM

## 2024-05-23 PROCEDURE — G2211 COMPLEX E/M VISIT ADD ON: CPT

## 2024-05-23 PROCEDURE — 99214 OFFICE O/P EST MOD 30 MIN: CPT

## 2024-05-23 RX ORDER — ASPIRIN 81 MG/1
81 TABLET, COATED ORAL DAILY
Refills: 0 | Status: ACTIVE | COMMUNITY

## 2024-05-23 RX ORDER — CLOPIDOGREL BISULFATE 75 MG/1
75 TABLET, FILM COATED ORAL DAILY
Qty: 30 | Refills: 3 | Status: DISCONTINUED | COMMUNITY
Start: 2024-03-20 | End: 2024-05-23

## 2024-05-23 NOTE — ASSESSMENT
[FreeTextEntry1] : Patient with above medical issues. Pt is making slow improvement To do lipids with next blood draw FU with Rheum, urology Likely needs a bone density in 6 months to 1 year.

## 2024-05-23 NOTE — PHYSICAL EXAM
[Normal Sclera/Conjunctiva] : normal sclera/conjunctiva [Pedal Pulses Present] : the pedal pulses are present [No Edema] : there was no peripheral edema [Normal] : no posterior cervical lymphadenopathy and no anterior cervical lymphadenopathy

## 2024-05-23 NOTE — HISTORY OF PRESENT ILLNESS
[FreeTextEntry1] : FU for hyperlipidemia, TA, Prostate cancer, CVA's Feels tired Exercising - PT 2 x per week - walking the dog Watching diet Did not recover vision in right eye. Has resless legs -

## 2024-05-23 NOTE — ASU PATIENT PROFILE, ADULT - NSICDXPASTMEDICALHX_GEN_ALL_CORE_FT
PAST MEDICAL HISTORY:  BPH (benign prostatic hyperplasia)     GERD (gastroesophageal reflux disease)     H/O constipation     HLD (hyperlipidemia)     [place] (hard of hearing)     Snores

## 2024-05-23 NOTE — ASU PATIENT PROFILE, ADULT - PRESSURE ULCER(S)
From: Izabel Blank  To: Kala Vital  Sent: 1/9/2023 11:08 AM CST  Subject: Note for Employer    Hi Dr. Vital,    In advance of my upcoming surgery on the 26th, is it possible to receive a note indicating I'll be out of work for a period of time? I told my employer I'm having a surgery and may be out for up to two weeks. Probably not that long, but I estimated on the high side.    Thank you!    Izabel   no

## 2024-05-24 ENCOUNTER — TRANSCRIPTION ENCOUNTER (OUTPATIENT)
Age: 84
End: 2024-05-24

## 2024-05-24 ENCOUNTER — OUTPATIENT (OUTPATIENT)
Dept: OUTPATIENT SERVICES | Facility: HOSPITAL | Age: 84
LOS: 1 days | Discharge: HOME | End: 2024-05-24
Payer: MEDICARE

## 2024-05-24 VITALS
OXYGEN SATURATION: 96 % | WEIGHT: 177.03 LBS | HEART RATE: 66 BPM | SYSTOLIC BLOOD PRESSURE: 100 MMHG | HEIGHT: 73 IN | RESPIRATION RATE: 17 BRPM | TEMPERATURE: 98 F | DIASTOLIC BLOOD PRESSURE: 74 MMHG

## 2024-05-24 VITALS
RESPIRATION RATE: 17 BRPM | HEART RATE: 67 BPM | SYSTOLIC BLOOD PRESSURE: 117 MMHG | TEMPERATURE: 97 F | OXYGEN SATURATION: 95 % | DIASTOLIC BLOOD PRESSURE: 59 MMHG

## 2024-05-24 DIAGNOSIS — Z86.73 PERSONAL HISTORY OF TRANSIENT ISCHEMIC ATTACK (TIA), AND CEREBRAL INFARCTION WITHOUT RESIDUAL DEFICITS: ICD-10-CM

## 2024-05-24 DIAGNOSIS — Z98.890 OTHER SPECIFIED POSTPROCEDURAL STATES: Chronic | ICD-10-CM

## 2024-05-24 PROCEDURE — 92960 CARDIOVERSION ELECTRIC EXT: CPT

## 2024-05-24 PROCEDURE — 33285 INSJ SUBQ CAR RHYTHM MNTR: CPT

## 2024-05-24 PROCEDURE — C1764: CPT

## 2024-05-24 RX ORDER — ALFUZOSIN HYDROCHLORIDE 10 MG/1
1 TABLET, EXTENDED RELEASE ORAL
Refills: 0 | DISCHARGE

## 2024-05-24 RX ORDER — ACETAMINOPHEN 500 MG
2 TABLET ORAL
Refills: 0 | DISCHARGE

## 2024-05-24 RX ORDER — FAMOTIDINE 10 MG/ML
1 INJECTION INTRAVENOUS
Refills: 0 | DISCHARGE

## 2024-05-24 RX ORDER — DOCUSATE SODIUM 100 MG
3 CAPSULE ORAL
Refills: 0 | DISCHARGE

## 2024-05-24 RX ORDER — ASPIRIN/CALCIUM CARB/MAGNESIUM 324 MG
0 TABLET ORAL
Refills: 0 | DISCHARGE

## 2024-05-24 NOTE — HISTORY OF PRESENT ILLNESS
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 He has a history of HLD, BPH, GERD, Giant cell arteritis. MAEGAN AMBROSE  denies chest pain, chest pressure, shortness of breath, lightheadedness, dizziness, palpitations, syncope, presyncope, orthopnea, PND, or edema.

## 2024-05-24 NOTE — PACU DISCHARGE NOTE - COMMENTS
Discharge instructions given to patient. Answered all questions. Verbalized understanding. All belongings are with the patient. Diana for monitoring set up assisted by Delmi Combs Rep.
Female

## 2024-05-24 NOTE — ASSESSMENT
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 He has a history of HLD, BPH, GERD, Giant cell arteritis.  Wearable monitor demonstrated a brief run of irregular SVT, but no true atrial fibrillation.  It has been shown in multiple studies that detection of atrial fibrillation in patients with cryptogenic stroke and embolic stroke of unknown source is limited with wearable monitoring and ECGs as compared to implantable cardiac monitoring. CRYSTAL AF for instance showed a 2.0% detection of AF with a wearable vs. 12.4% with an implantable monitor.   In addition, the study also showed paroxysmal atrial fibrillation was most often asymptomatic in this patient group and unlikely to be detected by symptom-driven monitoring or intermittent short-term monitoring. Recommend Implant of an implantable cardiac monitor/loop recorder.

## 2024-05-24 NOTE — CARDIOLOGY SUMMARY
[de-identified] : 5/10/24 : MCOT  HR  bpm Avg 65 bpm 1 run AT @ 124 bpm.  No AF, HB or pauses.  [de-identified] : 3/21/24 : TTE  The left ventricle is normal in size, wall thickness, wall motion and  contractility as seen in limited views.  Estimated left ventricular ejection fraction is 60-65 %.  Normal diastolic function.  The mitral valve leaflets appear thickened.  Mild (1+) mitral regurgitation is present.  Normal appearing tricuspid valve structure.  Trace tricuspid valve regurgitation is present.  The IVC is dilated.  Limited subcostal window.  [de-identified] : 3/20/24 :  CT NONCONTRAST HEAD CT SCAN: Hypodensities within the subcortical white matter of the right frontal lobe corresponding to acute infarcts seen on recent MRI. Additional smaller infarcts in the right occipital lobe are inconspicuous on this noncontrast CT.  There is no acute intracranial hemorrhage, mass effect or midline shift.  The ventricles, sulci and extra-axial spaces appear within normal limits.  The paranasal sinuses and mastoids are grossly clear.  The calvarium and skull base appear within normal limits.  3/20/24 : MRI  IMPRESSION:   Acute cortical infarctions in the RIGHT frontal, parietal and occipital cortex with underlying subcortical white matter, demonstrating restricted diffusion in these regions. Tiny old hemorrhagic cortical infarction is seen in the posterior LEFT parietal lobe.

## 2024-05-24 NOTE — ASU DISCHARGE PLAN (ADULT/PEDIATRIC) - NS MD DC FALL RISK RISK
For information on Fall & Injury Prevention, visit: [email or web address] OR  [email or web address] OR  https://www.cdc.gov/steadi/patient.html

## 2024-05-31 DIAGNOSIS — I48.91 UNSPECIFIED ATRIAL FIBRILLATION: ICD-10-CM

## 2024-06-03 ENCOUNTER — APPOINTMENT (OUTPATIENT)
Dept: ELECTROPHYSIOLOGY | Facility: CLINIC | Age: 84
End: 2024-06-03

## 2024-06-06 ENCOUNTER — OUTPATIENT (OUTPATIENT)
Dept: OUTPATIENT SERVICES | Facility: HOSPITAL | Age: 84
LOS: 1 days | End: 2024-06-06
Payer: MEDICARE

## 2024-06-06 ENCOUNTER — APPOINTMENT (OUTPATIENT)
Dept: UROLOGY | Facility: CLINIC | Age: 84
End: 2024-06-06
Payer: MEDICARE

## 2024-06-06 VITALS
HEIGHT: 72 IN | BODY MASS INDEX: 23.7 KG/M2 | WEIGHT: 175 LBS | HEART RATE: 62 BPM | RESPIRATION RATE: 16 BRPM | DIASTOLIC BLOOD PRESSURE: 69 MMHG | SYSTOLIC BLOOD PRESSURE: 112 MMHG

## 2024-06-06 DIAGNOSIS — Z98.890 OTHER SPECIFIED POSTPROCEDURAL STATES: Chronic | ICD-10-CM

## 2024-06-06 DIAGNOSIS — C61 MALIGNANT NEOPLASM OF PROSTATE: ICD-10-CM

## 2024-06-06 DIAGNOSIS — R35.0 FREQUENCY OF MICTURITION: ICD-10-CM

## 2024-06-06 PROCEDURE — 99214 OFFICE O/P EST MOD 30 MIN: CPT | Mod: 25

## 2024-06-06 PROCEDURE — 96402U: CUSTOM | Mod: NC

## 2024-06-06 PROCEDURE — 96402 CHEMO HORMON ANTINEOPL SQ/IM: CPT

## 2024-06-06 RX ORDER — LEUPROLIDE ACETATE 45 MG/.375ML
45 INJECTION, SUSPENSION, EXTENDED RELEASE SUBCUTANEOUS
Refills: 0 | Status: COMPLETED | OUTPATIENT
Start: 2024-06-06

## 2024-06-06 RX ORDER — LEUPROLIDE ACETATE 45 MG/.375ML
45 INJECTION, SUSPENSION, EXTENDED RELEASE SUBCUTANEOUS
Qty: 1 | Refills: 0 | Status: COMPLETED | OUTPATIENT
Start: 2024-06-05 | End: 2024-06-06

## 2024-06-06 RX ADMIN — LEUPROLIDE ACETATE 45 MG: 45 INJECTION, SUSPENSION, EXTENDED RELEASE SUBCUTANEOUS at 00:00

## 2024-06-07 ENCOUNTER — APPOINTMENT (OUTPATIENT)
Dept: RHEUMATOLOGY | Facility: CLINIC | Age: 84
End: 2024-06-07
Payer: MEDICARE

## 2024-06-07 VITALS
BODY MASS INDEX: 23.84 KG/M2 | DIASTOLIC BLOOD PRESSURE: 64 MMHG | WEIGHT: 176 LBS | HEIGHT: 72 IN | HEART RATE: 63 BPM | OXYGEN SATURATION: 95 % | SYSTOLIC BLOOD PRESSURE: 109 MMHG

## 2024-06-07 DIAGNOSIS — R26.81 UNSTEADINESS ON FEET: ICD-10-CM

## 2024-06-07 PROCEDURE — G2211 COMPLEX E/M VISIT ADD ON: CPT

## 2024-06-07 PROCEDURE — 99215 OFFICE O/P EST HI 40 MIN: CPT

## 2024-06-07 RX ORDER — SULFAMETHOXAZOLE AND TRIMETHOPRIM 800; 160 MG/1; MG/1
800-160 TABLET ORAL
Refills: 0 | Status: DISCONTINUED | COMMUNITY
Start: 2024-03-29 | End: 2024-06-07

## 2024-06-08 PROBLEM — C61 PROSTATE CANCER: Status: ACTIVE | Noted: 2024-03-27

## 2024-06-08 LAB
ALBUMIN SERPL ELPH-MCNC: 4.2 G/DL
ALP BLD-CCNC: 50 U/L
ALT SERPL-CCNC: 37 U/L
ANION GAP SERPL CALC-SCNC: 10 MMOL/L
AST SERPL-CCNC: 25 U/L
BASOPHILS # BLD AUTO: 0.04 K/UL
BASOPHILS NFR BLD AUTO: 0.5 %
BILIRUB SERPL-MCNC: 0.8 MG/DL
BUN SERPL-MCNC: 25 MG/DL
CALCIUM SERPL-MCNC: 9.4 MG/DL
CHLORIDE SERPL-SCNC: 102 MMOL/L
CO2 SERPL-SCNC: 27 MMOL/L
CREAT SERPL-MCNC: 1.03 MG/DL
CRP SERPL-MCNC: <3 MG/L
EGFR: 72 ML/MIN/1.73M2
EOSINOPHIL # BLD AUTO: 0.01 K/UL
EOSINOPHIL NFR BLD AUTO: 0.1 %
ERYTHROCYTE [SEDIMENTATION RATE] IN BLOOD BY WESTERGREN METHOD: 12 MM/HR
GLUCOSE SERPL-MCNC: 108 MG/DL
HCT VFR BLD CALC: 40.4 %
HGB BLD-MCNC: 13 G/DL
IMM GRANULOCYTES NFR BLD AUTO: 0.9 %
LYMPHOCYTES # BLD AUTO: 1.14 K/UL
LYMPHOCYTES NFR BLD AUTO: 14 %
MAN DIFF?: NORMAL
MCHC RBC-ENTMCNC: 32.2 GM/DL
MCHC RBC-ENTMCNC: 32.7 PG
MCV RBC AUTO: 101.5 FL
MONOCYTES # BLD AUTO: 0.41 K/UL
MONOCYTES NFR BLD AUTO: 5 %
NEUTROPHILS # BLD AUTO: 6.49 K/UL
NEUTROPHILS NFR BLD AUTO: 79.5 %
PLATELET # BLD AUTO: 140 K/UL
POTASSIUM SERPL-SCNC: 4.3 MMOL/L
PROT SERPL-MCNC: 7 G/DL
RBC # BLD: 3.98 M/UL
RBC # FLD: 15 %
SODIUM SERPL-SCNC: 139 MMOL/L
WBC # FLD AUTO: 8.16 K/UL

## 2024-06-08 NOTE — DISEASE MANAGEMENT
[4] : T4 [1] : M1 [10-20] : 10 - 20 ng/mL [FreeTextEntry7] : PSMA PET scan showed evidence of multiple avid lymph nodes concerning for metastasis in addition to activity seen within the prostate.  He also has a mildly avid lesion in one of his ribs.  Findings were consistent with metastatic prostate cancer. [IVB] : IVB

## 2024-06-08 NOTE — REASON FOR VISIT
[Consideration for Non-Curative Therapy] : consideration for non-curative therapy for prostate cancer

## 2024-06-08 NOTE — HISTORY OF PRESENT ILLNESS
[FreeTextEntry1] : Rachid Ambrose returns today to the office.  He is 84 years old.  I had seen him recently after he had undergone workup with a PSMA PET imaging study to evaluate a marked elevation of the PSA at 17.9 ng/mL.  The PET scan showed evidence of lymph node activity as well as activity in one of his rib bones.  The prostate also showed activity.  The findings were consistent with metastatic prostate cancer.  We had met and discussed the findings.  We have discussed the potential for biopsy but I had recommended to him that he could move forward without the biopsy to treat the prostate cancer as the biopsy itself would unlikely affect his treatment strategy as it would most likely encompass androgen deprivation therapy as systemic treatment regardless of the findings.  He has started on bicalutamide for that reason and is now here today to consider leuprolide injection and to talk about its side effects.  He is not experiencing any major voiding complaints at this time.  He is using alfuzosin to manage baseline urinary symptoms but with the medication does not have any significant issue.

## 2024-06-09 PROBLEM — R26.81 GAIT INSTABILITY: Status: ACTIVE | Noted: 2024-06-09

## 2024-06-09 RX ORDER — SULFAMETHOXAZOLE AND TRIMETHOPRIM 800; 160 MG/1; MG/1
800-160 TABLET ORAL DAILY
Qty: 12 | Refills: 1 | Status: DISCONTINUED | COMMUNITY
Start: 2024-03-09 | End: 2024-06-09

## 2024-06-10 RX ORDER — PANTOPRAZOLE 40 MG/1
40 TABLET, DELAYED RELEASE ORAL DAILY
Qty: 30 | Refills: 3 | Status: ACTIVE | COMMUNITY
Start: 2024-05-03 | End: 1900-01-01

## 2024-06-10 NOTE — PHYSICAL EXAM
[General Appearance - In No Acute Distress] : in no acute distress [General Appearance - Alert] : alert [Sclera] : the sclera and conjunctiva were normal [Outer Ear] : the ears and nose were normal in appearance [Nasal Cavity] : the nasal mucosa and septum were normal [Jugular Venous Distention Increased] : there was no jugular-venous distention [Respiration, Rhythm And Depth] : normal respiratory rhythm and effort [] : no respiratory distress [Heart Rate And Rhythm] : heart rate was normal and rhythm regular [Heart Sounds] : normal S1 and S2 [Edema] : there was no peripheral edema [Abdomen Soft] : soft [Abdomen Tenderness] : non-tender [Cervical Lymph Nodes Enlarged Posterior Bilaterally] : posterior cervical [Cervical Lymph Nodes Enlarged Anterior Bilaterally] : anterior cervical [Axillary Lymph Nodes Enlarged Bilaterally] : axillary [Skin Color & Pigmentation] : normal skin color and pigmentation [No Focal Deficits] : no focal deficits [Skin Turgor] : normal skin turgor [Oriented To Time, Place, And Person] : oriented to person, place, and time [Impaired Insight] : insight and judgment were intact [Abnormal Walk] : normal gait [FreeTextEntry1] : temporal aa palpable but not tender, decreased pulse on right and no pulse on left

## 2024-06-10 NOTE — ASSESSMENT
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

## 2024-06-10 NOTE — HISTORY OF PRESENT ILLNESS
[de-identified] : Last was seen on May 3, 2024 [FreeTextEntry1] : Interval History: ----------------------- = on prednisone 15 mg a day x about 1-2 weeks; on Actemra since 3/22/2024.  = overall felt better, in last 2 weeks noted more difficulty with finding words, some gait instability, persistent headache = left hand weakness improved = no jaw claudication, no vision changes

## 2024-06-10 NOTE — REVIEW OF SYSTEMS
[Feeling Tired] : feeling tired [As noted in HPI] : as noted in HPI [As Noted in HPI] : as noted in HPI [Negative] : Psychiatric [Fever] : no fever [Chills] : no chills [Recent Weight Loss (___ Lbs)] : no recent weight loss

## 2024-06-11 ENCOUNTER — NON-APPOINTMENT (OUTPATIENT)
Age: 84
End: 2024-06-11

## 2024-06-11 ENCOUNTER — APPOINTMENT (OUTPATIENT)
Dept: ELECTROPHYSIOLOGY | Facility: CLINIC | Age: 84
End: 2024-06-11
Payer: MEDICARE

## 2024-06-11 VITALS
BODY MASS INDEX: 23.7 KG/M2 | DIASTOLIC BLOOD PRESSURE: 53 MMHG | HEART RATE: 66 BPM | SYSTOLIC BLOOD PRESSURE: 105 MMHG | HEIGHT: 72 IN | OXYGEN SATURATION: 96 % | WEIGHT: 175 LBS

## 2024-06-11 DIAGNOSIS — Z95.818 PRESENCE OF OTHER CARDIAC IMPLANTS AND GRAFTS: ICD-10-CM

## 2024-06-11 DIAGNOSIS — I63.9 CEREBRAL INFARCTION, UNSPECIFIED: ICD-10-CM

## 2024-06-11 PROCEDURE — 93285 PRGRMG DEV EVAL SCRMS IP: CPT

## 2024-06-11 PROCEDURE — 99213 OFFICE O/P EST LOW 20 MIN: CPT

## 2024-06-11 RX ORDER — LEUPROLIDE ACETATE 45 MG
KIT INTRAMUSCULAR
Refills: 0 | Status: ACTIVE | COMMUNITY

## 2024-06-11 NOTE — DISCUSSION/SUMMARY
[FreeTextEntry1] : ILR interrogated & reprogrammed. Battery status is good. presenting rhythm is  SR 67bpm No Stored data to review. Remote f/u reinstructed to pt/family, clinic f/u in 6months.

## 2024-06-11 NOTE — PHYSICAL EXAM
[Normal] : moves all extremities, no focal deficits, normal speech [de-identified] : (+) Mid chest ILR closed well, no swelling, (+) residual ecchymosis on chest

## 2024-06-11 NOTE — HISTORY OF PRESENT ILLNESS
[FreeTextEntry1] : 83 yo M s/p CVA resolved Lt side weakness /ILR implanted on 5/24/24, here for post-op wound check & ILR interrogation/reprogramming.

## 2024-06-14 ENCOUNTER — APPOINTMENT (OUTPATIENT)
Dept: RHEUMATOLOGY | Facility: CLINIC | Age: 84
End: 2024-06-14
Payer: MEDICARE

## 2024-06-14 ENCOUNTER — MED ADMIN CHARGE (OUTPATIENT)
Age: 84
End: 2024-06-14

## 2024-06-14 VITALS
SYSTOLIC BLOOD PRESSURE: 114 MMHG | DIASTOLIC BLOOD PRESSURE: 65 MMHG | HEART RATE: 58 BPM | TEMPERATURE: 97.2 F | OXYGEN SATURATION: 96 %

## 2024-06-14 VITALS — OXYGEN SATURATION: 95 % | DIASTOLIC BLOOD PRESSURE: 56 MMHG | HEART RATE: 55 BPM | SYSTOLIC BLOOD PRESSURE: 104 MMHG

## 2024-06-14 PROCEDURE — 96413 CHEMO IV INFUSION 1 HR: CPT

## 2024-06-14 RX ORDER — TOCILIZUMAB 20 MG/ML
80 INJECTION, SOLUTION, CONCENTRATE INTRAVENOUS
Qty: 1 | Refills: 0 | Status: COMPLETED
Start: 2024-03-22

## 2024-06-14 RX ORDER — TOCILIZUMAB 20 MG/ML
200 INJECTION, SOLUTION, CONCENTRATE INTRAVENOUS
Refills: 0 | Status: ACTIVE | OUTPATIENT
Start: 2024-06-14 | End: 1900-01-01

## 2024-06-14 RX ORDER — TOCILIZUMAB 20 MG/ML
200 INJECTION, SOLUTION, CONCENTRATE INTRAVENOUS
Qty: 1 | Refills: 0 | Status: COMPLETED
Start: 2024-06-14

## 2024-06-14 NOTE — HISTORY OF PRESENT ILLNESS
[N/A] : N/A [Denies] : Denies [No] : No [Yes] : Yes [Declined] : Declined [Informed consent documented in EHR.] : Informed consent documented in EHR. [TB] : Tuberculosis screening [Hep acute panel] : Hepatitis acute panel [Total Hep B core AB] : total Hepatitis B Core antibody [Right upper extremity] : Right upper extremity [24g] : 24g [Start Time: ___] : Medication Start Time: [unfilled] [End Time: ___] : Medication End Time: [unfilled] [Medication Name: ___] : Medication Name: [unfilled] [Total Amount Administered: ___] : Total Amount Administered: [unfilled] [IV discontinued. Intact. No signs or symptoms of IV complications noted. Time: ___] : IV discontinued. Intact. No signs or symptoms of IV complications noted. Time: [unfilled] [Patient  instructed to seek medical attention with signs and symptoms of adverse effects] : Patient  instructed to seek medical attention with signs and symptoms of adverse effects [Patient left unit in no acute distress] : Patient left unit in no acute distress [Medications administered as ordered and tolerated well.] : Medications administered as ordered and tolerated well. [de-identified] : 11:10am [de-identified] : NEXT INFUSION- 7/12/24 AT 11:00AM

## 2024-06-17 DIAGNOSIS — C61 MALIGNANT NEOPLASM OF PROSTATE: ICD-10-CM

## 2024-06-18 ENCOUNTER — APPOINTMENT (OUTPATIENT)
Dept: MRI IMAGING | Facility: CLINIC | Age: 84
End: 2024-06-18
Payer: MEDICARE

## 2024-06-18 ENCOUNTER — OUTPATIENT (OUTPATIENT)
Dept: OUTPATIENT SERVICES | Facility: HOSPITAL | Age: 84
LOS: 1 days | End: 2024-06-18
Payer: MEDICARE

## 2024-06-18 DIAGNOSIS — I63.9 CEREBRAL INFARCTION, UNSPECIFIED: ICD-10-CM

## 2024-06-18 DIAGNOSIS — Z98.890 OTHER SPECIFIED POSTPROCEDURAL STATES: Chronic | ICD-10-CM

## 2024-06-18 PROCEDURE — 70544 MR ANGIOGRAPHY HEAD W/O DYE: CPT | Mod: XU

## 2024-06-18 PROCEDURE — 70551 MRI BRAIN STEM W/O DYE: CPT | Mod: 26,MH

## 2024-06-18 PROCEDURE — 70549 MR ANGIOGRAPH NECK W/O&W/DYE: CPT | Mod: 26,MH

## 2024-06-18 PROCEDURE — 70551 MRI BRAIN STEM W/O DYE: CPT

## 2024-06-18 PROCEDURE — 70544 MR ANGIOGRAPHY HEAD W/O DYE: CPT | Mod: 26,XU,MH

## 2024-06-18 PROCEDURE — 70549 MR ANGIOGRAPH NECK W/O&W/DYE: CPT

## 2024-06-18 PROCEDURE — A9585: CPT

## 2024-06-20 ENCOUNTER — NON-APPOINTMENT (OUTPATIENT)
Age: 84
End: 2024-06-20

## 2024-06-21 ENCOUNTER — APPOINTMENT (OUTPATIENT)
Dept: RHEUMATOLOGY | Facility: CLINIC | Age: 84
End: 2024-06-21
Payer: MEDICARE

## 2024-06-21 VITALS
OXYGEN SATURATION: 97 % | HEIGHT: 72 IN | BODY MASS INDEX: 23.7 KG/M2 | HEART RATE: 60 BPM | SYSTOLIC BLOOD PRESSURE: 106 MMHG | DIASTOLIC BLOOD PRESSURE: 64 MMHG | WEIGHT: 175 LBS

## 2024-06-21 DIAGNOSIS — M31.6 OTHER GIANT CELL ARTERITIS: ICD-10-CM

## 2024-06-21 DIAGNOSIS — Z79.899 OTHER LONG TERM (CURRENT) DRUG THERAPY: ICD-10-CM

## 2024-06-21 PROCEDURE — G2211 COMPLEX E/M VISIT ADD ON: CPT

## 2024-06-21 PROCEDURE — 99214 OFFICE O/P EST MOD 30 MIN: CPT

## 2024-06-21 RX ORDER — ATORVASTATIN CALCIUM 40 MG/1
40 TABLET, FILM COATED ORAL
Qty: 1 | Refills: 0 | Status: ACTIVE | COMMUNITY
Start: 2017-01-09 | End: 1900-01-01

## 2024-06-21 RX ORDER — PREDNISONE 2.5 MG/1
2.5 TABLET ORAL
Qty: 210 | Refills: 0 | Status: ACTIVE | COMMUNITY
Start: 2024-06-21 | End: 1900-01-01

## 2024-06-21 RX ORDER — RISEDRONATE SODIUM 150 MG/1
150 TABLET, FILM COATED ORAL
Qty: 3 | Refills: 1 | Status: ACTIVE | COMMUNITY
Start: 2024-06-21 | End: 1900-01-01

## 2024-06-21 RX ORDER — PREDNISONE 5 MG/1
5 TABLET ORAL
Qty: 210 | Refills: 1 | Status: ACTIVE | COMMUNITY
Start: 2024-03-09 | End: 1900-01-01

## 2024-06-21 NOTE — PHYSICAL EXAM
[General Appearance - Alert] : alert [General Appearance - In No Acute Distress] : in no acute distress [Sclera] : the sclera and conjunctiva were normal [Outer Ear] : the ears and nose were normal in appearance [Nasal Cavity] : the nasal mucosa and septum were normal [Jugular Venous Distention Increased] : there was no jugular-venous distention [Respiration, Rhythm And Depth] : normal respiratory rhythm and effort [] : no respiratory distress [Heart Rate And Rhythm] : heart rate was normal and rhythm regular [Heart Sounds] : normal S1 and S2 [Edema] : there was no peripheral edema [FreeTextEntry1] : temporal aa palpable but not tender, decreased pulse on right and no pulse on left [Abdomen Soft] : soft [Abdomen Tenderness] : non-tender [Cervical Lymph Nodes Enlarged Posterior Bilaterally] : posterior cervical [Cervical Lymph Nodes Enlarged Anterior Bilaterally] : anterior cervical [Axillary Lymph Nodes Enlarged Bilaterally] : axillary [Skin Color & Pigmentation] : normal skin color and pigmentation [Skin Turgor] : normal skin turgor [No Focal Deficits] : no focal deficits [Oriented To Time, Place, And Person] : oriented to person, place, and time [Impaired Insight] : insight and judgment were intact [Abnormal Walk] : normal gait

## 2024-06-21 NOTE — REVIEW OF SYSTEMS
[Fever] : no fever [Chills] : no chills [Feeling Tired] : feeling tired [Recent Weight Loss (___ Lbs)] : no recent weight loss [As noted in HPI] : as noted in HPI [As Noted in HPI] : as noted in HPI [Negative] : Integumentary

## 2024-06-21 NOTE — PHYSICAL EXAM
[General Appearance - Alert] : alert [General Appearance - In No Acute Distress] : in no acute distress [Sclera] : the sclera and conjunctiva were normal [Outer Ear] : the ears and nose were normal in appearance [Nasal Cavity] : the nasal mucosa and septum were normal [Jugular Venous Distention Increased] : there was no jugular-venous distention [] : no respiratory distress [Respiration, Rhythm And Depth] : normal respiratory rhythm and effort [Heart Rate And Rhythm] : heart rate was normal and rhythm regular [Heart Sounds] : normal S1 and S2 [Edema] : there was no peripheral edema [FreeTextEntry1] : temporal aa palpable but not tender, decreased pulse on right and no pulse on left [Abdomen Soft] : soft [Abdomen Tenderness] : non-tender [Cervical Lymph Nodes Enlarged Posterior Bilaterally] : posterior cervical [Cervical Lymph Nodes Enlarged Anterior Bilaterally] : anterior cervical [Axillary Lymph Nodes Enlarged Bilaterally] : axillary [Skin Color & Pigmentation] : normal skin color and pigmentation [Skin Turgor] : normal skin turgor [No Focal Deficits] : no focal deficits [Oriented To Time, Place, And Person] : oriented to person, place, and time [Impaired Insight] : insight and judgment were intact [Abnormal Walk] : normal gait

## 2024-06-21 NOTE — ASSESSMENT
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

## 2024-06-21 NOTE — HISTORY OF PRESENT ILLNESS
[de-identified] : Last was seen on June 7, 2024 [FreeTextEntry1] : Interval History: ------------------------- = on prednisone 20 mg a day in last 2 weeks; on Actemra monthly since 3/22/2024.  = feels much improved since last visit better cognition, more stability of gait, headache improved, no jaw claudication, no vision changes

## 2024-06-21 NOTE — HISTORY OF PRESENT ILLNESS
[de-identified] : Last was seen on June 7, 2024 [FreeTextEntry1] : Interval History: ------------------------- = on prednisone 20 mg a day in last 2 weeks; on Actemra monthly since 3/22/2024.  = feels much improved since last visit better cognition, more stability of gait, headache improved, no jaw claudication, no vision changes

## 2024-06-26 ENCOUNTER — APPOINTMENT (OUTPATIENT)
Dept: RADIOLOGY | Facility: CLINIC | Age: 84
End: 2024-06-26
Payer: MEDICARE

## 2024-06-26 ENCOUNTER — OUTPATIENT (OUTPATIENT)
Dept: OUTPATIENT SERVICES | Facility: HOSPITAL | Age: 84
LOS: 1 days | End: 2024-06-26
Payer: MEDICARE

## 2024-06-26 DIAGNOSIS — Z98.890 OTHER SPECIFIED POSTPROCEDURAL STATES: Chronic | ICD-10-CM

## 2024-06-26 DIAGNOSIS — M81.8 OTHER OSTEOPOROSIS WITHOUT CURRENT PATHOLOGICAL FRACTURE: ICD-10-CM

## 2024-06-26 PROCEDURE — 77080 DXA BONE DENSITY AXIAL: CPT

## 2024-06-26 PROCEDURE — 77080 DXA BONE DENSITY AXIAL: CPT | Mod: 26

## 2024-07-11 ENCOUNTER — RX RENEWAL (OUTPATIENT)
Age: 84
End: 2024-07-11

## 2024-07-11 ENCOUNTER — APPOINTMENT (OUTPATIENT)
Dept: ELECTROPHYSIOLOGY | Facility: CLINIC | Age: 84
End: 2024-07-11
Payer: MEDICARE

## 2024-07-11 ENCOUNTER — NON-APPOINTMENT (OUTPATIENT)
Age: 84
End: 2024-07-11

## 2024-07-12 ENCOUNTER — MED ADMIN CHARGE (OUTPATIENT)
Age: 84
End: 2024-07-12

## 2024-07-12 ENCOUNTER — APPOINTMENT (OUTPATIENT)
Dept: RHEUMATOLOGY | Facility: CLINIC | Age: 84
End: 2024-07-12
Payer: MEDICARE

## 2024-07-12 VITALS
HEART RATE: 67 BPM | TEMPERATURE: 97.8 F | RESPIRATION RATE: 17 BRPM | SYSTOLIC BLOOD PRESSURE: 119 MMHG | OXYGEN SATURATION: 99 % | DIASTOLIC BLOOD PRESSURE: 70 MMHG

## 2024-07-12 PROCEDURE — 93298 REM INTERROG DEV EVAL SCRMS: CPT

## 2024-07-12 PROCEDURE — 96413 CHEMO IV INFUSION 1 HR: CPT

## 2024-07-12 RX ORDER — TOCILIZUMAB 20 MG/ML
200 INJECTION, SOLUTION, CONCENTRATE INTRAVENOUS
Qty: 1 | Refills: 0 | Status: COMPLETED
Start: 2024-06-14

## 2024-07-12 RX ORDER — TOCILIZUMAB 20 MG/ML
80 INJECTION, SOLUTION, CONCENTRATE INTRAVENOUS
Qty: 1 | Refills: 0 | Status: COMPLETED
Start: 2024-03-22

## 2024-07-15 ENCOUNTER — APPOINTMENT (OUTPATIENT)
Dept: NEUROLOGY | Facility: CLINIC | Age: 84
End: 2024-07-15
Payer: MEDICARE

## 2024-07-15 VITALS
WEIGHT: 175 LBS | BODY MASS INDEX: 23.7 KG/M2 | HEIGHT: 72 IN | SYSTOLIC BLOOD PRESSURE: 97 MMHG | DIASTOLIC BLOOD PRESSURE: 62 MMHG | HEART RATE: 66 BPM

## 2024-07-15 DIAGNOSIS — I63.9 CEREBRAL INFARCTION, UNSPECIFIED: ICD-10-CM

## 2024-07-15 PROCEDURE — G2211 COMPLEX E/M VISIT ADD ON: CPT

## 2024-07-15 PROCEDURE — 99215 OFFICE O/P EST HI 40 MIN: CPT

## 2024-07-16 ENCOUNTER — NON-APPOINTMENT (OUTPATIENT)
Age: 84
End: 2024-07-16

## 2024-07-27 ENCOUNTER — APPOINTMENT (OUTPATIENT)
Dept: INTERNAL MEDICINE | Facility: CLINIC | Age: 84
End: 2024-07-27
Payer: MEDICARE

## 2024-07-27 VITALS
SYSTOLIC BLOOD PRESSURE: 120 MMHG | HEIGHT: 72 IN | DIASTOLIC BLOOD PRESSURE: 60 MMHG | WEIGHT: 175 LBS | RESPIRATION RATE: 14 BRPM | BODY MASS INDEX: 23.7 KG/M2 | HEART RATE: 68 BPM

## 2024-07-27 DIAGNOSIS — R60.0 LOCALIZED EDEMA: ICD-10-CM

## 2024-07-27 DIAGNOSIS — C61 MALIGNANT NEOPLASM OF PROSTATE: ICD-10-CM

## 2024-07-27 PROCEDURE — 36415 COLL VENOUS BLD VENIPUNCTURE: CPT

## 2024-07-27 PROCEDURE — G2211 COMPLEX E/M VISIT ADD ON: CPT

## 2024-07-27 PROCEDURE — 99214 OFFICE O/P EST MOD 30 MIN: CPT

## 2024-07-27 NOTE — PHYSICAL EXAM
[Normal Sclera/Conjunctiva] : normal sclera/conjunctiva [Normal] : soft, non-tender, non-distended, no masses palpated, no HSM and normal bowel sounds [Alert and Oriented x3] : oriented to person, place, and time [de-identified] : trace -1+ edema on left. Trace on Right LE [de-identified] : Few shoddy inguinal LN's

## 2024-07-27 NOTE — HISTORY OF PRESENT ILLNESS
[FreeTextEntry1] : Pt with leg swelling over past month - Left > Right No pain Not SOB Getting Lupron injections No bleeding No chest pains.

## 2024-07-27 NOTE — ASSESSMENT
[FreeTextEntry1] : Pt with above medical issues. Bloods drawn in office. To do LE dopplers and Echocardiogram Pt does not want diuretics To elevate legs Could consider compression stockings.

## 2024-07-28 LAB
ALBUMIN SERPL ELPH-MCNC: 4 G/DL
ALP BLD-CCNC: 55 U/L
ALT SERPL-CCNC: 40 U/L
ANION GAP SERPL CALC-SCNC: 14 MMOL/L
APPEARANCE: CLEAR
AST SERPL-CCNC: 32 U/L
BACTERIA: NEGATIVE /HPF
BASOPHILS # BLD AUTO: 0.03 K/UL
BASOPHILS NFR BLD AUTO: 0.4 %
BILIRUB SERPL-MCNC: 1.2 MG/DL
BILIRUBIN URINE: NEGATIVE
BLOOD URINE: NEGATIVE
BUN SERPL-MCNC: 23 MG/DL
CALCIUM SERPL-MCNC: 9.5 MG/DL
CAST: 0 /LPF
CHLORIDE SERPL-SCNC: 103 MMOL/L
CO2 SERPL-SCNC: 27 MMOL/L
COLOR: YELLOW
CREAT SERPL-MCNC: 1.05 MG/DL
EGFR: 70 ML/MIN/1.73M2
EOSINOPHIL # BLD AUTO: 0.05 K/UL
EOSINOPHIL NFR BLD AUTO: 0.6 %
EPITHELIAL CELLS: 0 /HPF
GLUCOSE QUALITATIVE U: NEGATIVE MG/DL
GLUCOSE SERPL-MCNC: 103 MG/DL
HCT VFR BLD CALC: 39.3 %
HGB BLD-MCNC: 12.8 G/DL
IMM GRANULOCYTES NFR BLD AUTO: 0.5 %
KETONES URINE: NEGATIVE MG/DL
LEUKOCYTE ESTERASE URINE: NEGATIVE
LYMPHOCYTES # BLD AUTO: 1.11 K/UL
LYMPHOCYTES NFR BLD AUTO: 14.2 %
MAN DIFF?: NORMAL
MCHC RBC-ENTMCNC: 32.6 GM/DL
MCHC RBC-ENTMCNC: 33.3 PG
MCV RBC AUTO: 102.3 FL
MICROSCOPIC-UA: NORMAL
MONOCYTES # BLD AUTO: 0.53 K/UL
MONOCYTES NFR BLD AUTO: 6.8 %
NEUTROPHILS # BLD AUTO: 6.03 K/UL
NEUTROPHILS NFR BLD AUTO: 77.5 %
NITRITE URINE: NEGATIVE
NT-PROBNP SERPL-MCNC: 200 PG/ML
PH URINE: 6.5
PLATELET # BLD AUTO: 152 K/UL
POTASSIUM SERPL-SCNC: 3.7 MMOL/L
PROT SERPL-MCNC: 7 G/DL
PROTEIN URINE: NEGATIVE MG/DL
PSA SERPL-MCNC: 0.56 NG/ML
RBC # BLD: 3.84 M/UL
RBC # FLD: 14.3 %
RED BLOOD CELLS URINE: 0 /HPF
SODIUM SERPL-SCNC: 143 MMOL/L
SPECIFIC GRAVITY URINE: 1.01
TSH SERPL-ACNC: 1.96 UIU/ML
UROBILINOGEN URINE: 0.2 MG/DL
WBC # FLD AUTO: 7.79 K/UL
WHITE BLOOD CELLS URINE: 0 /HPF

## 2024-07-31 ENCOUNTER — APPOINTMENT (OUTPATIENT)
Dept: ULTRASOUND IMAGING | Facility: CLINIC | Age: 84
End: 2024-07-31
Payer: MEDICARE

## 2024-07-31 ENCOUNTER — OUTPATIENT (OUTPATIENT)
Dept: OUTPATIENT SERVICES | Facility: HOSPITAL | Age: 84
LOS: 1 days | End: 2024-07-31
Payer: MEDICARE

## 2024-07-31 ENCOUNTER — NON-APPOINTMENT (OUTPATIENT)
Age: 84
End: 2024-07-31

## 2024-07-31 DIAGNOSIS — R60.0 LOCALIZED EDEMA: ICD-10-CM

## 2024-07-31 DIAGNOSIS — Z98.890 OTHER SPECIFIED POSTPROCEDURAL STATES: Chronic | ICD-10-CM

## 2024-07-31 PROCEDURE — 93970 EXTREMITY STUDY: CPT

## 2024-07-31 PROCEDURE — 93970 EXTREMITY STUDY: CPT | Mod: 26

## 2024-08-02 ENCOUNTER — RESULT REVIEW (OUTPATIENT)
Age: 84
End: 2024-08-02

## 2024-08-02 ENCOUNTER — OUTPATIENT (OUTPATIENT)
Dept: OUTPATIENT SERVICES | Facility: HOSPITAL | Age: 84
LOS: 1 days | End: 2024-08-02
Payer: MEDICARE

## 2024-08-02 DIAGNOSIS — Z98.890 OTHER SPECIFIED POSTPROCEDURAL STATES: Chronic | ICD-10-CM

## 2024-08-02 DIAGNOSIS — R60.0 LOCALIZED EDEMA: ICD-10-CM

## 2024-08-02 PROCEDURE — 93306 TTE W/DOPPLER COMPLETE: CPT

## 2024-08-02 PROCEDURE — 93306 TTE W/DOPPLER COMPLETE: CPT | Mod: 26

## 2024-08-03 DIAGNOSIS — R60.0 LOCALIZED EDEMA: ICD-10-CM

## 2024-08-06 ENCOUNTER — NON-APPOINTMENT (OUTPATIENT)
Age: 84
End: 2024-08-06

## 2024-08-13 NOTE — HEALTH RISK ASSESSMENT
Patient education provided.  [Good] : ~his/her~  mood as  good [Never] : Never [No] : No [No falls in past year] : Patient reported no falls in the past year [PHQ-2 Negative - No further assessment needed] : PHQ-2 Negative - No further assessment needed [None] : None [With Family] : lives with family [Employed] : employed [] :  [Feels Safe at Home] : Feels safe at home [Fully functional (bathing, dressing, toileting, transferring, walking, feeding)] : Fully functional (bathing, dressing, toileting, transferring, walking, feeding) [Fully functional (using the telephone, shopping, preparing meals, housekeeping, doing laundry, using] : Fully functional and needs no help or supervision to perform IADLs (using the telephone, shopping, preparing meals, housekeeping, doing laundry, using transportation, managing medications and managing finances) [Smoke Detector] : smoke detector [Carbon Monoxide Detector] : carbon monoxide detector [Seat Belt] :  uses seat belt [Sunscreen] : uses sunscreen [With Patient/Caregiver] : , with patient/caregiver [Designated Healthcare Proxy] : Designated healthcare proxy [Name: ___] : Health Care Proxy's Name: [unfilled]  [Time Spent: ___ minutes] : Time Spent: [unfilled] minutes [de-identified] : Walking [de-identified] : Low fat [Change in mental status noted] : No change in mental status noted [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

## 2024-08-14 ENCOUNTER — APPOINTMENT (OUTPATIENT)
Dept: RHEUMATOLOGY | Facility: CLINIC | Age: 84
End: 2024-08-14
Payer: MEDICARE

## 2024-08-14 VITALS
SYSTOLIC BLOOD PRESSURE: 93 MMHG | HEART RATE: 69 BPM | WEIGHT: 175 LBS | BODY MASS INDEX: 23.7 KG/M2 | HEIGHT: 72 IN | OXYGEN SATURATION: 97 % | DIASTOLIC BLOOD PRESSURE: 57 MMHG

## 2024-08-14 DIAGNOSIS — M31.6 OTHER GIANT CELL ARTERITIS: ICD-10-CM

## 2024-08-14 DIAGNOSIS — Z79.899 OTHER LONG TERM (CURRENT) DRUG THERAPY: ICD-10-CM

## 2024-08-14 PROCEDURE — G2211 COMPLEX E/M VISIT ADD ON: CPT

## 2024-08-14 PROCEDURE — 99214 OFFICE O/P EST MOD 30 MIN: CPT

## 2024-08-15 ENCOUNTER — MED ADMIN CHARGE (OUTPATIENT)
Age: 84
End: 2024-08-15

## 2024-08-15 ENCOUNTER — APPOINTMENT (OUTPATIENT)
Dept: RHEUMATOLOGY | Facility: CLINIC | Age: 84
End: 2024-08-15
Payer: MEDICARE

## 2024-08-15 VITALS
HEART RATE: 54 BPM | OXYGEN SATURATION: 97 % | TEMPERATURE: 97 F | SYSTOLIC BLOOD PRESSURE: 136 MMHG | RESPIRATION RATE: 18 BRPM | DIASTOLIC BLOOD PRESSURE: 74 MMHG

## 2024-08-15 VITALS
OXYGEN SATURATION: 96 % | DIASTOLIC BLOOD PRESSURE: 64 MMHG | RESPIRATION RATE: 18 BRPM | HEART RATE: 50 BPM | SYSTOLIC BLOOD PRESSURE: 112 MMHG

## 2024-08-15 PROCEDURE — 96413 CHEMO IV INFUSION 1 HR: CPT

## 2024-08-15 RX ORDER — TOCILIZUMAB 20 MG/ML
80 INJECTION, SOLUTION, CONCENTRATE INTRAVENOUS
Qty: 1 | Refills: 0 | Status: COMPLETED
Start: 2024-03-22

## 2024-08-15 RX ORDER — TOCILIZUMAB 20 MG/ML
200 INJECTION, SOLUTION, CONCENTRATE INTRAVENOUS
Qty: 1 | Refills: 0 | Status: COMPLETED
Start: 2024-06-14

## 2024-08-15 RX ORDER — RISEDRONATE SODIUM 150 MG/1
150 TABLET, FILM COATED ORAL
Qty: 3 | Refills: 3 | Status: ACTIVE | COMMUNITY
Start: 2024-08-15 | End: 1900-01-01

## 2024-08-15 NOTE — HISTORY OF PRESENT ILLNESS
[de-identified] : Last was seen on June 21 2024 [FreeTextEntry1] : Interval History: ------------------------- = on prednisone 12.5 mg a day in last 2 weeks; on Actemra monthly since 3/22/2024.  = continues to improve - more stability of gait, no more headache, no jaw claudication, no vision changes

## 2024-08-15 NOTE — HISTORY OF PRESENT ILLNESS
[de-identified] : Last was seen on June 21 2024 [FreeTextEntry1] : Interval History: ------------------------- = on prednisone 12.5 mg a day in last 2 weeks; on Actemra monthly since 3/22/2024.  = continues to improve - more stability of gait, no more headache, no jaw claudication, no vision changes

## 2024-08-15 NOTE — PHYSICAL EXAM
[General Appearance - Alert] : alert [General Appearance - In No Acute Distress] : in no acute distress [Sclera] : the sclera and conjunctiva were normal [Outer Ear] : the ears and nose were normal in appearance [Nasal Cavity] : the nasal mucosa and septum were normal [Jugular Venous Distention Increased] : there was no jugular-venous distention [] : no respiratory distress [Respiration, Rhythm And Depth] : normal respiratory rhythm and effort [Heart Rate And Rhythm] : heart rate was normal and rhythm regular [Heart Sounds] : normal S1 and S2 [Edema] : there was no peripheral edema [Abdomen Soft] : soft [Abdomen Tenderness] : non-tender [Cervical Lymph Nodes Enlarged Posterior Bilaterally] : posterior cervical [Cervical Lymph Nodes Enlarged Anterior Bilaterally] : anterior cervical [Axillary Lymph Nodes Enlarged Bilaterally] : axillary [Skin Color & Pigmentation] : normal skin color and pigmentation [Skin Turgor] : normal skin turgor [No Focal Deficits] : no focal deficits [Oriented To Time, Place, And Person] : oriented to person, place, and time [Impaired Insight] : insight and judgment were intact [Abnormal Walk] : normal gait [FreeTextEntry1] : temporal aa palpable but not tender, decreased pulse on right and no pulse on left

## 2024-08-15 NOTE — HISTORY OF PRESENT ILLNESS
[N/A] : N/A [Denies] : Denies [No] : No [Yes] : Yes [Declined] : Declined [TB] : Tuberculosis screening [Hep acute panel] : Hepatitis acute panel [Right upper extremity] : Right upper extremity [22g] : 22g [Start Time: ___] : Medication Start Time: [unfilled] [End Time: ___] : Medication End Time: [unfilled] [Medication Name: ___] : Medication Name: [unfilled] [Total Amount Administered: ___] : Total Amount Administered: [unfilled] [IV discontinued. Intact. No signs or symptoms of IV complications noted. Time: ___] : IV discontinued. Intact. No signs or symptoms of IV complications noted. Time: [unfilled] [Patient  instructed to seek medical attention with signs and symptoms of adverse effects] : Patient  instructed to seek medical attention with signs and symptoms of adverse effects [Patient left unit in no acute distress] : Patient left unit in no acute distress [Medications administered as ordered and tolerated well.] : Medications administered as ordered and tolerated well. [de-identified] : 11:05 AM

## 2024-08-15 NOTE — ASSESSMENT
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 CT angio head showed no LV occlusion or significant stenosis- possibly related to GCA  ## worsening of gait / cognition - concern for another CVA  ## Osteopenia - DEXA 6/2024 - Using the FRAX 10- year fracture risk calculator the patient's ten-year risk of any fracture is 13%  and the patient's risk of hip fracture is  6.4%.  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

## 2024-08-16 ENCOUNTER — APPOINTMENT (OUTPATIENT)
Dept: ELECTROPHYSIOLOGY | Facility: CLINIC | Age: 84
End: 2024-08-16
Payer: MEDICARE

## 2024-08-16 ENCOUNTER — NON-APPOINTMENT (OUTPATIENT)
Age: 84
End: 2024-08-16

## 2024-08-16 PROCEDURE — 93298 REM INTERROG DEV EVAL SCRMS: CPT

## 2024-09-05 ENCOUNTER — RESULT REVIEW (OUTPATIENT)
Age: 84
End: 2024-09-05

## 2024-09-05 ENCOUNTER — INPATIENT (INPATIENT)
Facility: HOSPITAL | Age: 84
LOS: 2 days | Discharge: HOME | DRG: 312 | End: 2024-09-08
Attending: SURGERY | Admitting: HOSPITALIST
Payer: MEDICARE

## 2024-09-05 VITALS
HEART RATE: 69 BPM | DIASTOLIC BLOOD PRESSURE: 60 MMHG | RESPIRATION RATE: 16 BRPM | TEMPERATURE: 97 F | HEIGHT: 73 IN | WEIGHT: 175.05 LBS | OXYGEN SATURATION: 99 % | SYSTOLIC BLOOD PRESSURE: 109 MMHG

## 2024-09-05 DIAGNOSIS — Z98.890 OTHER SPECIFIED POSTPROCEDURAL STATES: Chronic | ICD-10-CM

## 2024-09-05 DIAGNOSIS — R55 SYNCOPE AND COLLAPSE: ICD-10-CM

## 2024-09-05 LAB
ADD ON TEST-SPECIMEN IN LAB: SIGNIFICANT CHANGE UP
ALBUMIN SERPL ELPH-MCNC: 2.5 G/DL — LOW (ref 3.3–5)
ALP SERPL-CCNC: 48 U/L — SIGNIFICANT CHANGE UP (ref 40–120)
ALT FLD-CCNC: 49 U/L — SIGNIFICANT CHANGE UP (ref 12–78)
ANION GAP SERPL CALC-SCNC: 6 MMOL/L — SIGNIFICANT CHANGE UP (ref 5–17)
APPEARANCE UR: CLEAR — SIGNIFICANT CHANGE UP
AST SERPL-CCNC: 58 U/L — HIGH (ref 15–37)
BILIRUB SERPL-MCNC: 0.6 MG/DL — SIGNIFICANT CHANGE UP (ref 0.2–1.2)
BILIRUB UR-MCNC: NEGATIVE — SIGNIFICANT CHANGE UP
BUN SERPL-MCNC: 50 MG/DL — HIGH (ref 7–23)
CALCIUM SERPL-MCNC: 8.3 MG/DL — LOW (ref 8.5–10.1)
CHLORIDE SERPL-SCNC: 108 MMOL/L — SIGNIFICANT CHANGE UP (ref 96–108)
CO2 SERPL-SCNC: 30 MMOL/L — SIGNIFICANT CHANGE UP (ref 22–31)
COLOR SPEC: YELLOW — SIGNIFICANT CHANGE UP
CREAT SERPL-MCNC: 0.85 MG/DL — SIGNIFICANT CHANGE UP (ref 0.5–1.3)
DIFF PNL FLD: NEGATIVE — SIGNIFICANT CHANGE UP
EGFR: 86 ML/MIN/1.73M2 — SIGNIFICANT CHANGE UP
GLUCOSE SERPL-MCNC: 159 MG/DL — HIGH (ref 70–99)
GLUCOSE UR QL: NEGATIVE MG/DL — SIGNIFICANT CHANGE UP
HCT VFR BLD CALC: 27.7 % — LOW (ref 39–50)
HCT VFR BLD CALC: 28.1 % — LOW (ref 39–50)
HGB BLD-MCNC: 9.1 G/DL — LOW (ref 13–17)
HGB BLD-MCNC: 9.3 G/DL — LOW (ref 13–17)
KETONES UR-MCNC: ABNORMAL MG/DL
LEUKOCYTE ESTERASE UR-ACNC: NEGATIVE — SIGNIFICANT CHANGE UP
MCHC RBC-ENTMCNC: 32.9 GM/DL — SIGNIFICANT CHANGE UP (ref 32–36)
MCHC RBC-ENTMCNC: 33.1 GM/DL — SIGNIFICANT CHANGE UP (ref 32–36)
MCHC RBC-ENTMCNC: 33.8 PG — SIGNIFICANT CHANGE UP (ref 27–34)
MCHC RBC-ENTMCNC: 34 PG — SIGNIFICANT CHANGE UP (ref 27–34)
MCV RBC AUTO: 102.2 FL — HIGH (ref 80–100)
MCV RBC AUTO: 103.4 FL — HIGH (ref 80–100)
NITRITE UR-MCNC: NEGATIVE — SIGNIFICANT CHANGE UP
PH UR: 6 — SIGNIFICANT CHANGE UP (ref 5–8)
PLATELET # BLD AUTO: 127 K/UL — LOW (ref 150–400)
PLATELET # BLD AUTO: 128 K/UL — LOW (ref 150–400)
POTASSIUM SERPL-MCNC: 4 MMOL/L — SIGNIFICANT CHANGE UP (ref 3.5–5.3)
POTASSIUM SERPL-SCNC: 4 MMOL/L — SIGNIFICANT CHANGE UP (ref 3.5–5.3)
PROT SERPL-MCNC: 5.3 GM/DL — LOW (ref 6–8.3)
PROT UR-MCNC: NEGATIVE MG/DL — SIGNIFICANT CHANGE UP
RBC # BLD: 2.68 M/UL — LOW (ref 4.2–5.8)
RBC # BLD: 2.75 M/UL — LOW (ref 4.2–5.8)
RBC # FLD: 13.6 % — SIGNIFICANT CHANGE UP (ref 10.3–14.5)
RBC # FLD: 13.9 % — SIGNIFICANT CHANGE UP (ref 10.3–14.5)
SODIUM SERPL-SCNC: 144 MMOL/L — SIGNIFICANT CHANGE UP (ref 135–145)
SP GR SPEC: >1.03 — HIGH (ref 1–1.03)
TROPONIN I, HIGH SENSITIVITY RESULT: 19.95 NG/L — SIGNIFICANT CHANGE UP
TROPONIN I, HIGH SENSITIVITY RESULT: 37.14 NG/L — SIGNIFICANT CHANGE UP
UROBILINOGEN FLD QL: 0.2 MG/DL — SIGNIFICANT CHANGE UP (ref 0.2–1)
WBC # BLD: 11.11 K/UL — HIGH (ref 3.8–10.5)
WBC # BLD: 12.19 K/UL — HIGH (ref 3.8–10.5)
WBC # FLD AUTO: 11.11 K/UL — HIGH (ref 3.8–10.5)
WBC # FLD AUTO: 12.19 K/UL — HIGH (ref 3.8–10.5)

## 2024-09-05 PROCEDURE — 99223 1ST HOSP IP/OBS HIGH 75: CPT

## 2024-09-05 PROCEDURE — 99285 EMERGENCY DEPT VISIT HI MDM: CPT

## 2024-09-05 PROCEDURE — 82525 ASSAY OF COPPER: CPT

## 2024-09-05 PROCEDURE — 82607 VITAMIN B-12: CPT

## 2024-09-05 PROCEDURE — 82728 ASSAY OF FERRITIN: CPT

## 2024-09-05 PROCEDURE — 86334 IMMUNOFIX E-PHORESIS SERUM: CPT

## 2024-09-05 PROCEDURE — 87641 MR-STAPH DNA AMP PROBE: CPT

## 2024-09-05 PROCEDURE — 84484 ASSAY OF TROPONIN QUANT: CPT

## 2024-09-05 PROCEDURE — 70496 CT ANGIOGRAPHY HEAD: CPT | Mod: 26,MC

## 2024-09-05 PROCEDURE — 72125 CT NECK SPINE W/O DYE: CPT | Mod: 26

## 2024-09-05 PROCEDURE — 99233 SBSQ HOSP IP/OBS HIGH 50: CPT

## 2024-09-05 PROCEDURE — 93306 TTE W/DOPPLER COMPLETE: CPT | Mod: 26

## 2024-09-05 PROCEDURE — 97162 PT EVAL MOD COMPLEX 30 MIN: CPT | Mod: GP

## 2024-09-05 PROCEDURE — 84165 PROTEIN E-PHORESIS SERUM: CPT

## 2024-09-05 PROCEDURE — 83521 IG LIGHT CHAINS FREE EACH: CPT

## 2024-09-05 PROCEDURE — 70551 MRI BRAIN STEM W/O DYE: CPT | Mod: MC

## 2024-09-05 PROCEDURE — 85610 PROTHROMBIN TIME: CPT

## 2024-09-05 PROCEDURE — 86901 BLOOD TYPING SEROLOGIC RH(D): CPT

## 2024-09-05 PROCEDURE — 71260 CT THORAX DX C+: CPT | Mod: 26

## 2024-09-05 PROCEDURE — 84100 ASSAY OF PHOSPHORUS: CPT

## 2024-09-05 PROCEDURE — 74177 CT ABD & PELVIS W/CONTRAST: CPT | Mod: 26

## 2024-09-05 PROCEDURE — 70450 CT HEAD/BRAIN W/O DYE: CPT | Mod: MC

## 2024-09-05 PROCEDURE — 84443 ASSAY THYROID STIM HORMONE: CPT

## 2024-09-05 PROCEDURE — 83010 ASSAY OF HAPTOGLOBIN QUANT: CPT

## 2024-09-05 PROCEDURE — 72125 CT NECK SPINE W/O DYE: CPT | Mod: MC

## 2024-09-05 PROCEDURE — 36415 COLL VENOUS BLD VENIPUNCTURE: CPT

## 2024-09-05 PROCEDURE — 74177 CT ABD & PELVIS W/CONTRAST: CPT | Mod: MC

## 2024-09-05 PROCEDURE — 82784 ASSAY IGA/IGD/IGG/IGM EACH: CPT

## 2024-09-05 PROCEDURE — 83550 IRON BINDING TEST: CPT

## 2024-09-05 PROCEDURE — 99222 1ST HOSP IP/OBS MODERATE 55: CPT

## 2024-09-05 PROCEDURE — 85045 AUTOMATED RETICULOCYTE COUNT: CPT

## 2024-09-05 PROCEDURE — 70450 CT HEAD/BRAIN W/O DYE: CPT | Mod: 26,59,77

## 2024-09-05 PROCEDURE — 82746 ASSAY OF FOLIC ACID SERUM: CPT

## 2024-09-05 PROCEDURE — 83735 ASSAY OF MAGNESIUM: CPT

## 2024-09-05 PROCEDURE — 82272 OCCULT BLD FECES 1-3 TESTS: CPT

## 2024-09-05 PROCEDURE — 71260 CT THORAX DX C+: CPT | Mod: MC

## 2024-09-05 PROCEDURE — 97530 THERAPEUTIC ACTIVITIES: CPT | Mod: GP

## 2024-09-05 PROCEDURE — 83540 ASSAY OF IRON: CPT

## 2024-09-05 PROCEDURE — 83615 LACTATE (LD) (LDH) ENZYME: CPT

## 2024-09-05 PROCEDURE — 80048 BASIC METABOLIC PNL TOTAL CA: CPT

## 2024-09-05 PROCEDURE — 84155 ASSAY OF PROTEIN SERUM: CPT

## 2024-09-05 PROCEDURE — 70498 CT ANGIOGRAPHY NECK: CPT | Mod: 26,MC

## 2024-09-05 PROCEDURE — 97116 GAIT TRAINING THERAPY: CPT | Mod: GP

## 2024-09-05 PROCEDURE — 70450 CT HEAD/BRAIN W/O DYE: CPT | Mod: 26,MC

## 2024-09-05 PROCEDURE — 93285 PRGRMG DEV EVAL SCRMS IP: CPT | Mod: 26

## 2024-09-05 PROCEDURE — 85027 COMPLETE CBC AUTOMATED: CPT

## 2024-09-05 PROCEDURE — 86900 BLOOD TYPING SEROLOGIC ABO: CPT

## 2024-09-05 PROCEDURE — 70551 MRI BRAIN STEM W/O DYE: CPT | Mod: 26

## 2024-09-05 PROCEDURE — 87640 STAPH A DNA AMP PROBE: CPT

## 2024-09-05 PROCEDURE — 93010 ELECTROCARDIOGRAM REPORT: CPT

## 2024-09-05 PROCEDURE — 86850 RBC ANTIBODY SCREEN: CPT

## 2024-09-05 RX ORDER — METOCLOPRAMIDE HCL 5 MG
10 TABLET ORAL ONCE
Refills: 0 | Status: COMPLETED | OUTPATIENT
Start: 2024-09-05 | End: 2024-09-05

## 2024-09-05 RX ORDER — PREDNISONE 10 MG
10 TABLET, DOSE PACK ORAL DAILY
Refills: 0 | Status: DISCONTINUED | OUTPATIENT
Start: 2024-09-05 | End: 2024-09-08

## 2024-09-05 RX ORDER — TAMSULOSIN HYDROCHLORIDE 0.4 MG/1
0.8 CAPSULE ORAL AT BEDTIME
Refills: 0 | Status: DISCONTINUED | OUTPATIENT
Start: 2024-09-05 | End: 2024-09-08

## 2024-09-05 RX ORDER — METOCLOPRAMIDE HCL 5 MG
10 TABLET ORAL ONCE
Refills: 0 | Status: DISCONTINUED | OUTPATIENT
Start: 2024-09-05 | End: 2024-09-05

## 2024-09-05 RX ORDER — SODIUM CHLORIDE 9 MG/ML
1000 INJECTION INTRAMUSCULAR; INTRAVENOUS; SUBCUTANEOUS
Refills: 0 | Status: DISCONTINUED | OUTPATIENT
Start: 2024-09-05 | End: 2024-09-06

## 2024-09-05 RX ORDER — PANTOPRAZOLE SODIUM 40 MG
40 TABLET, DELAYED RELEASE (ENTERIC COATED) ORAL
Refills: 0 | Status: DISCONTINUED | OUTPATIENT
Start: 2024-09-05 | End: 2024-09-08

## 2024-09-05 RX ORDER — MECLIZINE HYDROCHLORIDE 25 MG/1
25 TABLET ORAL ONCE
Refills: 0 | Status: COMPLETED | OUTPATIENT
Start: 2024-09-05 | End: 2024-09-05

## 2024-09-05 RX ORDER — SENNA 187 MG
2 TABLET ORAL AT BEDTIME
Refills: 0 | Status: DISCONTINUED | OUTPATIENT
Start: 2024-09-05 | End: 2024-09-08

## 2024-09-05 RX ORDER — ACETAMINOPHEN 325 MG/1
1000 TABLET ORAL ONCE
Refills: 0 | Status: COMPLETED | OUTPATIENT
Start: 2024-09-05 | End: 2024-09-05

## 2024-09-05 RX ORDER — ACETAMINOPHEN 325 MG/1
1000 TABLET ORAL ONCE
Refills: 0 | Status: DISCONTINUED | OUTPATIENT
Start: 2024-09-05 | End: 2024-09-08

## 2024-09-05 RX ORDER — DIAZEPAM 10 MG
2.5 TABLET ORAL ONCE
Refills: 0 | Status: DISCONTINUED | OUTPATIENT
Start: 2024-09-05 | End: 2024-09-05

## 2024-09-05 RX ORDER — ONDANSETRON 2 MG/ML
4 INJECTION, SOLUTION INTRAMUSCULAR; INTRAVENOUS EVERY 8 HOURS
Refills: 0 | Status: DISCONTINUED | OUTPATIENT
Start: 2024-09-05 | End: 2024-09-08

## 2024-09-05 RX ORDER — MECLIZINE HYDROCHLORIDE 25 MG/1
50 TABLET ORAL ONCE
Refills: 0 | Status: DISCONTINUED | OUTPATIENT
Start: 2024-09-05 | End: 2024-09-05

## 2024-09-05 RX ORDER — SODIUM CHLORIDE 9 MG/ML
1000 INJECTION INTRAMUSCULAR; INTRAVENOUS; SUBCUTANEOUS ONCE
Refills: 0 | Status: COMPLETED | OUTPATIENT
Start: 2024-09-05 | End: 2024-09-05

## 2024-09-05 RX ORDER — CALCIUM CARBONATE/VITAMIN D3 500MG-5MCG
1 TABLET ORAL DAILY
Refills: 0 | Status: DISCONTINUED | OUTPATIENT
Start: 2024-09-05 | End: 2024-09-08

## 2024-09-05 RX ORDER — MECLIZINE HYDROCHLORIDE 25 MG/1
25 TABLET ORAL EVERY 8 HOURS
Refills: 0 | Status: DISCONTINUED | OUTPATIENT
Start: 2024-09-05 | End: 2024-09-08

## 2024-09-05 RX ADMIN — Medication 40 MILLIGRAM(S): at 14:07

## 2024-09-05 RX ADMIN — ACETAMINOPHEN 400 MILLIGRAM(S): 325 TABLET ORAL at 23:40

## 2024-09-05 RX ADMIN — Medication 10 MILLIGRAM(S): at 14:08

## 2024-09-05 RX ADMIN — SODIUM CHLORIDE 100 MILLILITER(S): 9 INJECTION INTRAMUSCULAR; INTRAVENOUS; SUBCUTANEOUS at 12:18

## 2024-09-05 RX ADMIN — Medication 10 MILLIGRAM(S): at 03:38

## 2024-09-05 RX ADMIN — MECLIZINE HYDROCHLORIDE 25 MILLIGRAM(S): 25 TABLET ORAL at 05:06

## 2024-09-05 RX ADMIN — SODIUM CHLORIDE 1000 MILLILITER(S): 9 INJECTION INTRAMUSCULAR; INTRAVENOUS; SUBCUTANEOUS at 03:38

## 2024-09-05 RX ADMIN — MECLIZINE HYDROCHLORIDE 25 MILLIGRAM(S): 25 TABLET ORAL at 23:41

## 2024-09-05 RX ADMIN — ONDANSETRON 4 MILLIGRAM(S): 2 INJECTION, SOLUTION INTRAMUSCULAR; INTRAVENOUS at 14:39

## 2024-09-05 RX ADMIN — ACETAMINOPHEN 400 MILLIGRAM(S): 325 TABLET ORAL at 12:18

## 2024-09-05 RX ADMIN — Medication 2.5 MILLIGRAM(S): at 21:22

## 2024-09-05 RX ADMIN — SODIUM CHLORIDE 100 MILLILITER(S): 9 INJECTION INTRAMUSCULAR; INTRAVENOUS; SUBCUTANEOUS at 23:42

## 2024-09-05 RX ADMIN — MECLIZINE HYDROCHLORIDE 25 MILLIGRAM(S): 25 TABLET ORAL at 14:21

## 2024-09-05 RX ADMIN — TAMSULOSIN HYDROCHLORIDE 0.8 MILLIGRAM(S): 0.4 CAPSULE ORAL at 21:24

## 2024-09-05 RX ADMIN — Medication 40 MILLIGRAM(S): at 21:24

## 2024-09-05 NOTE — ED ADULT NURSE NOTE - OBJECTIVE STATEMENT
pt bibems c/o dizziness and nausea x45min pta. PMH of 3 summers in the past, no residual deficits. Pt endorses that he got up to use the bathroom and felt "the room spinning." pt then lowered himself to the floor. no headstrike or loc.  Denies visual changes, numbness/tingling. No slurred speech, facial droop or extremity drift noted Pt A&O4

## 2024-09-05 NOTE — PATIENT PROFILE ADULT - FUNCTIONAL ASSESSMENT - BASIC MOBILITY 6.
Stable. 4-calculated by average/Not able to assess (calculate score using [facility] averaging method)

## 2024-09-05 NOTE — ED PROVIDER NOTE - NSICDXPASTMEDICALHX_GEN_ALL_CORE_FT
PAST MEDICAL HISTORY:  BPH (benign prostatic hyperplasia)     GERD (gastroesophageal reflux disease)     H/O constipation     HLD (hyperlipidemia)     [place] (hard of hearing)     Snores

## 2024-09-05 NOTE — CONSULT NOTE ADULT - ASSESSMENT
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 CT head sig for ? trace Right occipital SAH.     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 CT head sig for ? trace Right occipital SAH.     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 CT head sig for ? trace Right occipital SAH.     - No acute neurosurgical intervention   - Out of window for TNK, CTA neg for LVO  - Hold ASA / Plavix, no reversal needed at this time  - SBP goal 110-150  - Elevate HOB  - Rpt CT head this AM resolution of SAH, new R frontal SDH  - RHCT in 6 hours or sooner if acute change in MS  - Neuro checks Q2hrs for now if RHCT stable then can change to Q4hrs  - Medical / neurology eval for vertigo  - Antiemetics for nausea  - Will follow    Discussed with Dr Luis / Dr Mariscal

## 2024-09-05 NOTE — ED ADULT TRIAGE NOTE - CHIEF COMPLAINT QUOTE
Pt BIB EMS from home w c/o dizziness and nausea x45min pta. PMH of 3 summers in the past, no residual deficits. Pt states that he was laying in bed sleeping and when he got up the room started to spin and would worsen when changing positions or moving head. Denies visual changes, numbness/tingling. No slurred speech, facial droop or extremity drift noted. -BEFAST. MD. Mcgregor at bedside, no code stroke called. Pt A&O4 in triage.

## 2024-09-05 NOTE — CONSULT NOTE ADULT - ASSESSMENT
83 yo male PMH GCA 3/2024 w/ residual vision in right eye with "black spots", hx of TIA and presumed embolic strokes on plavix, ASA(restarted by [facility]) w/o residual deficits, prostate ca, presented tot he ER for evaluation of vertigo.  This morning just after midnight the patient got up to go to the bathroom and when getting up had severe vertigo and nausea.  He slid himself down on the floor.  Denies hitting his head or LOC, incontinence, focal weakness/numbness, shaking, tongue biting, dysarthria, facial droop, diplopia.  He did start to have a headache soon afterwards. His wife found him fully oriented on the floor and called 911.  Denies h/o vertigo, but does have chronic intermittent tinnitus.  He states symptoms are worse with turning head and getting up. Denies prior episodes.  CT head sig for ? trace Right occipital SAH.  Repeat CT head this AM shows resolution SAH, and a new R frontal SDH.  Neurology is consulted for vertigo evaluation.  ASA and Plavix are on hold.  Currently the patient is still having positional vertigo, mild headache and nausea.          #acute vertigo, likely positional.  He has multiple risk factor for stroke, will need to r/o    #acute trace R occipital SAH-resolved, now with new R frontal SDH while being on ASA and Plavix      Recommendations  - continue vertigo treatment with meclizine PRN  - Hold ASA / Plavix till cleared by neurosurgery, continue Atorvastatin 40mg QD  - monitor on tele  - interrogate ILR  - SBP goal <150  - RHCT stat if acute change in MS  - Neuro checks/VS q 4 hours  - MRI brain to r/o stroke  - mechanical DVT prophylaxis  - Check A1c, LDL (goal should be <70)  - Antiemetics for nausea  - Will follow    D/W Dr. Lang, patient and his family   85 yo male PMHx of GCA 3/2024 w/ residual central vision loss right eye, is On Acterma, also had history pros CA, TIA /Embolic strokes, is on plavix, ASA was restarted by heme/onc, has no residual deficits, presented to ER for evaluation of vertigo. Early this morning just after midnight, patient got up to go to the bathroom, when getting up he had severe vertigo- spinning sensation and nausea, he slid himself down on the floor, did not hit his head, no LOC, incontinence, focal weakness/numbness, seizure like activity, dysarthria, facial droop or diplopia. He did start to have a headache soon afterwards. His wife found him fully oriented on the floor and called 911. CT head was sig for ? trace Right occipital SAH, CT angio H/N , no LVO, stenosis or vascular abnormality. Pt seen by neurosurgery. Repeat CT head this AM shows resolution SAH, and a new R frontal SDH.  ASA and Plavix are on hold.      #Acute vertigo, likely positional, in view of multiple risk factor, will rule out brainstem TIA/ Stroke    # Small R occipital SAH, with new R frontal SDH while being on ASA and Plavix      Recommendations  - continue vertigo treatment with meclizine PRN  - Hold ASA / Plavix till cleared by neurosurgery, continue only BASA  - Atorvastatin 40mg QD  - monitor on tele  - interrogate ILR  - SBP goal <150  - RHCT stat if acute change in MS  - Neuro checks/VS q 4 hours  - MRI brain to r/o stroke  - mechanical DVT prophylaxis  - Check A1c, LDL (goal should be <70)  - Antiemetics for nausea  - Will follow    D/W Dr. Lang, patient and his family

## 2024-09-05 NOTE — ED ADULT TRIAGE NOTE - NSWEIGHTCALCTOOLDRUG_GEN_A_CORE
used Mastoid Interpolation Flap Text: A decision was made to reconstruct the defect utilizing an interpolation axial flap and a staged reconstruction.  A telfa template was made of the defect.  This telfa template was then used to outline the mastoid interpolation flap.  The donor area for the pedicle flap was then injected with anesthesia.  The flap was excised through the skin and subcutaneous tissue down to the layer of the underlying musculature.  The pedicle flap was carefully excised within this deep plane to maintain its blood supply.  The edges of the donor site were undermined.   The donor site was closed in a primary fashion.  The pedicle was then rotated into position and sutured.  Once the tube was sutured into place, adequate blood supply was confirmed with blanching and refill.  The pedicle was then wrapped with xeroform gauze and dressed appropriately with a telfa and gauze bandage to ensure continued blood supply and protect the attached pedicle.

## 2024-09-05 NOTE — PROGRESS NOTE ADULT - NS ATTEND AMEND GEN_ALL_CORE FT
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    Found to have CTH initially sig for ? trace Right occipital SAH. Repeat CTH revealing new R frontal SDH with R parietal scalp hematoma, no SAH. Repeat CTH after this showing stable SDH.    CT C/A/P without traumatic findings, constipation. 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    Found to have CTH initially sig for ? trace Right occipital SAH. Repeat CTH revealing new R frontal SDH with R parietal scalp hematoma, no SAH. Repeat CTH after this showing stable SDH.    CT C/A/P without traumatic findings, gastric distension ?gastroparesis or gastric outlet obstruction and constipation.    Pt currently c/o dizziness with movement, no dizziness at rest. Denies chest pain, SOB, numbness or weakness. Hemodynamically stable. Possible vertigo v dizziness related to SDH v less likely cerebellar stroke or arrhythmia    - Repeat CTH stable, MR head pending, f/u neuro recs  - No symptomatic improvement with meclizine will trial low dose valium  - TTE w/ normal LV systolic function, mild MR, mild LVH, EKG w/ some lateral T wave inversions, trop negative, monitor on tele, ILR interrogation  - Hold ASA/plavix given head bleed  - On room air  - Diet as tolerated, nausea meds prn, may consider GI consult if symptoms not improving given gastroparesis/possible gastric outlet obstruction  - c/w NS at 100/h, trend renal indices  - c/w prednisone for GCA   - Stable anemia, will trend  - hold pharm DVT ppx    Agree with rest of assessment and plan as above

## 2024-09-05 NOTE — H&P ADULT - NS ATTEND AMEND GEN_ALL_CORE FT
85 yo male PMH giant cell arteritis w/ residual vision in right eye with "black spots", 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 As per pt, he was in his usual state of health this evening when he went to sleep around 21:30 (LKWT). He woke up to use bathroom around 0030 tonight and that's when he felt as if the room spinning when he was walking. He lowered himself to the ground, denies LOC, no head trauma. Denies h/o vertigo, states symptoms are worse with turning head left to right, +associated nausea. Denies prior episodes. Pt denies HA, visual changes, focal numb / ting / weak, word finding difficulty, neck stiffness, photophobia.     CTA head and neck 0300: Right posterior scalp soft tissue swelling and trace linear hyperdensity in the right lateral occipital lobe, which likely reflects subarachnoid hemorrhage versus less likely cortical laminar necrosis. No significant midline shift. No large vessel occlusion, significant stenosis or vascular abnormality of the head or neck identified.    CTH 0636: Previously linear density along the right occipital lobe is no longer visualized on the current study. There is a newly appreciated small right subdural hematoma.  Right parietal scalp hematoma.    Problems:  # right SDH with overlying scalp hematoma  # h/o TIA on plavix/ASA  # new onset vertigo  # anemia  # prostate ca on monthly chemo  # GCA on chronic, tapered steroids    Plan:  admit SDU under trauma team, Dr. Hameed  No acute trauma surgical pathology on ct chest/abd/pelvis, no evidence of hemorrhage  neurosx following - plan discussed at 0930, no need for reversal at this time, recommend 3rd CTH at 1300  no acute trauma or neurosx intervention indicated  q2h neurochecks  pt c/o vertigo not improved with 50 mg meclizine this AM, neurology c/s placed, ED  contacted to call in c/s  daily labs, trend h/h - pt reports worsening anemia since June but denies any bleeding, baseline ~11, today 9.1, ,  monitor  monitor VS - pt reports baseline soft SBP ~110, IVF ordered  NPO for now pending CTH #3  resume home meds as appropriate  hold plavix/asa  SCDs for DVT PPX  bowel regimen, PPI    75 minuted of time spent on pt examination, review of relevant labs and radiologic studies, assured stabilization of pt, discussion with relevant services/providers for coordination of pt care and services, time is exclusive of resident and/or physiciam assistant teaching or supervision time 85 yo male PMH giant cell arteritis w/ residual vision in right eye with "black spots", 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 As per pt, he was in his usual state of health this evening when he went to sleep around 21:30 (LKWT). He woke up to use bathroom around 0030 tonight and that's when he felt as if the room spinning when he was walking. He lowered himself to the ground, denies LOC, no head trauma. Denies h/o vertigo, states symptoms are worse with turning head left to right, +associated nausea. Denies prior episodes. Pt denies HA, visual changes, focal numb / ting / weak, word finding difficulty, neck stiffness, photophobia.     CTA head and neck 0300: Right posterior scalp soft tissue swelling and trace linear hyperdensity in the right lateral occipital lobe, which likely reflects subarachnoid hemorrhage versus less likely cortical laminar necrosis. No significant midline shift. No large vessel occlusion, significant stenosis or vascular abnormality of the head or neck identified.    CTH 0636: Previously linear density along the right occipital lobe is no longer visualized on the current study. There is a newly appreciated small right subdural hematoma.  Right parietal scalp hematoma.    Problems:  # right SDH with overlying scalp hematoma  # h/o TIA on plavix/ASA  # new onset vertigo  # chronic anemia  # prostate ca on monthly chemo  # GCA on chronic, tapered steroids    Plan:  admit SDU under trauma team, Dr. Hameed  No acute trauma surgical pathology on ct chest/abd/pelvis, no evidence of hemorrhage  neurosx following - plan discussed at 0930, no need for reversal at this time, recommend 3rd CTH at 1300  no acute trauma or neurosx intervention indicated  q2h neurochecks  pt c/o vertigo not improved with 50 mg meclizine this AM, neurology c/s placed, ED  contacted to call in c/s  daily labs, trend h/h - pt reports worsening anemia since June but denies any bleeding, baseline ~11, today 9.1, ,  monitor  monitor VS - pt reports baseline soft SBP ~110, IVF ordered  NPO for now pending CTH #3  resume home meds as appropriate  hold plavix/asa  SCDs for DVT PPX  bowel regimen, PPI    75 minuted of time spent on pt examination, review of relevant labs and radiologic studies, assured stabilization of pt, discussion with relevant services/providers for coordination of pt care and services, time is exclusive of resident and/or physiciam assistant teaching or supervision time

## 2024-09-05 NOTE — ED PROVIDER NOTE - PROGRESS NOTE DETAILS
Shannan Mcgregor MD, ED Attending:  call from radiologist/Dr. mAara Larsen; concern for SAH vs. cortical laminar necrosis on CTH.   Pt was signed out to Dr. Ramsey who is aware of the findings and is in contact w/ neuro for likely repeat CTH in 6 hours. Pt signed out to Dr. Ramsey at this time pending labs, ECG, CT imaging, likely admission. Loida ALEJANDRA: Pt's repeat CT is now evidence for small subdural no longer evidence of sub arachnoid findings. NSG contacted again, NSG re assessed pt, plan for medical admission, neurology follow up. Spoke with patient and updated him. Spoke with Dr. Maria Guadalupe finley, tele, kristy. hospitalist admission of patient appreciated.

## 2024-09-05 NOTE — H&P ADULT - NSHPADDITIONALINFOADULT_GEN_ALL_CORE
Radiology:  ACC: [id number] EXAM:  CT CHEST IC   ORDERED BY: CYRUS RIVAS   ACC: [id number] EXAM:  CT ABDOMEN AND PELVIS IC   ORDERED BY: CYRUS RIVAS   PROCEDURE DATE:  09/05/2024    INTERRETATION:  CLINICAL INFORMATION: 84 years  Male with trauma.  COMPARISON: None.  IMPRESSION:  No acute intrathoracic or intra-abdominal traumatic findings.  Constipated colon. Air-filled nondilated small bowel may reflect   enteritis. Constipated colon. Gastric distention of either gastroparesis   or partial gastric outlet obstruction.  Trace left hydronephrosis. No evidence of high-grade obstruction.    ACC: [id number] EXAM:  MR BRAIN   ORDERED BY: TEODORO JACOBS   PROCEDURE DATE:  09/05/2024    INTERPRETATION:  .  IMPRESSION: No acute intracranial hemorrhage or evidence of acute   ischemia.  Thin right frontal convexity subdural hygroma.  Chronic areas of encephalomalacia and gliosis within the right   posteromedial frontal lobe and left inferior paramedian left parietal   cortex likely compatible with old infarcts.

## 2024-09-05 NOTE — PATIENT PROFILE ADULT - FALL HARM RISK - HARM RISK INTERVENTIONS
Assistance with ambulation/Assistance OOB with selected safe patient handling equipment/Communicate Risk of Fall with Harm to all staff/Discuss with provider need for PT consult/Monitor gait and stability/Provide patient with walking aids - walker, cane, crutches/Reinforce activity limits and safety measures with patient and family/Sit up slowly, dangle for a short time, stand at bedside before walking/Tailored Fall Risk Interventions/Visual Cue: Yellow wristband and red socks/Bed in lowest position, wheels locked, appropriate side rails in place/Call bell, personal items and telephone in reach/Instruct patient to call for assistance before getting out of bed or chair/Non-slip footwear when patient is out of bed/[place] to call system/Physically safe environment - no spills, clutter or unnecessary equipment/Purposeful Proactive Rounding/Room/bathroom lighting operational, light cord in reach

## 2024-09-05 NOTE — ED PROVIDER NOTE - OBJECTIVE STATEMENT
84yoM with giant cell arteritis, hx of TIA and embolic stroke w/o residual deficits coming to ED for evaluation of dizziness described as room spinning which is worse with turning head left to right. Associated nausea. Has not had these sx's in the past. No weakness. no parestthesias, no dysarthria, no HA. 84yoM with giant cell arteritis, hx of TIA and embolic stroke w/o residual deficits coming to ED for evaluation of dizziness described as room spinning which is worse with turning head left to right. LKW at 9:00 PM, woke up to use bathroom around 0030 and that's when he noticed sx's. Associated nausea. Has not had these sx's in the past. No weakness. no paresthesias, no dysarthria, no HA. 84yoM with giant cell arteritis, hx of TIA and embolic stroke w/o residual deficits coming to ED for evaluation of dizziness described as room spinning which is worse with turning head left to right. LKWon 9/4/24 @9:00 PM, woke up to use bathroom around 0030 and that's when he noticed sx's. Associated nausea. Has not had these sx's in the past.     Is on daily Plavix and ASA.    No weakness. no paresthesias, no dysarthria, no HA.

## 2024-09-05 NOTE — CONSULT NOTE ADULT - SUBJECTIVE AND OBJECTIVE BOX
Patient is a 84y old  Male who presents with a chief complaint of     Triage time - 01:50  PA call for consult - 03:33   PA evaluation time - 03:40    HPI:  85 yo male PMH giant cell arteritis, hx of TIA and embolic stroke w/o residual deficits, GERD, BPH presented tot he ER for evaluation of dizziness starting upon waking up this night. As per pt, he was in his usual state of health this evening when he went to sleep around 21:30 (LKWT). He woke up to use bathroom around 0030 tonight and that's when he felt as if the room spinning when he was walking. He lowered himself to the ground, denies LOC, no head trauma. Pt states vertigo is worse with turning head left to right, +associated nausea. Denies prior episodes. CT head sig for ? trace Right occipital SAH. At the time of my exam pt appears to be comfortable, in NAD. Pt denies HA, visual changes, focal numb / ting / weak, word finding difficulty, neck stiffness, photophobia.         PAST MEDICAL & SURGICAL HISTORY:  HLD (hyperlipidemia)  BPH (benign prostatic hyperplasia)  GERD (gastroesophageal reflux disease)  [place] (hard of hearing)  H/O constipation  Status post ORIF of fracture of ankle  History of colonoscopy  S/P left inguinal hernia repair        FAMILY HISTORY:  Negative for stroke  Noncontributory         Social Hx:  Nonsmoker, no drug or alcohol use        Allergies  penicillin (Other)        MEDICATIONS +ASA / Plavix         ROS: Pertinent positives in HPI, all other ROS were reviewed and are negative.          Vital Signs Last 24 Hrs  T(C): 36.3 (05 Sep 2024 01:50), Max: 36.3 (05 Sep 2024 01:50)  T(F): 97.4 (05 Sep 2024 01:50), Max: 97.4 (05 Sep 2024 01:50)  HR: 69 (05 Sep 2024 01:50) (69 - 69)  BP: 109/60 (05 Sep 2024 01:50) (109/60 - 109/60)  RR: 16 (05 Sep 2024 01:50) (16 - 16)  SpO2: 99% (05 Sep 2024 01:50) (99% - 99%)    Parameters below as of 05 Sep 2024 01:50  Patient On (Oxygen Delivery Method): room air        Physical Exam:  Constitutional: Awake / alert  HEENT: PERRLA, EOMI  Neck: Supple  Respiratory: Breath sounds are clear bilaterally  Cardiovascular: S1 and S2, regular rhythm  Gastrointestinal: Soft, NT/ND  Extremities:  no edema  Musculoskeletal: no abnormal movements  Skin: No rashes    Neurological Exam:  HF: A x O x 3, follows commands, normal affect, speech fluent  CN: PERRL, EOMI, no NLFD, tongue midline  Motor: Strength 5/5 in all 4 ext, normal bulk and tone  Sens: Intact to light touch  Reflexes: Symmetric and normal, no clonus, no Richard's   Coord:  No FNFA, dysmetria, LIGIA intact   Gait/Balance: Cannot test        Labs:                        9.1    12.19 )-----------( 127      ( 05 Sep 2024 02:48 )             27.7     09-05    144  |  108  |  50<H>  ----------------------------<  159<H>  4.0   |  30  |  0.85    Ca    8.3<L>      05 Sep 2024 02:48    TPro  5.3<L>  /  Alb  2.5<L>  /  TBili  0.6  /  DBili  x   /  AST  58<H>  /  ALT  49  /  AlkPhos  48  09-05        Radiology:  CT Angio Head w/ IV Cont (09.05.24 @ 03:00) >  CT HEAD:  Right posterior scalp soft tissue swelling and trace linear hyperdensity   in the right lateral occipital lobe, which likely reflects subarachnoid   hemorrhage versus less likely cortical laminar necrosis. No significant   midline shift.    CTA NECK: Suboptimal bolus timing. Within this limitation:  No evidence of significant stenosis or occlusion.    CTA HEAD:  No large vessel occlusion, significant stenosis or vascular abnormality   identified.

## 2024-09-05 NOTE — ED PROVIDER NOTE - CLINICAL SUMMARY MEDICAL DECISION MAKING FREE TEXT BOX
84yoM with giant cell arteritis, hx of TIA and embolic stroke w/o residual deficits coming to ED for evaluation of dizziness described as room spinning which is worse with turning head left to right. LKW at 9:00 PM, woke up to use bathroom around 0030 and that's when he noticed sx's.    Pt was evaluated on ED arrival, no FNDs, NIHSS=0. Therefore, no code stroke was called. However, given pt's hx and sx's, CTA head/neck obtained which showed 84yoM with giant cell arteritis, hx of TIA and embolic stroke w/o residual deficits coming to ED for evaluation of dizziness described as room spinning which is worse with turning head left to right. LKW at 9:00 PM, woke up to use bathroom around 0030 and that's when he noticed sx's.    Pt was evaluated on ED arrival, no FNDs, NIHSS=0. Therefore, no code stroke was called. However, given pt's hx and sx's, CTA head/neck obtained which showed possible SAH vs. laminar necrosis from prior strokes.     Pt was signed out to Dr. Ramsey pending neuro consult and dispo per them.

## 2024-09-05 NOTE — H&P ADULT - ASSESSMENT
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 As per pt, he was in his usual state of health this evening when he went to sleep around 21:30 (LKWT). He woke up to use bathroom around 0030 tonight and that's when he felt as if the room spinning when he was walking. He lowered himself to the ground, denies LOC, no head trauma. Denies h/o vertigo, states symptoms are worse with turning head left to right, +associated nausea. Denies prior episodes. Pt denies HA, visual changes, focal numb / ting / weak, word finding difficulty, neck stiffness, photophobia.     CTA head and neck 0300: Right posterior scalp soft tissue swelling and trace linear hyperdensity in the right lateral occipital lobe, which likely reflects subarachnoid hemorrhage versus less likely cortical laminar necrosis. No significant midline shift. No large vessel occlusion, significant stenosis or vascular abnormality of the head or neck identified.    CTH 0636: Previously linear density along the right occipital lobe is no longer visualized on the current study. There is a newly appreciated small right subdural hematoma.  Right parietal scalp hematoma.    Problems:  # right SDH with overlying scalp hematoma  # h/o TIA on plavix/ASA  # new onset vertigo  # anemia  # prostate ca on monthly chemo  # GCA on chronic, tapered steroids    Plan:  admit SDU under trauma team, Dr. Hameed  complete trauma series imaging to be done prior to leaving the ED: CT cspine, chest, abd/pelvis ordered  neurosx following - plan discussed at 0930, no need for reversal at this time, recommend 3rd CTH at 1300  no acute trauma or neurosx intervention indicated  q2h neurochecks  pt c/o vertigo not improved with 50 mg meclizine this AM, neurology c/s placed, ED  contacted to call in c/s  daily labs, trend h/h - pt reports worsening anemia since June but denies any bleeding, baseline ~11, today 9.1, ,  monitor  monitor VS - pt reports baseline soft SBP ~110, IVF orded  NPO for now pending CTH #3  resume home meds as appropriate  hold plavix/asa  SCDs for DVT PPX  bowel regimen, PPI      case and plan dw Dr Hameed

## 2024-09-05 NOTE — H&P ADULT - HISTORY OF PRESENT ILLNESS
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 As per pt, he was in his usual state of health this evening when he went to sleep around 21:30 (LKWT). He woke up to use bathroom around 0030 tonight and that's when he felt as if the room spinning when he was walking. He lowered himself to the ground, denies LOC, no head trauma. Denies h/o vertigo, states symptoms are worse with turning head left to right, +associated nausea. Denies prior episodes. CT head initially sig for ? trace Right occipital SAH. At the time of my exam pt appears to be comfortable, in NAD. Pt denies HA, visual changes, focal numb / ting / weak, word finding difficulty, neck stiffness, photophobia.     Subjective:  Pt seen and examined at bedside with chaperone. Pt is AAOx3, pt in no acute distress. Pt denied c/o fever, chills, chest pain, SOB, abd pain, N/V/D, extremity pain or dysfunction, hemoptysis, hematemesis, hematuria, hematochezia headache, diplopia. Pt tolerating diet, (+) void, (+) ambulation, (+) bowel function    ROS: as above otherwise negative    PMH: GCA, prostate Ca, TIA/emoblic stroke, GERD, PBPH  PSH: inguinal hernia repair, ORIF ankle  penicillin (Other)    SH: no toxic behaviors  FH: no pertinent fam med hx    Vital Signs Last 24 Hrs  T(C): 36.3 (05 Sep 2024 07:10), Max: 36.3 (05 Sep 2024 01:50)  T(F): 97.3 (05 Sep 2024 07:10), Max: 97.4 (05 Sep 2024 01:50)  HR: 86 (05 Sep 2024 09:32) (69 - 86)  BP: 93/62 (05 Sep 2024 09:32) (93/62 - 116/55)  BP(mean): 71 (05 Sep 2024 07:10) (70 - 71)  RR: 16 (05 Sep 2024 09:32) (16 - 18)  SpO2: 99% (05 Sep 2024 09:32) (99% - 100%)    Parameters below as of 05 Sep 2024 07:10  Patient On (Oxygen Delivery Method): room air        Labs:                                9.1    12.19 )-----------( 127      ( 05 Sep 2024 02:48 )             27.7     CBC Full  -  ( 05 Sep 2024 02:48 )  WBC Count : 12.19 K/uL  RBC Count : 2.68 M/uL  Hemoglobin : 9.1 g/dL  Hematocrit : 27.7 %  Platelet Count - Automated : 127 K/uL  Mean Cell Volume : 103.4 fl  Mean Cell Hemoglobin : 34.0 pg  Mean Cell Hemoglobin Concentration : 32.9 gm/dL  Auto Neutrophil # : x  Auto Lymphocyte # : x  Auto Monocyte # : x  Auto Eosinophil # : x  Auto Basophil # : x  Auto Neutrophil % : x  Auto Lymphocyte % : x  Auto Monocyte % : x  Auto Eosinophil % : x  Auto Basophil % : x    09-05    144  |  108  |  50<H>  ----------------------------<  159<H>  4.0   |  30  |  0.85    Ca    8.3<L>      05 Sep 2024 02:48    TPro  5.3<L>  /  Alb  2.5<L>  /  TBili  0.6  /  DBili  x   /  AST  58<H>  /  ALT  49  /  AlkPhos  48  09-05    LIVER FUNCTIONS - ( 05 Sep 2024 02:48 )  Alb: 2.5 g/dL / Pro: 5.3 gm/dL / ALK PHOS: 48 U/L / ALT: 49 U/L / AST: 58 U/L / GGT: x                 Meds:  acetaminophen   IVPB .. 1000 milliGRAM(s) IV Intermittent once PRN  atorvastatin 40 milliGRAM(s) Oral at bedtime  calcium carbonate 1250 mG  + Vitamin D (OsCal 500 + D) 1 Tablet(s) Oral daily  metoclopramide Injectable 10 milliGRAM(s) IV Push once  multivitamin 1 Tablet(s) Oral daily  ondansetron Injectable 4 milliGRAM(s) IV Push every 8 hours PRN  pantoprazole    Tablet 40 milliGRAM(s) Oral before breakfast  predniSONE   Tablet 10 milliGRAM(s) Oral daily  senna 2 Tablet(s) Oral at bedtime  sodium chloride 0.9%. 1000 milliLiter(s) IV Continuous <Continuous>  tamsulosin 0.8 milliGRAM(s) Oral at bedtime      Radiology:    < from: CT Angio Head w/ IV Cont (09.05.24 @ 03:00) >  CT HEAD:  Right posterior scalp soft tissue swelling and trace linear hyperdensity   in the right lateral occipital lobe, which likely reflects subarachnoid   hemorrhage versus less likely cortical laminar necrosis. No significant   midline shift.    CTA NECK: Suboptimal bolus timing. Within this limitation:  No evidence of significant stenosis or occlusion.    CTA HEAD:  No large vessel occlusion, significant stenosis or vascular abnormality   identified.    Findings were discussed with Dr. JOEL PERDOMO [id number] 9/5/2024 3:22   AM by Dr. Larsen with read back confirmation.    --- End of Report ---      < end of copied text >      < from: CT Head No Cont (09.05.24 @ 06:36) >    IMPRESSION:  Previously linear density along the right occipital lobe is no longer   visualized on the current study.    There is a newly appreciated small right subdural hematoma.    Right parietal scalp hematoma.    Findings were discussed with Dr. Mariscal 9/5/2024 6:50 AM by Dr. BERNABE    with read back confirmation.    --- End of Report ---      < end of copied text >      Physical exam:  GCS of 15  Airway is patent  Breathing is symmetric and unlabored  Neuro: CNII-XII grossly intact  Psych: normal affect  HEENT: Normocephalic, ?small area of ecchymosis to right parietal scalp, YUSEF, EOM wnl, no otorrhea or hemotympanum b/l, no epistaxis or d/c b/l nares, no craniofacial bony pathology or tenderness b/l  Neck: No crepitus, no ecchymosis, no hematoma, to exam, no JVD, no tracheal deviation  Cspine/thoracolumbrosacral spine: no gross bony pathology or tenderness to exam  Cardiovascular: S1S2 Present  Chest: no gross rib pathology or tenderness to exam. No sternal pathology or tenderness to exam. No crepitus, no ecchymosis, no hematoma. No penetrating thorcoabdominal trauma  Respiratory: Rate is 18; Respiratory Effort normal; no wheezes, rales or rhonchi to exam  ABD: bowel sounds (+), soft, nontender, non distended, no rebound, no guarding, no rigidity, no skin changes to exam. No pelvic instability to exam, no skin changes  Musculoskeletal: Pt has palpable b/l radial, femoral, dorsalis pedis pulses. All digits are warm and well perfused. No gross long bone pathology or tenderness to exam. Pt demonstrates grossly intact sensoromotor function. Pt has good capillary refill to digits, no calf edema or tenderness to exam.  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 As per pt, he was in his usual state of health this evening when he went to sleep around 21:30 (LKWT). He woke up to use bathroom around 0030 tonight and that's when he felt as if the room spinning when he was walking. He lowered himself to the ground, denies LOC, no head trauma. Denies h/o vertigo, states symptoms are worse with turning head left to right, +associated nausea. Denies prior episodes. CT head initially sig for ? trace Right occipital SAH. At the time of my exam pt appears to be comfortable, in NAD. Pt denies HA, visual changes, focal numb / ting / weak, word finding difficulty, neck stiffness, photophobia.     Subjective:  Pt seen and examined at bedside with chaperone. Pt is AAOx3, pt in no acute distress. Pt denied c/o fever, chills, chest pain, SOB, abd pain, N/V/D, extremity pain or dysfunction, hemoptysis, hematemesis, hematuria, hematochezia headache, diplopia. Pt tolerating diet, (+) void, (+) ambulation, (+) bowel function    ROS: as above otherwise negative    PMH: GCA, prostate Ca, TIA/emoblic stroke, GERD, PBPH  PSH: inguinal hernia repair, ORIF ankle  penicillin (Other)    SH: no toxic behaviors  FH: no pertinent fam med hx    Vital Signs Last 24 Hrs  T(C): 36.3 (05 Sep 2024 07:10), Max: 36.3 (05 Sep 2024 01:50)  T(F): 97.3 (05 Sep 2024 07:10), Max: 97.4 (05 Sep 2024 01:50)  HR: 86 (05 Sep 2024 09:32) (69 - 86)  BP: 93/62 (05 Sep 2024 09:32) (93/62 - 116/55)  BP(mean): 71 (05 Sep 2024 07:10) (70 - 71)  RR: 16 (05 Sep 2024 09:32) (16 - 18)  SpO2: 99% (05 Sep 2024 09:32) (99% - 100%)    Parameters below as of 05 Sep 2024 07:10  Patient On (Oxygen Delivery Method): room air        Labs:                                9.1    12.19 )-----------( 127      ( 05 Sep 2024 02:48 )             27.7     CBC Full  -  ( 05 Sep 2024 02:48 )  WBC Count : 12.19 K/uL  RBC Count : 2.68 M/uL  Hemoglobin : 9.1 g/dL  Hematocrit : 27.7 %  Platelet Count - Automated : 127 K/uL  Mean Cell Volume : 103.4 fl  Mean Cell Hemoglobin : 34.0 pg  Mean Cell Hemoglobin Concentration : 32.9 gm/dL  Auto Neutrophil # : x  Auto Lymphocyte # : x  Auto Monocyte # : x  Auto Eosinophil # : x  Auto Basophil # : x  Auto Neutrophil % : x  Auto Lymphocyte % : x  Auto Monocyte % : x  Auto Eosinophil % : x  Auto Basophil % : x    09-05    144  |  108  |  50<H>  ----------------------------<  159<H>  4.0   |  30  |  0.85    Ca    8.3<L>      05 Sep 2024 02:48    TPro  5.3<L>  /  Alb  2.5<L>  /  TBili  0.6  /  DBili  x   /  AST  58<H>  /  ALT  49  /  AlkPhos  48  09-05    LIVER FUNCTIONS - ( 05 Sep 2024 02:48 )  Alb: 2.5 g/dL / Pro: 5.3 gm/dL / ALK PHOS: 48 U/L / ALT: 49 U/L / AST: 58 U/L / GGT: x                 Meds:  acetaminophen   IVPB .. 1000 milliGRAM(s) IV Intermittent once PRN  atorvastatin 40 milliGRAM(s) Oral at bedtime  calcium carbonate 1250 mG  + Vitamin D (OsCal 500 + D) 1 Tablet(s) Oral daily  metoclopramide Injectable 10 milliGRAM(s) IV Push once  multivitamin 1 Tablet(s) Oral daily  ondansetron Injectable 4 milliGRAM(s) IV Push every 8 hours PRN  pantoprazole    Tablet 40 milliGRAM(s) Oral before breakfast  predniSONE   Tablet 10 milliGRAM(s) Oral daily  senna 2 Tablet(s) Oral at bedtime  sodium chloride 0.9%. 1000 milliLiter(s) IV Continuous <Continuous>  tamsulosin 0.8 milliGRAM(s) Oral at bedtime      Radiology:    < from: CT Angio Head w/ IV Cont (09.05.24 @ 03:00) >  CT HEAD:  Right posterior scalp soft tissue swelling and trace linear hyperdensity   in the right lateral occipital lobe, which likely reflects subarachnoid   hemorrhage versus less likely cortical laminar necrosis. No significant   midline shift.    CTA NECK: Suboptimal bolus timing. Within this limitation:  No evidence of significant stenosis or occlusion.    CTA HEAD:  No large vessel occlusion, significant stenosis or vascular abnormality   identified.    Findings were discussed with Dr. JOEL PERDOMO [id number] 9/5/2024 3:22   AM by Dr. Larsen with read back confirmation.    --- End of Report ---      < end of copied text >      < from: CT Head No Cont (09.05.24 @ 06:36) >    IMPRESSION:  Previously linear density along the right occipital lobe is no longer   visualized on the current study.    There is a newly appreciated small right subdural hematoma.    Right parietal scalp hematoma.    Findings were discussed with Dr. Mariscal 9/5/2024 6:50 AM by Dr. BERNABE    with read back confirmation.    --- End of Report ---      < end of copied text >      Physical exam:  GCS of 15  Airway is patent  Breathing is symmetric and unlabored  Neuro: CNII-XII grossly intact  Psych: normal affect  HEENT: Normocephalic, ?small area of ecchymosis to right parietal scalp, YUSEF, EOM wnl, no otorrhea or hemotympanum b/l, no epistaxis or d/c b/l nares, no craniofacial bony pathology or tenderness b/l  Neck: No crepitus, no ecchymosis, no hematoma, to exam, no JVD, no tracheal deviation  Cspine/thoracolumbrosacral spine: no gross bony pathology or tenderness to exam  Cardiovascular: S1S2 Present  Chest: no gross rib pathology or tenderness to exam. No sternal pathology or tenderness to exam. No crepitus, no ecchymosis, no hematoma. No penetrating thorcoabdominal trauma  Respiratory: Rate is 18; Respiratory Effort normal; no wheezes, rales or rhonchi to exam  ABD: bowel sounds (+), soft, nontender, non distended, no rebound, no guarding, no rigidity, no skin changes to exam. No pelvic instability to exam, no skin changes  Musculoskeletal: Pt has palpable b/l radial, femoral, dorsalis pedis pulses. All digits are warm and well perfused. No gross long bone pathology or tenderness to exam. Pt demonstrates grossly intact sensoromotor function. Pt has good capillary refill to digits, no calf edema or tenderness to exam.  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 As per pt, he was in his usual state of health this evening when he went to sleep around 21:30 (LKWT). He woke up to use bathroom around 0030 tonight and that's when he felt as if the room spinning when he was walking. He lowered himself to the ground, denies LOC, no head trauma. Denies h/o vertigo, states symptoms are worse with turning head left to right, +associated nausea. Denies prior episodes. CT head initially sig for ? trace Right occipital SAH. At the time of my exam pt appears to be comfortable, in NAD. Pt denies HA, visual changes, focal numb / ting / weak, word finding difficulty, neck stiffness, photophobia.     Subjective:  Pt seen and examined at bedside with chaperone. Pt is AAOx3, pt in no acute distress. Pt denied c/o fever, chills, chest pain, SOB, abd pain, N/V/D, extremity pain or dysfunction, hemoptysis, hematemesis, hematuria, hematochezia headache, diplopia. Pt tolerating diet, (+) void, (+) ambulation, (+) bowel function    ROS: as above otherwise negative    PMH: GCA, prostate Ca, TIA/emoblic stroke, GERD, PBPH  PSH: inguinal hernia repair, ORIF ankle  penicillin (Other)    SH: no toxic behaviors  FH: no pertinent fam med hx    Vital Signs Last 24 Hrs  T(C): 36.3 (05 Sep 2024 07:10), Max: 36.3 (05 Sep 2024 01:50)  T(F): 97.3 (05 Sep 2024 07:10), Max: 97.4 (05 Sep 2024 01:50)  HR: 86 (05 Sep 2024 09:32) (69 - 86)  BP: 93/62 (05 Sep 2024 09:32) (93/62 - 116/55)  BP(mean): 71 (05 Sep 2024 07:10) (70 - 71)  RR: 16 (05 Sep 2024 09:32) (16 - 18)  SpO2: 99% (05 Sep 2024 09:32) (99% - 100%)    Parameters below as of 05 Sep 2024 07:10  Patient On (Oxygen Delivery Method): room air        Labs:                                9.1    12.19 )-----------( 127      ( 05 Sep 2024 02:48 )             27.7     CBC Full  -  ( 05 Sep 2024 02:48 )  WBC Count : 12.19 K/uL  RBC Count : 2.68 M/uL  Hemoglobin : 9.1 g/dL  Hematocrit : 27.7 %  Platelet Count - Automated : 127 K/uL  Mean Cell Volume : 103.4 fl  Mean Cell Hemoglobin : 34.0 pg  Mean Cell Hemoglobin Concentration : 32.9 gm/dL  Auto Neutrophil # : x  Auto Lymphocyte # : x  Auto Monocyte # : x  Auto Eosinophil # : x  Auto Basophil # : x  Auto Neutrophil % : x  Auto Lymphocyte % : x  Auto Monocyte % : x  Auto Eosinophil % : x  Auto Basophil % : x    09-05    144  |  108  |  50<H>  ----------------------------<  159<H>  4.0   |  30  |  0.85    Ca    8.3<L>      05 Sep 2024 02:48    TPro  5.3<L>  /  Alb  2.5<L>  /  TBili  0.6  /  DBili  x   /  AST  58<H>  /  ALT  49  /  AlkPhos  48  09-05    LIVER FUNCTIONS - ( 05 Sep 2024 02:48 )  Alb: 2.5 g/dL / Pro: 5.3 gm/dL / ALK PHOS: 48 U/L / ALT: 49 U/L / AST: 58 U/L / GGT: x                 Meds:  acetaminophen   IVPB .. 1000 milliGRAM(s) IV Intermittent once PRN  atorvastatin 40 milliGRAM(s) Oral at bedtime  calcium carbonate 1250 mG  + Vitamin D (OsCal 500 + D) 1 Tablet(s) Oral daily  metoclopramide Injectable 10 milliGRAM(s) IV Push once  multivitamin 1 Tablet(s) Oral daily  ondansetron Injectable 4 milliGRAM(s) IV Push every 8 hours PRN  pantoprazole    Tablet 40 milliGRAM(s) Oral before breakfast  predniSONE   Tablet 10 milliGRAM(s) Oral daily  senna 2 Tablet(s) Oral at bedtime  sodium chloride 0.9%. 1000 milliLiter(s) IV Continuous <Continuous>  tamsulosin 0.8 milliGRAM(s) Oral at bedtime      Radiology:    < from: CT Angio Head w/ IV Cont (09.05.24 @ 03:00) >  CT HEAD:  Right posterior scalp soft tissue swelling and trace linear hyperdensity   in the right lateral occipital lobe, which likely reflects subarachnoid   hemorrhage versus less likely cortical laminar necrosis. No significant   midline shift.    CTA NECK: Suboptimal bolus timing. Within this limitation:  No evidence of significant stenosis or occlusion.    CTA HEAD:  No large vessel occlusion, significant stenosis or vascular abnormality   identified.    Findings were discussed with Dr. JOEL PERDOMO [id number] 9/5/2024 3:22   AM by Dr. Larsen with read back confirmation.    --- End of Report ---      < end of copied text >      < from: CT Head No Cont (09.05.24 @ 06:36) >    IMPRESSION:  Previously linear density along the right occipital lobe is no longer   visualized on the current study.    There is a newly appreciated small right subdural hematoma.    Right parietal scalp hematoma.    Findings were discussed with Dr. Mariscal 9/5/2024 6:50 AM by Dr. BERNABE    with read back confirmation.    --- End of Report ---      < end of copied text >      Physical exam:  GCS of 15  Airway is patent  Breathing is symmetric and unlabored  Neuro: CNII-XII grossly intact  Psych: normal affect  HEENT: Normocephalic, ?small area of ecchymosis to right parietal scalp, YUSEF, EOM wnl, no otorrhea or hemotympanum b/l, no epistaxis or d/c b/l nares, no craniofacial bony pathology or tenderness b/l  Neck: No crepitus, no ecchymosis, no hematoma, to exam, no JVD, no tracheal deviation  Cspine/thoracolumbrosacral spine: no gross bony pathology or tenderness to exam  Cardiovascular: S1S2 Present  Chest: no gross rib pathology or tenderness to exam. No sternal pathology or tenderness to exam. No crepitus, no ecchymosis, no hematoma. No penetrating thoracoabdominal trauma  Respiratory: Rate is 18; Respiratory Effort normal; no wheezes, rales or rhonchi to exam  ABD: bowel sounds (+), soft, nontender, non distended, no rebound, no guarding, no rigidity, no skin changes to exam. No pelvic instability to exam, no skin changes  Musculoskeletal: Pt has palpable b/l radial, femoral, dorsalis pedis pulses. All digits are warm and well perfused. No gross long bone pathology or tenderness to exam. Pt demonstrates grossly intact sensoromotor function. Pt has good capillary refill to digits, no calf edema or tenderness to exam.  Skin: no lesions or rashes to exam

## 2024-09-05 NOTE — H&P ADULT - NSICDXPASTMEDICALHX_GEN_ALL_CORE_FT
PAST MEDICAL HISTORY:  BPH (benign prostatic hyperplasia)     GERD (gastroesophageal reflux disease)     H/O constipation     HLD (hyperlipidemia)     [place] (hard of hearing)     Snores

## 2024-09-05 NOTE — PROGRESS NOTE ADULT - SUBJECTIVE AND OBJECTIVE BOX
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 As per EMR, pt states he was in his usual state of health this evening when he went to sleep around 21:30 (LKWT). He woke up to use bathroom around 0030 tonight and that's when he felt as if the room spinning when he was walking. He lowered himself to the ground, denies LOC    CT head initially sig for ? trace Right occipital SAH. Repeat CTH revealing new R frontal SDH with R parietal scalp hematoma.     9/5    PAST MEDICAL & SURGICAL HISTORY:  HLD (hyperlipidemia)  BPH (benign prostatic hyperplasia)  GERD (gastroesophageal reflux disease)  [place] (hard of hearing)  H/O constipation  Status post ORIF of fracture of ankle  History of colonoscopy  S/P left inguinal hernia repair      Medications:  metoclopramide Injectable 10 milliGRAM(s) IV Push once  ondansetron Injectable 4 milliGRAM(s) IV Push every 8 hours PRN  pantoprazole    Tablet 40 milliGRAM(s) Oral before breakfast  senna 2 Tablet(s) Oral at bedtime  tamsulosin 0.8 milliGRAM(s) Oral at bedtime  atorvastatin 40 milliGRAM(s) Oral at bedtime  predniSONE   Tablet 10 milliGRAM(s) Oral daily  calcium carbonate 1250 mG  + Vitamin D (OsCal 500 + D) 1 Tablet(s) Oral daily  multivitamin 1 Tablet(s) Oral daily  sodium chloride 0.9%. 1000 milliLiter(s) IV Continuous <Continuous>      ICU Vital Signs Last 24 Hrs  T(C): 36.2 (05 Sep 2024 10:50), Max: 36.3 (05 Sep 2024 01:50)  T(F): 97.2 (05 Sep 2024 10:50), Max: 97.4 (05 Sep 2024 01:50)  HR: 88 (05 Sep 2024 11:45) (69 - 98)  BP: 121/62 (05 Sep 2024 11:45) (93/62 - 129/59)  BP(mean): 78 (05 Sep 2024 11:45) (67 - 78)  ABP: --  ABP(mean): --  RR: 18 (05 Sep 2024 11:45) (16 - 18)  SpO2: 97% (05 Sep 2024 11:45) (96% - 100%)    O2 Parameters below as of 05 Sep 2024 10:50  Patient On (Oxygen Delivery Method): room air      LABS:                        9.1    12.19 )-----------( 127      ( 05 Sep 2024 02:48 )             27.7     09-05    144  |  108  |  50<H>  ----------------------------<  159<H>  4.0   |  30  |  0.85    Ca    8.3<L>      05 Sep 2024 02:48    TPro  5.3<L>  /  Alb  2.5<L>  /  TBili  0.6  /  DBili  x   /  AST  58<H>  /  ALT  49  /  AlkPhos  48  09-05    CAPILLARY BLOOD GLUCOSE    Urinalysis Basic - ( 05 Sep 2024 05:09 )    Color: Yellow / Appearance: Clear / SG: >1.030 / pH: x  Gluc: x / Ketone: Trace mg/dL  / Bili: Negative / Urobili: 0.2 mg/dL   Blood: x / Protein: Negative mg/dL / Nitrite: Negative   Leuk Esterase: Negative / RBC: x / WBC x   Sq Epi: x / Non Sq Epi: x / Bacteria: x      CULTURES:    Physical Examination:    General: No acute distress.    HEENT: Pupils equal, reactive to light.  Symmetric.  PULM: Clear to auscultation bilaterally, no significant sputum production  NECK: Supple, no lymphadenopathy, trachea midline  CVS: Regular rate and rhythm, no murmurs, rubs, or gallops  ABD: Soft, nondistended, nontender, normoactive bowel sounds, no masses  EXT: No edema, nontender  SKIN: Warm and well perfused, no rashes noted.  NEURO: Alert, oriented, interactive    DEVICES:     RADIOLOGY:    < from: CT Chest w/ IV Cont (09.05.24 @ 10:56) >  IMPRESSION:  No acute intrathoracic or intra-abdominal traumatic findings.    Constipated colon. Air-filled nondilated small bowel may reflect   enteritis. Constipated colon. Gastric distention of either gastroparesis   or partial gastric outlet obstruction.    Trace left hydronephrosis. No evidence of high-grade obstruction.    < end of copied text >      < from: CT Head No Cont (09.05.24 @ 06:36) >  IMPRESSION:  Previously linear density along the right occipital lobe is no longer   visualized on the current study.    There is a newly appreciated small right subdural hematoma.    Right parietal scalp hematoma.    < end of copied text >      < from: CT Angio Head w/ IV Cont (09.05.24 @ 03:00) >  CT HEAD:  Right posterior scalp soft tissue swelling and trace linear hyperdensity   in the right lateral occipital lobe, which likely reflects subarachnoid   hemorrhage versus less likely cortical laminar necrosis. No significant   midline shift.    CTA NECK: Suboptimal bolus timing. Within this limitation:  No evidence of significant stenosis or occlusion.    CTA HEAD:  No large vessel occlusion, significant stenosis or vascular abnormality   identified.    < end of copied text >     Patient is a 84y old  Male who presents with a chief complaint of SDH (05 Sep 2024 10:37)    BRIEF HOSPITAL COURSE: 85 yo male with PMHx giant cell arteritis w/ residual vision in right eye with "black spots", hx of TIA and embolic stroke (on plavix, ASA) w/o residual deficits, GERD, BPH, prostate ca, presented to ER for evaluation of dizziness starting upon waking up this night. As per EMR, pt states he was in his usual state of health this evening when he went to sleep around 21:30 (LKWT). He woke up to use bathroom around 0030 tonight and that's when he felt as if the room spinning when he was walking. He lowered himself to the ground, denies LOC    CT head initially sig for ? trace Right occipital SAH. Repeat CTH revealing new R frontal SDH with R parietal scalp hematoma.     9/5 pt c/o feeling dizzy still, exacerbated by movement. HA has resolved with tylenol. denies dark stools, nausea, abd pain, diarrhea, cough, sob    PAST MEDICAL & SURGICAL HISTORY:  HLD (hyperlipidemia)  BPH (benign prostatic hyperplasia)  GERD (gastroesophageal reflux disease)  [place] (hard of hearing)  H/O constipation  Status post ORIF of fracture of ankle  History of colonoscopy  S/P left inguinal hernia repair      Medications:  metoclopramide Injectable 10 milliGRAM(s) IV Push once  ondansetron Injectable 4 milliGRAM(s) IV Push every 8 hours PRN  pantoprazole    Tablet 40 milliGRAM(s) Oral before breakfast  senna 2 Tablet(s) Oral at bedtime  tamsulosin 0.8 milliGRAM(s) Oral at bedtime  atorvastatin 40 milliGRAM(s) Oral at bedtime  predniSONE   Tablet 10 milliGRAM(s) Oral daily  calcium carbonate 1250 mG  + Vitamin D (OsCal 500 + D) 1 Tablet(s) Oral daily  multivitamin 1 Tablet(s) Oral daily  sodium chloride 0.9%. 1000 milliLiter(s) IV Continuous <Continuous>      ICU Vital Signs Last 24 Hrs  T(C): 36.2 (05 Sep 2024 10:50), Max: 36.3 (05 Sep 2024 01:50)  T(F): 97.2 (05 Sep 2024 10:50), Max: 97.4 (05 Sep 2024 01:50)  HR: 88 (05 Sep 2024 11:45) (69 - 98)  BP: 121/62 (05 Sep 2024 11:45) (93/62 - 129/59)  BP(mean): 78 (05 Sep 2024 11:45) (67 - 78)  ABP: --  ABP(mean): --  RR: 18 (05 Sep 2024 11:45) (16 - 18)  SpO2: 97% (05 Sep 2024 11:45) (96% - 100%)    O2 Parameters below as of 05 Sep 2024 10:50  Patient On (Oxygen Delivery Method): room air      LABS:                        9.1    12.19 )-----------( 127      ( 05 Sep 2024 02:48 )             27.7     09-05    144  |  108  |  50<H>  ----------------------------<  159<H>  4.0   |  30  |  0.85    Ca    8.3<L>      05 Sep 2024 02:48    TPro  5.3<L>  /  Alb  2.5<L>  /  TBili  0.6  /  DBili  x   /  AST  58<H>  /  ALT  49  /  AlkPhos  48  09-05    CAPILLARY BLOOD GLUCOSE    Urinalysis Basic - ( 05 Sep 2024 05:09 )    Color: Yellow / Appearance: Clear / SG: >1.030 / pH: x  Gluc: x / Ketone: Trace mg/dL  / Bili: Negative / Urobili: 0.2 mg/dL   Blood: x / Protein: Negative mg/dL / Nitrite: Negative   Leuk Esterase: Negative / RBC: x / WBC x   Sq Epi: x / Non Sq Epi: x / Bacteria: x      CULTURES:    Physical Examination:    General: No acute distress.    HEENT: Pupils equal, reactive to light.  Symmetric.  PULM: Clear to auscultation bilaterally, no wheezing  CVS: Regular rate and rhythm, no murmurs  ABD: Soft, nondistended, nontender  EXT: No edema, nontender  SKIN: Warm and well perfused, no rashes noted.  NEURO: Alert, oriented, interactive. no gross focal deficits. strength in UE and LE equal b.l    DEVICES:     RADIOLOGY:    < from: CT Chest w/ IV Cont (09.05.24 @ 10:56) >  IMPRESSION:  No acute intrathoracic or intra-abdominal traumatic findings.    Constipated colon. Air-filled nondilated small bowel may reflect   enteritis. Constipated colon. Gastric distention of either gastroparesis   or partial gastric outlet obstruction.    Trace left hydronephrosis. No evidence of high-grade obstruction.    < end of copied text >      < from: CT Head No Cont (09.05.24 @ 06:36) >  IMPRESSION:  Previously linear density along the right occipital lobe is no longer   visualized on the current study.    There is a newly appreciated small right subdural hematoma.    Right parietal scalp hematoma.    < end of copied text >      < from: CT Angio Head w/ IV Cont (09.05.24 @ 03:00) >  CT HEAD:  Right posterior scalp soft tissue swelling and trace linear hyperdensity   in the right lateral occipital lobe, which likely reflects subarachnoid   hemorrhage versus less likely cortical laminar necrosis. No significant   midline shift.    CTA NECK: Suboptimal bolus timing. Within this limitation:  No evidence of significant stenosis or occlusion.    CTA HEAD:  No large vessel occlusion, significant stenosis or vascular abnormality   identified.    < end of copied text >

## 2024-09-05 NOTE — ED ADULT NURSE NOTE - NSICDXPASTMEDICALHX_GEN_ALL_CORE_FT
PAST MEDICAL HISTORY:  BPH (benign prostatic hyperplasia)     GERD (gastroesophageal reflux disease)     H/O constipation     HLD (hyperlipidemia)     [place] (hard of hearing)     Snores

## 2024-09-05 NOTE — ED ADULT NURSE NOTE - SUICIDE SCREENING QUESTION 3
No [FreeTextEntry1] : cpe/est care [de-identified] : SOL OCASIO is a 58 year F who presents for CPE/est care\par PMHx T2DM, asthma, celiac disease, IBS, seasonal allergies, depression, insomnia, L ear [place]\par States she has been prescribed medications but avoids taking them\par Reports depressed mood. Tried trazodone for approx 2 weeks, felt it was helpful; but stopped as she does not like to take medications. Denies active SI/HI; however, reports she is not afraId of death. \par Is on doxycycline for bronchitis diagnosed at urgent care last week, prescribed for 8 days, started on sun or monday. \par Has L hearing aid which she uses intermittently\par Pulm Dr. Judi Armas\par Ob/gyn Dr. Cohen \par GI Dr. Scott Rojas \par Flu vaccine- defers\par Covid vaccines- s/p 2 doses

## 2024-09-05 NOTE — PROGRESS NOTE ADULT - ASSESSMENT
ASSESSMENT   83 yo male with PMHx giant cell arteritis w/ residual vision in right eye with "black spots", hx of TIA and embolic stroke (on plavix, ASA) w/o residual deficits, GERD, BPH, prostate ca, presented to ER for evaluation of dizziness overnight. As per EMR, pt states he was in his usual state of health this evening when he went to sleep around 21:30 (LKW). He woke up to use bathroom around 0030 tonight and that's when he felt as if the room spinning when he was walking. He lowered himself to the ground, denies LOC    CT head initially sig for ? trace Right occipital SAH. Repeat CTH revealing new R frontal SDH with R parietal scalp hematoma.     Admitted for  1. Dizziness  2. Acute R frontal SDH  3. Trace R occipital SAH? - now resolved  4. Normocytic anemia  5. Enteritis on CT imaging?     ASSESSMENT   85 yo male with PMHx giant cell arteritis w/ residual vision in right eye with "black spots", hx of TIA and embolic stroke (on plavix, ASA) w/o residual deficits, GERD, BPH, prostate ca, presented to ER for evaluation of dizziness overnight. As per EMR, pt states he was in his usual state of health this evening when he went to sleep around 21:30 (LKW). He woke up to use bathroom around 0030 tonight and that's when he felt as if the room spinning when he was walking. He lowered himself to the ground, denies LOC    CT head initially sig for ? trace Right occipital SAH. Repeat CTH revealing new R frontal SDH with R parietal scalp hematoma.     Admitted for  1. Dizziness  2. Acute R frontal SDH  3. Trace R occipital SAH? - now resolved  4. Normocytic anemia  5. Enteritis on CT imaging?    PLAN    Neuro: AAOx 3. 3rd CTH pending read. neuro consult for possible vertigo? ordered meclizine prn  CV: Normotensive. in sinus rhythm on monitor. keep systolic BP <150. TTE ordered. EP consult for loop recorder interrogation. check orthostatics  Pulm: on room air.   GI: PO diet when cleared by neuro sx. PPI. bowel regimen as pt with colonic constipation on CT scan. denies diarrhea abd pain nausea. will hold off on checking GI PCR.  Nephro: cont NS @ 100, s/p  1L bolus in ED. elevated SG on UA and BUN on admission could be reflective of dehydration vs steroid use. monitor I & Os. Trend renal fxn  Endo: cont chronic prednisone 10mg qd for giant cell arteritis.   ID: mild leukocytosis 12k. no clear source of infection, pt denies GI sxs so will hold on abx. check RVP. trend WBC. monitor temps.  Heme: Hgb 9.1 this admission. baseline HGb appears to be 12-13 from EMR? denies dark stools, GERD. anemia w/u iron studies, b12 folate, occult blood. on lupron for prostate CA and tocilizumab for GCA. no chemical DVT ppx due to SDH. SCDs  PT eval    Dispo: SICU. repeat CTH pending read. IV fluids. Trend H/H, anemia work up. TTE.     Will discuss with Dr. Knapp     ASSESSMENT   83 yo male with PMHx giant cell arteritis w/ residual vision in right eye with "black spots", hx of TIA and embolic stroke (on plavix, ASA) w/o residual deficits, GERD, BPH, prostate ca, presented to ER for evaluation of dizziness overnight. As per EMR, pt states he was in his usual state of health this evening when he went to sleep around 21:30 (LKW). He woke up to use bathroom around 0030 tonight and that's when he felt as if the room spinning when he was walking. He lowered himself to the ground, denies LOC    CT head initially sig for ? trace Right occipital SAH. Repeat CTH revealing new R frontal SDH with R parietal scalp hematoma.     Admitted for  1. Dizziness  2. Acute R frontal SDH  3. Trace R occipital SAH? - now resolved  4. Normocytic anemia  5. Enteritis on CT imaging?    PLAN    Neuro: AAOx 3. 3rd CTH pending read. neuro consult for possible vertigo? ordered meclizine prn  CV: Normotensive. in sinus rhythm on monitor. holding ASA and Plavix  keep systolic BP <150. TTE ordered. EP consult for loop recorder interrogation. check orthostatics  Pulm: on room air.   GI: PO diet when cleared by neuro sx. PPI. bowel regimen as pt with colonic constipation on CT scan. denies diarrhea abd pain nausea. will hold off on checking GI PCR.  Nephro: cont NS @ 100, s/p  1L bolus in ED. elevated SG on UA and BUN on admission could be reflective of dehydration vs steroid use. monitor I & Os. Trend renal fxn  Endo: cont chronic prednisone 10mg qd for giant cell arteritis.   ID: mild leukocytosis 12k. no clear source of infection, pt denies GI sxs so will hold on abx. check RVP. trend WBC. monitor temps.  Heme: Hgb 9.1 this admission. baseline HGb appears to be 12-13 from EMR? denies dark stools, GERD. anemia w/u iron studies, b12 folate, occult blood. on lupron for prostate CA and tocilizumab for GCA. thrombocytopenia? unclear etiology could be related to tocilizumab? cont to trend. no chemical DVT ppx due to SDH. SCDs  PT eval    Dispo: SICU. repeat CTH pending read. IV fluids. Trend H/H, anemia work up. TTE.   updated wife and daughter at bedside    Will discuss with Dr. Knapp

## 2024-09-05 NOTE — CONSULT NOTE ADULT - SUBJECTIVE AND OBJECTIVE BOX
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  This morning just after midnight the patient got up to go to the bathroom and when getting up had severe vertigo and nausea.  He slid himself down on the floor.  Denies hitting his head or LOC, incontinence, focal weakness/numbness, shaking, tongue biting, dysarthria, facial droop, diplopia.  He did start to have a headache soon afterwards. His wife found him fully oriented on the floor and called 911.  Denies h/o vertigo, but does have chronic intermittent tinnitus.  He states symptoms are worse with turning head and getting up. Denies prior episodes.  CT head sig for ? trace Right occipital SAH.  Repeat CT head this AM shows resolution SAH, and a new R frontal SDH.  Neurology is consulted for vertigo evaluation.  ASA and Plavix are on hold.  Currently the patient is still having positional vertigo, mild headache and nausea.          ROS: as above otherwise negative    PMH: GCA, prostate Ca, TIA/emoblic stroke, GERD, PBPH  PSH: inguinal hernia repair, ORIF ankle  penicillin (Other)    SH: no toxic behaviors  FH: no pertinent fam med hx    Vital Signs Last 24 Hrs  T(C): 36.3 (05 Sep 2024 07:10), Max: 36.3 (05 Sep 2024 01:50)  T(F): 97.3 (05 Sep 2024 07:10), Max: 97.4 (05 Sep 2024 01:50)  HR: 86 (05 Sep 2024 09:32) (69 - 86)  BP: 93/62 (05 Sep 2024 09:32) (93/62 - 116/55)  BP(mean): 71 (05 Sep 2024 07:10) (70 - 71)  RR: 16 (05 Sep 2024 09:32) (16 - 18)  SpO2: 99% (05 Sep 2024 09:32) (99% - 100%)    Parameters below as of 05 Sep 2024 07:10  Patient On (Oxygen Delivery Method): room air        Labs:                 9.1    12.19 )-----------( 127      ( 05 Sep 2024 02:48 )             27.7         09-05    144  |  108  |  50<H>  ----------------------------<  159<H>  4.0   |  30  |  0.85    Ca    8.3<L>      05 Sep 2024 02:48    TPro  5.3<L>  /  Alb  2.5<L>  /  TBili  0.6  /  DBili  x   /  AST  58<H>  /  ALT  49  /  AlkPhos  48  09-05    LIVER FUNCTIONS - ( 05 Sep 2024 02:48 )  Alb: 2.5 g/dL / Pro: 5.3 gm/dL / ALK PHOS: 48 U/L / ALT: 49 U/L / AST: 58 U/L / GGT: x                 Meds:  acetaminophen   IVPB .. 1000 milliGRAM(s) IV Intermittent once PRN  atorvastatin 40 milliGRAM(s) Oral at bedtime  calcium carbonate 1250 mG  + Vitamin D (OsCal 500 + D) 1 Tablet(s) Oral daily  metoclopramide Injectable 10 milliGRAM(s) IV Push once  multivitamin 1 Tablet(s) Oral daily  ondansetron Injectable 4 milliGRAM(s) IV Push every 8 hours PRN  pantoprazole    Tablet 40 milliGRAM(s) Oral before breakfast  predniSONE   Tablet 10 milliGRAM(s) Oral daily  senna 2 Tablet(s) Oral at bedtime  sodium chloride 0.9%. 1000 milliLiter(s) IV Continuous <Continuous>  tamsulosin 0.8 milliGRAM(s) Oral at bedtime      Radiology:    < from: CT Angio Head w/ IV Cont (09.05.24 @ 03:00) >  CT HEAD:  Right posterior scalp soft tissue swelling and trace linear hyperdensity   in the right lateral occipital lobe, which likely reflects subarachnoid   hemorrhage versus less likely cortical laminar necrosis. No significant   midline shift.    CTA NECK: Suboptimal bolus timing. Within this limitation:  No evidence of significant stenosis or occlusion.    CTA HEAD:  No large vessel occlusion, significant stenosis or vascular abnormality   identified.    Findings were discussed with Dr. JOEL PERDOMO [id number] 9/5/2024 3:22   AM by Dr. Larsen with read back confirmation.    --- End of Report ---      < from: CT Head No Cont (09.05.24 @ 06:36) >    IMPRESSION:  Previously linear density along the right occipital lobe is no longer   visualized on the current study.    There is a newly appreciated small right subdural hematoma.    Right parietal scalp hematoma.    Findings were discussed with Dr. Mariscal 9/5/2024 6:50 AM by Dr. BERNABE    with read back confirmation.    --- End of Report ---          MEDICATIONS  (STANDING):  acetaminophen   IVPB .. 1000 milliGRAM(s) IV Intermittent once  atorvastatin 40 milliGRAM(s) Oral at bedtime  calcium carbonate 1250 mG  + Vitamin D (OsCal 500 + D) 1 Tablet(s) Oral daily  multivitamin 1 Tablet(s) Oral daily  pantoprazole    Tablet 40 milliGRAM(s) Oral before breakfast  predniSONE   Tablet 10 milliGRAM(s) Oral daily  senna 2 Tablet(s) Oral at bedtime  sodium chloride 0.9%. 1000 milliLiter(s) (100 mL/Hr) IV Continuous <Continuous>  tamsulosin 0.8 milliGRAM(s) Oral at bedtime       Allergies  penicillin (Other)        ROS: Pertinent positives in HPI, all other ROS were reviewed and are negative.      Vital Signs Last 24 Hrs  T(C): 37.4 (05 Sep 2024 16:00), Max: 37.8 (05 Sep 2024 13:00)  T(F): 99.3 (05 Sep 2024 16:00), Max: 100.1 (05 Sep 2024 13:00)  HR: 80 (05 Sep 2024 16:00) (69 - 98)  BP: 116/46 (05 Sep 2024 16:00) (93/62 - 138/50)  BP(mean): 66 (05 Sep 2024 16:00) (66 - 81)  RR: 16 (05 Sep 2024 16:00) (10 - 19)  SpO2: 100% (05 Sep 2024 16:00) (95% - 100%)    Parameters below as of 05 Sep 2024 15:00  Patient On (Oxygen Delivery Method): room air            Constitutional: awake, NAD  HEENT: PERRLA, EOMI,   Neck: Supple.  Extremities:  no edema  Musculoskeletal:  no abnormal movements  Skin: No rashes    Neurological exam:  HF: A x O x 3. Appropriately interactive, normal affect. Speech fluent, No Aphasia or paraphasic errors. Naming /repetition intact   CN: ABIGAIL, EOMI, no nystagmus, VFF, facial sensation normal, no NLFD, tongue midline, Palate moves equally, SCM equal bilaterally  Motor: No pronator drift, Strength 5/5 in all 4 ext, normal bulk and tone, no tremor, rigidity or bradykinesia.    Sens: Intact to light touch   Reflexes: Symmetric and normal,  downgoing toes b/l  Coord:  No FNFA, dysmetria  Gait/Balance: Cannot test    NIHSS: 0          Labs:   09-05    144  |  108  |  50<H>  ----------------------------<  159<H>  4.0   |  30  |  0.85    Ca    8.3<L>      05 Sep 2024 02:48    TPro  5.3<L>  /  Alb  2.5<L>  /  TBili  0.6  /  DBili  x   /  AST  58<H>  /  ALT  49  /  AlkPhos  48  09-05                              9.3    11.11 )-----------( 128      ( 05 Sep 2024 14:33 )             28.1       Radiology:  < from: CT Head No Cont (09.05.24 @ 12:53) >  IMPRESSION:    CT HEAD: Encephalomalacia and gliosis in the RIGHT frontal subcortical   white matter unchanged. Tiny 5 mm RIGHT frontal subdural hematoma is   unchanged.    CT cervical spine:   No vertebral fracture is recognized.  Moderate   degenerative disc disease and spondylosis at C3-4 through C6/7 with loss   of disc height and associated degenerative endplate changes. There is   narrowing of the RIGHT C2-3, BILATERAL C3-4 through C6/7 neural foramina   due to spurring and facet osteophytic hypertrophy. Posterior osteophytic   ridge/disc complexes at C3-4 through C6-7 compresses the ventral cord.    < from: CT Head No Cont (09.05.24 @ 06:36) >  IMPRESSION:  Previously linear density along the right occipital lobe is no longer   visualized on the current study.    There is a newly appreciated small right subdural hematoma.    Right parietal scalp hematoma.    Findings were discussed with Dr. Mariscal 9/5/2024 6:50 AM by Dr. BERNABE    with read back confirmation.      < from: CT Angio Head w/ IV Cont (09.05.24 @ 03:00) >  IMPRESSION:    CT HEAD:  Right posterior scalp soft tissue swelling and trace linear hyperdensity   in the right lateral occipital lobe, which likely reflects subarachnoid   hemorrhage versus less likely cortical laminar necrosis. No significant   midline shift.    CTA NECK: Suboptimal bolus timing. Within this limitation:  No evidence of significant stenosis or occlusion.    CTA HEAD:  No large vessel occlusion, significant stenosis or vascular abnormality   identified.    Findings were discussed with Dr. JOEL PERDOMO [phone] 9/5/2024 3:22   AM by Dr. Larsen with read back confirmation.      < from: TTE W or WO Ultrasound Enhancing Agent (09.05.24 @ 14:31) >  CONCLUSIONS:      1. Left ventricular systolic function is normal with an ejection fraction visually estimated at 60 to 65 %.   2. Mild mitral regurgitation.   3. Mild left ventricular hypertrophy.   4. Technically difficult image quality.                 CC: Vertigo      HPI:  83 yo male PMHx of GCA 3/2024 w/ residual central vision loss right eye, is On Acterma, also had history pros CA, TIA /Embolic strokes, is on plavix, ASA was restarted by heme/onc, has no residual deficits, presented to ER for evaluation of vertigo. Early this morning just after midnight, patient got up to go to the bathroom, when getting up he had severe vertigo- spinning sensation and nausea, he slid himself down on the floor, did not hit his head, no LOC, incontinence, focal weakness/numbness, seizure like activity, dysarthria, facial droop or diplopia. He did start to have a headache soon afterwards. His wife found him fully oriented on the floor and called 911. CT head was sig for ? trace Right occipital SAH, pt seen by neurosurgery. Repeat CT head this AM shows resolution SAH, and a new R frontal SDH.  ASA and Plavix are on hold.    Neuro consulted for vertigo, upon enquiring from pt, he denies h/o vertigo, but does have chronic intermittent tinnitus. He states his dizziness is worse with turning head and while getting up. Currently the patient is still having positional vertigo with mild headache and nausea.        PMH: GCA, prostate Ca, TIA/ Embolic stroke, GERD, PBPH    PSH: inguinal hernia repair, ORIF ankle    SH: non-smoker, , retired nephrologist    FH: no pertinent family med hx    MEDICATIONS  (STANDING):  acetaminophen   IVPB .. 1000 milliGRAM(s) IV Intermittent once  atorvastatin 40 milliGRAM(s) Oral at bedtime  calcium carbonate 1250 mG  + Vitamin D (OsCal 500 + D) 1 Tablet(s) Oral daily  multivitamin 1 Tablet(s) Oral daily  pantoprazole    Tablet 40 milliGRAM(s) Oral before breakfast  predniSONE   Tablet 10 milliGRAM(s) Oral daily  senna 2 Tablet(s) Oral at bedtime  sodium chloride 0.9%. 1000 milliLiter(s) (100 mL/Hr) IV Continuous <Continuous>  tamsulosin 0.8 milliGRAM(s) Oral at bedtime         Allergies  penicillin (Other)        ROS: Pertinent positives in HPI, all other ROS were reviewed and are negative.        Vital Signs Last 24 Hrs  T(C): 37.4 (05 Sep 2024 16:00), Max: 37.8 (05 Sep 2024 13:00)  T(F): 99.3 (05 Sep 2024 16:00), Max: 100.1 (05 Sep 2024 13:00)  HR: 80 (05 Sep 2024 16:00) (69 - 98)  BP: 116/46 (05 Sep 2024 16:00) (93/62 - 138/50)  BP(mean): 66 (05 Sep 2024 16:00) (66 - 81)  RR: 16 (05 Sep 2024 16:00) (10 - 19)  SpO2: 100% (05 Sep 2024 16:00) (95% - 100%)    Parameters below as of 05 Sep 2024 15:00  Patient On (Oxygen Delivery Method): room air        Gen exam:  Normocephalic, in no distress,  Constitutional: awake, NAD  HEENT: PERRLA, EOMI,   Neck: Supple.  Extremities:  no edema  Musculoskeletal:  no abnormal movements  Skin: No rashes      Neurological exam:  HF: A x O x 3. Appropriately interactive, normal affect. Speech fluent, No Aphasia or paraphasic errors. Naming /repetition intact   CN: ABIGAIL, EOMI, no nystagmus, VFF, facial sensation normal, no NLFD, tongue midline, Palate moves equally, SCM equal bilaterally  Motor: No pronator drift, Strength 5/5 in all 4 ext, normal bulk and tone, no tremor, rigidity or bradykinesia.    Sens: Intact to light touch   Reflexes: Symmetric and normal,  downgoing toes b/l  Coord:  No FNFA, dysmetria  Gait/Balance: Cannot test    NIHSS: 0          Labs:   09-05    144  |  108  |  50<H>  ----------------------------<  159<H>  4.0   |  30  |  0.85    Ca    8.3<L>      05 Sep 2024 02:48    TPro  5.3<L>  /  Alb  2.5<L>  /  TBili  0.6  /  DBili  x   /  AST  58<H>  /  ALT  49  /  AlkPhos  48  09-05                              9.3    11.11 )-----------( 128      ( 05 Sep 2024 14:33 )             28.1       Radiology:  < from: CT Head No Cont (09.05.24 @ 12:53) >  IMPRESSION:    CT HEAD: Encephalomalacia and gliosis in the RIGHT frontal subcortical   white matter unchanged. Tiny 5 mm RIGHT frontal subdural hematoma is unchanged.    < from: CT Head No Cont (09.05.24 @ 06:36) >  IMPRESSION:  Previously linear density along the right occipital lobe is no longer   visualized on the current study.    There is a newly appreciated small right subdural hematoma.  Right parietal scalp hematoma.    < from: CT Angio Head w/ IV Cont (09.05.24 @ 03:00) >  IMPRESSION:    CT HEAD:  Right posterior scalp soft tissue swelling and trace linear hyperdensity   in the right lateral occipital lobe, which likely reflects subarachnoid   hemorrhage versus less likely cortical laminar necrosis. No significant midline shift.    CTA NECK: Suboptimal bolus timing. Within this limitation:  No evidence of significant stenosis or occlusion.    CTA HEAD:  No large vessel occlusion, significant stenosis or vascular abnormality identified.      < from: TTE W or WO Ultrasound Enhancing Agent (09.05.24 @ 14:31) >  CONCLUSIONS:      1. Left ventricular systolic function is normal with an ejection fraction visually estimated at 60 to 65 %.   2. Mild mitral regurgitation.   3. Mild left ventricular hypertrophy.   4. Technically difficult image quality.

## 2024-09-05 NOTE — ED PROVIDER NOTE - PHYSICAL EXAMINATION
Constitutional: NAD AAOx3  Eyes: EOMI, pupils equal  Head: Normocephalic atraumatic  Mouth: no airway obstruction  Cardiac: regular rate   Resp: Lungs CTAB  GI: Abd s/nt/nd  Neuro: CN2-12 intact, sensation normal throughout, 5/5 strength in all extremities. No dysarthria. No expressive or receptive aphasia. Normal finger to nose and heel to shin bilaterally. No nystagmus, no visual field deficits.   Skin: No rashes Constitutional: NAD AAOx3  Eyes: EOMI, pupils equal  Head: Normocephalic atraumatic  Mouth: no airway obstruction  Cardiac: regular rate   Resp: Lungs CTAB  GI: Abd s/nt/nd  Neuro: CN2-12 intact, sensation normal throughout, 5/5 strength in all extremities. No dysarthria. No expressive or receptive aphasia. Normal finger to nose and heel to shin bilaterally. No nystagmus, no visual field deficits. NIHSS=-0  Skin: No rashes

## 2024-09-06 LAB
ANION GAP SERPL CALC-SCNC: 1 MMOL/L — LOW (ref 5–17)
BLD GP AB SCN SERPL QL: SIGNIFICANT CHANGE UP
BUN SERPL-MCNC: 37 MG/DL — HIGH (ref 7–23)
CALCIUM SERPL-MCNC: 8.4 MG/DL — LOW (ref 8.5–10.1)
CHLORIDE SERPL-SCNC: 113 MMOL/L — HIGH (ref 96–108)
CO2 SERPL-SCNC: 31 MMOL/L — SIGNIFICANT CHANGE UP (ref 22–31)
CREAT SERPL-MCNC: 0.86 MG/DL — SIGNIFICANT CHANGE UP (ref 0.5–1.3)
EGFR: 85 ML/MIN/1.73M2 — SIGNIFICANT CHANGE UP
FERRITIN SERPL-MCNC: 158 NG/ML — SIGNIFICANT CHANGE UP (ref 30–400)
FOLATE SERPL-MCNC: 15.1 NG/ML — SIGNIFICANT CHANGE UP
GLUCOSE SERPL-MCNC: 99 MG/DL — SIGNIFICANT CHANGE UP (ref 70–99)
HAPTOGLOB SERPL-MCNC: 41 MG/DL — SIGNIFICANT CHANGE UP (ref 34–200)
HCT VFR BLD CALC: 24.3 % — LOW (ref 39–50)
HCT VFR BLD CALC: 26.6 % — LOW (ref 39–50)
HCT VFR BLD CALC: 27.2 % — LOW (ref 39–50)
HGB BLD-MCNC: 7.8 G/DL — LOW (ref 13–17)
HGB BLD-MCNC: 8.5 G/DL — LOW (ref 13–17)
HGB BLD-MCNC: 8.9 G/DL — LOW (ref 13–17)
INR BLD: 0.99 RATIO — SIGNIFICANT CHANGE UP (ref 0.85–1.18)
IRON SATN MFR SERPL: 181 UG/DL — HIGH (ref 45–165)
IRON SATN MFR SERPL: 82 % — HIGH (ref 16–55)
LDH SERPL L TO P-CCNC: 157 U/L — SIGNIFICANT CHANGE UP (ref 84–241)
MAGNESIUM SERPL-MCNC: 1.8 MG/DL — SIGNIFICANT CHANGE UP (ref 1.6–2.6)
MCHC RBC-ENTMCNC: 32 GM/DL — SIGNIFICANT CHANGE UP (ref 32–36)
MCHC RBC-ENTMCNC: 32.1 GM/DL — SIGNIFICANT CHANGE UP (ref 32–36)
MCHC RBC-ENTMCNC: 32.7 GM/DL — SIGNIFICANT CHANGE UP (ref 32–36)
MCHC RBC-ENTMCNC: 33.3 PG — SIGNIFICANT CHANGE UP (ref 27–34)
MCHC RBC-ENTMCNC: 33.5 PG — SIGNIFICANT CHANGE UP (ref 27–34)
MCHC RBC-ENTMCNC: 34.4 PG — HIGH (ref 27–34)
MCV RBC AUTO: 103.8 FL — HIGH (ref 80–100)
MCV RBC AUTO: 104.7 FL — HIGH (ref 80–100)
MCV RBC AUTO: 105 FL — HIGH (ref 80–100)
MRSA PCR RESULT.: SIGNIFICANT CHANGE UP
OB PNL STL: NEGATIVE — SIGNIFICANT CHANGE UP
PHOSPHATE SERPL-MCNC: 3.1 MG/DL — SIGNIFICANT CHANGE UP (ref 2.5–4.5)
PLATELET # BLD AUTO: 127 K/UL — LOW (ref 150–400)
PLATELET # BLD AUTO: 143 K/UL — LOW (ref 150–400)
PLATELET # BLD AUTO: 146 K/UL — LOW (ref 150–400)
POTASSIUM SERPL-MCNC: 3.7 MMOL/L — SIGNIFICANT CHANGE UP (ref 3.5–5.3)
POTASSIUM SERPL-SCNC: 3.7 MMOL/L — SIGNIFICANT CHANGE UP (ref 3.5–5.3)
PROTHROM AB SERPL-ACNC: 11.2 SEC — SIGNIFICANT CHANGE UP (ref 9.5–13)
RBC # BLD: 2.34 M/UL — LOW (ref 4.2–5.8)
RBC # BLD: 2.54 M/UL — LOW (ref 4.2–5.8)
RBC # BLD: 2.59 M/UL — LOW (ref 4.2–5.8)
RBC # BLD: 2.59 M/UL — LOW (ref 4.2–5.8)
RBC # FLD: 14 % — SIGNIFICANT CHANGE UP (ref 10.3–14.5)
RETICS #: 44.3 K/UL — SIGNIFICANT CHANGE UP (ref 25–125)
RETICS/RBC NFR: 1.7 % — SIGNIFICANT CHANGE UP (ref 0.5–2.5)
S AUREUS DNA NOSE QL NAA+PROBE: SIGNIFICANT CHANGE UP
SODIUM SERPL-SCNC: 145 MMOL/L — SIGNIFICANT CHANGE UP (ref 135–145)
TIBC SERPL-MCNC: 221 UG/DL — SIGNIFICANT CHANGE UP (ref 220–430)
UIBC SERPL-MCNC: 40 UG/DL — LOW (ref 110–370)
VIT B12 SERPL-MCNC: 281 PG/ML — SIGNIFICANT CHANGE UP (ref 232–1245)
WBC # BLD: 10.18 K/UL — SIGNIFICANT CHANGE UP (ref 3.8–10.5)
WBC # BLD: 7.46 K/UL — SIGNIFICANT CHANGE UP (ref 3.8–10.5)
WBC # BLD: 8.02 K/UL — SIGNIFICANT CHANGE UP (ref 3.8–10.5)
WBC # FLD AUTO: 10.18 K/UL — SIGNIFICANT CHANGE UP (ref 3.8–10.5)
WBC # FLD AUTO: 7.46 K/UL — SIGNIFICANT CHANGE UP (ref 3.8–10.5)
WBC # FLD AUTO: 8.02 K/UL — SIGNIFICANT CHANGE UP (ref 3.8–10.5)

## 2024-09-06 PROCEDURE — 99232 SBSQ HOSP IP/OBS MODERATE 35: CPT

## 2024-09-06 PROCEDURE — 99231 SBSQ HOSP IP/OBS SF/LOW 25: CPT

## 2024-09-06 PROCEDURE — 99233 SBSQ HOSP IP/OBS HIGH 50: CPT

## 2024-09-06 RX ORDER — ASPIRIN 81 MG
81 TABLET, DELAYED RELEASE (ENTERIC COATED) ORAL DAILY
Refills: 0 | Status: DISCONTINUED | OUTPATIENT
Start: 2024-09-06 | End: 2024-09-08

## 2024-09-06 RX ORDER — CHLORHEXIDINE GLUCONATE 40 MG/ML
1 SOLUTION TOPICAL
Refills: 0 | Status: DISCONTINUED | OUTPATIENT
Start: 2024-09-06 | End: 2024-09-08

## 2024-09-06 RX ORDER — POLYETHYLENE GLYCOL 3350 17 G/17G
17 POWDER, FOR SOLUTION ORAL DAILY
Refills: 0 | Status: DISCONTINUED | OUTPATIENT
Start: 2024-09-06 | End: 2024-09-08

## 2024-09-06 RX ORDER — DIAZEPAM 10 MG
2 TABLET ORAL
Refills: 0 | Status: DISCONTINUED | OUTPATIENT
Start: 2024-09-06 | End: 2024-09-08

## 2024-09-06 RX ADMIN — Medication 10 MILLIGRAM(S): at 10:52

## 2024-09-06 RX ADMIN — TAMSULOSIN HYDROCHLORIDE 0.8 MILLIGRAM(S): 0.4 CAPSULE ORAL at 22:13

## 2024-09-06 RX ADMIN — Medication 40 MILLIGRAM(S): at 22:14

## 2024-09-06 RX ADMIN — Medication 5 MILLIGRAM(S): at 16:07

## 2024-09-06 RX ADMIN — Medication 40 MILLIGRAM(S): at 05:47

## 2024-09-06 RX ADMIN — ACETAMINOPHEN 1000 MILLIGRAM(S): 325 TABLET ORAL at 00:00

## 2024-09-06 RX ADMIN — Medication 1 TABLET(S): at 10:52

## 2024-09-06 RX ADMIN — Medication 2 TABLET(S): at 22:12

## 2024-09-06 RX ADMIN — Medication 2 MILLIGRAM(S): at 13:16

## 2024-09-06 RX ADMIN — MECLIZINE HYDROCHLORIDE 25 MILLIGRAM(S): 25 TABLET ORAL at 10:52

## 2024-09-06 RX ADMIN — POLYETHYLENE GLYCOL 3350 17 GRAM(S): 17 POWDER, FOR SOLUTION ORAL at 10:52

## 2024-09-06 NOTE — PROGRESS NOTE ADULT - SUBJECTIVE AND OBJECTIVE BOX
Patient has noted remarkable improvement of vertigo, is sitting in bed comfortable, reports no double vision, visual blurring, has been cautious with head and neck movements.    MRI brain negative for acute infarct or acute hmg, right frontal convexity subdural hygroma; encephalomalacia and gliosis within the right   posteromedial frontal lobe and left inferior paramedian left parietal cortex likely compatible with old infarcts.        ROS; as above, other ROS negative      MEDICATIONS  (STANDING):  acetaminophen   IVPB .. 1000 milliGRAM(s) IV Intermittent once  atorvastatin 40 milliGRAM(s) Oral at bedtime  calcium carbonate 1250 mG  + Vitamin D (OsCal 500 + D) 1 Tablet(s) Oral daily  chlorhexidine 4% Liquid 1 Application(s) Topical <User Schedule>  multivitamin 1 Tablet(s) Oral daily  pantoprazole    Tablet 40 milliGRAM(s) Oral before breakfast  polyethylene glycol 3350 17 Gram(s) Oral daily  predniSONE   Tablet 10 milliGRAM(s) Oral daily  senna 2 Tablet(s) Oral at bedtime  tamsulosin 0.8 milliGRAM(s) Oral at bedtime      Vital Signs Last 24 Hrs  T(C): 36.9 (06 Sep 2024 06:00), Max: 37.4 (05 Sep 2024 16:00)  T(F): 98.4 (06 Sep 2024 06:00), Max: 99.3 (05 Sep 2024 16:00)  HR: 80 (06 Sep 2024 15:00) (61 - 80)  BP: 98/55 (06 Sep 2024 15:00) (88/45 - 121/58)  BP(mean): 68 (06 Sep 2024 15:00) (59 - 99)  RR: 18 (06 Sep 2024 06:00) (16 - 23)  SpO2: 98% (06 Sep 2024 15:00) (91% - 100%)    Parameters below as of 06 Sep 2024 12:00  Patient On (Oxygen Delivery Method): room air      Neurological exam:  HF: A x O x 3. Appropriately interactive, normal affect. Speech fluent, No Aphasia or paraphasic errors. Naming /repetition intact   CN: ABIGAIL, EOMI, no nystagmus, VFF, facial sensation normal, no NLFD, tongue midline, Palate moves equally, SCM equal bilaterally  Motor: No pronator drift, Strength 5/5 in all 4 ext, normal bulk and tone, no tremor, rigidity or bradykinesia.    Sens: Intact to light touch   Reflexes: Symmetric and normal,  downgoing toes b/l  Coord:  No FNFA, dysmetria  Gait/Balance: not tested                          8.9    8.02  )-----------( 143      ( 06 Sep 2024 11:26 )             27.2     09-06    145  |  113<H>  |  37<H>  ----------------------------<  99  3.7   |  31  |  0.86    Ca    8.4<L>      06 Sep 2024 06:05  Phos  3.1     09-06  Mg     1.8     09-06    TPro  5.3<L>  /  Alb  2.5<L>  /  TBili  0.6  /  DBili  x   /  AST  58<H>  /  ALT  49  /  AlkPhos  48  09-05          Radiology report:  < from: MR Head No Cont (09.05.24 @ 23:02) >  IMPRESSION: No acute intracranial hemorrhage or evidence of acute   ischemia.    Thin right frontal convexity subdural hygroma.    Chronic areas of encephalomalacia and gliosis within the right   posteromedial frontal lobe and left inferior paramedian left parietal   cortex likely compatible with old infarcts.

## 2024-09-06 NOTE — PROGRESS NOTE ADULT - SUBJECTIVE AND OBJECTIVE BOX
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 As per pt, he was in his usual state of health this evening when he went to sleep around 21:30 (LKWT). He woke up to use bathroom around 0030 tonight and that's when he felt as if the room spinning when he was walking. He lowered himself to the ground, denies LOC, no head trauma. Denies h/o vertigo, states symptoms are worse with turning head left to right, +associated nausea. Denies prior episodes. CT head initially sig for ? trace Right occipital SAH. At the time of my exam pt appears to be comfortable, in NAD. Pt denies HA, visual changes, focal numb / ting / weak, word finding difficulty, neck stiffness, photophobia.     Subjective:  Pt seen and examined at bedside with chaperone. Pt is AAOx3, pt in no acute distress. Pt denied c/o fever, chills, chest pain, SOB, abd pain, N/V/D, extremity pain or dysfunction, hemoptysis, hematemesis, hematuria, hematochezia headache, diplopia. Pt tolerating diet, (+) void, (+) ambulation, (+) bowel function    ROS: as above otherwise negative    PMH: GCA, prostate Ca, TIA/emoblic stroke, GERD, PBPH  PSH: inguinal hernia repair, ORIF ankle  penicillin (Other)    SH: no toxic behaviors  FH: no pertinent fam med hx    Vital Signs Last 24 Hrs  T(C): 36.3 (05 Sep 2024 07:10), Max: 36.3 (05 Sep 2024 01:50)  T(F): 97.3 (05 Sep 2024 07:10), Max: 97.4 (05 Sep 2024 01:50)  HR: 86 (05 Sep 2024 09:32) (69 - 86)  BP: 93/62 (05 Sep 2024 09:32) (93/62 - 116/55)  BP(mean): 71 (05 Sep 2024 07:10) (70 - 71)  RR: 16 (05 Sep 2024 09:32) (16 - 18)  SpO2: 99% (05 Sep 2024 09:32) (99% - 100%)    Parameters below as of 05 Sep 2024 07:10  Patient On (Oxygen Delivery Method): room air      Meds:  acetaminophen   IVPB .. 1000 milliGRAM(s) IV Intermittent once PRN  atorvastatin 40 milliGRAM(s) Oral at bedtime  calcium carbonate 1250 mG  + Vitamin D (OsCal 500 + D) 1 Tablet(s) Oral daily  metoclopramide Injectable 10 milliGRAM(s) IV Push once  multivitamin 1 Tablet(s) Oral daily  ondansetron Injectable 4 milliGRAM(s) IV Push every 8 hours PRN  pantoprazole    Tablet 40 milliGRAM(s) Oral before breakfast  predniSONE   Tablet 10 milliGRAM(s) Oral daily  senna 2 Tablet(s) Oral at bedtime  sodium chloride 0.9%. 1000 milliLiter(s) IV Continuous <Continuous>  tamsulosin 0.8 milliGRAM(s) Oral at bedtime         (05 Sep 2024 10:37)      PAST MEDICAL & SURGICAL HISTORY:  HLD (hyperlipidemia)      BPH (benign prostatic hyperplasia)      GERD (gastroesophageal reflux disease)      Snores      [place] (hard of hearing)      H/O constipation      Status post ORIF of fracture of ankle      History of colonoscopy      S/P left inguinal hernia repair        FAMILY HISTORY:    [ x ] Family history not pertinent as reviewed with the patient and family    SOCIAL HISTORY:    Medications (inpatient): acetaminophen   IVPB .. 1000 milliGRAM(s) IV Intermittent once  atorvastatin 40 milliGRAM(s) Oral at bedtime  calcium carbonate 1250 mG  + Vitamin D (OsCal 500 + D) 1 Tablet(s) Oral daily  chlorhexidine 4% Liquid 1 Application(s) Topical <User Schedule>  multivitamin 1 Tablet(s) Oral daily  pantoprazole    Tablet 40 milliGRAM(s) Oral before breakfast  polyethylene glycol 3350 17 Gram(s) Oral daily  predniSONE   Tablet 10 milliGRAM(s) Oral daily  senna 2 Tablet(s) Oral at bedtime  tamsulosin 0.8 milliGRAM(s) Oral at bedtime    Medications (PRN):meclizine 25 milliGRAM(s) Oral every 8 hours PRN  ondansetron Injectable 4 milliGRAM(s) IV Push every 8 hours PRN    Allergies: penicillin (Other)  (Intolerances: )    Vital Signs Last 24 Hrs  T(C): 36.9 (06 Sep 2024 06:00), Max: 37.8 (05 Sep 2024 13:00)  T(F): 98.4 (06 Sep 2024 06:00), Max: 100.1 (05 Sep 2024 13:00)  HR: 63 (06 Sep 2024 09:00) (61 - 91)  BP: 109/62 (06 Sep 2024 09:00) (97/60 - 138/50)  BP(mean): 76 (06 Sep 2024 09:00) (63 - 99)  RR: 18 (06 Sep 2024 06:00) (10 - 23)  SpO2: 100% (06 Sep 2024 09:00) (95% - 100%)    Parameters below as of 05 Sep 2024 23:00  Patient On (Oxygen Delivery Method): nasal cannula  O2 Flow (L/min): 2    Drug Dosing Weight  Height (cm): 185.4 (05 Sep 2024 01:50)  Weight (kg): 79.4 (05 Sep 2024 01:50)  BMI (kg/m2): 23.1 (05 Sep 2024 01:50)  BSA (m2): 2.03 (05 Sep 2024 01:50)      Physical exam:  GCS of 15  Airway is patent  Breathing is symmetric and unlabored  Neuro: CNII-XII grossly intact  Psych: normal affect  HEENT: Normocephalic, ?small area of ecchymosis to right parietal scalp, YUSEF, EOM wnl, no otorrhea or hemotympanum b/l, no epistaxis or d/c b/l nares, no craniofacial bony pathology or tenderness b/l  Neck: No crepitus, no ecchymosis, no hematoma, to exam, no JVD, no tracheal deviation  Cspine/thoracolumbrosacral spine: no gross bony pathology or tenderness to exam  Cardiovascular: S1S2 Present  Chest: no gross rib pathology or tenderness to exam. No sternal pathology or tenderness to exam. No crepitus, no ecchymosis, no hematoma. No penetrating thorcoabdominal trauma  Respiratory: Rate is 18; Respiratory Effort normal; no wheezes, rales or rhonchi to exam  ABD: bowel sounds (+), soft, nontender, non distended, no rebound, no guarding, no rigidity, no skin changes to exam. No pelvic instability to exam, no skin changes  Musculoskeletal: Pt has palpable b/l radial, femoral, dorsalis pedis pulses. All digits are warm and well perfused. No gross long bone pathology or tenderness to exam. Pt demonstrates grossly intact sensoromotor function. Pt has good capillary refill to digits, no calf edema or tenderness to exam.  Skin: no lesions or rashes to exam                        7.8    7.46  )-----------( 127      ( 06 Sep 2024 06:05 )             24.3     09-06    145  |  113<H>  |  37<H>  ----------------------------<  99  3.7   |  31  |  0.86    Ca    8.4<L>      06 Sep 2024 06:05  Phos  3.1     09-06  Mg     1.8     09-06    TPro  5.3<L>  /  Alb  2.5<L>  /  TBili  0.6  /  DBili  x   /  AST  58<H>  /  ALT  49  /  AlkPhos  48  09-05      Urinalysis Basic - ( 06 Sep 2024 06:05 )    Color: x / Appearance: x / SG: x / pH: x  Gluc: 99 mg/dL / Ketone: x  / Bili: x / Urobili: x   Blood: x / Protein: x / Nitrite: x   Leuk Esterase: x / RBC: x / WBC x   Sq Epi: x / Non Sq Epi: x / Bacteria: x        9/5/24  List Injuries Identified to Date:   right SDH with overlying scalp hematoma    RADIOLOGICAL FINDINGS REVIEW:  Radiology:    < from: CT Angio Head w/ IV Cont (09.05.24 @ 03:00) >  CT HEAD:  Right posterior scalp soft tissue swelling and trace linear hyperdensity   in the right lateral occipital lobe, which likely reflects subarachnoid   hemorrhage versus less likely cortical laminar necrosis. No significant   midline shift.    CTA NECK: Suboptimal bolus timing. Within this limitation:  No evidence of significant stenosis or occlusion.    CTA HEAD:  No large vessel occlusion, significant stenosis or vascular abnormality   identified.    Findings were discussed with Dr. JOEL PERDOMO [phone] 9/5/2024 3:22   AM by Dr. Larsen with read back confirmation.    --- End of Report ---      < end of copied text >      < from: CT Head No Cont (09.05.24 @ 06:36) >    IMPRESSION:  Previously linear density along the right occipital lobe is no longer   visualized on the current study.    There is a newly appreciated small right subdural hematoma.    Right parietal scalp hematoma.    Findings were discussed with Dr. Mariscal 9/5/2024 6:50 AM by Dr. BERNABE    with read back confirmation.    --- End of Report ---

## 2024-09-06 NOTE — PROGRESS NOTE ADULT - ASSESSMENT
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 As per pt, he was in his usual state of health this evening when he went to sleep around 21:30 (LKWT). He woke up to use bathroom around 0030 tonight and that's when he felt as if the room spinning when he was walking. He lowered himself to the ground, denies LOC, no head trauma. Denies h/o vertigo, states symptoms are worse with turning head left to right, +associated nausea. Denies prior episodes. Pt denies HA, visual changes, focal numb / ting / weak, word finding difficulty, neck stiffness, photophobia.     CTA head and neck 0300: Right posterior scalp soft tissue swelling and trace linear hyperdensity in the right lateral occipital lobe, which likely reflects subarachnoid hemorrhage versus less likely cortical laminar necrosis. No significant midline shift. No large vessel occlusion, significant stenosis or vascular abnormality of the head or neck identified.    CTH 0636: Previously linear density along the right occipital lobe is no longer visualized on the current study. There is a newly appreciated small right subdural hematoma.  Right parietal scalp hematoma.    Problems:  # right SDH with overlying scalp hematoma  # h/o TIA on plavix/ASA  # new onset vertigo  # anemia  Hct 24.3 today   # prostate ca on monthly chemo  # GCA on chronic, tapered steroids    Tertiary trauma survey- stable     Plan:  Continue SDU under trauma team, Dr. 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 As per pt, he was in his usual state of health this evening when he went to sleep around 21:30 (LKWT). He woke up to use bathroom around 0030 tonight and that's when he felt as if the room spinning when he was walking. He lowered himself to the ground, denies LOC, no head trauma. Denies h/o vertigo, states symptoms are worse with turning head left to right, +associated nausea. Denies prior episodes. Pt denies HA, visual changes, focal numb / ting / weak, word finding difficulty, neck stiffness, photophobia.     CTA head and neck 0300: Right posterior scalp soft tissue swelling and trace linear hyperdensity in the right lateral occipital lobe, which likely reflects subarachnoid hemorrhage versus less likely cortical laminar necrosis. No significant midline shift. No large vessel occlusion, significant stenosis or vascular abnormality of the head or neck identified.    CTH 0636: Previously linear density along the right occipital lobe is no longer visualized on the current study. There is a newly appreciated small right subdural hematoma.  Right parietal scalp hematoma.    Problems:  # right SDH with overlying scalp hematoma  # h/o TIA on plavix/ASA  # new onset vertigo  # anemia  Hct 24.3 today   # prostate ca on monthly chemo  # GCA on chronic, tapered steroids    Tertiary trauma survey- stable     Plan:  Continue SDU under trauma team, Dr. Hameed  Neurosurgery FU  Neurochecks   c/o vertigo not improved with 50 mg meclizine this AM, neurology c/s placed and seen recs on chart   BUN up to 37 pre renal however having adequate UO   Cont IVF  CT's stable - start diet   resume home meds as appropriate  hold plavix/asa (baby ASA OK)  SCDs for DVT PPX  bowel regimen, PPI  Per Dr. 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 As per pt, he was in his usual state of health this evening when he went to sleep around 21:30 (LKWT). He woke up to use bathroom around 0030 tonight and that's when he felt as if the room spinning when he was walking. He lowered himself to the ground, denies LOC, no head trauma. Denies h/o vertigo, states symptoms are worse with turning head left to right, +associated nausea. Denies prior episodes. Pt denies HA, visual changes, focal numb / ting / weak, word finding difficulty, neck stiffness, photophobia.     CTA head and neck 0300: Right posterior scalp soft tissue swelling and trace linear hyperdensity in the right lateral occipital lobe, which likely reflects subarachnoid hemorrhage versus less likely cortical laminar necrosis. No significant midline shift. No large vessel occlusion, significant stenosis or vascular abnormality of the head or neck identified.    CTH 0636: Previously linear density along the right occipital lobe is no longer visualized on the current study. There is a newly appreciated small right subdural hematoma.  Right parietal scalp hematoma.    Problems:  # right SDH with overlying scalp hematoma  # h/o TIA on plavix/ASA  # new onset vertigo  # anemia  Hct 24.3 today   # prostate ca on monthly chemo  # GCA on chronic, tapered steroids    Tertiary trauma survey- stable     Plan:  Continue SDU under trauma team, Dr. Hameed  Neurosurgery FU  Neurochecks   c/o vertigo not improved with 50 mg meclizine this AM, neurology c/s placed and seen recs on chart   BUN up to 37 pre renal however having adequate UO   Cont IVF  CT's stable - start diet   resume home meds as appropriate  hold plavix/asa (baby ASA OK)  SCDs for DVT PPX  bowel regimen, PPI        case and plan dw Dr Hameed

## 2024-09-06 NOTE — PROGRESS NOTE ADULT - SUBJECTIVE AND OBJECTIVE BOX
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 As per pt, he was in his usual state of health this evening when he went to sleep around 21:30 (LKWT). He woke up to use bathroom around 0030 tonight and that's when he felt as if the room spinning when he was walking. He lowered himself to the ground, denies LOC, no head trauma. Denies h/o vertigo, states symptoms are worse with turning head left to right, +associated nausea. Denies prior episodes. Pt denies HA, visual changes, focal numb / ting / weak, word finding difficulty, neck stiffness, photophobia.   CTA head and neck 0300: Right posterior scalp soft tissue swelling and trace linear hyperdensity in the right lateral occipital lobe, which likely reflects subarachnoid hemorrhage versus less likely cortical laminar necrosis. No significant midline shift. No large vessel occlusion, significant stenosis or vascular abnormality of the head or neck identified.  CTH 0636: Previously linear density along the right occipital lobe is no longer visualized on the current study. There is a newly appreciated small right subdural hematoma.  Right parietal scalp hematoma.    SUBJECTIVE / INTERVAL HPI: Patient seen and examined at bedside.     ROS: All 10 systems reviewed and found to be negative with the exception of what has been described above.     VITAL SIGNS:  Vital Signs Last 24 Hrs  T(C): 36.9 (06 Sep 2024 06:00), Max: 37.4 (05 Sep 2024 16:00)  T(F): 98.4 (06 Sep 2024 06:00), Max: 99.3 (05 Sep 2024 16:00)  HR: 71 (06 Sep 2024 13:15) (61 - 91)  BP: 92/56 (06 Sep 2024 13:15) (88/45 - 138/50)  BP(mean): 66 (06 Sep 2024 13:15) (59 - 99)  RR: 18 (06 Sep 2024 06:00) (14 - 23)  SpO2: 97% (06 Sep 2024 13:15) (91% - 100%)    Parameters below as of 06 Sep 2024 12:00  Patient On (Oxygen Delivery Method): room air        PHYSICAL EXAM:    General: Well developed, well nourished, no acute distress  HEENT: NC/AT; PERRL, anicteric sclera; MMM  Neck: Supple  Cardiovascular: +S1/S2, RRR, no murmurs, rubs, gallops  Respiratory: CTA B/L; no W/R/R  Gastrointestinal: soft, NT/ND; +BSx4  Extremities: WWP; no edema, clubbing or cyanosis  Vascular: 2+ radial, DP/PT pulses B/L  Neurological: AAOx3; no focal deficits    MEDICATIONS:  MEDICATIONS  (STANDING):  acetaminophen   IVPB .. 1000 milliGRAM(s) IV Intermittent once  atorvastatin 40 milliGRAM(s) Oral at bedtime  calcium carbonate 1250 mG  + Vitamin D (OsCal 500 + D) 1 Tablet(s) Oral daily  chlorhexidine 4% Liquid 1 Application(s) Topical <User Schedule>  multivitamin 1 Tablet(s) Oral daily  pantoprazole    Tablet 40 milliGRAM(s) Oral before breakfast  polyethylene glycol 3350 17 Gram(s) Oral daily  predniSONE   Tablet 10 milliGRAM(s) Oral daily  senna 2 Tablet(s) Oral at bedtime  tamsulosin 0.8 milliGRAM(s) Oral at bedtime    MEDICATIONS  (PRN):  diazepam    Tablet 2 milliGRAM(s) Oral two times a day PRN vertigo, dizziness  meclizine 25 milliGRAM(s) Oral every 8 hours PRN Dizziness  ondansetron Injectable 4 milliGRAM(s) IV Push every 8 hours PRN Nausea and/or Vomiting      ALLERGIES:  Allergies    penicillin (Other)    Intolerances        LABS:                        8.9    8.02  )-----------( 143      ( 06 Sep 2024 11:26 )             27.2     09-06    145  |  113<H>  |  37<H>  ----------------------------<  99  3.7   |  31  |  0.86    Ca    8.4<L>      06 Sep 2024 06:05  Phos  3.1     09-06  Mg     1.8     09-06    TPro  5.3<L>  /  Alb  2.5<L>  /  TBili  0.6  /  DBili  x   /  AST  58<H>  /  ALT  49  /  AlkPhos  48  09-05    PT/INR - ( 06 Sep 2024 11:26 )   PT: 11.2 sec;   INR: 0.99 ratio           Urinalysis Basic - ( 06 Sep 2024 06:05 )    Color: x / Appearance: x / SG: x / pH: x  Gluc: 99 mg/dL / Ketone: x  / Bili: x / Urobili: x   Blood: x / Protein: x / Nitrite: x   Leuk Esterase: x / RBC: x / WBC x   Sq Epi: x / Non Sq Epi: x / Bacteria: x      CAPILLARY BLOOD GLUCOSE            RADIOLOGY & ADDITIONAL TESTS: Reviewed. 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 As per pt, he was in his usual state of health this evening when he went to sleep around 21:30 (LKWT). He woke up to use bathroom around 0030 tonight and that's when he felt as if the room spinning when he was walking. He lowered himself to the ground, denies LOC, no head trauma. Denies h/o vertigo, states symptoms are worse with turning head left to right, +associated nausea. Denies prior episodes. Pt denies HA, visual changes, focal numb / ting / weak, word finding difficulty, neck stiffness, photophobia.   CTA head and neck 0300: Right posterior scalp soft tissue swelling and trace linear hyperdensity in the right lateral occipital lobe, which likely reflects subarachnoid hemorrhage versus less likely cortical laminar necrosis. No significant midline shift. No large vessel occlusion, significant stenosis or vascular abnormality of the head or neck identified.  CTH 0636: Previously linear density along the right occipital lobe is no longer visualized on the current study. There is a newly appreciated small right subdural hematoma.  Right parietal scalp hematoma.    SUBJECTIVE / INTERVAL HPI: Patient seen and examined at bedside.     ROS: All 10 systems reviewed and found to be negative with the exception of what has been described above.     VITAL SIGNS:  Vital Signs Last 24 Hrs  T(C): 36.9 (06 Sep 2024 06:00), Max: 37.4 (05 Sep 2024 16:00)  T(F): 98.4 (06 Sep 2024 06:00), Max: 99.3 (05 Sep 2024 16:00)  HR: 71 (06 Sep 2024 13:15) (61 - 91)  BP: 92/56 (06 Sep 2024 13:15) (88/45 - 138/50)  BP(mean): 66 (06 Sep 2024 13:15) (59 - 99)  RR: 18 (06 Sep 2024 06:00) (14 - 23)  SpO2: 97% (06 Sep 2024 13:15) (91% - 100%)    Parameters below as of 06 Sep 2024 12:00  Patient On (Oxygen Delivery Method): room air    PHYSICAL EXAM:  General: No acute distress  HEENT: NC/AT; PERRL, anicteric sclera; MMM  Neck: Supple  Cardiovascular: +S1/S2, RRR, no murmurs, rubs, gallops  Respiratory: CTA B/L; no W/R/R  Gastrointestinal: soft, NT/ND; +BSx4  Extremities: WWP; no edema, clubbing or cyanosis  Vascular: 2+ radial, DP/PT pulses B/L  Neurological: AAOx3; no focal deficits    MEDICATIONS:  MEDICATIONS  (STANDING):  acetaminophen   IVPB .. 1000 milliGRAM(s) IV Intermittent once  atorvastatin 40 milliGRAM(s) Oral at bedtime  calcium carbonate 1250 mG  + Vitamin D (OsCal 500 + D) 1 Tablet(s) Oral daily  chlorhexidine 4% Liquid 1 Application(s) Topical <User Schedule>  multivitamin 1 Tablet(s) Oral daily  pantoprazole    Tablet 40 milliGRAM(s) Oral before breakfast  polyethylene glycol 3350 17 Gram(s) Oral daily  predniSONE   Tablet 10 milliGRAM(s) Oral daily  senna 2 Tablet(s) Oral at bedtime  tamsulosin 0.8 milliGRAM(s) Oral at bedtime    MEDICATIONS  (PRN):  diazepam    Tablet 2 milliGRAM(s) Oral two times a day PRN vertigo, dizziness  meclizine 25 milliGRAM(s) Oral every 8 hours PRN Dizziness  ondansetron Injectable 4 milliGRAM(s) IV Push every 8 hours PRN Nausea and/or Vomiting      ALLERGIES:  Allergies    penicillin (Other)    Intolerances        LABS:                        8.9    8.02  )-----------( 143      ( 06 Sep 2024 11:26 )             27.2     09-06    145  |  113<H>  |  37<H>  ----------------------------<  99  3.7   |  31  |  0.86    Ca    8.4<L>      06 Sep 2024 06:05  Phos  3.1     09-06  Mg     1.8     09-06    TPro  5.3<L>  /  Alb  2.5<L>  /  TBili  0.6  /  DBili  x   /  AST  58<H>  /  ALT  49  /  AlkPhos  48  09-05    PT/INR - ( 06 Sep 2024 11:26 )   PT: 11.2 sec;   INR: 0.99 ratio           Urinalysis Basic - ( 06 Sep 2024 06:05 )    Color: x / Appearance: x / SG: x / pH: x  Gluc: 99 mg/dL / Ketone: x  / Bili: x / Urobili: x   Blood: x / Protein: x / Nitrite: x   Leuk Esterase: x / RBC: x / WBC x   Sq Epi: x / Non Sq Epi: x / Bacteria: x      CAPILLARY BLOOD GLUCOSE            RADIOLOGY & ADDITIONAL TESTS: Reviewed. 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 As per pt, he was in his usual state of health this evening when he went to sleep around 21:30 (LKWT). He woke up to use bathroom around 0030 tonight and that's when he felt as if the room spinning when he was walking. He lowered himself to the ground, denies LOC, no head trauma. Denies h/o vertigo, states symptoms are worse with turning head left to right, +associated nausea. Denies prior episodes. Pt denies HA, visual changes, focal numb / ting / weak, word finding difficulty, neck stiffness, photophobia.   CTA head and neck 0300: Right posterior scalp soft tissue swelling and trace linear hyperdensity in the right lateral occipital lobe, which likely reflects subarachnoid hemorrhage versus less likely cortical laminar necrosis. No significant midline shift. No large vessel occlusion, significant stenosis or vascular abnormality of the head or neck identified.  CTH 0636: Previously linear density along the right occipital lobe is no longer visualized on the current study. There is a newly appreciated small right subdural hematoma.  Right parietal scalp hematoma.    SUBJECTIVE / INTERVAL HPI: Patient seen and examined at bedside. Vertigo has improved with Valium and Meclizine. No signs of bleeding per pt - unsure why hgb dropped.     ROS: All 10 systems reviewed and found to be negative with the exception of what has been described above.     VITAL SIGNS:  Vital Signs Last 24 Hrs  T(C): 36.9 (06 Sep 2024 06:00), Max: 37.4 (05 Sep 2024 16:00)  T(F): 98.4 (06 Sep 2024 06:00), Max: 99.3 (05 Sep 2024 16:00)  HR: 71 (06 Sep 2024 13:15) (61 - 91)  BP: 92/56 (06 Sep 2024 13:15) (88/45 - 138/50)  BP(mean): 66 (06 Sep 2024 13:15) (59 - 99)  RR: 18 (06 Sep 2024 06:00) (14 - 23)  SpO2: 97% (06 Sep 2024 13:15) (91% - 100%)    Parameters below as of 06 Sep 2024 12:00  Patient On (Oxygen Delivery Method): room air    PHYSICAL EXAM:  General: No acute distress  HEENT: NC/AT; PERRL, anicteric sclera; MMM  Neck: Supple  Cardiovascular: +S1/S2, RRR, no murmurs, rubs, gallops  Respiratory: CTA B/L; no W/R/R  Gastrointestinal: soft, NT/ND; +BSx4  Extremities: WWP; no edema, clubbing or cyanosis  Vascular: 2+ radial, DP/PT pulses B/L  Neurological: AAOx3; no focal deficits    MEDICATIONS:  MEDICATIONS  (STANDING):  acetaminophen   IVPB .. 1000 milliGRAM(s) IV Intermittent once  atorvastatin 40 milliGRAM(s) Oral at bedtime  calcium carbonate 1250 mG  + Vitamin D (OsCal 500 + D) 1 Tablet(s) Oral daily  chlorhexidine 4% Liquid 1 Application(s) Topical <User Schedule>  multivitamin 1 Tablet(s) Oral daily  pantoprazole    Tablet 40 milliGRAM(s) Oral before breakfast  polyethylene glycol 3350 17 Gram(s) Oral daily  predniSONE   Tablet 10 milliGRAM(s) Oral daily  senna 2 Tablet(s) Oral at bedtime  tamsulosin 0.8 milliGRAM(s) Oral at bedtime    MEDICATIONS  (PRN):  diazepam    Tablet 2 milliGRAM(s) Oral two times a day PRN vertigo, dizziness  meclizine 25 milliGRAM(s) Oral every 8 hours PRN Dizziness  ondansetron Injectable 4 milliGRAM(s) IV Push every 8 hours PRN Nausea and/or Vomiting      ALLERGIES:  Allergies    penicillin (Other)    Intolerances        LABS:                        8.9    8.02  )-----------( 143      ( 06 Sep 2024 11:26 )             27.2     09-06    145  |  113<H>  |  37<H>  ----------------------------<  99  3.7   |  31  |  0.86    Ca    8.4<L>      06 Sep 2024 06:05  Phos  3.1     09-06  Mg     1.8     09-06    TPro  5.3<L>  /  Alb  2.5<L>  /  TBili  0.6  /  DBili  x   /  AST  58<H>  /  ALT  49  /  AlkPhos  48  09-05    PT/INR - ( 06 Sep 2024 11:26 )   PT: 11.2 sec;   INR: 0.99 ratio           Urinalysis Basic - ( 06 Sep 2024 06:05 )    Color: x / Appearance: x / SG: x / pH: x  Gluc: 99 mg/dL / Ketone: x  / Bili: x / Urobili: x   Blood: x / Protein: x / Nitrite: x   Leuk Esterase: x / RBC: x / WBC x   Sq Epi: x / Non Sq Epi: x / Bacteria: x      CAPILLARY BLOOD GLUCOSE            RADIOLOGY & ADDITIONAL TESTS: Reviewed.

## 2024-09-06 NOTE — DIETITIAN INITIAL EVALUATION ADULT - PERTINENT LABORATORY DATA
09-06    145  |  113<H>  |  37<H>  ----------------------------<  99  3.7   |  31  |  0.86    Ca    8.4<L>      06 Sep 2024 06:05  Phos  3.1     09-06  Mg     1.8     09-06    TPro  5.3<L>  /  Alb  2.5<L>  /  TBili  0.6  /  DBili  x   /  AST  58<H>  /  ALT  49  /  AlkPhos  48  09-05  A1C with Estimated Average Glucose Result: 6.2 % (03-21-24 @ 07:56)

## 2024-09-06 NOTE — PHYSICAL THERAPY INITIAL EVALUATION ADULT - PERTINENT HX OF CURRENT PROBLEM, REHAB EVAL
83 yo male PMHx of GCA 3/2024 w/ residual central vision loss right eye, is On Acterma, also had history pros CA, TIA /Embolic strokes, is on plavix, ASA was restarted by heme/onc, has no residual deficits, presented to ER for evaluation of vertigo. Early this morning just after midnight, patient got up to go to the bathroom, when getting up he had severe vertigo- spinning sensation and nausea, he slid himself down on the floor, did not hit his head, no LOC, incontinence, focal weakness/numbness, seizure like activity, dysarthria, facial droop or diplopia. He did start to have a headache soon afterwards. His wife found him fully oriented on the floor and called 911.     According to SICU PA note:  Found to have CTH initially sig for ? trace Right occipital SAH. Repeat CTH revealing new R frontal SDH with R parietal scalp hematoma, no SAH. Repeat CTH after this showing stable SDH.    Head MRI: No acute intracranial hemorrhage or evidence of acute   ischemia.

## 2024-09-06 NOTE — DIETITIAN INITIAL EVALUATION ADULT - ORAL INTAKE PTA/DIET HISTORY
pt reports good appetite despite losing wt recently. Does endorse nausea and chronic constipation, takes Doculax daily. Lives w/ wife who cooks/ shops for pt    prev admit 3/21/24: "Pt lives at home w/ wife that cooks/grocery shops w/o difficulty. 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 Does not consume ONS at home."

## 2024-09-06 NOTE — DIETITIAN INITIAL EVALUATION ADULT - PERTINENT MEDS FT
MEDICATIONS  (STANDING):  acetaminophen   IVPB .. 1000 milliGRAM(s) IV Intermittent once  atorvastatin 40 milliGRAM(s) Oral at bedtime  calcium carbonate 1250 mG  + Vitamin D (OsCal 500 + D) 1 Tablet(s) Oral daily  chlorhexidine 4% Liquid 1 Application(s) Topical <User Schedule>  multivitamin 1 Tablet(s) Oral daily  pantoprazole    Tablet 40 milliGRAM(s) Oral before breakfast  polyethylene glycol 3350 17 Gram(s) Oral daily  predniSONE   Tablet 10 milliGRAM(s) Oral daily  senna 2 Tablet(s) Oral at bedtime  tamsulosin 0.8 milliGRAM(s) Oral at bedtime    MEDICATIONS  (PRN):  diazepam    Tablet 2 milliGRAM(s) Oral two times a day PRN vertigo, dizziness  meclizine 25 milliGRAM(s) Oral every 8 hours PRN Dizziness  ondansetron Injectable 4 milliGRAM(s) IV Push every 8 hours PRN Nausea and/or Vomiting

## 2024-09-06 NOTE — PROGRESS NOTE ADULT - ASSESSMENT
INCOMPLETE NOTE  INCOMPLETE NOTE    85 yo male PMH giant cell arteritis, hx of TIA and embolic stroke w/o residual deficits, GERD, BPH presented tot he ER for evaluation of dizziness starting upon waking up this night. CT head sig for ? trace Right occipital SAH. Neurology consulted for vertigo. Medicine consulted for comanagement.     #Right occipital SAH  #Vertigo  - No acute neurosurgical intervention   - MR brain neg for acute blood  - ASA and Plavix resumed per Neurosurgical   - Neuro checks   - Neurology following for Vertigo - recommendations appreciated   - Repeat CT head per Neurosurgery - will follow up with Dr. Luis     #Hx of GCA  -Continue home Prednisone    #Anemia of chronic dx  - No signs of bleeding  - H/H stable on repeat labs this afternoon  - Monitor        83 yo male PMH giant cell arteritis, hx of TIA and embolic stroke w/o residual deficits, GERD, BPH presented tot he ER for evaluation of dizziness starting upon waking up this night. CT head sig for ? trace Right occipital SAH. Neurology consulted for vertigo. Medicine consulted for comanagement.     #Right occipital SAH  #Vertigo  - No acute neurosurgical intervention   - MR brain neg for acute blood  - ASA and Plavix resumed per Neurosurgical   - Neuro checks   - Neurology following for Vertigo - recommendations appreciated   - Patient reports improvement in symptoms with Valium and Meclizine   - Repeat CT head per Neurosurgery - will follow up with Dr. Luis   #Anemia of chronic dx  - No signs of bleeding. Baseline Hgb around 12 - currently 9  - Order FOBT  - Iron studies noted  - H/H stable on repeat labs this afternoon  - Monitor H/H    #Hx of GCA  -Continue home Prednisone    MEDICINE WILL CONTINUE TO FOLLOW 83 yo male PMH giant cell arteritis, hx of TIA and embolic stroke w/o residual deficits, GERD, BPH presented tot he ER for evaluation of dizziness starting upon waking up this night. CT head sig for ? trace Right occipital SAH. Neurology consulted for vertigo. Medicine consulted for comanagement.     #Right occipital SAH  #Vertigo  - No acute neurosurgical intervention   - MR brain neg for acute blood  - ASA and Plavix resumed per Neurosurgical   - Neuro checks   - Neurology following for Vertigo - recommendations appreciated   - Patient reports improvement in symptoms with Valium and Meclizine   - Repeat CT head per Neurosurgery - will follow up with Dr. Luis     #Anemia of chronic dx  - No signs of bleeding. Baseline Hgb around 12 - currently 9  - Order FOBT  - Iron studies noted  - H/H stable on repeat labs this afternoon  - Monitor H/H    #Hx of GCA  -Continue home Prednisone    MEDICINE WILL CONTINUE TO FOLLOW 83 yo male PMH giant cell arteritis, hx of TIA and embolic stroke w/o residual deficits, GERD, BPH presented tot he ER for evaluation of dizziness starting upon waking up this night. CT head sig for ? trace Right occipital SAH. Neurology consulted for vertigo. Medicine consulted for comanagement.     #Right occipital SAH  #Vertigo  - No acute neurosurgical intervention   - MR brain neg for acute blood  - ASA resumed per Neurosurgical Team  - Neuro checks   - Neurology following for Vertigo - recommendations appreciated   - Patient reports improvement in symptoms with Valium and Meclizine   - Repeat CT head per Neurosurgery - will follow up with Dr. Luis     #Anemia of chronic dx  - No signs of bleeding. Baseline Hgb around 12 - currently 9  - Order FOBT  - Iron studies noted  - H/H stable on repeat labs this afternoon  - Monitor H/H    #Hx of GCA  -Continue home Prednisone    MEDICINE WILL CONTINUE TO FOLLOW

## 2024-09-06 NOTE — PROGRESS NOTE ADULT - ASSESSMENT
85 yo male PMHx of GCA 3/2024 w/ residual central vision loss right eye, is On Acterma, also had history pros CA, TIA /Embolic strokes, is on plavix, ASA was restarted by heme/onc, has no residual deficits, presented to ER for evaluation of vertigo. Early this morning just after midnight, patient got up to go to the bathroom, when getting up he had severe vertigo- spinning sensation and nausea, he slid himself down on the floor, did not hit his head, no LOC, incontinence, focal weakness/numbness, seizure like activity, dysarthria, facial droop or diplopia. He did start to have a headache soon afterwards. His wife found him fully oriented on the floor and called 911. CT head was sig for ? trace Right occipital SAH, CT angio H/N , no LVO, stenosis or vascular abnormality. Pt seen by neurosurgery. Repeat CT head this AM shows resolution SAH, and a new R frontal SDH.  ASA and Plavix are on hold.      #Acute vertigo, likely positional, MRI brain reveals no acute infarct, chronic left parietal right frontal infarcts noted    # Small R occipital SAH, with new R frontal SDH -Not seen on MRI brain, right frontal convexity subdural hygroma noted      Recommendations  - May restart aspirin 81 Mg once cleared by neurosurgery  - Atorvastatin 40mg QD  - Educated regarding vertigo, advised about head and neck postural changes.  If symptoms recur recommended vestibular therapy    – Above management discussed with patient and with Dr. Garner. Call neuro if needed henceforth

## 2024-09-06 NOTE — PROGRESS NOTE ADULT - SUBJECTIVE AND OBJECTIVE BOX
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 As per EMR, pt states he was in his usual state of health this evening when he went to sleep around 21:30 (LKWT). He woke up to use bathroom around 0030 tonight and that's when he felt as if the room spinning when he was walking. He lowered himself to the ground, denies LOC    CT head initially sig for ? trace Right occipital SAH. Repeat CTH revealing new R frontal SDH with R parietal scalp hematoma.     9/5 pt c/o feeling dizzy still, exacerbated by movement. HA has resolved with tylenol. denies dark stools, nausea, abd pain, diarrhea, cough, sob  9/6 pt still feeling dizzy but better than  yesterday. hasnt had BM yet.     PAST MEDICAL & SURGICAL HISTORY:  HLD (hyperlipidemia)  BPH (benign prostatic hyperplasia)  GERD (gastroesophageal reflux disease)  [place] (hard of hearing)  H/O constipation  Status post ORIF of fracture of ankle  History of colonoscopy  S/P left inguinal hernia repair      MEDICATIONS  (STANDING):  acetaminophen   IVPB .. 1000 milliGRAM(s) IV Intermittent once  atorvastatin 40 milliGRAM(s) Oral at bedtime  calcium carbonate 1250 mG  + Vitamin D (OsCal 500 + D) 1 Tablet(s) Oral daily  chlorhexidine 4% Liquid 1 Application(s) Topical <User Schedule>  multivitamin 1 Tablet(s) Oral daily  pantoprazole    Tablet 40 milliGRAM(s) Oral before breakfast  polyethylene glycol 3350 17 Gram(s) Oral daily  predniSONE   Tablet 10 milliGRAM(s) Oral daily  senna 2 Tablet(s) Oral at bedtime  tamsulosin 0.8 milliGRAM(s) Oral at bedtime    Vital Signs Last 24 Hrs  T(C): 36.9 (06 Sep 2024 06:00), Max: 37.8 (05 Sep 2024 13:00)  T(F): 98.4 (06 Sep 2024 06:00), Max: 100.1 (05 Sep 2024 13:00)  HR: 65 (06 Sep 2024 10:07) (61 - 91)  BP: 105/54 (06 Sep 2024 10:07) (102/53 - 138/50)  BP(mean): 71 (06 Sep 2024 10:07) (63 - 99)  RR: 18 (06 Sep 2024 06:00) (10 - 23)  SpO2: 98% (06 Sep 2024 10:07) (95% - 100%)    Parameters below as of 05 Sep 2024 23:00  Patient On (Oxygen Delivery Method): nasal cannula  O2 Flow (L/min): 2      LABS:                            7.8    7.46  )-----------( 127      ( 06 Sep 2024 06:05 )             24.3     09-06    145  |  113<H>  |  37<H>  ----------------------------<  99  3.7   |  31  |  0.86    Ca    8.4<L>      06 Sep 2024 06:05  Phos  3.1     09-06  Mg     1.8     09-06    TPro  5.3<L>  /  Alb  2.5<L>  /  TBili  0.6  /  DBili  x   /  AST  58<H>  /  ALT  49  /  AlkPhos  48  09-05    LIVER FUNCTIONS - ( 05 Sep 2024 02:48 )  Alb: 2.5 g/dL / Pro: 5.3 gm/dL / ALK PHOS: 48 U/L / ALT: 49 U/L / AST: 58 U/L / GGT: x           Urinalysis Basic - ( 06 Sep 2024 06:05 )    Color: x / Appearance: x / SG: x / pH: x  Gluc: 99 mg/dL / Ketone: x  / Bili: x / Urobili: x   Blood: x / Protein: x / Nitrite: x   Leuk Esterase: x / RBC: x / WBC x   Sq Epi: x / Non Sq Epi: x / Bacteria: x    CULTURES:    Physical Examination:    General: No acute distress.    HEENT: Pupils equal, reactive to light.  Symmetric. dry lips  PULM: Clear to auscultation bilaterally, no wheezing  CVS: Regular rate and rhythm, no murmurs  ABD: Soft, nondistended, nontender  EXT: No LE edema  SKIN: Warm and well perfused, no rashes noted.  NEURO: Alert, oriented, interactive. no gross focal deficits. strength in UE and LE equal b.l    DEVICES:     RADIOLOGY:  < from: MR Head No Cont (09.05.24 @ 23:02) >    IMPRESSION: No acute intracranial hemorrhage or evidence of acute   ischemia.    Thin right frontal convexity subdural hygroma.    Chronic areas of encephalomalacia and gliosis within the right   posteromedial frontal lobe and left inferior paramedian left parietal   cortex likely compatible with old infarcts.    < end of copied text >      < from: CT Chest w/ IV Cont (09.05.24 @ 10:56) >  IMPRESSION:  No acute intrathoracic or intra-abdominal traumatic findings.    Constipated colon. Air-filled nondilated small bowel may reflect   enteritis. Constipated colon. Gastric distention of either gastroparesis   or partial gastric outlet obstruction.    Trace left hydronephrosis. No evidence of high-grade obstruction.    < end of copied text >      < from: CT Head No Cont (09.05.24 @ 06:36) >  IMPRESSION:  Previously linear density along the right occipital lobe is no longer   visualized on the current study.    There is a newly appreciated small right subdural hematoma.    Right parietal scalp hematoma.    < end of copied text >      < from: CT Angio Head w/ IV Cont (09.05.24 @ 03:00) >  CT HEAD:  Right posterior scalp soft tissue swelling and trace linear hyperdensity   in the right lateral occipital lobe, which likely reflects subarachnoid   hemorrhage versus less likely cortical laminar necrosis. No significant   midline shift.    CTA NECK: Suboptimal bolus timing. Within this limitation:  No evidence of significant stenosis or occlusion.    CTA HEAD:  No large vessel occlusion, significant stenosis or vascular abnormality   identified.    < end of copied text >

## 2024-09-06 NOTE — DIETITIAN INITIAL EVALUATION ADULT - NSICDXPASTMEDICALHX_GEN_ALL_CORE_FT
PAST MEDICAL HISTORY:  BPH (benign prostatic hyperplasia)     GERD (gastroesophageal reflux disease)     H/O constipation     HLD (hyperlipidemia)     [place] (hard of hearing)     Snores

## 2024-09-06 NOTE — DIETITIAN INITIAL EVALUATION ADULT - ADD RECOMMEND
1) c/w regular diet; Encourage protein-rich foods, maximize food preferences, 2) Add ensure plus high protein BID to optimize PO intake (provides 350 kcal, 20g protein/ shake), 3) MVI w/ minerals daily to ensure 100% RDA met, 4) Consider adding thiamine 100 mg daily 2/2 poor PO intake/ malnutrition, 5) Consider obtaining vitamin D 25OH level to assess nutriture, 6) consider checking B6, B12, thiamine, folate, carnitine, and copper levels as malnutrition in cause these to be deficient, 7) Confirm goals of care regarding nutrition support, 8) Monitor bowel movements, if no BM for >3 days, consider implementing stronger bowel regimen. RD will continue to monitor PO intake, labs, hydration, and wt prn.

## 2024-09-06 NOTE — PROGRESS NOTE ADULT - ASSESSMENT
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 CT head sig for ? trace Right occipital SAH.     - No acute neurosurgical intervention   - MR brain neg for acute blood  - OK to resume ASA / Plavix from neurosurgical standpoint  - OK to liberalize SBP goal  - Neuros q 8 hours  - Medical mgmt for vertigo  - Repeat CT head in 2 weeks, sooner if pt has any change in neurologic function   - Can follow up in the office with Dr Luis after CT competed  - Will sign off for now, reconsult PRN     Discussed with Dr Luis    Time spent 25 minutes in review of imaging, examination of the patient, and coordination with contributing physicians

## 2024-09-06 NOTE — DIETITIAN INITIAL EVALUATION ADULT - OTHER INFO
85 yo male PMH giant cell arteritis w/ residual vision in right eye with "black spots", 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 As per pt, he was in his usual state of health this evening when he went to sleep around 21:30 (LKWT). He woke up to use bathroom around 0030 tonight and that's when he felt as if the room spinning when he was walking. He lowered himself to the ground, denies LOC, no head trauma. CT head initially sig for ? trace Right occipital SAH.     Seen by RD service on prev admit March 2024 - met criteria for PCM at this time. Wt taken by RD via bed scale at 169# on 3/21/24. Currently, pt appears very thin. NFPE reveals mod to severe muscle/ fat wasting; continues to meet criteria for PCM. New bed scale wt taken by RD on 9/6/24 at 168. Pt reports # in Feb 2024. Unintentional wt loss of 17#/ 9.2% x 6 mos (not clinically significant). Pt on regular diet, will trial premier protein shakes to optimize protein intake, pt receptive. See additional recs below.

## 2024-09-06 NOTE — PHYSICAL THERAPY INITIAL EVALUATION ADULT - FOLLOWS COMMANDS/ANSWERS QUESTIONS, REHAB EVAL
Need for prophylactic measure
100% of the time

## 2024-09-06 NOTE — DIETITIAN INITIAL EVALUATION ADULT - NSFNSGIIOFT_GEN_A_CORE
I&O's Detail    05 Sep 2024 07:01  -  06 Sep 2024 07:00  --------------------------------------------------------  IN:    IV PiggyBack: 100 mL    sodium chloride 0.9%: 1620 mL  Total IN: 1720 mL    OUT:    Incontinent per Condom Catheter (mL): 1825 mL    Voided (mL): 1000 mL  Total OUT: 2825 mL    Total NET: -1105 mL      06 Sep 2024 07:01  -  06 Sep 2024 14:13  --------------------------------------------------------  IN:    sodium chloride 0.9%: 298 mL  Total IN: 298 mL    OUT:    Incontinent per Condom Catheter (mL): 675 mL  Total OUT: 675 mL    Total NET: -377 mL

## 2024-09-06 NOTE — PROGRESS NOTE ADULT - ASSESSMENT
ASSESSMENT   83 yo male with PMHx giant cell arteritis w/ residual vision in right eye with "black spots", hx of TIA and embolic stroke (on plavix, ASA) w/o residual deficits, GERD, BPH, prostate ca, presented to ER for evaluation of dizziness overnight. As per EMR, pt states he was in his usual state of health this evening when he went to sleep around 21:30 (LKW). He woke up to use bathroom around 0030 tonight and that's when he felt as if the room spinning when he was walking. He lowered himself to the ground, denies LOC    CT head initially sig for ? trace Right occipital SAH. Repeat CTH revealing new R frontal SDH with R parietal scalp hematoma.     Admitted for  1. Dizziness  2. Acute R frontal SDH  3. Trace R occipital SAH? - now resolved  4. Macrocytic anemia  5. Enteritis on CT imaging?    PLAN    Neuro: AAOx 3. repeat CTH stable. MRI head neg for acute infarcts. neuro consult for possible vertigo? ordered meclizine prn  CV: Normotensive. in sinus rhythm on monitor. holding ASA and Plavix. keep systolic BP <150. TTE EF 60-65%, mild LVH.  loop recorder interrogation neg for arrhythmias. check orthostatics  Pulm: on room air.   GI: PO diet when cleared by neuro sx. PPI.   Nephro: dc IV fluids. elevated SG on UA and BUN on admission could be reflective of dehydration vs steroid use. monitor I & Os. Trend renal fxn  Endo: cont chronic prednisone 10mg qd for giant cell arteritis.   ID: no leukocytosis. no clear source of infection. monitor off abx.   Heme: Hgb 9.1 -> 7.8 baseline Hgb appears to be 12-13 from EMR? denies dark stools, GERD. anemia w/u iron studies, b12 folate - thus far neg. occult blood pending. on lupron for prostate CA and tocilizumab for GCA. thrombocytopenia? unclear etiology could be related to tocilizumab? cont to trend. no chemical DVT ppx due to SDH. SCDs  PT eval    Dispo: Stable for med/surg. Trend H/H. hold ASA and plavix. anemia work up. meclizine for vertigo    Will discuss with Dr. Diamond    signed out to Dr. San for hospital consult 10:45am     ASSESSMENT   83 yo male with PMHx giant cell arteritis w/ residual vision in right eye with "black spots", hx of TIA and embolic stroke (on plavix, ASA) w/o residual deficits, GERD, BPH, prostate ca, presented to ER for evaluation of dizziness overnight. As per EMR, pt states he was in his usual state of health this evening when he went to sleep around 21:30 (LKW). He woke up to use bathroom around 0030 tonight and that's when he felt as if the room spinning when he was walking. He lowered himself to the ground, denies LOC    CT head initially sig for ? trace Right occipital SAH. Repeat CTH revealing new R frontal SDH with R parietal scalp hematoma.     Admitted for  1. Dizziness  2. Acute R frontal SDH  3. Trace R occipital SAH? - now resolved  4. Macrocytic anemia  5. Enteritis on CT imaging?    PLAN    Neuro: AAOx 3. repeat CTH stable. MRI head neg for acute infarcts. neuro consult for possible vertigo? ordered meclizine and low dose valium prn for vertigo. recommend f/u with neuro as outpt for vertigo mgmt and neuro sx for repeat CTH in 2 weeks.  CV: Normotensive. in sinus rhythm on monitor. holding ASA and Plavix until ok with neuro sx and if Hgb stable. TTE EF 60-65%, mild LVH.  loop recorder interrogation neg for arrhythmias. check orthostatics  Pulm: on room air.   GI: PO diet when cleared by neuro sx. PPI.   Nephro: dc IV fluids. elevated SG on UA and BUN on admission could be reflective of dehydration vs steroid use. monitor I & Os. Trend renal fxn  Endo: cont chronic prednisone 10mg qd for giant cell arteritis.   ID: no leukocytosis. no clear source of infection. monitor off abx.   Heme: Hgb 9.1 -> 7.8 baseline Hgb appears to be 12-13 from EMR? denies dark stools, GERD. anemia w/u iron studies, b12 folate - thus far neg. occult blood pending. on lupron for prostate CA and tocilizumab for GCA. thrombocytopenia? unclear etiology could be related to tocilizumab? cont to trend. no chemical DVT ppx due to SDH. SCDs. recommend f/u with hematologist  PT eval    Dispo: Stable for med/surg. Trend H/H. hold ASA and plavix. anemia work up. meclizine for vertigo    Will discuss with Dr. Diamond    signed out to Dr. San for hospital consult 10:45am     ASSESSMENT   85 yo male with PMHx giant cell arteritis w/ residual vision in right eye with "black spots", hx of TIA and embolic stroke (on plavix, ASA) w/o residual deficits, GERD, BPH, prostate ca, presented to ER for evaluation of dizziness overnight. As per EMR, pt states he was in his usual state of health this evening when he went to sleep around 21:30 (LKW). He woke up to use bathroom around 0030 tonight and that's when he felt as if the room spinning when he was walking. He lowered himself to the ground, denies LOC    CT head initially sig for ? trace Right occipital SAH. Repeat CTH revealing new R frontal SDH with R parietal scalp hematoma.     Admitted for  1. Dizziness  2. Acute R frontal SDH  3. Trace R occipital SAH? - now resolved  4. Macrocytic anemia  5. Enteritis on CT imaging?    PLAN    Neuro: AAOx 3. repeat CTH stable. MRI head neg for acute infarcts. neuro consult for possible vertigo? ordered meclizine and low dose valium prn for vertigo. recommend f/u with neuro as outpt for vertigo mgmt and neuro sx for repeat CTH in 2 weeks.  CV: Normotensive. in sinus rhythm on monitor. resume ASA and plavix when ok with neuro sx.  TTE EF 60-65%, mild LVH.  loop recorder interrogation neg for arrhythmias. check orthostatics  Pulm: on room air.   GI: PO diet  PPI. pt has had  Nephro: elevated SG on UA and BUN on admission could be reflective of dehydration vs steroid use. monitor I & Os. Trend renal fxn  Endo: cont chronic prednisone 10mg qd for giant cell arteritis.   ID: no leukocytosis. no clear source of infection. monitor off abx.   Heme: Hgb 9.1 -> 7.8 baseline Hgb appears to be 12-13 from EMR? denies dark stools, GERD. anemia w/u iron studies, b12 folate - thus far neg. occult blood pending. on lupron for prostate CA and tocilizumab for GCA. thrombocytopenia? unclear etiology could be related to tocilizumab? cont to trend. no chemical DVT ppx due to SDH. SCDs. recommend f/u with hematologist  PT eval    Dispo: Stable for med/surg. Trend H/H. hold ASA and plavix. anemia work up. meclizine for vertigo    Will discuss with Dr. Diamond  signed out to Dr. San for hospital consult 10:45am      Repeat CBC Hgb 8.9. hemolysis labs neg.   will add dulcolax as family states that works better. pt hasnt had BM in 3 days.   updated wife and daughter at bedside.   Recommended repeat labs with primary care doctor and follow up with GI for further anemia workup. Also f/u with neuro sx for repeat CTH imaging.

## 2024-09-06 NOTE — PROGRESS NOTE ADULT - NS ATTEND AMEND GEN_ALL_CORE FT
A: 83 yo male PMH giant cell arteritis w/ residual vision in right eye with "black spots", 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 As per pt, he was in his usual state of health this evening when he went to sleep around 21:30 (LKWT). He woke up to use bathroom around 0030 tonight and that's when he felt as if the room spinning when he was walking. He lowered himself to the ground, denies LOC, no head trauma. Denies h/o vertigo, states symptoms are worse with turning head left to right, +associated nausea. Denies prior episodes. Pt denies HA, visual changes, focal numb / ting / weak, word finding difficulty, neck stiffness, photophobia.     CTA head and neck 0300: Right posterior scalp soft tissue swelling and trace linear hyperdensity in the right lateral occipital lobe, which likely reflects subarachnoid hemorrhage versus less likely cortical laminar necrosis. No significant midline shift. No large vessel occlusion, significant stenosis or vascular abnormality of the head or neck identified.    CTH 0636: Previously linear density along the right occipital lobe is no longer visualized on the current study. There is a newly appreciated small right subdural hematoma.  Right parietal scalp hematoma.    Problems:  # right SDH with overlying scalp hematoma  # h/o TIA on plavix/ASA  # new onset vertigo  # anemia  Hct 24.3 today   # prostate ca on monthly chemo  # GCA on chronic, tapered steroids    Tertiary trauma survey- stable     Plan:  Continue SDU under trauma team, Dr. Hameed  Neurosurgery   Neurochecks   c/o vertigo not improved with 50 mg meclizine this AM, neurology c/s placed and seen recs on chart   BUN up to 37 pre renal however having adequate UO   Cont IVF  CT's stable - start diet   resume home meds as appropriate  hold plavix/asa (baby ASA OK)  SCDs for DVT PPX  bowel regimen, PPI    Repeat H/H stable from admission, no need for transfusions at this time. No hemorrhage source on imaging/studies. Pt has h/o anemia, outpt evaluation and mgmt for anemia    35 minuted of time spent on pt examination, review of relevant labs and radiologic studies, assured stabilization of pt, discussion with relevant services/providers for coordination of pt care and services, time is exclusive of resident and/or physiciam assistant teaching or supervision time

## 2024-09-06 NOTE — PROGRESS NOTE ADULT - SUBJECTIVE AND OBJECTIVE BOX
Patient is a 84y old  Male who presents with a chief complaint of SDH (06 Sep 2024 09:49)    HPI:  85 yo male PMH giant cell arteritis, hx of TIA and embolic stroke w/o residual deficits, GERD, BPH presented tot he ER for evaluation of dizziness starting upon waking up this night. As per pt, he was in his usual state of health this evening when he went to sleep around 21:30 (LKWT). He woke up to use bathroom around 0030 tonight and that's when he felt as if the room spinning when he was walking. He lowered himself to the ground, denies LOC, no head trauma. Pt states vertigo is worse with turning head left to right, +associated nausea. Denies prior episodes. CT head sig for ? trace Right occipital SAH. At the time of my exam pt appears to be comfortable, in NAD. Pt denies HA, visual changes, focal numb / ting / weak, word finding difficulty, neck stiffness, photophobia.     9/6 - Pt seen and examined earlier today, in NAD. Appears comfortable, denies new complaints, no overnight events. Vertigo much better today. MR brain shows no acute hemm        acetaminophen   IVPB .. 1000 milliGRAM(s) IV Intermittent once  atorvastatin 40 milliGRAM(s) Oral at bedtime  calcium carbonate 1250 mG  + Vitamin D (OsCal 500 + D) 1 Tablet(s) Oral daily  chlorhexidine 4% Liquid 1 Application(s) Topical <User Schedule>  meclizine 25 milliGRAM(s) Oral every 8 hours PRN  multivitamin 1 Tablet(s) Oral daily  ondansetron Injectable 4 milliGRAM(s) IV Push every 8 hours PRN  pantoprazole    Tablet 40 milliGRAM(s) Oral before breakfast  polyethylene glycol 3350 17 Gram(s) Oral daily  predniSONE   Tablet 10 milliGRAM(s) Oral daily  senna 2 Tablet(s) Oral at bedtime  tamsulosin 0.8 milliGRAM(s) Oral at bedtime        Vital Signs Last 24 Hrs  T(C): 36.9 (06 Sep 2024 06:00), Max: 37.8 (05 Sep 2024 13:00)  T(F): 98.4 (06 Sep 2024 06:00), Max: 100.1 (05 Sep 2024 13:00)  HR: 65 (06 Sep 2024 10:07) (61 - 91)  BP: 105/54 (06 Sep 2024 10:07) (102/53 - 138/50)  BP(mean): 71 (06 Sep 2024 10:07) (63 - 99)  RR: 18 (06 Sep 2024 06:00) (10 - 23)  SpO2: 98% (06 Sep 2024 10:07) (95% - 100%)    Parameters below as of 05 Sep 2024 23:00  Patient On (Oxygen Delivery Method): nasal cannula  O2 Flow (L/min): 2        Physical Exam:  Constitutional: Awake / alert  HEENT: PERRLA, EOMI  Neck: Supple  Respiratory: Breath sounds are clear bilaterally  Cardiovascular: S1 and S2, regular rhythm  Gastrointestinal: Soft, NT/ND  Extremities:  no edema  Musculoskeletal: no abnormal movements  Skin: No rashes    Neurological Exam:  HF: A x O x 3, follows commands, normal affect, speech fluent  CN: PERRL, EOMI, no NLFD, tongue midline  Motor: Strength 5/5 in all 4 ext, normal bulk and tone  Sens: Intact to light touch  Reflexes: Symmetric and normal, no clonus, no Richard's   Coord:  No FNFA, dysmetria, LIGIA intact   Gait/Balance: Cannot test                            7.8    7.46  )-----------( 127      ( 06 Sep 2024 06:05 )             24.3       09-06    145  |  113<H>  |  37<H>  ----------------------------<  99  3.7   |  31  |  0.86    Ca    8.4<L>      06 Sep 2024 06:05  Phos  3.1     09-06  Mg     1.8     09-06    TPro  5.3<L>  /  Alb  2.5<L>  /  TBili  0.6  /  DBili  x   /  AST  58<H>  /  ALT  49  /  AlkPhos  48  09-05        Radiology:  MR Head No Cont (09.05.24 @ 23:02) >  No acute intracranial hemorrhage or evidence of acute   ischemia.    Thin right frontal convexity subdural hygroma.    Chronic areas of encephalomalacia and gliosis within the right   posteromedial frontal lobe and left inferior paramedian left parietal   cortex likely compatible with old infarcts.

## 2024-09-07 LAB
ANION GAP SERPL CALC-SCNC: 3 MMOL/L — LOW (ref 5–17)
BUN SERPL-MCNC: 31 MG/DL — HIGH (ref 7–23)
CALCIUM SERPL-MCNC: 8.4 MG/DL — LOW (ref 8.5–10.1)
CHLORIDE SERPL-SCNC: 109 MMOL/L — HIGH (ref 96–108)
CO2 SERPL-SCNC: 31 MMOL/L — SIGNIFICANT CHANGE UP (ref 22–31)
CREAT SERPL-MCNC: 0.79 MG/DL — SIGNIFICANT CHANGE UP (ref 0.5–1.3)
EGFR: 88 ML/MIN/1.73M2 — SIGNIFICANT CHANGE UP
GLUCOSE SERPL-MCNC: 105 MG/DL — HIGH (ref 70–99)
HCT VFR BLD CALC: 24.1 % — LOW (ref 39–50)
HGB BLD-MCNC: 7.9 G/DL — LOW (ref 13–17)
MAGNESIUM SERPL-MCNC: 1.9 MG/DL — SIGNIFICANT CHANGE UP (ref 1.6–2.6)
MCHC RBC-ENTMCNC: 32.8 GM/DL — SIGNIFICANT CHANGE UP (ref 32–36)
MCHC RBC-ENTMCNC: 34.1 PG — HIGH (ref 27–34)
MCV RBC AUTO: 103.9 FL — HIGH (ref 80–100)
MRSA PCR RESULT.: SIGNIFICANT CHANGE UP
PHOSPHATE SERPL-MCNC: 3.6 MG/DL — SIGNIFICANT CHANGE UP (ref 2.5–4.5)
PLATELET # BLD AUTO: 128 K/UL — LOW (ref 150–400)
POTASSIUM SERPL-MCNC: 3.5 MMOL/L — SIGNIFICANT CHANGE UP (ref 3.5–5.3)
POTASSIUM SERPL-SCNC: 3.5 MMOL/L — SIGNIFICANT CHANGE UP (ref 3.5–5.3)
RBC # BLD: 2.32 M/UL — LOW (ref 4.2–5.8)
RBC # FLD: 13.8 % — SIGNIFICANT CHANGE UP (ref 10.3–14.5)
S AUREUS DNA NOSE QL NAA+PROBE: SIGNIFICANT CHANGE UP
SODIUM SERPL-SCNC: 143 MMOL/L — SIGNIFICANT CHANGE UP (ref 135–145)
TSH SERPL-MCNC: 1.82 UU/ML — SIGNIFICANT CHANGE UP (ref 0.34–4.82)
WBC # BLD: 8.33 K/UL — SIGNIFICANT CHANGE UP (ref 3.8–10.5)
WBC # FLD AUTO: 8.33 K/UL — SIGNIFICANT CHANGE UP (ref 3.8–10.5)

## 2024-09-07 PROCEDURE — 99232 SBSQ HOSP IP/OBS MODERATE 35: CPT

## 2024-09-07 PROCEDURE — 99233 SBSQ HOSP IP/OBS HIGH 50: CPT

## 2024-09-07 RX ADMIN — Medication 40 MILLIGRAM(S): at 06:32

## 2024-09-07 RX ADMIN — Medication 10 MILLIGRAM(S): at 09:08

## 2024-09-07 RX ADMIN — Medication 2 TABLET(S): at 22:47

## 2024-09-07 RX ADMIN — TAMSULOSIN HYDROCHLORIDE 0.8 MILLIGRAM(S): 0.4 CAPSULE ORAL at 22:47

## 2024-09-07 RX ADMIN — Medication 1 TABLET(S): at 09:08

## 2024-09-07 RX ADMIN — CHLORHEXIDINE GLUCONATE 1 APPLICATION(S): 40 SOLUTION TOPICAL at 06:32

## 2024-09-07 RX ADMIN — Medication 40 MILLIGRAM(S): at 22:47

## 2024-09-07 RX ADMIN — Medication 81 MILLIGRAM(S): at 09:08

## 2024-09-07 NOTE — PROGRESS NOTE ADULT - ASSESSMENT
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 As per pt, he was in his usual state of health this evening when he went to sleep around 21:30 (LKWT). He woke up to use bathroom around 0030 tonight and that's when he felt as if the room spinning when he was walking. He lowered himself to the ground, denies LOC, no head trauma. Denies h/o vertigo, states symptoms are worse with turning head left to right, +associated nausea. Denies prior episodes. Pt denies HA, visual changes, focal numb / ting / weak, word finding difficulty, neck stiffness, photophobia.     CTA head and neck 0300: Right posterior scalp soft tissue swelling and trace linear hyperdensity in the right lateral occipital lobe, which likely reflects subarachnoid hemorrhage versus less likely cortical laminar necrosis. No significant midline shift. No large vessel occlusion, significant stenosis or vascular abnormality of the head or neck identified.    CTH 0636: Previously linear density along the right occipital lobe is no longer visualized on the current study. There is a newly appreciated small right subdural hematoma.  Right parietal scalp hematoma.    Problems:  # right SDH with overlying scalp hematoma  # h/o TIA on plavix/ASA  # new onset vertigo  # anemia  H/H 8.5/26.6 today  # prostate ca on monthly chemo  # GCA on chronic, tapered steroids    Tertiary trauma survey- stable     Plan:  Continue SDU under trauma team, Dr. Hameed  Neurosurgery; no intervention. Ok to resume ASA and plavix  Neurochecks q8h  c/o vertigo not improved with 50 mg meclizine this AM, neurology c/s placed and seen recs on chart   BUN up to 37 pre renal however having adequate UO   C/w diet  resume home meds as appropriate  asa, holdin plavix for now  SCDs for DVT PPX  bowel regimen, PPI  SW for dispo planning  PT        case and plan dw Dr Hameed

## 2024-09-07 NOTE — CONSULT NOTE ADULT - SUBJECTIVE AND OBJECTIVE BOX
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    9/7/24:    PAST MEDICAL & SURGICAL HISTORY:    HLD (hyperlipidemia)    BPH (benign prostatic hyperplasia)    GERD (gastroesophageal reflux disease)    [place] (hard of hearing)    constipation    Status post ORIF of fracture of ankle    S/P left inguinal hernia repair    FAMILY HISTORY:  No pertinent family history in first degree relatives        MEDICATIONS  (STANDING):  acetaminophen   IVPB .. 1000 milliGRAM(s) IV Intermittent once  aspirin enteric coated 81 milliGRAM(s) Oral daily  atorvastatin 40 milliGRAM(s) Oral at bedtime  calcium carbonate 1250 mG  + Vitamin D (OsCal 500 + D) 1 Tablet(s) Oral daily  chlorhexidine 4% Liquid 1 Application(s) Topical <User Schedule>  multivitamin 1 Tablet(s) Oral daily  pantoprazole    Tablet 40 milliGRAM(s) Oral before breakfast  polyethylene glycol 3350 17 Gram(s) Oral daily  predniSONE   Tablet 10 milliGRAM(s) Oral daily  senna 2 Tablet(s) Oral at bedtime  tamsulosin 0.8 milliGRAM(s) Oral at bedtime    MEDICATIONS  (PRN):  bisacodyl 5 milliGRAM(s) Oral every 12 hours PRN Constipation  diazepam    Tablet 2 milliGRAM(s) Oral two times a day PRN vertigo, dizziness  meclizine 25 milliGRAM(s) Oral every 8 hours PRN Dizziness  ondansetron Injectable 4 milliGRAM(s) IV Push every 8 hours PRN Nausea and/or Vomiting      Allergies    penicillin (Other)    Intolerances        REVIEW OF SYSTEM:    Constitutional, Eyes, ENT, Cardiovascular, Respiratory, Gastrointestinal, Genitourinary, Musculoskeletal, Integumentary, Neurological, Psychiatric, Endocrine, Heme/Lymph and Allergic/Immunologic review of systems are otherwise negative except as noted in HPI.     Vital Signs Last 24 Hrs  T(C): 36.5 (07 Sep 2024 14:13), Max: 37.4 (06 Sep 2024 20:59)  T(F): 97.7 (07 Sep 2024 14:13), Max: 99.3 (06 Sep 2024 20:59)  HR: 73 (07 Sep 2024 14:13) (61 - 87)  BP: 117/48 (07 Sep 2024 14:13) (95/48 - 117/48)  BP(mean): 71 (06 Sep 2024 16:00) (71 - 71)  RR: 18 (07 Sep 2024 14:13) (17 - 18)  SpO2: 96% (07 Sep 2024 14:13) (95% - 96%)    Parameters below as of 07 Sep 2024 14:13  Patient On (Oxygen Delivery Method): room air        PHYSICAL EXAM:    Constitutional: no acute distress  Eyes: no conjunctival infection, anicteric.   ENT: pharynx is unremarkable  Neck: supple without JVD  Pulmonary: clear to auscultation bilaterally   Cardiac: RRR  Vascular: no calf tenderness, venous stasis changes, varices  Abdomen: normoactive bowel sounds, soft and nontender, no hepatosplenomegaly or masses appreciated  Lymphatic: no peripheral adenopathy appreciated  Musculoskeletal: full range of motion and no deformities appreciated  Skin: normal appearance, no rash/erythema  Neurology: awake, alert, oriented; no focal deficits      LABS:  CBC Full  -  ( 07 Sep 2024 07:57 )  WBC Count : 8.33 K/uL  RBC Count : 2.32 M/uL  Hemoglobin : 7.9 g/dL  Hematocrit : 24.1 %  Platelet Count - Automated : 128 K/uL  Mean Cell Volume : 103.9 fl  Mean Cell Hemoglobin : 34.1 pg  Mean Cell Hemoglobin Concentration : 32.8 gm/dL  Auto Neutrophil # : x  Auto Lymphocyte # : x  Auto Monocyte # : x  Auto Eosinophil # : x  Auto Basophil # : x  Auto Neutrophil % : x  Auto Lymphocyte % : x  Auto Monocyte % : x  Auto Eosinophil % : x  Auto Basophil % : x    09-07    143  |  109<H>  |  31<H>  ----------------------------<  105<H>  3.5   |  31  |  0.79    Ca    8.4<L>      07 Sep 2024 07:57  Phos  3.6     09-07  Mg     1.9     09-07      PT/INR - ( 06 Sep 2024 11:26 )   PT: 11.2 sec;   INR: 0.99 ratio           Urinalysis Basic - ( 07 Sep 2024 07:57 )    Color: x / Appearance: x / SG: x / pH: x  Gluc: 105 mg/dL / Ketone: x  / Bili: x / Urobili: x   Blood: x / Protein: x / Nitrite: x   Leuk Esterase: x / RBC: x / WBC x   Sq Epi: x / Non Sq Epi: x / Bacteria: x        RADIOLOGY & ADDITIONAL STUDIES: HPI:  83 yo male with MH significant for 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    9/7/24:    PAST MEDICAL & SURGICAL HISTORY:    HLD (hyperlipidemia)    BPH (benign prostatic hyperplasia)    GERD (gastroesophageal reflux disease)    [place] (hard of hearing)    constipation    Status post ORIF of fracture of ankle    S/P left inguinal hernia repair    FAMILY HISTORY:    No pertinent family history in first degree relatives    MEDICATIONS  (STANDING):    acetaminophen   IVPB .. 1000 milliGRAM(s) IV Intermittent once  aspirin enteric coated 81 milliGRAM(s) Oral daily  atorvastatin 40 milliGRAM(s) Oral at bedtime  calcium carbonate 1250 mG  + Vitamin D (OsCal 500 + D) 1 Tablet(s) Oral daily  chlorhexidine 4% Liquid 1 Application(s) Topical <User Schedule>  multivitamin 1 Tablet(s) Oral daily  pantoprazole    Tablet 40 milliGRAM(s) Oral before breakfast  polyethylene glycol 3350 17 Gram(s) Oral daily  predniSONE   Tablet 10 milliGRAM(s) Oral daily  senna 2 Tablet(s) Oral at bedtime  tamsulosin 0.8 milliGRAM(s) Oral at bedtime    MEDICATIONS  (PRN):    bisacodyl 5 milliGRAM(s) Oral every 12 hours PRN Constipation  diazepam    Tablet 2 milliGRAM(s) Oral two times a day PRN vertigo, dizziness  meclizine 25 milliGRAM(s) Oral every 8 hours PRN Dizziness  ondansetron Injectable 4 milliGRAM(s) IV Push every 8 hours PRN Nausea and/or Vomiting    Allergies    penicillin (Other)    REVIEW OF SYSTEM:    Constitutional, Eyes, ENT, Cardiovascular, Respiratory, Gastrointestinal, Genitourinary, Musculoskeletal, Integumentary, Neurological, Psychiatric, Endocrine, Heme/Lymph and Allergic/Immunologic review of systems are otherwise negative except as noted in HPI.     Vital Signs Last 24 Hrs    T(C): 36.5 (07 Sep 2024 14:13), Max: 37.4 (06 Sep 2024 20:59)  T(F): 97.7 (07 Sep 2024 14:13), Max: 99.3 (06 Sep 2024 20:59)  HR: 73 (07 Sep 2024 14:13) (61 - 87)  BP: 117/48 (07 Sep 2024 14:13) (95/48 - 117/48)  BP(mean): 71 (06 Sep 2024 16:00) (71 - 71)  RR: 18 (07 Sep 2024 14:13) (17 - 18)  SpO2: 96% (07 Sep 2024 14:13) (95% - 96%)    Parameters below as of 07 Sep 2024 14:13  Patient On (Oxygen Delivery Method): room air    PHYSICAL EXAM:    Constitutional: no acute distress  Eyes: no conjunctival infection, anicteric.   ENT: pharynx is unremarkable  Neck: supple without JVD  Pulmonary: clear to auscultation bilaterally   Cardiac: RRR  Vascular: no calf tenderness, venous stasis changes, varices  Abdomen: normoactive bowel sounds, soft and nontender, no hepatosplenomegaly or masses appreciated  Lymphatic: no peripheral adenopathy appreciated  Musculoskeletal: full range of motion and no deformities appreciated  Skin: normal appearance, no rash/erythema  Neurology: awake, alert, oriented; no focal deficits    LABS:    CBC Full  -  ( 07 Sep 2024 07:57 )  WBC Count : 8.33 K/uL  RBC Count : 2.32 M/uL  Hemoglobin : 7.9 g/dL  Hematocrit : 24.1 %  Platelet Count - Automated : 128 K/uL  Mean Cell Volume : 103.9 fl  Mean Cell Hemoglobin : 34.1 pg  Mean Cell Hemoglobin Concentration : 32.8 gm/dL  Auto Neutrophil # : x  Auto Lymphocyte # : x  Auto Monocyte # : x  Auto Eosinophil # : x  Auto Basophil # : x  Auto Neutrophil % : x  Auto Lymphocyte % : x  Auto Monocyte % : x  Auto Eosinophil % : x  Auto Basophil % : x    09-07    143  |  109<H>  |  31<H>  ----------------------------<  105<H>  3.5   |  31  |  0.79    Ca    8.4<L>      07 Sep 2024 07:57  Phos  3.6     09-07  Mg     1.9     09-07      PT/INR - ( 06 Sep 2024 11:26 )   PT: 11.2 sec;   INR: 0.99 ratio      Urinalysis Basic - ( 07 Sep 2024 07:57 )    Color: x / Appearance: x / SG: x / pH: x  Gluc: 105 mg/dL / Ketone: x  / Bili: x / Urobili: x   Blood: x / Protein: x / Nitrite: x   Leuk Esterase: x / RBC: x / WBC x   Sq Epi: x / Non Sq Epi: x / Bacteria: x    RADIOLOGY & ADDITIONAL STUDIES:    EXAM:  MR BRAIN      PROCEDURE DATE:  09/05/2024    INTERPRETATION:  .    CLINICAL INFORMATION: Dizziness. Status post fall. Subarachnoid   hemorrhage. Subdural hematoma. Evaluate for stroke.    TECHNIQUE: Multiplanar multisequential MRI of the brain was acquired   without the administration of IV gadolinium.    COMPARISON: Prior CT study of the head from 9/5/2024. Prior CT angiogram   studies of the head and neck from 9/5/2024.    FINDINGS: Previously seen right-sided frontal convexity subdural hematoma   corresponds to a thin right frontal subdural hygroma. There is   suppression of signal within this area on the T2 FLAIR sequence.    There is a wedge-shaped chronic infarct within the right posteromedial   frontal cortex extending to the centrum semiovale.    A smaller area of encephalomalacia and gliosis is seen within the left   paramedian lower parietal lobe just above the parietal occipital cortex.    A few punctate nonspecific T2/FLAIR hyperintense signal changes are noted   throughout the bihemispheric white matter without associated mass effect   or restricted diffusion.    No hydrocephalus is notable. There are no abnormal extra-axial fluid   collections. Flow-voids are noted throughout the major intracranial   vessels, on the T2 weighted images, consistent with their patency. The   sella turcica and posterior fossa are unremarkable.    The paranasal sinuses and tympanomastoid cavities are clear. The   calvarium appears intact. The orbits appear unremarkable.    IMPRESSION: No acute intracranial hemorrhage or evidence of acute   ischemia.    Thin right frontal convexity subdural hygroma.    Chronic areas of encephalomalacia and gliosis within the right   posteromedial frontal lobe and left inferior paramedian left parietal   cortex likely compatible with old infarcts.    CT Head No Cont (09.05.24 @ 12:53)  CT cervical spine without IV contrast    CLINICAL INFORMATION: Fracture, trauma, neck pain.  Neck pain, spinal   stenosis, spondylosis.    IMPRESSION:    CT HEAD: Encephalomalacia and gliosis in the RIGHT frontal subcortical   white matter unchanged. Tiny 5 mm RIGHT frontal subdural hematoma is   unchanged.    CT cervical spine:   No vertebral fracture is recognized.  Moderate   degenerative disc disease and spondylosis at C3-4 through C6/7 with loss   of disc height and associated degenerative endplate changes. There is   narrowing of the RIGHT C2-3, BILATERAL C3-4 through C6/7 neural foramina   due to spurring and facet osteophytic hypertrophy. Posterior osteophytic   ridge/disc complexes at C3-4 through C6-7 compresses the ventral cord.      EXAM:  CT CHEST IC     EXAM:  CT ABDOMEN AND PELVIS IC     PROCEDURE DATE:  09/05/2024      COMPARISON: None.    CONTRAST/COMPLICATIONS:  IV Contrast: Omnipaque 350 (accession [id number]), NONE (accession   [id number])  90 cc administered   0 cc discarded  Oral Contrast: NONE  Complications: None reported at time of study completion    PROCEDURE:  CT of the Chest, Abdomen and Pelvis was performed.  Imaging was performed through the chest in the arterial phase followed by   imaging of the abdomen and pelvis in the portal venous phase.  Sagittal and coronal reformats were performed.    FINDINGS:  CHEST:  LUNGS AND LARGE AIRWAYS: Patent central airways. No pulmonary nodules.  PLEURA: Trace bilateral pleural effusions.  VESSELS: Aortic and coronary atherosclerosis.  HEART: Heart size is normal. No pericardial effusion.  MEDIASTINUM ANDHILA: No lymphadenopathy.  CHEST WALL AND LOWER NECK: Within normal limits.    ABDOMEN AND PELVIS:  LIVER: Within normal limits.  BILE DUCTS: Normal caliber.  GALLBLADDER: Within normal limits.  SPLEEN: Within normal limits.  PANCREAS: Within normal limits.  ADRENALS: Within normal limits.  KIDNEYS/URETERS: 1.6 cm left upper pole cyst. Right renal hypodensity too   small to characterize. Trace left hydronephrosis. No right   hydronephrosis. Symmetrical nephrograms. Excreted contrast in the   bilateral collecting systems and ureters from recent CTA head and neck.    BLADDER: Mildly distended.  REPRODUCTIVE ORGANS: Prostate within normal limits.    BOWEL: No bowel obstruction. Appendix is not visualized and cannot be   assessed.. Stomach distended with air. Moderate colonic stool burden.   Nonspecific fluid-filled small bowel loops without abnormal distention.  PERITONEUM/RETROPERITONEUM: Within normal limits.  VESSELS: Within normal limits.  LYMPH NODES: No lymphadenopathy.  ABDOMINAL WALL: Within normal limits.  BONES: Lumbar degenerative changes. No acute fractures identified.    IMPRESSION:  No acute intrathoracic or intra-abdominal traumatic findings.    Constipated colon. Air-filled nondilated small bowel may reflect   enteritis. Constipated colon. Gastric distention of either gastroparesis   or partial gastric outlet obstruction.    Trace left hydronephrosis. No evidence of high-grade obstruction.       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    9/7/24: Seen at bedside, alert, awake in no acute distress. Reports feeling better overall, but anxious about low H/H, agreeable for repeat MM w/u and requesting w/u completed sooner, so he can be d/c'd home.    PAST MEDICAL & SURGICAL HISTORY:    HLD (hyperlipidemia)    BPH (benign prostatic hyperplasia)    GERD (gastroesophageal reflux disease)    [place] (hard of hearing)    constipation    Status post ORIF of fracture of ankle    S/P left inguinal hernia repair    FAMILY HISTORY:    No pertinent family history in first degree relatives    MEDICATIONS  (STANDING):    acetaminophen   IVPB .. 1000 milliGRAM(s) IV Intermittent once  aspirin enteric coated 81 milliGRAM(s) Oral daily  atorvastatin 40 milliGRAM(s) Oral at bedtime  calcium carbonate 1250 mG  + Vitamin D (OsCal 500 + D) 1 Tablet(s) Oral daily  chlorhexidine 4% Liquid 1 Application(s) Topical <User Schedule>  multivitamin 1 Tablet(s) Oral daily  pantoprazole    Tablet 40 milliGRAM(s) Oral before breakfast  polyethylene glycol 3350 17 Gram(s) Oral daily  predniSONE   Tablet 10 milliGRAM(s) Oral daily  senna 2 Tablet(s) Oral at bedtime  tamsulosin 0.8 milliGRAM(s) Oral at bedtime    MEDICATIONS  (PRN):    bisacodyl 5 milliGRAM(s) Oral every 12 hours PRN Constipation  diazepam    Tablet 2 milliGRAM(s) Oral two times a day PRN vertigo, dizziness  meclizine 25 milliGRAM(s) Oral every 8 hours PRN Dizziness  ondansetron Injectable 4 milliGRAM(s) IV Push every 8 hours PRN Nausea and/or Vomiting    Allergies    penicillin (Other)    REVIEW OF SYSTEM:    Constitutional, Eyes, ENT, Cardiovascular, Respiratory, Gastrointestinal, Genitourinary, Musculoskeletal, Integumentary, Neurological, Psychiatric, Endocrine, Heme/Lymph and Allergic/Immunologic review of systems are otherwise negative except as noted in HPI.     Vital Signs Last 24 Hrs    T(C): 36.5 (07 Sep 2024 14:13), Max: 37.4 (06 Sep 2024 20:59)  T(F): 97.7 (07 Sep 2024 14:13), Max: 99.3 (06 Sep 2024 20:59)  HR: 73 (07 Sep 2024 14:13) (61 - 87)  BP: 117/48 (07 Sep 2024 14:13) (95/48 - 117/48)  BP(mean): 71 (06 Sep 2024 16:00) (71 - 71)  RR: 18 (07 Sep 2024 14:13) (17 - 18)  SpO2: 96% (07 Sep 2024 14:13) (95% - 96%)    Parameters below as of 07 Sep 2024 14:13  Patient On (Oxygen Delivery Method): room air    PHYSICAL EXAM:    Constitutional: no acute distress  Eyes: no conjunctival infection, anicteric.   ENT: pharynx is unremarkable  Neck: supple without JVD  Pulmonary: clear to auscultation bilaterally   Cardiac: RRR  Vascular: no calf tenderness, venous stasis changes, varices  Abdomen: normoactive bowel sounds, soft and nontender, no hepatosplenomegaly or masses appreciated  Lymphatic: no peripheral adenopathy appreciated  Musculoskeletal: full range of motion and no deformities appreciated  Skin: normal appearance, no rash/erythema  Neurology: awake, alert, oriented; no focal deficits    LABS:    CBC Full  -  ( 07 Sep 2024 07:57 )  WBC Count : 8.33 K/uL  RBC Count : 2.32 M/uL  Hemoglobin : 7.9 g/dL  Hematocrit : 24.1 %  Platelet Count - Automated : 128 K/uL  Mean Cell Volume : 103.9 fl  Mean Cell Hemoglobin : 34.1 pg  Mean Cell Hemoglobin Concentration : 32.8 gm/dL  Auto Neutrophil # : x  Auto Lymphocyte # : x  Auto Monocyte # : x  Auto Eosinophil # : x  Auto Basophil # : x  Auto Neutrophil % : x  Auto Lymphocyte % : x  Auto Monocyte % : x  Auto Eosinophil % : x  Auto Basophil % : x    09-07    143  |  109<H>  |  31<H>  ----------------------------<  105<H>  3.5   |  31  |  0.79    Ca    8.4<L>      07 Sep 2024 07:57  Phos  3.6     09-07  Mg     1.9     09-07      PT/INR - ( 06 Sep 2024 11:26 )   PT: 11.2 sec;   INR: 0.99 ratio      Urinalysis Basic - ( 07 Sep 2024 07:57 )    Color: x / Appearance: x / SG: x / pH: x  Gluc: 105 mg/dL / Ketone: x  / Bili: x / Urobili: x   Blood: x / Protein: x / Nitrite: x   Leuk Esterase: x / RBC: x / WBC x   Sq Epi: x / Non Sq Epi: x / Bacteria: x    RADIOLOGY & ADDITIONAL STUDIES:    EXAM:  MR BRAIN      PROCEDURE DATE:  09/05/2024    INTERPRETATION:  .    CLINICAL INFORMATION: Dizziness. Status post fall. Subarachnoid   hemorrhage. Subdural hematoma. Evaluate for stroke.    TECHNIQUE: Multiplanar multisequential MRI of the brain was acquired   without the administration of IV gadolinium.    COMPARISON: Prior CT study of the head from 9/5/2024. Prior CT angiogram   studies of the head and neck from 9/5/2024.    FINDINGS: Previously seen right-sided frontal convexity subdural hematoma   corresponds to a thin right frontal subdural hygroma. There is   suppression of signal within this area on the T2 FLAIR sequence.    There is a wedge-shaped chronic infarct within the right posteromedial   frontal cortex extending to the centrum semiovale.    A smaller area of encephalomalacia and gliosis is seen within the left   paramedian lower parietal lobe just above the parietal occipital cortex.    A few punctate nonspecific T2/FLAIR hyperintense signal changes are noted   throughout the bihemispheric white matter without associated mass effect   or restricted diffusion.    No hydrocephalus is notable. There are no abnormal extra-axial fluid   collections. Flow-voids are noted throughout the major intracranial   vessels, on the T2 weighted images, consistent with their patency. The   sella turcica and posterior fossa are unremarkable.    The paranasal sinuses and tympanomastoid cavities are clear. The   calvarium appears intact. The orbits appear unremarkable.    IMPRESSION: No acute intracranial hemorrhage or evidence of acute   ischemia.    Thin right frontal convexity subdural hygroma.    Chronic areas of encephalomalacia and gliosis within the right   posteromedial frontal lobe and left inferior paramedian left parietal   cortex likely compatible with old infarcts.    CT Head No Cont (09.05.24 @ 12:53)  CT cervical spine without IV contrast    CLINICAL INFORMATION: Fracture, trauma, neck pain.  Neck pain, spinal   stenosis, spondylosis.    IMPRESSION:    CT HEAD: Encephalomalacia and gliosis in the RIGHT frontal subcortical   white matter unchanged. Tiny 5 mm RIGHT frontal subdural hematoma is   unchanged.    CT cervical spine:   No vertebral fracture is recognized.  Moderate   degenerative disc disease and spondylosis at C3-4 through C6/7 with loss   of disc height and associated degenerative endplate changes. There is   narrowing of the RIGHT C2-3, BILATERAL C3-4 through C6/7 neural foramina   due to spurring and facet osteophytic hypertrophy. Posterior osteophytic   ridge/disc complexes at C3-4 through C6-7 compresses the ventral cord.      EXAM:  CT CHEST IC     EXAM:  CT ABDOMEN AND PELVIS IC     PROCEDURE DATE:  09/05/2024      COMPARISON: None.    CONTRAST/COMPLICATIONS:  IV Contrast: Omnipaque 350 (accession [id number]), NONE (accession   [id number])  90 cc administered   0 cc discarded  Oral Contrast: NONE  Complications: None reported at time of study completion    PROCEDURE:  CT of the Chest, Abdomen and Pelvis was performed.  Imaging was performed through the chest in the arterial phase followed by   imaging of the abdomen and pelvis in the portal venous phase.  Sagittal and coronal reformats were performed.    FINDINGS:  CHEST:  LUNGS AND LARGE AIRWAYS: Patent central airways. No pulmonary nodules.  PLEURA: Trace bilateral pleural effusions.  VESSELS: Aortic and coronary atherosclerosis.  HEART: Heart size is normal. No pericardial effusion.  MEDIASTINUM ANDHILA: No lymphadenopathy.  CHEST WALL AND LOWER NECK: Within normal limits.    ABDOMEN AND PELVIS:  LIVER: Within normal limits.  BILE DUCTS: Normal caliber.  GALLBLADDER: Within normal limits.  SPLEEN: Within normal limits.  PANCREAS: Within normal limits.  ADRENALS: Within normal limits.  KIDNEYS/URETERS: 1.6 cm left upper pole cyst. Right renal hypodensity too   small to characterize. Trace left hydronephrosis. No right   hydronephrosis. Symmetrical nephrograms. Excreted contrast in the   bilateral collecting systems and ureters from recent CTA head and neck.    BLADDER: Mildly distended.  REPRODUCTIVE ORGANS: Prostate within normal limits.    BOWEL: No bowel obstruction. Appendix is not visualized and cannot be   assessed.. Stomach distended with air. Moderate colonic stool burden.   Nonspecific fluid-filled small bowel loops without abnormal distention.  PERITONEUM/RETROPERITONEUM: Within normal limits.  VESSELS: Within normal limits.  LYMPH NODES: No lymphadenopathy.  ABDOMINAL WALL: Within normal limits.  BONES: Lumbar degenerative changes. No acute fractures identified.    IMPRESSION:  No acute intrathoracic or intra-abdominal traumatic findings.    Constipated colon. Air-filled nondilated small bowel may reflect   enteritis. Constipated colon. Gastric distention of either gastroparesis   or partial gastric outlet obstruction.    Trace left hydronephrosis. No evidence of high-grade obstruction.

## 2024-09-07 NOTE — PROGRESS NOTE ADULT - SUBJECTIVE AND OBJECTIVE BOX
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 Pt denies HA, visual changes, focal numb / ting / weak, word finding difficulty, neck stiffness, photophobia. Feeling better today, wife, family at bedside.    MEDICATIONS  (STANDING):  acetaminophen   IVPB .. 1000 milliGRAM(s) IV Intermittent once  aspirin enteric coated 81 milliGRAM(s) Oral daily  atorvastatin 40 milliGRAM(s) Oral at bedtime  calcium carbonate 1250 mG  + Vitamin D (OsCal 500 + D) 1 Tablet(s) Oral daily  chlorhexidine 4% Liquid 1 Application(s) Topical <User Schedule>  multivitamin 1 Tablet(s) Oral daily  pantoprazole    Tablet 40 milliGRAM(s) Oral before breakfast  polyethylene glycol 3350 17 Gram(s) Oral daily  predniSONE   Tablet 10 milliGRAM(s) Oral daily  senna 2 Tablet(s) Oral at bedtime  tamsulosin 0.8 milliGRAM(s) Oral at bedtime    MEDICATIONS  (PRN):  bisacodyl 5 milliGRAM(s) Oral every 12 hours PRN Constipation  diazepam    Tablet 2 milliGRAM(s) Oral two times a day PRN vertigo, dizziness  meclizine 25 milliGRAM(s) Oral every 8 hours PRN Dizziness  ondansetron Injectable 4 milliGRAM(s) IV Push every 8 hours PRN Nausea and/or Vomiting      Orthostatic VS    09-07-24 @ 11:10  Lying BP: Orthostatic BP (Lying Systolic): 107/Orthostatic BP (Lying Diastolic (mm Hg)): 47 HR: Orthostatic Pulse (Heart Rate (beats/min)): 68   Sitting BP: Orthostatic BP (Sitting Systolic): 101/Orthostatic BP (Sitting Diastolic (mm Hg)): 48 HR: Orthostatic Pulse (Heart Rate (beats/min)): 74  Standing BP: --/-- HR: --      Vital Signs Last 24 Hrs  T(C): 36.5 (07 Sep 2024 14:13), Max: 37.4 (06 Sep 2024 20:59)  T(F): 97.7 (07 Sep 2024 14:13), Max: 99.3 (06 Sep 2024 20:59)  HR: 73 (07 Sep 2024 14:13) (61 - 87)  BP: 117/48 (07 Sep 2024 14:13) (95/48 - 117/48)  BP(mean): 71 (06 Sep 2024 16:00) (71 - 71)  RR: 18 (07 Sep 2024 14:13) (17 - 18)  SpO2: 96% (07 Sep 2024 14:13) (95% - 96%)    Parameters below as of 07 Sep 2024 14:13  Patient On (Oxygen Delivery Method): room air      Neurological Exam:    HF: Patient is alert and oriented x 3. There is no aphasia or dysarthria. Follows  commands.     CN:  Pupils are equal and reactive. Extra ocular muscles are intact. There is no facial droop or asymmetry. Tongue is midline. Sensation is intact in the face. Other CN II-XII are intact.   Motor: motor examination all muscles are 5/5 and there is no pronator drift.   Sensory: intact to  touch. VS intact  DTR: 0-1/4 all 4 extremities. Babinski is negative bilateral.  Co-ord:  Finger to finger to nose is intact. Heel to shin is intact bilaterally.     Basic Metabolic Panel (09.07.24 @ 07:57)   Sodium: 143 mmol/L  Potassium: 3.5 mmol/L  Chloride: 109 mmol/L  Carbon Dioxide: 31 mmol/L  Anion Gap: 3 mmol/L  Blood Urea Nitrogen: 31 mg/dL  Creatinine: 0.79 mg/dL  Glucose: 105 mg/dL  Calcium: 8.4 mg/dL  eGFR: 88: T          MR Head No Cont (09.05.24 @ 23:02) >  INTERPRETATION:  .    CLINICAL INFORMATION: Dizziness. Status post fall. Subarachnoid   hemorrhage. Subdural hematoma. Evaluate for stroke.    TECHNIQUE: Multiplanar multisequential MRI of the brain was acquired   without the administration of IV gadolinium.    COMPARISON: Prior CT study of the head from 9/5/2024. Prior CT angiogram   studies of the head and neck from 9/5/2024.    FINDINGS: Previously seen right-sided frontal convexity subdural hematoma   corresponds to a thin right frontal subdural hygroma. There is   suppression of signal within this area on the T2 FLAIR sequence.    There is a wedge-shaped chronic infarct within the right posteromedial   frontal cortex extending to the centrum semiovale.    A smaller area of encephalomalacia and gliosis is seen within the left   paramedian lower parietal lobe just above the parietal occipital cortex.    A few punctate nonspecific T2/FLAIR hyperintense signal changes are noted   throughout the bihemispheric white matter without associated mass effect   or restricted diffusion.    No hydrocephalus is notable. There are no abnormal extra-axial fluid   collections. Flow-voids are noted throughout the major intracranial   vessels, on the T2 weighted images, consistent with their patency. The   sella turcica and posterior fossa are unremarkable.    The paranasal sinuses and tympanomastoid cavities are clear. The   calvarium appears intact. The orbits appear unremarkable.    IMPRESSION: No acute intracranial hemorrhage or evidence of acute   ischemia.    Thin right frontal convexity subdural hygroma.    Chronic areas of encephalomalacia and gliosis within the right   posteromedial frontal lobe and left inferior paramedian left parietal   cortex likely compatible with old infarcts.    < end of copied text >    < from: CT Cervical Spine No Cont (09.05.24 @ 12:53) >    IMPRESSION:    CT HEAD: Encephalomalacia and gliosis in the RIGHT frontal subcortical   white matter unchanged. Tiny 5 mm RIGHT frontal subdural hematoma is   unchanged.    CT cervical spine:   No vertebral fracture is recognized.  Moderate   degenerative disc disease and spondylosis at C3-4 through C6/7 with loss   of disc height and associated degenerative endplate changes. There is   narrowing of the RIGHT C2-3, BILATERAL C3-4 through C6/7 neural foramina   due to spurring and facet osteophytic hypertrophy. Posterior osteophytic   ridge/disc complexes at C3-4 through C6-7 compresses the ventral cord.    < end of copied text >    < from: CT Angio Head w/ IV Cont (09.05.24 @ 03:00) >    IMPRESSION:    CT HEAD:  Right posterior scalp soft tissue swelling and trace linear hyperdensity   in the right lateral occipital lobe, which likely reflects subarachnoid   hemorrhage versus less likely cortical laminar necrosis. No significant   midline shift.    CTA NECK: Suboptimal bolus timing. Within this limitation:  No evidence of significant stenosis or occlusion.    CTA HEAD:  No large vessel occlusion, significant stenosis or vascular abnormality   identified.    < end of copied text >

## 2024-09-07 NOTE — CONSULT NOTE ADULT - NS ATTEND AMEND GEN_ALL_CORE FT
Patient seen and examined.  Agree with above plan  – Above management discussed with patient, his wife
85 yo male with MH significant for giant cell arteritis on prednisone, TIA and embolic stroke (On ASA/Plavix), metastatic prostate CA on Lupron/  p/w dizziness/near syncope, CT head with right occipital SAH, right frontal SDH, also found to have macrocytic anemia.    Patient follows Rheumatology, Dr. De La Garza for Giant Arteritis, who sent serum electrophoresis on 3/14/24 which showed mild protein elevation to 9, mild increase in Gamma (2.7) which corresponded to the Gamma-migrating paraprotein leading to M-spike of 2.3, IGG level of 2383, Kappa / Mary Anne ratio of 10.48 (9.01/0.86). No anemia, hypercalcemia, elevated creatinine or other clear myeloma defining findings (bone lesions noted are attributed to prostate disease).  Was referred to Dr. Pollock, last seen 05/24.,     Anemia w/u underway. No evidence of hemolysis. Vit B12/Folate/Iron- normal/ FOBT- negative. Will repeat TSH, and f/u on r/o MM, as prior studies from 03/24 with IgG kappa band noted. Ordered SPEP/ UPEP/ Immunoelectrophoresis/Immunofixation/ bone survey, especially given hx of prostate ca and ? prior bone findings rib lesion on PSMA. Consider quant Ig.

## 2024-09-07 NOTE — CONSULT NOTE ADULT - ASSESSMENT
83 yo male with MH significant for giant cell arteritis, TIA and embolic stroke (On ASA/Plavix), GERD, BPH, metastatic prostate CA on ADT/ bicalutamide, p/w dizziness/near syncope, CT head with right occipital SAH, right frontal SDH, also found to have macrocytic anemia.    # Macrocytic Anemia    -Hgb-<10 g/dl (baseline 11~12 g/dl)  -MCV-103 fl (Normal in past)  -CT head with right occipital SAH, right frontal SDH and right parietal scalp  -CT-C/A/P: no acute pathology  -Patient follows Rheumatology, Dr. De La Garza for Giant Arteritis, who sent serum electrophoresis on 3/14/24 which showed mild protein elevation to 9, mild increase in Gamma (2.7) which corresponded to the Gamma-migrating paraprotein leading to M-spike of 2.3, IGG level of 2383, Kappa / Mary Anne ratio of 10.48 (9.01/0.86). No anemia, hypercalcemia, elevated creatinine or other clear myeloma defining findings (bone lesions noted are attributed to prostate disease).   -Was referred to Dr. Pollock, also for hypercoagulable w/u given Hx of multiple CVA, who patient last seen in 5/2024 and per Dr. Pollock's note for above MM labs-->continue to monitor these levels    -Etiology Unclear at this time: so far hemolysis/anemia w/u negative, LDH/ Haptoglobin / Reticulocytes normal, Vit B12/Folate/Iron- normal  -Macrocytic anemia cause include : Alcohol, Copper deficiency, Thyroid disfunction and drugs (antiviral, anti-seizure): TSH normal in July, will send repeat.  -Will repeat MM labs; ordered SPEP/ UPEP/ Immunoelectrophoresis/Immunofixation  -Patient with Hx of Metastases Prostate and PSMA-3/13/24: Solitary mildly PSMA -avid rib lesion.  -Ordered Bone Survey to r/o Lytic lesions      85 yo male with MH significant for giant cell arteritis, TIA and embolic stroke (On ASA/Plavix), GERD, BPH, metastatic prostate CA on ADT/ bicalutamide, p/w dizziness/near syncope, CT head with right occipital SAH, right frontal SDH, also found to have macrocytic anemia.    # Macrocytic Anemia    -Hgb-<10 g/dl (baseline 11~12 g/dl)  -MCV-103 fl (Normal in past)  -CT head with right occipital SAH, right frontal SDH and right parietal scalp  -CT-C/A/P: no acute pathology  -Patient follows Rheumatology, Dr. De La Garza for Giant Arteritis, who sent serum electrophoresis on 3/14/24 which showed mild protein elevation to 9, mild increase in Gamma (2.7) which corresponded to the Gamma-migrating paraprotein leading to M-spike of 2.3, IGG level of 2383, Kappa / Mary Anne ratio of 10.48 (9.01/0.86). No anemia, hypercalcemia, elevated creatinine or other clear myeloma defining findings (bone lesions noted are attributed to prostate disease).   -Was referred to Dr. Pollock, also for hypercoagulable w/u given Hx of multiple CVA, who patient last seen in 5/2024 and per Dr. Pollock's note for above MM labs-->continue to monitor these levels    -Etiology Unclear at this time: so far hemolysis/anemia w/u negative, LDH/ Haptoglobin / Reticulocytes normal, Vit B12/Folate/Iron- normal  -Macrocytic anemia cause include : Alcohol, Copper deficiency, Thyroid disfunction and drugs (antiviral, anti-seizure): TSH normal in July, will send repeat.  -Will repeat MM labs; ordered SPEP/ UPEP/ Immunoelectrophoresis/Immunofixation  -Patient with Hx of Metastases Prostate and PSMA-3/13/24: Solitary mildly PSMA -avid rib lesion.  -Ca- low, and no lytic lesion on so far scans, but ordered dedicated Bone Survey to r/o lytic lesions   83 yo male with MH significant for giant cell arteritis, TIA and embolic stroke (On ASA/Plavix), GERD, BPH, metastatic prostate CA on ADT/ bicalutamide, p/w dizziness/near syncope, CT head with right occipital SAH, right frontal SDH, also found to have macrocytic anemia.    # Macrocytic Anemia    -Hgb-<10 g/dl (baseline 11~12 g/dl)  -MCV-103 fl (Normal in past)  -CT head with right occipital SAH, right frontal SDH and right parietal scalp  -CT-C/A/P: no acute pathology  -Patient follows Rheumatology, Dr. De La Garza for Giant Arteritis, who sent serum electrophoresis on 3/14/24 which showed mild protein elevation to 9, mild increase in Gamma (2.7) which corresponded to the Gamma-migrating paraprotein leading to M-spike of 2.3, IGG level of 2383, Kappa / Mary Anne ratio of 10.48 (9.01/0.86). No anemia, hypercalcemia, elevated creatinine or other clear myeloma defining findings (bone lesions noted are attributed to prostate disease).   -Was referred to Dr. Pollock, also for hypercoagulable w/u given Hx of multiple CVA, who patient last seen in 5/2024 and per Dr. Pollock's note for above MM labs-->continue to monitor these levels    -Etiology Unclear at this time: so far hemolysis/anemia w/u negative, LDH/ Haptoglobin / Reticulocytes normal, Vit B12/Folate/Iron- normal/ FOBT- negative   -Macrocytic anemia cause include : Alcohol, Copper deficiency, Thyroid disfunction and drugs (antiviral, anti-seizure): TSH normal in July, will send repeat.  -Will repeat MM labs; ordered SPEP/ UPEP/ Immunoelectrophoresis/Immunofixation  -Patient with Hx of Metastases Prostate and PSMA-3/13/24: Solitary mildly PSMA -avid rib lesion.  -Ca- low, and no lytic lesion on so far scans, but ordered dedicated Bone Survey to r/o lytic lesions   83 yo male with MH significant for giant cell arteritis, TIA and embolic stroke (On ASA/Plavix), GERD, BPH, metastatic prostate CA on Lupron p/w dizziness/near syncope, CT head with right occipital SAH, right frontal SDH, also found to have macrocytic anemia.    # Macrocytic Anemia    -Hgb-<10 g/dl (baseline 11~12 g/dl)  -MCV-103 fl (Normal in past, except transiently elevated in 8/2019 & 10/2021)  -CT head with right occipital SAH, right frontal SDH and right parietal scalp  -CT-C/A/P: no acute pathology  -Patient follows Rheumatology, Dr. De La Garza for Giant Arteritis, who sent serum electrophoresis on 3/14/24 which showed mild protein elevation to 9, mild increase in Gamma (2.7) which corresponded to the Gamma-migrating paraprotein leading to M-spike of 2.3, IGG level of 2383, Kappa / Mary Anne ratio of 10.48 (9.01/0.86). No anemia, hypercalcemia, elevated creatinine or other clear myeloma defining findings (bone lesions noted are attributed to prostate disease).   -Was referred to Dr. Pollock, also for hypercoagulable w/u given Hx of multiple CVA, who patient last seen in 5/2024 and per Dr. Pollock's note for above MM labs-->continue to monitor these levels    -Etiology Unclear at this time: so far hemolysis/anemia w/u negative, LDH/ Haptoglobin / Reticulocytes normal, Vit B12/Folate/Iron- normal/ FOBT- negative   -Macrocytic anemia cause include : Alcohol, Copper deficiency, thyroid disfunction, drugs (antiviral, anti-seizure), MDS and others.   -Will repeat TSH, MM labs; ordered SPEP/ UPEP/ Immunoelectrophoresis/Immunofixation  -Patient with metastatic prostate, PSMA-3/13/24: Solitary mildly PSMA -avid rib lesion.  -Ca- low, and no lytic lesion on scans so far available this admission, but ordered dedicated Bone Survey to r/o lytic lesions  -discussed above plan with Dr. Garner

## 2024-09-07 NOTE — PROGRESS NOTE ADULT - ASSESSMENT
85 yo man PMHx of GCA 3/2024 w/ residual central vision loss right eye, is On Acterma,  presented to ER for vertigo. Improved, non focal exam. CT head was sig for ? trace Right occipital SAH, CT angio H/N , no LVO, stenosis or vascular abnormality. PMR no acute changes.  Suggest:  meclizine 25 mg po TID as needed  OPT referral to ENT, vestibular rehab.

## 2024-09-07 NOTE — PROGRESS NOTE ADULT - SUBJECTIVE AND OBJECTIVE BOX
HOSPITALIST PROGRESS NOTE    SUBJECTIVE / INTERVAL HPI: Patient seen and examined at bedside. No signs of bleeding. FOBT negative.  Patient slightly dizzy after working with PT this morning. Will check orthostatics.     ROS: All 10 systems reviewed and found to be negative with the exception of what has been described above.     VITAL SIGNS:  Vital Signs Last 24 Hrs  T(C): 36.5 (07 Sep 2024 14:13), Max: 37.4 (06 Sep 2024 20:59)  T(F): 97.7 (07 Sep 2024 14:13), Max: 99.3 (06 Sep 2024 20:59)  HR: 73 (07 Sep 2024 14:13) (61 - 87)  BP: 117/48 (07 Sep 2024 14:13) (95/48 - 117/48)  BP(mean): --  RR: 18 (07 Sep 2024 14:13) (17 - 18)  SpO2: 96% (07 Sep 2024 14:13) (95% - 96%)    Parameters below as of 07 Sep 2024 14:13  Patient On (Oxygen Delivery Method): room air    PHYSICAL EXAM:  General: No acute distress  HEENT: NC/AT; PERRL, anicteric sclera; MMM  Neck: Supple  Cardiovascular: +S1/S2, RRR, no murmurs, rubs, gallops  Respiratory: CTA B/L; no W/R/R  Gastrointestinal: soft, NT/ND; +BSx4  Extremities: WWP; no edema, clubbing or cyanosis  Vascular: 2+ radial, DP/PT pulses B/L  Neurological: AAOx3; no focal deficits    MEDICATIONS:  MEDICATIONS  (STANDING):  acetaminophen   IVPB .. 1000 milliGRAM(s) IV Intermittent once  aspirin enteric coated 81 milliGRAM(s) Oral daily  atorvastatin 40 milliGRAM(s) Oral at bedtime  calcium carbonate 1250 mG  + Vitamin D (OsCal 500 + D) 1 Tablet(s) Oral daily  chlorhexidine 4% Liquid 1 Application(s) Topical <User Schedule>  multivitamin 1 Tablet(s) Oral daily  pantoprazole    Tablet 40 milliGRAM(s) Oral before breakfast  polyethylene glycol 3350 17 Gram(s) Oral daily  predniSONE   Tablet 10 milliGRAM(s) Oral daily  senna 2 Tablet(s) Oral at bedtime  tamsulosin 0.8 milliGRAM(s) Oral at bedtime    MEDICATIONS  (PRN):  bisacodyl 5 milliGRAM(s) Oral every 12 hours PRN Constipation  diazepam    Tablet 2 milliGRAM(s) Oral two times a day PRN vertigo, dizziness  meclizine 25 milliGRAM(s) Oral every 8 hours PRN Dizziness  ondansetron Injectable 4 milliGRAM(s) IV Push every 8 hours PRN Nausea and/or Vomiting      ALLERGIES:  Allergies    penicillin (Other)    Intolerances        LABS:                        7.9    8.33  )-----------( 128      ( 07 Sep 2024 07:57 )             24.1     09-07    143  |  109<H>  |  31<H>  ----------------------------<  105<H>  3.5   |  31  |  0.79    Ca    8.4<L>      07 Sep 2024 07:57  Phos  3.6     09-07  Mg     1.9     09-07      PT/INR - ( 06 Sep 2024 11:26 )   PT: 11.2 sec;   INR: 0.99 ratio           Urinalysis Basic - ( 07 Sep 2024 07:57 )    Color: x / Appearance: x / SG: x / pH: x  Gluc: 105 mg/dL / Ketone: x  / Bili: x / Urobili: x   Blood: x / Protein: x / Nitrite: x   Leuk Esterase: x / RBC: x / WBC x   Sq Epi: x / Non Sq Epi: x / Bacteria: x      CAPILLARY BLOOD GLUCOSE            RADIOLOGY & ADDITIONAL TESTS: Reviewed.

## 2024-09-07 NOTE — PROGRESS NOTE ADULT - SUBJECTIVE AND OBJECTIVE BOX
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 As per pt, he was in his usual state of health this evening when he went to sleep around 21:30 (LKWT). He woke up to use bathroom around 0030 tonight and that's when he felt as if the room spinning when he was walking. He lowered himself to the ground, denies LOC, no head trauma. Denies h/o vertigo, states symptoms are worse with turning head left to right, +associated nausea. Denies prior episodes. CT head initially sig for trace Right occipital SAH. At the time of my exam pt appears to be comfortable, in NAD. Pt denies HA, visual changes, focal numb / ting / weak, word finding difficulty, neck stiffness, photophobia.     Subjective:  Pt seen and examined at bedside with chaperone. Pt is AAOx3, pt in no acute distress. AFVSS. Started on AC. Denies any pain, fevers, chills or any other complaints.      ICU Vital Signs Last 24 Hrs  T(C): 37.4 (06 Sep 2024 20:59), Max: 37.4 (06 Sep 2024 20:59)  T(F): 99.3 (06 Sep 2024 20:59), Max: 99.3 (06 Sep 2024 20:59)  HR: 87 (06 Sep 2024 20:59) (61 - 87)  BP: 95/48 (06 Sep 2024 20:59) (88/45 - 114/55)  BP(mean): 71 (06 Sep 2024 16:00) (59 - 76)  ABP: --  ABP(mean): --  RR: 17 (06 Sep 2024 20:59) (17 - 18)  SpO2: 95% (06 Sep 2024 20:59) (91% - 100%)    O2 Parameters below as of 06 Sep 2024 20:59  Patient On (Oxygen Delivery Method): room air            Meds:  acetaminophen   IVPB .. 1000 milliGRAM(s) IV Intermittent once PRN  atorvastatin 40 milliGRAM(s) Oral at bedtime  calcium carbonate 1250 mG  + Vitamin D (OsCal 500 + D) 1 Tablet(s) Oral daily  metoclopramide Injectable 10 milliGRAM(s) IV Push once  multivitamin 1 Tablet(s) Oral daily  ondansetron Injectable 4 milliGRAM(s) IV Push every 8 hours PRN  pantoprazole    Tablet 40 milliGRAM(s) Oral before breakfast  predniSONE   Tablet 10 milliGRAM(s) Oral daily  senna 2 Tablet(s) Oral at bedtime  sodium chloride 0.9%. 1000 milliLiter(s) IV Continuous <Continuous>  tamsulosin 0.8 milliGRAM(s) Oral at bedtime         (05 Sep 2024 10:37)      PAST MEDICAL & SURGICAL HISTORY:  HLD (hyperlipidemia)      BPH (benign prostatic hyperplasia)      GERD (gastroesophageal reflux disease)      Snores      [place] (hard of hearing)      H/O constipation      Status post ORIF of fracture of ankle      History of colonoscopy      S/P left inguinal hernia repair        FAMILY HISTORY:    [ x ] Family history not pertinent as reviewed with the patient and family    SOCIAL HISTORY:    Medications (inpatient): acetaminophen   IVPB .. 1000 milliGRAM(s) IV Intermittent once  atorvastatin 40 milliGRAM(s) Oral at bedtime  calcium carbonate 1250 mG  + Vitamin D (OsCal 500 + D) 1 Tablet(s) Oral daily  chlorhexidine 4% Liquid 1 Application(s) Topical <User Schedule>  multivitamin 1 Tablet(s) Oral daily  pantoprazole    Tablet 40 milliGRAM(s) Oral before breakfast  polyethylene glycol 3350 17 Gram(s) Oral daily  predniSONE   Tablet 10 milliGRAM(s) Oral daily  senna 2 Tablet(s) Oral at bedtime  tamsulosin 0.8 milliGRAM(s) Oral at bedtime    Medications (PRN):meclizine 25 milliGRAM(s) Oral every 8 hours PRN  ondansetron Injectable 4 milliGRAM(s) IV Push every 8 hours PRN      Physical exam:  GCS of 15  Airway is patent  Breathing is symmetric and unlabored  Neuro: CNII-XII grossly intact  Psych: normal affect  HEENT: Normocephalic, small area of ecchymosis to right parietal scalp, YUSEF, EOM wnl  Neck: No crepitus, no ecchymosis, no hematoma, to exam  Cervicothoracolumbosacral spine: no gross bony pathology or tenderness to exam  Cardiovascular: S1S2 Present  Chest: no gross rib pathology or tenderness to exam. No sternal pathology or tenderness to exam. No crepitus, no ecchymosis, no hematoma.   Respiratory: Respiratory Effort normal; no wheezes, rales or rhonchi to exam  ABD: soft, nontender, non distended  Musculoskeletal: Pt has palpable b/l radial, femoral, dorsalis pedis pulses. All digits are warm and well perfused. No gross long bone pathology or tenderness to exam. Pt demonstrates grossly intact sensorimotor function. Pt has good capillary refill to digits, no calf edema or tenderness to exam.  Skin: no lesions or rashes to exam                                   8.5    10.18 )-----------( 146      ( 06 Sep 2024 18:35 )             26.6     09-06    145  |  113<H>  |  37<H>  ----------------------------<  99  3.7   |  31  |  0.86    Ca    8.4<L>      06 Sep 2024 06:05  Phos  3.1     09-06  Mg     1.8     09-06 9/5/24  List Injuries Identified to Date:   right SDH with overlying scalp hematoma    RADIOLOGICAL FINDINGS REVIEW:  Radiology:    < from: CT Angio Head w/ IV Cont (09.05.24 @ 03:00) >  CT HEAD:  Right posterior scalp soft tissue swelling and trace linear hyperdensity   in the right lateral occipital lobe, which likely reflects subarachnoid   hemorrhage versus less likely cortical laminar necrosis. No significant   midline shift.    CTA NECK: Suboptimal bolus timing. Within this limitation:  No evidence of significant stenosis or occlusion.    CTA HEAD:  No large vessel occlusion, significant stenosis or vascular abnormality   identified.    Findings were discussed with Dr. JOEL PERDOMO [phone] 9/5/2024 3:22   AM by Dr. Larsen with read back confirmation.    --- End of Report ---      < end of copied text >      < from: CT Head No Cont (09.05.24 @ 06:36) >    IMPRESSION:  Previously linear density along the right occipital lobe is no longer   visualized on the current study.    There is a newly appreciated small right subdural hematoma.    Right parietal scalp hematoma.    Findings were discussed with Dr. Mariscal 9/5/2024 6:50 AM by Dr. BERNABE    with read back confirmation.    --- End of Report ---

## 2024-09-07 NOTE — PROGRESS NOTE ADULT - ASSESSMENT
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 CT head sig for ? trace Right occipital SAH. Neurology consulted for vertigo. Medicine consulted for comanagement.     #Right occipital SAH  #Vertigo  - No acute neurosurgical intervention   - MR brain neg for acute blood  - ASA resumed per Neurosurgical Team  - Neuro checks   - Neurology following for Vertigo - recommendations appreciated   - Patient reports improvement in symptoms with Valium and Meclizine   - Repeat CT head per Neurosurgery - will follow up with Dr. Luis     #Anemia of chronic dx  - No signs of bleeding. Baseline Hgb around 12 - currently 9  - FOBT negative. Hemolysis labs unremarkable  - Iron studies noted  - H/H noted today. Heme/Onc consult placed    #Hx of GCA  -Continue home Prednisone    MEDICINE WILL CONTINUE TO FOLLOW

## 2024-09-08 VITALS
DIASTOLIC BLOOD PRESSURE: 61 MMHG | OXYGEN SATURATION: 93 % | TEMPERATURE: 98 F | SYSTOLIC BLOOD PRESSURE: 111 MMHG | RESPIRATION RATE: 18 BRPM | HEART RATE: 75 BPM

## 2024-09-08 LAB
BUN SERPL-MCNC: 27 MG/DL — HIGH (ref 7–23)
CALCIUM SERPL-MCNC: 8.9 MG/DL — SIGNIFICANT CHANGE UP (ref 8.5–10.1)
CHLORIDE SERPL-SCNC: 110 MMOL/L — HIGH (ref 96–108)
CO2 SERPL-SCNC: 32 MMOL/L — HIGH (ref 22–31)
CREAT SERPL-MCNC: 0.77 MG/DL — SIGNIFICANT CHANGE UP (ref 0.5–1.3)
EGFR: 88 ML/MIN/1.73M2 — SIGNIFICANT CHANGE UP
GLUCOSE SERPL-MCNC: 106 MG/DL — HIGH (ref 70–99)
HCT VFR BLD CALC: 25.5 % — LOW (ref 39–50)
HGB BLD-MCNC: 8.4 G/DL — LOW (ref 13–17)
MAGNESIUM SERPL-MCNC: 1.9 MG/DL — SIGNIFICANT CHANGE UP (ref 1.6–2.6)
MCHC RBC-ENTMCNC: 32.9 GM/DL — SIGNIFICANT CHANGE UP (ref 32–36)
MCHC RBC-ENTMCNC: 34.3 PG — HIGH (ref 27–34)
MCV RBC AUTO: 104.1 FL — HIGH (ref 80–100)
PHOSPHATE SERPL-MCNC: 3.6 MG/DL — SIGNIFICANT CHANGE UP (ref 2.5–4.5)
PLATELET # BLD AUTO: 137 K/UL — LOW (ref 150–400)
POTASSIUM SERPL-MCNC: 3.4 MMOL/L — LOW (ref 3.5–5.3)
POTASSIUM SERPL-SCNC: 3.4 MMOL/L — LOW (ref 3.5–5.3)
PROT SERPL-MCNC: 6.2 G/DL — SIGNIFICANT CHANGE UP (ref 6–8.3)
RBC # BLD: 2.45 M/UL — LOW (ref 4.2–5.8)
RBC # FLD: 13.7 % — SIGNIFICANT CHANGE UP (ref 10.3–14.5)
SODIUM SERPL-SCNC: 142 MMOL/L — SIGNIFICANT CHANGE UP (ref 135–145)
WBC # BLD: 7.22 K/UL — SIGNIFICANT CHANGE UP (ref 3.8–10.5)
WBC # FLD AUTO: 7.22 K/UL — SIGNIFICANT CHANGE UP (ref 3.8–10.5)

## 2024-09-08 PROCEDURE — 99239 HOSP IP/OBS DSCHRG MGMT >30: CPT

## 2024-09-08 PROCEDURE — 99233 SBSQ HOSP IP/OBS HIGH 50: CPT

## 2024-09-08 RX ADMIN — Medication 1 TABLET(S): at 09:06

## 2024-09-08 RX ADMIN — Medication 40 MILLIGRAM(S): at 06:12

## 2024-09-08 RX ADMIN — Medication 1 TABLET(S): at 09:07

## 2024-09-08 RX ADMIN — Medication 10 MILLIGRAM(S): at 09:07

## 2024-09-08 RX ADMIN — POLYETHYLENE GLYCOL 3350 17 GRAM(S): 17 POWDER, FOR SOLUTION ORAL at 09:07

## 2024-09-08 RX ADMIN — Medication 81 MILLIGRAM(S): at 09:06

## 2024-09-08 NOTE — DISCHARGE NOTE PROVIDER - CARE PROVIDER_API CALL
Rachid Luis  Neurosurgery  [address] [place] [id number]  Phone: [phone]  Fax: [phone]  Follow Up Time: 2 weeks    PCP,   Phone: (   )    -  Fax: (   )    -  Follow Up Time: 1 week

## 2024-09-08 NOTE — DISCHARGE NOTE PROVIDER - CARE PROVIDERS DIRECT ADDRESSES
[email or web address]

## 2024-09-08 NOTE — DISCHARGE NOTE PROVIDER - NSDCFUSCHEDAPPT_GEN_ALL_CORE_FT
[facility]  RHEUM [address]  Scheduled Appointment: 09/13/2024    [facility]  ELECTROPH [address]  Scheduled Appointment: 09/20/2024    Nicole De La Garza  [facility]  RHEUM [address]  Scheduled Appointment: 10/16/2024    [facility]  NEUROLOGY [address]  Scheduled Appointment: 11/18/2024    [facility]  NEUROLOGY [address]  Scheduled Appointment: 11/18/2024    Anuja Florian  [facility]  NEUROLOGY [address]  Scheduled Appointment: 11/18/2024    [facility]  ELECTROPH [address]  Scheduled Appointment: 12/05/2024

## 2024-09-08 NOTE — DISCHARGE NOTE NURSING/CASE MANAGEMENT/SOCIAL WORK - NSDCPEFALRISK_GEN_ALL_CORE
For information on Fall & Injury Prevention, visit: [email or web address] OR  [email or web address] OR  https://www.cdc.gov/steadi/patient.html

## 2024-09-08 NOTE — DISCHARGE NOTE PROVIDER - PROVIDER TOKENS
PROVIDER:[TOKEN:[[id number]],FOLLOWUP:[2 weeks]],FREE:[LAST:[PCP],PHONE:[(   )    -],FAX:[(   )    -],FOLLOWUP:[1 week]]

## 2024-09-08 NOTE — PROGRESS NOTE ADULT - SUBJECTIVE AND OBJECTIVE BOX
HOSPITALIST PROGRESS NOTE    SUBJECTIVE / INTERVAL HPI: Patient seen and examined at bedside. Feels well. Large BM this morning, no signs of bleeding. Patient would not like to wait inpatient for heme work up - will follow up with his Hematologist outpatient.     ROS: All 10 systems reviewed and found to be negative with the exception of what has been described above.     VITAL SIGNS:  Vital Signs Last 24 Hrs  T(C): 36.7 (08 Sep 2024 08:41), Max: 36.7 (08 Sep 2024 08:41)  T(F): 98.1 (08 Sep 2024 08:41), Max: 98.1 (08 Sep 2024 08:41)  HR: 66 (08 Sep 2024 08:41) (66 - 71)  BP: 98/45 (08 Sep 2024 08:41) (98/45 - 119/61)  BP(mean): --  RR: 18 (08 Sep 2024 08:41) (18 - 18)  SpO2: 97% (08 Sep 2024 08:41) (95% - 97%)    Parameters below as of 08 Sep 2024 08:41  Patient On (Oxygen Delivery Method): room air    PHYSICAL EXAM:  General: No acute distress  HEENT: NC/AT; PERRL, anicteric sclera; MMM  Neck: Supple  Cardiovascular: +S1/S2, RRR, no murmurs, rubs, gallops  Respiratory: CTA B/L; no W/R/R  Gastrointestinal: soft, NT/ND; +BSx4  Extremities: WWP; no edema, clubbing or cyanosis  Vascular: 2+ radial, DP/PT pulses B/L  Neurological: AAOx3; no focal deficits    MEDICATIONS:  MEDICATIONS  (STANDING):  acetaminophen   IVPB .. 1000 milliGRAM(s) IV Intermittent once  aspirin enteric coated 81 milliGRAM(s) Oral daily  atorvastatin 40 milliGRAM(s) Oral at bedtime  calcium carbonate 1250 mG  + Vitamin D (OsCal 500 + D) 1 Tablet(s) Oral daily  chlorhexidine 4% Liquid 1 Application(s) Topical <User Schedule>  multivitamin 1 Tablet(s) Oral daily  pantoprazole    Tablet 40 milliGRAM(s) Oral before breakfast  polyethylene glycol 3350 17 Gram(s) Oral daily  predniSONE   Tablet 10 milliGRAM(s) Oral daily  senna 2 Tablet(s) Oral at bedtime  tamsulosin 0.8 milliGRAM(s) Oral at bedtime    MEDICATIONS  (PRN):  bisacodyl 5 milliGRAM(s) Oral every 12 hours PRN Constipation  diazepam    Tablet 2 milliGRAM(s) Oral two times a day PRN vertigo, dizziness  meclizine 25 milliGRAM(s) Oral every 8 hours PRN Dizziness  ondansetron Injectable 4 milliGRAM(s) IV Push every 8 hours PRN Nausea and/or Vomiting      ALLERGIES:  Allergies    penicillin (Other)    Intolerances        LABS:                        8.4    7.22  )-----------( 137      ( 08 Sep 2024 08:05 )             25.5     09-08    142  |  110<H>  |  27<H>  ----------------------------<  106<H>  3.4<L>   |  32<H>  |  0.77    Ca    8.9      08 Sep 2024 08:05  Phos  3.6     09-08  Mg     1.9     09-08    TPro  6.2  /  Alb  x   /  TBili  x   /  DBili  x   /  AST  x   /  ALT  x   /  AlkPhos  x   09-07      Urinalysis Basic - ( 08 Sep 2024 08:05 )    Color: x / Appearance: x / SG: x / pH: x  Gluc: 106 mg/dL / Ketone: x  / Bili: x / Urobili: x   Blood: x / Protein: x / Nitrite: x   Leuk Esterase: x / RBC: x / WBC x   Sq Epi: x / Non Sq Epi: x / Bacteria: x      CAPILLARY BLOOD GLUCOSE            RADIOLOGY & ADDITIONAL TESTS: Reviewed.

## 2024-09-08 NOTE — PROGRESS NOTE ADULT - NUTRITIONAL ASSESSMENT
This patient has been assessed with a concern for Malnutrition and has been determined to have a diagnosis/diagnoses of Severe protein-calorie malnutrition.    This patient is being managed with:   Diet Regular-  Entered: Sep  6 2024  7:56AM

## 2024-09-08 NOTE — PROGRESS NOTE ADULT - SUBJECTIVE AND OBJECTIVE BOX
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 As per pt, he was in his usual state of health this evening when he went to sleep around 21:30 (LKWT). He woke up to use bathroom around 0030 tonight and that's when he felt as if the room spinning when he was walking. He lowered himself to the ground, denies LOC, no head trauma. Denies h/o vertigo, states symptoms are worse with turning head left to right, +associated nausea. Denies prior episodes. CT head initially sig for trace Right occipital SAH. At the time of my exam pt appears to be comfortable, in NAD. Pt denies HA, visual changes, focal numb / ting / weak, word finding difficulty, neck stiffness, photophobia. Slightly dizzy when up and walking, gotten better over time    Subjective:  Pt seen and examined at bedside with chaperone. Pt is AAOx3, pt in no acute distress. AFVSS. Started on ASA81. Denies any pain, fevers, chills or any other complaints.      ICU Vital Signs Last 24 Hrs  T(C): 37.4 (06 Sep 2024 20:59), Max: 37.4 (06 Sep 2024 20:59)  T(F): 99.3 (06 Sep 2024 20:59), Max: 99.3 (06 Sep 2024 20:59)  HR: 87 (06 Sep 2024 20:59) (61 - 87)  BP: 95/48 (06 Sep 2024 20:59) (88/45 - 114/55)  BP(mean): 71 (06 Sep 2024 16:00) (59 - 76)  ABP: --  ABP(mean): --  RR: 17 (06 Sep 2024 20:59) (17 - 18)  SpO2: 95% (06 Sep 2024 20:59) (91% - 100%)    O2 Parameters below as of 06 Sep 2024 20:59  Patient On (Oxygen Delivery Method): room air            Meds:  acetaminophen   IVPB .. 1000 milliGRAM(s) IV Intermittent once PRN  atorvastatin 40 milliGRAM(s) Oral at bedtime  calcium carbonate 1250 mG  + Vitamin D (OsCal 500 + D) 1 Tablet(s) Oral daily  metoclopramide Injectable 10 milliGRAM(s) IV Push once  multivitamin 1 Tablet(s) Oral daily  ondansetron Injectable 4 milliGRAM(s) IV Push every 8 hours PRN  pantoprazole    Tablet 40 milliGRAM(s) Oral before breakfast  predniSONE   Tablet 10 milliGRAM(s) Oral daily  senna 2 Tablet(s) Oral at bedtime  sodium chloride 0.9%. 1000 milliLiter(s) IV Continuous <Continuous>  tamsulosin 0.8 milliGRAM(s) Oral at bedtime         (05 Sep 2024 10:37)      PAST MEDICAL & SURGICAL HISTORY:  HLD (hyperlipidemia)      BPH (benign prostatic hyperplasia)      GERD (gastroesophageal reflux disease)      Snores      [place] (hard of hearing)      H/O constipation      Status post ORIF of fracture of ankle      History of colonoscopy      S/P left inguinal hernia repair        FAMILY HISTORY:    [ x ] Family history not pertinent as reviewed with the patient and family    SOCIAL HISTORY:    Medications (inpatient): acetaminophen   IVPB .. 1000 milliGRAM(s) IV Intermittent once  atorvastatin 40 milliGRAM(s) Oral at bedtime  calcium carbonate 1250 mG  + Vitamin D (OsCal 500 + D) 1 Tablet(s) Oral daily  chlorhexidine 4% Liquid 1 Application(s) Topical <User Schedule>  multivitamin 1 Tablet(s) Oral daily  pantoprazole    Tablet 40 milliGRAM(s) Oral before breakfast  polyethylene glycol 3350 17 Gram(s) Oral daily  predniSONE   Tablet 10 milliGRAM(s) Oral daily  senna 2 Tablet(s) Oral at bedtime  tamsulosin 0.8 milliGRAM(s) Oral at bedtime    Medications (PRN):meclizine 25 milliGRAM(s) Oral every 8 hours PRN  ondansetron Injectable 4 milliGRAM(s) IV Push every 8 hours PRN      Physical exam:  GCS of 15  Airway is patent  Breathing is symmetric and unlabored  Neuro: CNII-XII grossly intact  Psych: normal affect  HEENT: Normocephalic, small area of ecchymosis to right parietal scalp, YUSEF, EOM wnl  Neck: No crepitus, no ecchymosis, no hematoma, to exam  Cervicothoracolumbosacral spine: no gross bony pathology or tenderness to exam  Cardiovascular: S1S2 Present  Chest: no gross rib pathology or tenderness to exam. No sternal pathology or tenderness to exam. No crepitus, no ecchymosis, no hematoma.   Respiratory: Respiratory Effort normal; no wheezes, rales or rhonchi to exam  ABD: soft, nontender, non distended  Musculoskeletal: Pt has palpable b/l radial, femoral, dorsalis pedis pulses. All digits are warm and well perfused. No gross long bone pathology or tenderness to exam. Pt demonstrates grossly intact sensorimotor function. Pt has good capillary refill to digits, no calf edema or tenderness to exam.  Skin: no lesions or rashes to exam                                   8.5    10.18 )-----------( 146      ( 06 Sep 2024 18:35 )             26.6     09-06    145  |  113<H>  |  37<H>  ----------------------------<  99  3.7   |  31  |  0.86    Ca    8.4<L>      06 Sep 2024 06:05  Phos  3.1     09-06  Mg     1.8     09-06 9/5/24  List Injuries Identified to Date:   right SDH with overlying scalp hematoma    RADIOLOGICAL FINDINGS REVIEW:  Radiology:    < from: CT Angio Head w/ IV Cont (09.05.24 @ 03:00) >  CT HEAD:  Right posterior scalp soft tissue swelling and trace linear hyperdensity   in the right lateral occipital lobe, which likely reflects subarachnoid   hemorrhage versus less likely cortical laminar necrosis. No significant   midline shift.    CTA NECK: Suboptimal bolus timing. Within this limitation:  No evidence of significant stenosis or occlusion.    CTA HEAD:  No large vessel occlusion, significant stenosis or vascular abnormality   identified.    Findings were discussed with Dr. JOEL PERDOMO [phone] 9/5/2024 3:22   AM by Dr. Larsen with read back confirmation.    --- End of Report ---      < end of copied text >      < from: CT Head No Cont (09.05.24 @ 06:36) >    IMPRESSION:  Previously linear density along the right occipital lobe is no longer   visualized on the current study.    There is a newly appreciated small right subdural hematoma.    Right parietal scalp hematoma.    Findings were discussed with Dr. Mariscal 9/5/2024 6:50 AM by Dr. BERNABE    with read back confirmation.    --- End of Report ---

## 2024-09-08 NOTE — DISCHARGE NOTE NURSING/CASE MANAGEMENT/SOCIAL WORK - PATIENT PORTAL LINK FT
You can access the FollowMyHealth Patient Portal offered by [facility] by registering at the following website: [email or web address]. By joining [employer]’s FollowMyHealth portal, you will also be able to view your health information using other applications (apps) compatible with our system.

## 2024-09-08 NOTE — DISCHARGE NOTE PROVIDER - DETAILS OF MALNUTRITION DIAGNOSIS/DIAGNOSES
This patient has been assessed with a concern for Malnutrition and was treated during this hospitalization for the following Nutrition diagnosis/diagnoses:     -  09/06/2024: Severe protein-calorie malnutrition

## 2024-09-08 NOTE — DISCHARGE NOTE PROVIDER - NSDCMRMEDTOKEN_GEN_ALL_CORE_FT
acetaminophen 500 mg oral tablet: 2 tab(s) orally 2 times a day as needed for  headache  alfuzosin 10 mg oral tablet, extended release: 1 tab(s) orally once a day with dinner  aspirin 81 mg oral tablet: 1 tab(s) orally once a day  atorvastatin 40 mg oral tablet: 1 tab(s) orally once a day (at bedtime)  Caltrate 600 + D oral tablet: 1 tab(s) orally once a day  Multiple Vitamins oral tablet: 1 tab(s) orally once a day  predniSONE 10 mg oral tablet: 1 tab(s) orally once a day  tocilizumab 20 mg/mL intravenous solution: 460 milligram(s) intravenous every 4 weeks

## 2024-09-08 NOTE — PROGRESS NOTE ADULT - ASSESSMENT
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 CT head sig for ? trace Right occipital SAH. Neurology consulted for vertigo. Medicine consulted for comanagement.     #Right occipital SAH  #Vertigo  - No acute neurosurgical intervention   - MR brain neg for acute blood  - ASA resumed  - Neurosurgery team approved to resume Plavix as well, however primary team recommending to resume outpatient given drop in H/H  - Neuro checks   - Neurology following for Vertigo - recommendations appreciated   - Patient reports improvement in symptoms with Valium and Meclizine   - Repeat CT head per Neurosurgery - will follow up with Dr. Luis     #Anemia of chronic dx  - No signs of bleeding. Baseline Hgb around 12 - currently 9  - FOBT negative. Hemolysis labs unremarkable  - Iron studies noted  - H/H stable   - Extensive workup started per Hematology including bone survey. Patient would like to continue workup outpatient at this time. Heme aware     #Hx of GCA  -Continue home Prednisone    Plan for discharge today per Primary Team

## 2024-09-08 NOTE — PROGRESS NOTE ADULT - ASSESSMENT
A: 83 yo male PMH giant cell arteritis w/ residual vision in right eye with "black spots", 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 As per pt, he was in his usual state of health this evening when he went to sleep around 21:30 (LKWT). He woke up to use bathroom around 0030 tonight and that's when he felt as if the room spinning when he was walking. He lowered himself to the ground, denies LOC, no head trauma. Denies h/o vertigo, states symptoms are worse with turning head left to right, +associated nausea. Denies prior episodes. Pt denies HA, visual changes, focal numb / ting / weak, word finding difficulty, neck stiffness, photophobia.     CTA head and neck 0300: Right posterior scalp soft tissue swelling and trace linear hyperdensity in the right lateral occipital lobe, which likely reflects subarachnoid hemorrhage versus less likely cortical laminar necrosis. No significant midline shift. No large vessel occlusion, significant stenosis or vascular abnormality of the head or neck identified.    CTH 0636: Previously linear density along the right occipital lobe is no longer visualized on the current study. There is a newly appreciated small right subdural hematoma.  Right parietal scalp hematoma.    Problems:  # right SDH with overlying scalp hematoma  # h/o TIA on plavix/ASA  # new onset vertigo  # anemia  H/H 8.5/26.6 today  # prostate ca on monthly chemo  # GCA on chronic, tapered steroids    Tertiary trauma survey- stable     Plan:  Continue SDU under trauma team  Neurosurgery; no intervention. Ok to resume ASA and plavix  Will consider Plavix dependent on the H/H  Neurochecks q8h  Meclizine 25 TID  C/w diet  resume home meds as appropriate  asa, holdin plavix for now  SCDs for DVT PPX  bowel regimen, PPI  SW for dispo planning  PT        case and plan dw Dr Dejesus

## 2024-09-08 NOTE — DISCHARGE NOTE PROVIDER - HOSPITAL COURSE
85 yo male PMH giant cell arteritis w/ residual vision in right eye with "black spots", hx of TIA and embolic stroke (on plavix, ASA) w/o residual deficits, GERD, BPH, prostate ca, presented tot he ER for evaluation of dizziness starting upon waking up this night with fall. Pt was found to have subdural hematoma. Multiple CT showed stable hematoma, NS and Neurology was consulted, no intervention. Pt was observed in SICU and now on the floor. Pt. is tolerating diet, H/H stable, and stable for discharge.

## 2024-09-08 NOTE — PROGRESS NOTE ADULT - PROVIDER SPECIALTY LIST ADULT
Hospitalist
Hospitalist
Trauma Surgery
Trauma Surgery
Hospitalist
Neurology
SICU
Neurology
Neurosurgery
SICU
Trauma Surgery

## 2024-09-08 NOTE — DISCHARGE NOTE PROVIDER - NSDCCPCAREPLAN_GEN_ALL_CORE_FT
PRINCIPAL DISCHARGE DIAGNOSIS  Diagnosis: Syncope  Assessment and Plan of Treatment:       SECONDARY DISCHARGE DIAGNOSES  Diagnosis: Nontraumatic subdural hematoma  Assessment and Plan of Treatment:

## 2024-09-09 ENCOUNTER — RX RENEWAL (OUTPATIENT)
Age: 84
End: 2024-09-09

## 2024-09-09 LAB
IGA FLD-MCNC: 35 MG/DL — LOW (ref 84–499)
IGG FLD-MCNC: 1392 MG/DL — SIGNIFICANT CHANGE UP (ref 610–1660)
IGM SERPL-MCNC: 25 MG/DL — LOW (ref 35–242)
KAPPA LC SER QL IFE: 8.45 MG/DL — HIGH (ref 0.33–1.94)
KAPPA/LAMBDA FREE LIGHT CHAIN RATIO, SERUM: 8.8 RATIO — HIGH (ref 0.26–1.65)
LAMBDA LC SER QL IFE: 0.96 MG/DL — SIGNIFICANT CHANGE UP (ref 0.57–2.63)

## 2024-09-10 ENCOUNTER — NON-APPOINTMENT (OUTPATIENT)
Age: 84
End: 2024-09-10

## 2024-09-11 ENCOUNTER — RESULT REVIEW (OUTPATIENT)
Age: 84
End: 2024-09-11

## 2024-09-11 ENCOUNTER — OUTPATIENT (OUTPATIENT)
Dept: OUTPATIENT SERVICES | Facility: HOSPITAL | Age: 84
LOS: 1 days | Discharge: HOME | End: 2024-09-11

## 2024-09-11 ENCOUNTER — APPOINTMENT (OUTPATIENT)
Dept: HEMATOLOGY ONCOLOGY | Facility: CLINIC | Age: 84
End: 2024-09-11
Payer: MEDICARE

## 2024-09-11 ENCOUNTER — LABORATORY RESULT (OUTPATIENT)
Age: 84
End: 2024-09-11

## 2024-09-11 VITALS
HEIGHT: 72 IN | OXYGEN SATURATION: 98 % | WEIGHT: 172 LBS | HEART RATE: 71 BPM | DIASTOLIC BLOOD PRESSURE: 64 MMHG | BODY MASS INDEX: 23.3 KG/M2 | SYSTOLIC BLOOD PRESSURE: 102 MMHG | TEMPERATURE: 97.9 F

## 2024-09-11 DIAGNOSIS — Z98.890 OTHER SPECIFIED POSTPROCEDURAL STATES: Chronic | ICD-10-CM

## 2024-09-11 DIAGNOSIS — K92.2 GASTROINTESTINAL HEMORRHAGE, UNSPECIFIED: ICD-10-CM

## 2024-09-11 DIAGNOSIS — D64.9 ANEMIA, UNSPECIFIED: ICD-10-CM

## 2024-09-11 DIAGNOSIS — E53.8 DEFICIENCY OF OTHER SPECIFIED B GROUP VITAMINS: ICD-10-CM

## 2024-09-11 LAB
% ALBUMIN: 56.8 % — SIGNIFICANT CHANGE UP
% ALPHA 1: 3.1 % — SIGNIFICANT CHANGE UP
% ALPHA 2: 7.5 % — SIGNIFICANT CHANGE UP
% BETA: 8.2 % — SIGNIFICANT CHANGE UP
% GAMMA: 24.4 % — SIGNIFICANT CHANGE UP
% M SPIKE: 20.6 % — SIGNIFICANT CHANGE UP
ALBUMIN SERPL ELPH-MCNC: 3.5 G/DL — LOW (ref 3.6–5.5)
ALBUMIN/GLOB SERPL ELPH: 1.3 RATIO — SIGNIFICANT CHANGE UP
ALPHA1 GLOB SERPL ELPH-MCNC: 0.2 G/DL — SIGNIFICANT CHANGE UP (ref 0.1–0.4)
ALPHA2 GLOB SERPL ELPH-MCNC: 0.5 G/DL — SIGNIFICANT CHANGE UP (ref 0.5–1)
B-GLOBULIN SERPL ELPH-MCNC: 0.5 G/DL — SIGNIFICANT CHANGE UP (ref 0.5–1)
BASOPHILS # BLD AUTO: 0.02 K/UL — SIGNIFICANT CHANGE UP (ref 0–0.2)
BASOPHILS NFR BLD AUTO: 0.2 % — SIGNIFICANT CHANGE UP (ref 0–2)
EOSINOPHIL # BLD AUTO: 0.05 K/UL — SIGNIFICANT CHANGE UP (ref 0–0.5)
EOSINOPHIL NFR BLD AUTO: 0.5 % — SIGNIFICANT CHANGE UP (ref 0–6)
GAMMA GLOBULIN: 1.5 G/DL — SIGNIFICANT CHANGE UP (ref 0.6–1.6)
HCT VFR BLD CALC: 26.8 % — LOW (ref 39–50)
HGB BLD-MCNC: 8.9 G/DL — LOW (ref 13–17)
IMM GRANULOCYTES NFR BLD AUTO: 1.5 % — HIGH (ref 0–0.9)
INTERPRETATION SERPL IFE-IMP: SIGNIFICANT CHANGE UP
LYMPHOCYTES # BLD AUTO: 1 K/UL — SIGNIFICANT CHANGE UP (ref 1–3.3)
LYMPHOCYTES # BLD AUTO: 10.6 % — LOW (ref 13–44)
M-SPIKE: 1.3 G/DL — HIGH (ref 0–0)
MCHC RBC-ENTMCNC: 33.2 GM/DL — SIGNIFICANT CHANGE UP (ref 32–36)
MCHC RBC-ENTMCNC: 34 PG — SIGNIFICANT CHANGE UP (ref 27–34)
MCV RBC AUTO: 102.3 FL — HIGH (ref 80–100)
MONOCYTES # BLD AUTO: 0.59 K/UL — SIGNIFICANT CHANGE UP (ref 0–0.9)
MONOCYTES NFR BLD AUTO: 6.2 % — SIGNIFICANT CHANGE UP (ref 2–14)
NEUTROPHILS # BLD AUTO: 7.66 K/UL — HIGH (ref 1.8–7.4)
NEUTROPHILS NFR BLD AUTO: 81 % — HIGH (ref 43–77)
NRBC # BLD: 0 /100 WBCS — SIGNIFICANT CHANGE UP (ref 0–0)
PLATELET # BLD AUTO: 215 K/UL — SIGNIFICANT CHANGE UP (ref 150–400)
PROT PATTERN SERPL ELPH-IMP: SIGNIFICANT CHANGE UP
PROT SERPL-MCNC: 6.2 G/DL — SIGNIFICANT CHANGE UP (ref 6–8.3)
RBC # BLD: 2.62 M/UL — LOW (ref 4.2–5.8)
RBC # FLD: 14.2 % — SIGNIFICANT CHANGE UP (ref 10.3–14.5)
WBC # BLD: 9.46 K/UL — SIGNIFICANT CHANGE UP (ref 3.8–10.5)
WBC # FLD AUTO: 9.46 K/UL — SIGNIFICANT CHANGE UP (ref 3.8–10.5)

## 2024-09-11 PROCEDURE — 99215 OFFICE O/P EST HI 40 MIN: CPT

## 2024-09-11 NOTE — RESULTS/DATA
[FreeTextEntry1] : Hospital labs Sept 2024 : HGb 7.9  -  8.4  on discharge  plts 137  WBC 7.22  creat 0.77 B12 folate irons normal LDH hapto normal   Bone survey r/o lytic lesions    TP 6.2   IgG 1392   IgA 35  IgM 25  REGINO Kappa 8..45  REGINO lambda 0.96  K/L ratio  8.8  NO lytic bone lesions on CT head, C spine;  CT CAP

## 2024-09-11 NOTE — PHYSICAL EXAM
[Normal] : affect appropriate [de-identified] : pleasant elderly  gentleman looks pale  [de-identified] : no edema

## 2024-09-11 NOTE — REVIEW OF SYSTEMS
[Fatigue] : fatigue [Recent Change In Weight] : ~T recent weight change [Constipation] : constipation [Dizziness] : dizziness [Negative] : Musculoskeletal [de-identified] : unbalanced walking

## 2024-09-11 NOTE — ASSESSMENT
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    Plan:  Bone marrow biopsy by IR  r/o plasma cell disorder/ myeloma  Repeat iron studies. Might need iv iron. Stool  occult blood. MOnitor counts CBC next week GI referral.  Resume Protonix 40 mg daily. Start B12 sublingual  1000 mcg daily. Will give B12 sc 1000 mcg next week.

## 2024-09-11 NOTE — PHYSICAL EXAM
[Normal] : affect appropriate [de-identified] : pleasant elderly  gentleman looks pale  [de-identified] : no edema

## 2024-09-11 NOTE — HISTORY OF PRESENT ILLNESS
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 He was  found to have subdural hematoma. Multiple CT showed stable hematoma, NS and Neurology was consulted,  no intervention. Hematology was consulted for macrocytic  anemia. Hg 7.9  He follows with rheum Dr De La Garza for giant cell arteritis - labs MArch 2024 by rheum ANA thomas 2.3 K/L 10.48 Ifec IgG kappa Saw hematology Dr Pollock for hypercoag w/up ( because of multiple strokes) - last seen in [place]y 2024- plan to monito myeloma labs, no bone marrow bx yet.  remote hx of GIB - GI w/up- no source found.  Hgb was 12 in July 2024 Noticed dark stools x few days and today- melanotic stool. BP running low recently. He was on Protonicx and Pepcid for GI protection while on prednisone (  recently d/c Protonix 40 but continued Pepcid ).

## 2024-09-11 NOTE — REVIEW OF SYSTEMS
[Fatigue] : fatigue [Recent Change In Weight] : ~T recent weight change [Constipation] : constipation [Dizziness] : dizziness [Negative] : Musculoskeletal [de-identified] : unbalanced walking

## 2024-09-11 NOTE — REASON FOR VISIT
[Follow-Up Visit] : a follow-up visit for [FreeTextEntry2] : follow up after  consultation for anemia , gammopathy

## 2024-09-11 NOTE — HISTORY OF PRESENT ILLNESS
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 He was  found to have subdural hematoma. Multiple CT showed stable hematoma, NS and Neurology was consulted,  no intervention. Hematology was consulted for macrocytic  anemia. Hg 7.9  He follows with rheum Dr De La Garza for giant cell arteritis - labs MArch 2024 by rheum ANA thomas 2.3 K/L 10.48 Ifec IgG kappa Saw hematology Dr Pollock for hypercoag w/up ( because of multiple strokes) - last seen in [place]y 2024- plan to monito myeloma labs, no bone marrow bx yet.  remote hx of GIB - GI w/up- no source found.  Hgb was 12 in July 2024 Noticed dark stools x few days and today- melanotic stool. BP running low recently. He was on Protonicx and Pepcid for GI protection while on prednisone (  recently d/c Protonix 40 but continued Pepcid ).

## 2024-09-12 DIAGNOSIS — E53.8 DEFICIENCY OF OTHER SPECIFIED B GROUP VITAMINS: ICD-10-CM

## 2024-09-12 DIAGNOSIS — D47.2 MONOCLONAL GAMMOPATHY: ICD-10-CM

## 2024-09-12 LAB — COPPER SERPL-MCNC: 49 UG/DL — LOW (ref 69–132)

## 2024-09-13 ENCOUNTER — APPOINTMENT (OUTPATIENT)
Dept: GASTROENTEROLOGY | Facility: CLINIC | Age: 84
End: 2024-09-13

## 2024-09-13 ENCOUNTER — APPOINTMENT (OUTPATIENT)
Dept: GASTROENTEROLOGY | Facility: CLINIC | Age: 84
End: 2024-09-13
Payer: MEDICARE

## 2024-09-13 ENCOUNTER — MED ADMIN CHARGE (OUTPATIENT)
Age: 84
End: 2024-09-13

## 2024-09-13 ENCOUNTER — APPOINTMENT (OUTPATIENT)
Dept: RHEUMATOLOGY | Facility: CLINIC | Age: 84
End: 2024-09-13
Payer: MEDICARE

## 2024-09-13 VITALS
HEART RATE: 52 BPM | DIASTOLIC BLOOD PRESSURE: 64 MMHG | SYSTOLIC BLOOD PRESSURE: 115 MMHG | TEMPERATURE: 97.2 F | OXYGEN SATURATION: 99 %

## 2024-09-13 VITALS
SYSTOLIC BLOOD PRESSURE: 98 MMHG | HEIGHT: 72 IN | BODY MASS INDEX: 23.57 KG/M2 | DIASTOLIC BLOOD PRESSURE: 70 MMHG | WEIGHT: 174 LBS

## 2024-09-13 DIAGNOSIS — K92.1 MELENA: ICD-10-CM

## 2024-09-13 DIAGNOSIS — D64.9 ANEMIA, UNSPECIFIED: ICD-10-CM

## 2024-09-13 PROBLEM — Z71.9 ENCOUNTER FOR CONSULTATION: Status: ACTIVE | Noted: 2024-09-13

## 2024-09-13 PROCEDURE — 96413 CHEMO IV INFUSION 1 HR: CPT

## 2024-09-13 PROCEDURE — 99204 OFFICE O/P NEW MOD 45 MIN: CPT

## 2024-09-13 PROCEDURE — 36415 COLL VENOUS BLD VENIPUNCTURE: CPT

## 2024-09-13 RX ORDER — TOCILIZUMAB 20 MG/ML
200 INJECTION, SOLUTION, CONCENTRATE INTRAVENOUS
Qty: 1 | Refills: 0 | Status: COMPLETED
Start: 2024-06-14

## 2024-09-13 RX ORDER — TOCILIZUMAB 20 MG/ML
80 INJECTION, SOLUTION, CONCENTRATE INTRAVENOUS
Qty: 1 | Refills: 0 | Status: COMPLETED
Start: 2024-03-22

## 2024-09-13 NOTE — HISTORY OF PRESENT ILLNESS
[N/A] : N/A [Denies] : Denies [No] : No [Yes] : Yes [Declined] : Declined [Informed consent documented in EHR.] : Informed consent documented in EHR. [TB] : Tuberculosis screening [Hep acute panel] : Hepatitis acute panel [Total Hep B core AB] : total Hepatitis B Core antibody [Right upper extremity] : Right upper extremity [24g] : 24g [Start Time: ___] : Medication Start Time: [unfilled] [End Time: ___] : Medication End Time: [unfilled] [Medication Name: ___] : Medication Name: [unfilled] [Total Amount Administered: ___] : Total Amount Administered: [unfilled] [IV discontinued. Intact. No signs or symptoms of IV complications noted. Time: ___] : IV discontinued. Intact. No signs or symptoms of IV complications noted. Time: [unfilled] [Patient  instructed to seek medical attention with signs and symptoms of adverse effects] : Patient  instructed to seek medical attention with signs and symptoms of adverse effects [Patient left unit in no acute distress] : Patient left unit in no acute distress [Medications administered as ordered and tolerated well.] : Medications administered as ordered and tolerated well. [Blood drawn at time of visit] : Blood drawn at time of visit [de-identified] : 10:45AM [de-identified] : NEXT INFUSION- 10/11/24 AT 11:00AM

## 2024-09-15 ENCOUNTER — NON-APPOINTMENT (OUTPATIENT)
Age: 84
End: 2024-09-15

## 2024-09-16 DIAGNOSIS — D53.9 NUTRITIONAL ANEMIA, UNSPECIFIED: ICD-10-CM

## 2024-09-16 DIAGNOSIS — G93.89 OTHER SPECIFIED DISORDERS OF BRAIN: ICD-10-CM

## 2024-09-16 DIAGNOSIS — H43.391 OTHER VITREOUS OPACITIES, RIGHT EYE: ICD-10-CM

## 2024-09-16 DIAGNOSIS — H91.90 UNSPECIFIED HEARING LOSS, UNSPECIFIED EAR: ICD-10-CM

## 2024-09-16 DIAGNOSIS — Z79.82 LONG TERM (CURRENT) USE OF ASPIRIN: ICD-10-CM

## 2024-09-16 DIAGNOSIS — Z79.899 OTHER LONG TERM (CURRENT) DRUG THERAPY: ICD-10-CM

## 2024-09-16 DIAGNOSIS — E78.5 HYPERLIPIDEMIA, UNSPECIFIED: ICD-10-CM

## 2024-09-16 DIAGNOSIS — I62.00 NONTRAUMATIC SUBDURAL HEMORRHAGE, UNSPECIFIED: ICD-10-CM

## 2024-09-16 DIAGNOSIS — R55 SYNCOPE AND COLLAPSE: ICD-10-CM

## 2024-09-16 DIAGNOSIS — Z86.73 PERSONAL HISTORY OF TRANSIENT ISCHEMIC ATTACK (TIA), AND CEREBRAL INFARCTION WITHOUT RESIDUAL DEFICITS: ICD-10-CM

## 2024-09-16 DIAGNOSIS — Z79.01 LONG TERM (CURRENT) USE OF ANTICOAGULANTS: ICD-10-CM

## 2024-09-16 DIAGNOSIS — Z92.21 PERSONAL HISTORY OF ANTINEOPLASTIC CHEMOTHERAPY: ICD-10-CM

## 2024-09-16 DIAGNOSIS — E43 UNSPECIFIED SEVERE PROTEIN-CALORIE MALNUTRITION: ICD-10-CM

## 2024-09-16 DIAGNOSIS — R42 DIZZINESS AND GIDDINESS: ICD-10-CM

## 2024-09-16 DIAGNOSIS — C61 MALIGNANT NEOPLASM OF PROSTATE: ICD-10-CM

## 2024-09-16 DIAGNOSIS — K59.00 CONSTIPATION, UNSPECIFIED: ICD-10-CM

## 2024-09-16 DIAGNOSIS — M31.6 OTHER GIANT CELL ARTERITIS: ICD-10-CM

## 2024-09-16 DIAGNOSIS — I60.9 NONTRAUMATIC SUBARACHNOID HEMORRHAGE, UNSPECIFIED: ICD-10-CM

## 2024-09-16 DIAGNOSIS — Z88.0 ALLERGY STATUS TO PENICILLIN: ICD-10-CM

## 2024-09-16 DIAGNOSIS — K21.9 GASTRO-ESOPHAGEAL REFLUX DISEASE WITHOUT ESOPHAGITIS: ICD-10-CM

## 2024-09-16 DIAGNOSIS — N40.0 BENIGN PROSTATIC HYPERPLASIA WITHOUT LOWER URINARY TRACT SYMPTOMS: ICD-10-CM

## 2024-09-16 LAB
ERYTHROCYTE [SEDIMENTATION RATE] IN BLOOD BY WESTERGREN METHOD: 10 MM/HR
FERRITIN SERPL-MCNC: 139 NG/ML
IRON SATN MFR SERPL: 15 %
IRON SERPL-MCNC: 44 UG/DL
TIBC SERPL-MCNC: 299 UG/DL
UIBC SERPL-MCNC: 255 UG/DL

## 2024-09-17 ENCOUNTER — RESULT REVIEW (OUTPATIENT)
Age: 84
End: 2024-09-17

## 2024-09-17 ENCOUNTER — APPOINTMENT (OUTPATIENT)
Dept: INFUSION THERAPY | Facility: CLINIC | Age: 84
End: 2024-09-17

## 2024-09-17 VITALS
HEART RATE: 68 BPM | OXYGEN SATURATION: 96 % | HEIGHT: 72 IN | DIASTOLIC BLOOD PRESSURE: 57 MMHG | SYSTOLIC BLOOD PRESSURE: 102 MMHG | TEMPERATURE: 98.3 F | WEIGHT: 176.13 LBS | BODY MASS INDEX: 23.86 KG/M2

## 2024-09-17 LAB
BASOPHILS # BLD AUTO: 0.02 K/UL — SIGNIFICANT CHANGE UP (ref 0–0.2)
BASOPHILS NFR BLD AUTO: 0.3 % — SIGNIFICANT CHANGE UP (ref 0–2)
EOSINOPHIL # BLD AUTO: 0.06 K/UL — SIGNIFICANT CHANGE UP (ref 0–0.5)
EOSINOPHIL NFR BLD AUTO: 0.9 % — SIGNIFICANT CHANGE UP (ref 0–6)
HCT VFR BLD CALC: 29.6 % — LOW (ref 39–50)
HGB BLD-MCNC: 9.7 G/DL — LOW (ref 13–17)
IMM GRANULOCYTES NFR BLD AUTO: 0.9 % — SIGNIFICANT CHANGE UP (ref 0–0.9)
LYMPHOCYTES # BLD AUTO: 0.96 K/UL — LOW (ref 1–3.3)
LYMPHOCYTES # BLD AUTO: 15 % — SIGNIFICANT CHANGE UP (ref 13–44)
MCHC RBC-ENTMCNC: 32.8 GM/DL — SIGNIFICANT CHANGE UP (ref 32–36)
MCHC RBC-ENTMCNC: 34 PG — SIGNIFICANT CHANGE UP (ref 27–34)
MCV RBC AUTO: 103.9 FL — HIGH (ref 80–100)
MONOCYTES # BLD AUTO: 0.53 K/UL — SIGNIFICANT CHANGE UP (ref 0–0.9)
MONOCYTES NFR BLD AUTO: 8.3 % — SIGNIFICANT CHANGE UP (ref 2–14)
NEUTROPHILS # BLD AUTO: 4.78 K/UL — SIGNIFICANT CHANGE UP (ref 1.8–7.4)
NEUTROPHILS NFR BLD AUTO: 74.6 % — SIGNIFICANT CHANGE UP (ref 43–77)
NRBC # BLD: 0 /100 WBCS — SIGNIFICANT CHANGE UP (ref 0–0)
PLATELET # BLD AUTO: 227 K/UL — SIGNIFICANT CHANGE UP (ref 150–400)
RBC # BLD: 2.85 M/UL — LOW (ref 4.2–5.8)
RBC # FLD: 14.8 % — HIGH (ref 10.3–14.5)
WBC # BLD: 6.41 K/UL — SIGNIFICANT CHANGE UP (ref 3.8–10.5)
WBC # FLD AUTO: 6.41 K/UL — SIGNIFICANT CHANGE UP (ref 3.8–10.5)

## 2024-09-18 ENCOUNTER — RX RENEWAL (OUTPATIENT)
Age: 84
End: 2024-09-18

## 2024-09-18 PROBLEM — K92.1 MELENA: Status: ACTIVE | Noted: 2024-09-18

## 2024-09-18 NOTE — ASSESSMENT
[FreeTextEntry1] : 83yo male with melena, anemia  will check EGD  Risks and benefits of procedure(s) discussed with patient in detail, including but not limited to, perforation, bleeding, reaction to anesthesia, missed lesions.  if negative would consider colonoscopy

## 2024-09-18 NOTE — HISTORY OF PRESENT ILLNESS
[FreeTextEntry1] : 83yo male with multiple medical issues  He is now with acute on chronic anemia with hgb 8.4 He has been having melena  His plavix being held while this going on  No significant diarrhea Some fatigue

## 2024-09-18 NOTE — HISTORY OF PRESENT ILLNESS
[FreeTextEntry1] : 85yo male with multiple medical issues  He is now with acute on chronic anemia with hgb 8.4 He has been having melena  His plavix being held while this going on  No significant diarrhea Some fatigue

## 2024-09-18 NOTE — PHYSICAL EXAM
[Alert] : alert [Normal Voice/Communication] : normal voice/communication [Healthy Appearing] : healthy appearing [No Acute Distress] : no acute distress [Sclera] : the sclera and conjunctiva were normal [Hearing Threshold Finger Rub Not [place]] : hearing was normal [Normal Lips/Gums] : the lips and gums were normal [Oropharynx] : the oropharynx was normal [Normal Appearance] : the appearance of the neck was normal [No Neck Mass] : no neck mass was observed [No Respiratory Distress] : no respiratory distress [No Acc Muscle Use] : no accessory muscle use [Respiration, Rhythm And Depth] : normal respiratory rhythm and effort [Auscultation Breath Sounds / Voice Sounds] : lungs were clear to auscultation bilaterally [Heart Rate And Rhythm] : heart rate was normal and rhythm regular [Normal S1, S2] : normal S1 and S2 [Murmurs] : no murmurs [Bowel Sounds] : normal bowel sounds [Abdomen Tenderness] : non-tender [No Masses] : no abdominal mass palpated [Abdomen Soft] : soft [] : no hepatosplenomegaly [Oriented To Time, Place, And Person] : oriented to person, place, and time

## 2024-09-18 NOTE — ASSESSMENT
[FreeTextEntry1] : 85yo male with melena, anemia  will check EGD  Risks and benefits of procedure(s) discussed with patient in detail, including but not limited to, perforation, bleeding, reaction to anesthesia, missed lesions.  if negative would consider colonoscopy

## 2024-09-19 ENCOUNTER — NON-APPOINTMENT (OUTPATIENT)
Age: 84
End: 2024-09-19

## 2024-09-19 ENCOUNTER — APPOINTMENT (OUTPATIENT)
Dept: GASTROENTEROLOGY | Facility: AMBULATORY MEDICAL SERVICES | Age: 84
End: 2024-09-19
Payer: MEDICARE

## 2024-09-19 ENCOUNTER — RESULT REVIEW (OUTPATIENT)
Age: 84
End: 2024-09-19

## 2024-09-19 PROCEDURE — 43239 EGD BIOPSY SINGLE/MULTIPLE: CPT

## 2024-09-20 ENCOUNTER — NON-APPOINTMENT (OUTPATIENT)
Age: 84
End: 2024-09-20

## 2024-09-20 ENCOUNTER — APPOINTMENT (OUTPATIENT)
Dept: ELECTROPHYSIOLOGY | Facility: CLINIC | Age: 84
End: 2024-09-20

## 2024-09-20 PROCEDURE — 93298 REM INTERROG DEV EVAL SCRMS: CPT

## 2024-09-23 ENCOUNTER — OUTPATIENT (OUTPATIENT)
Dept: INPATIENT UNIT | Facility: HOSPITAL | Age: 84
LOS: 1 days | Discharge: HOME | End: 2024-09-23
Payer: MEDICARE

## 2024-09-23 ENCOUNTER — TRANSCRIPTION ENCOUNTER (OUTPATIENT)
Age: 84
End: 2024-09-23

## 2024-09-23 ENCOUNTER — RESULT REVIEW (OUTPATIENT)
Age: 84
End: 2024-09-23

## 2024-09-23 VITALS
TEMPERATURE: 98 F | HEART RATE: 55 BPM | WEIGHT: 169.98 LBS | OXYGEN SATURATION: 99 % | DIASTOLIC BLOOD PRESSURE: 54 MMHG | HEIGHT: 73 IN | RESPIRATION RATE: 16 BRPM | SYSTOLIC BLOOD PRESSURE: 112 MMHG

## 2024-09-23 VITALS
RESPIRATION RATE: 14 BRPM | OXYGEN SATURATION: 99 % | HEART RATE: 64 BPM | SYSTOLIC BLOOD PRESSURE: 96 MMHG | DIASTOLIC BLOOD PRESSURE: 55 MMHG | TEMPERATURE: 98 F

## 2024-09-23 DIAGNOSIS — D64.9 ANEMIA, UNSPECIFIED: ICD-10-CM

## 2024-09-23 DIAGNOSIS — D47.2 MONOCLONAL GAMMOPATHY: ICD-10-CM

## 2024-09-23 DIAGNOSIS — Z98.890 OTHER SPECIFIED POSTPROCEDURAL STATES: Chronic | ICD-10-CM

## 2024-09-23 LAB
ANION GAP SERPL CALC-SCNC: 2 MMOL/L — LOW (ref 5–17)
BUN SERPL-MCNC: 25 MG/DL — HIGH (ref 7–23)
CALCIUM SERPL-MCNC: 9.2 MG/DL — SIGNIFICANT CHANGE UP (ref 8.5–10.1)
CHLORIDE SERPL-SCNC: 109 MMOL/L — HIGH (ref 96–108)
CO2 SERPL-SCNC: 31 MMOL/L — SIGNIFICANT CHANGE UP (ref 22–31)
CREAT SERPL-MCNC: 1.03 MG/DL — SIGNIFICANT CHANGE UP (ref 0.5–1.3)
EGFR: 72 ML/MIN/1.73M2 — SIGNIFICANT CHANGE UP
GLUCOSE SERPL-MCNC: 112 MG/DL — HIGH (ref 70–99)
HCT VFR BLD CALC: 28.6 % — LOW (ref 39–50)
HGB BLD-MCNC: 9 G/DL — LOW (ref 13–17)
INR BLD: 1.02 RATIO — SIGNIFICANT CHANGE UP (ref 0.85–1.18)
MCHC RBC-ENTMCNC: 31.5 GM/DL — LOW (ref 32–36)
MCHC RBC-ENTMCNC: 33.7 PG — SIGNIFICANT CHANGE UP (ref 27–34)
MCV RBC AUTO: 107.1 FL — HIGH (ref 80–100)
PLATELET # BLD AUTO: 171 K/UL — SIGNIFICANT CHANGE UP (ref 150–400)
POTASSIUM SERPL-MCNC: 3.8 MMOL/L — SIGNIFICANT CHANGE UP (ref 3.5–5.3)
POTASSIUM SERPL-SCNC: 3.8 MMOL/L — SIGNIFICANT CHANGE UP (ref 3.5–5.3)
PROTHROM AB SERPL-ACNC: 11.5 SEC — SIGNIFICANT CHANGE UP (ref 9.5–13)
RBC # BLD: 2.67 M/UL — LOW (ref 4.2–5.8)
RBC # FLD: 14.9 % — HIGH (ref 10.3–14.5)
SODIUM SERPL-SCNC: 142 MMOL/L — SIGNIFICANT CHANGE UP (ref 135–145)
WBC # BLD: 5.07 K/UL — SIGNIFICANT CHANGE UP (ref 3.8–10.5)
WBC # FLD AUTO: 5.07 K/UL — SIGNIFICANT CHANGE UP (ref 3.8–10.5)

## 2024-09-23 PROCEDURE — 85027 COMPLETE CBC AUTOMATED: CPT

## 2024-09-23 PROCEDURE — 88185 FLOWCYTOMETRY/TC ADD-ON: CPT

## 2024-09-23 PROCEDURE — 88280 CHROMOSOME KARYOTYPE STUDY: CPT | Mod: 59

## 2024-09-23 PROCEDURE — 88313 SPECIAL STAINS GROUP 2: CPT

## 2024-09-23 PROCEDURE — 88360 TUMOR IMMUNOHISTOCHEM/MANUAL: CPT | Mod: 26

## 2024-09-23 PROCEDURE — 88341 IMHCHEM/IMCYTCHM EA ADD ANTB: CPT | Mod: 26,59

## 2024-09-23 PROCEDURE — 88305 TISSUE EXAM BY PATHOLOGIST: CPT

## 2024-09-23 PROCEDURE — 88360 TUMOR IMMUNOHISTOCHEM/MANUAL: CPT

## 2024-09-23 PROCEDURE — 88364 INSITU HYBRIDIZATION (FISH): CPT | Mod: 26

## 2024-09-23 PROCEDURE — 88365 INSITU HYBRIDIZATION (FISH): CPT

## 2024-09-23 PROCEDURE — 93010 ELECTROCARDIOGRAM REPORT: CPT

## 2024-09-23 PROCEDURE — 88237 TISSUE CULTURE BONE MARROW: CPT | Mod: 59

## 2024-09-23 PROCEDURE — 88342 IMHCHEM/IMCYTCHM 1ST ANTB: CPT

## 2024-09-23 PROCEDURE — 88305 TISSUE EXAM BY PATHOLOGIST: CPT | Mod: 26

## 2024-09-23 PROCEDURE — 77012 CT SCAN FOR NEEDLE BIOPSY: CPT | Mod: 26

## 2024-09-23 PROCEDURE — 88364 INSITU HYBRIDIZATION (FISH): CPT

## 2024-09-23 PROCEDURE — 88264 CHROMOSOME ANALYSIS 20-25: CPT

## 2024-09-23 PROCEDURE — 93005 ELECTROCARDIOGRAM TRACING: CPT

## 2024-09-23 PROCEDURE — 87205 SMEAR GRAM STAIN: CPT

## 2024-09-23 PROCEDURE — 77012 CT SCAN FOR NEEDLE BIOPSY: CPT

## 2024-09-23 PROCEDURE — 85097 BONE MARROW INTERPRETATION: CPT

## 2024-09-23 PROCEDURE — 88189 FLOWCYTOMETRY/READ 16 & >: CPT

## 2024-09-23 PROCEDURE — 88313 SPECIAL STAINS GROUP 2: CPT | Mod: 26

## 2024-09-23 PROCEDURE — 36415 COLL VENOUS BLD VENIPUNCTURE: CPT

## 2024-09-23 PROCEDURE — 88291 CYTO/MOLECULAR REPORT: CPT

## 2024-09-23 PROCEDURE — 80048 BASIC METABOLIC PNL TOTAL CA: CPT

## 2024-09-23 PROCEDURE — 88342 IMHCHEM/IMCYTCHM 1ST ANTB: CPT | Mod: 26,59

## 2024-09-23 PROCEDURE — 88271 CYTOGENETICS DNA PROBE: CPT | Mod: XU

## 2024-09-23 PROCEDURE — 88275 CYTOGENETICS 100-300: CPT | Mod: XU

## 2024-09-23 PROCEDURE — 85610 PROTHROMBIN TIME: CPT

## 2024-09-23 PROCEDURE — 38222 DX BONE MARROW BX & ASPIR: CPT | Mod: RT

## 2024-09-23 PROCEDURE — 88341 IMHCHEM/IMCYTCHM EA ADD ANTB: CPT

## 2024-09-23 PROCEDURE — 88184 FLOWCYTOMETRY/ TC 1 MARKER: CPT

## 2024-09-23 PROCEDURE — 88365 INSITU HYBRIDIZATION (FISH): CPT | Mod: 26

## 2024-09-23 RX ORDER — CYANOCOBALAMIN (VITAMIN B-12) 500MCG/0.1
1 GEL (ML) NASAL
Refills: 0 | DISCHARGE

## 2024-09-23 RX ORDER — ONDANSETRON 2 MG/ML
4 INJECTION, SOLUTION INTRAMUSCULAR; INTRAVENOUS ONCE
Refills: 0 | Status: DISCONTINUED | OUTPATIENT
Start: 2024-09-23 | End: 2024-09-23

## 2024-09-23 RX ORDER — PANTOPRAZOLE SODIUM 40 MG
1 TABLET, DELAYED RELEASE (ENTERIC COATED) ORAL
Refills: 0 | DISCHARGE

## 2024-09-23 RX ORDER — OXYCODONE HYDROCHLORIDE 5 MG/1
5 TABLET ORAL ONCE
Refills: 0 | Status: DISCONTINUED | OUTPATIENT
Start: 2024-09-23 | End: 2024-09-23

## 2024-09-23 RX ORDER — FAMOTIDINE 10 MG/ML
1 INJECTION INTRAVENOUS
Refills: 0 | DISCHARGE

## 2024-09-23 RX ORDER — LEUPROLIDE 42 MG/.37G
45 INJECTION, EMULSION SUBCUTANEOUS
Refills: 0 | DISCHARGE

## 2024-09-23 RX ORDER — PREDNISONE 10 MG
1 TABLET, DOSE PACK ORAL
Refills: 0 | DISCHARGE

## 2024-09-23 RX ORDER — FENTANYL CITRATE 50 UG/ML
50 INJECTION INTRAMUSCULAR; INTRAVENOUS
Refills: 0 | Status: DISCONTINUED | OUTPATIENT
Start: 2024-09-23 | End: 2024-09-23

## 2024-09-23 NOTE — ASU PATIENT PROFILE, ADULT - NSICDXPASTMEDICALHX_GEN_ALL_CORE_FT
PAST MEDICAL HISTORY:  BPH (benign prostatic hyperplasia)     GERD (gastroesophageal reflux disease)     H/O constipation     HLD (hyperlipidemia)     [place] (hard of hearing)     Snores

## 2024-09-25 DIAGNOSIS — A04.8 OTHER SPECIFIED BACTERIAL INTESTINAL INFECTIONS: ICD-10-CM

## 2024-09-25 RX ORDER — METRONIDAZOLE 500 MG/1
500 TABLET ORAL
Qty: 42 | Refills: 0 | Status: ACTIVE | COMMUNITY
Start: 2024-09-25 | End: 1900-01-01

## 2024-09-25 RX ORDER — OMEPRAZOLE 40 MG/1
40 CAPSULE, DELAYED RELEASE ORAL
Qty: 14 | Refills: 1 | Status: ACTIVE | COMMUNITY
Start: 2024-09-25 | End: 1900-01-01

## 2024-09-25 RX ORDER — TETRACYCLINE HYDROCHLORIDE 500 MG/1
500 CAPSULE ORAL
Qty: 56 | Refills: 0 | Status: ACTIVE | COMMUNITY
Start: 2024-09-25 | End: 1900-01-01

## 2024-09-25 RX ORDER — BISMUTH SUBSALICYLATE 262 MG/1
262 TABLET, CHEWABLE ORAL 4 TIMES DAILY
Qty: 56 | Refills: 0 | Status: ACTIVE | COMMUNITY
Start: 2024-09-25 | End: 1900-01-01

## 2024-09-26 LAB
HEMATOPATHOLOGY REPORT: SIGNIFICANT CHANGE UP
TM INTERPRETATION: SIGNIFICANT CHANGE UP

## 2024-09-27 ENCOUNTER — LABORATORY RESULT (OUTPATIENT)
Age: 84
End: 2024-09-27

## 2024-09-27 LAB — HEMATOPATHOLOGY REPORT: SIGNIFICANT CHANGE UP

## 2024-09-30 ENCOUNTER — NON-APPOINTMENT (OUTPATIENT)
Age: 84
End: 2024-09-30

## 2024-09-30 LAB
BASOPHILS # BLD AUTO: 0.04 K/UL
BASOPHILS NFR BLD AUTO: 0.6 %
EOSINOPHIL # BLD AUTO: 0.24 K/UL
EOSINOPHIL NFR BLD AUTO: 3.8 %
HCT VFR BLD CALC: 31.1 %
HGB BLD-MCNC: 9.9 G/DL
IMM GRANULOCYTES NFR BLD AUTO: 0.5 %
LYMPHOCYTES # BLD AUTO: 0.9 K/UL
LYMPHOCYTES NFR BLD AUTO: 14.2 %
MAN DIFF?: NORMAL
MCHC RBC-ENTMCNC: 31.8 GM/DL
MCHC RBC-ENTMCNC: 33.8 PG
MCV RBC AUTO: 106.1 FL
MONOCYTES # BLD AUTO: 0.59 K/UL
MONOCYTES NFR BLD AUTO: 9.3 %
NEUTROPHILS # BLD AUTO: 4.54 K/UL
NEUTROPHILS NFR BLD AUTO: 71.6 %
PLATELET # BLD AUTO: 184 K/UL
RBC # BLD: 2.93 M/UL
RBC # FLD: 15.1 %
WBC # FLD AUTO: 6.34 K/UL

## 2024-09-30 NOTE — DIETITIAN INITIAL EVALUATION ADULT - CALCULATED TO (G/KG)
Patient admitted to room 204 from [facility] ED.  Patient oriented to room, call light, bed rails, phone, lights and bathroom.  Patient instructed about the schedule of the day including: vital sign frequency, lab draws, possible tests, frequency of MD and staff rounds, including RN/MD rounding together at bedside, daily weights, and I &O's.  Patient instructed about prescribed diet, how to call and order meals, and television.  Telemetry box  in place, patient aware of placement and reason.  Bed locked, in lowest position, side rails up 2/4, call light within reach.  Will continue to monitor.      Vitals:    09/30/24 1209   BP: (!) 165/100   Pulse: 59   Resp: 16   Temp: 98.2 °F (36.8 °C)   SpO2: 96%     Notified hospitalist of pt arrival.    122.56

## 2024-10-01 LAB — CHROM ANALY INTERPHASE BLD FISH-IMP: SIGNIFICANT CHANGE UP

## 2024-10-07 ENCOUNTER — RESULT REVIEW (OUTPATIENT)
Age: 84
End: 2024-10-07

## 2024-10-07 ENCOUNTER — APPOINTMENT (OUTPATIENT)
Dept: HEMATOLOGY ONCOLOGY | Facility: CLINIC | Age: 84
End: 2024-10-07
Payer: MEDICARE

## 2024-10-07 VITALS
HEART RATE: 70 BPM | WEIGHT: 175 LBS | OXYGEN SATURATION: 97 % | HEIGHT: 72 IN | DIASTOLIC BLOOD PRESSURE: 63 MMHG | SYSTOLIC BLOOD PRESSURE: 107 MMHG | BODY MASS INDEX: 23.7 KG/M2 | TEMPERATURE: 98.1 F

## 2024-10-07 DIAGNOSIS — K92.2 GASTROINTESTINAL HEMORRHAGE, UNSPECIFIED: ICD-10-CM

## 2024-10-07 DIAGNOSIS — D47.2 MONOCLONAL GAMMOPATHY: ICD-10-CM

## 2024-10-07 LAB
BASOPHILS # BLD AUTO: 0.04 K/UL — SIGNIFICANT CHANGE UP (ref 0–0.2)
BASOPHILS NFR BLD AUTO: 0.6 % — SIGNIFICANT CHANGE UP (ref 0–2)
CHROM ANALY OVERALL INTERP SPEC-IMP: SIGNIFICANT CHANGE UP
EOSINOPHIL # BLD AUTO: 0.23 K/UL — SIGNIFICANT CHANGE UP (ref 0–0.5)
EOSINOPHIL NFR BLD AUTO: 3.6 % — SIGNIFICANT CHANGE UP (ref 0–6)
HCT VFR BLD CALC: 33.3 % — LOW (ref 39–50)
HGB BLD-MCNC: 10.9 G/DL — LOW (ref 13–17)
IMM GRANULOCYTES NFR BLD AUTO: 0.6 % — SIGNIFICANT CHANGE UP (ref 0–0.9)
LYMPHOCYTES # BLD AUTO: 0.95 K/UL — LOW (ref 1–3.3)
LYMPHOCYTES # BLD AUTO: 14.8 % — SIGNIFICANT CHANGE UP (ref 13–44)
MCHC RBC-ENTMCNC: 32.7 GM/DL — SIGNIFICANT CHANGE UP (ref 32–36)
MCHC RBC-ENTMCNC: 33.7 PG — SIGNIFICANT CHANGE UP (ref 27–34)
MCV RBC AUTO: 103.1 FL — HIGH (ref 80–100)
MONOCYTES # BLD AUTO: 0.54 K/UL — SIGNIFICANT CHANGE UP (ref 0–0.9)
MONOCYTES NFR BLD AUTO: 8.4 % — SIGNIFICANT CHANGE UP (ref 2–14)
NEUTROPHILS # BLD AUTO: 4.6 K/UL — SIGNIFICANT CHANGE UP (ref 1.8–7.4)
NEUTROPHILS NFR BLD AUTO: 72 % — SIGNIFICANT CHANGE UP (ref 43–77)
NRBC # BLD: 0 /100 WBCS — SIGNIFICANT CHANGE UP (ref 0–0)
PLATELET # BLD AUTO: 172 K/UL — SIGNIFICANT CHANGE UP (ref 150–400)
RBC # BLD: 3.23 M/UL — LOW (ref 4.2–5.8)
RBC # FLD: 13.5 % — SIGNIFICANT CHANGE UP (ref 10.3–14.5)
WBC # BLD: 6.4 K/UL — SIGNIFICANT CHANGE UP (ref 3.8–10.5)
WBC # FLD AUTO: 6.4 K/UL — SIGNIFICANT CHANGE UP (ref 3.8–10.5)

## 2024-10-07 PROCEDURE — 99215 OFFICE O/P EST HI 40 MIN: CPT

## 2024-10-08 DIAGNOSIS — D47.2 MONOCLONAL GAMMOPATHY: ICD-10-CM

## 2024-10-09 NOTE — DISCHARGE NOTE PROVIDER - NSDCQMSTROKE_NEU_ALL_CORE
Medicare Wellness Visit  Plan for Preventive Care    A good way for you to stay healthy is to use preventive care.  Medicare covers many services that can help you stay healthy.* The goal of these services is to find any health problems as quickly as possible. Finding problems early can help make them easier to treat.  Your personal plan below lists the services you may need and when they are due.      Health Maintenance Summary     DM/CKD Microalbumin Ratio (Yearly)  Never done    Shingles Vaccine (1 of 2)  Overdue since 2/26/2011    DTaP/Tdap/Td Vaccine (2 - Td or Tdap)  Overdue since 6/27/2021    Medicare Advantage- Medicare Wellness Visit (Yearly - January to December)  Overdue since 1/1/2024    DM/CKD GFR (Yearly)  Ordered on 10/1/2024    Pneumococcal Vaccine 65+   Completed    Influenza Vaccine   Completed    COVID-19 Vaccine   Completed    Meningococcal Vaccine   Aged Out    Hepatitis B Vaccine (For Physician/APC Discussion)   Aged Out    HPV Vaccine   Aged Out           Preventive Care for Women and Men    Heart Screenings (Cardiovascular):  Blood tests are used to check your cholesterol, lipid and triglyceride levels. High levels can increase your risk for heart disease and stroke. High levels can be treated with medications, diet and exercise. Lowering your levels can help keep your heart and blood vessels healthy.  Your provider will order these tests if they are needed.    An ultrasound is done to see if you have an abdominal aortic aneurysm (AAA).  This is an enlargement of one of the main blood vessels that delivers blood to the body.   In the [place], 9,000 deaths are caused by AAA.  You may not even know you have this problem and as many as 1 in 3 people will have a serious problem if it is not treated.  Early diagnosis allows for more effective treatment and cure.  If you have a family history of AAA or are a male age 65-75 who has smoked, you are at higher risk of an AAA.  Your provider can  order this test, if needed.    Colorectal Screening:  There are many tests that are used to check for cancer of your colon and rectum. You and your provider should discuss what test is best for you and when to have it done.  Options include:  Screening Colonoscopy: exam of the entire colon, seen through a flexible lighted tube.  Flexible Sigmoidoscopy: exam of the last third (sigmoid portion) of the colon and rectum, seen through a flexible lighted tube.  Cologuard DNA stool test: a sample of your stool is used to screen for cancer and unseen blood in your stool.  Fecal Occult Blood Test: a sample of your stool is studied to find any unseen blood    Flu Shot:  An immunization that helps to prevent influenza (the flu). You should get this every year. The best time to get the shot is in the fall.    Pneumococcal Shot:  Vaccines help prevent pneumococcal disease, which is any type of illness caused by Streptococcus pneumoniae bacteria. There are two kinds of pneumococcal vaccines available in the [place]:   Pneumococcal conjugate vaccines (PCV20 or Lwqsqtk97®)  Pneumococcal polysaccharide vaccine (PPSV23 or Owpahaitu67®)  For those who have never received any pneumococcal conjugate vaccine, CDC recommends PVC20 for adults 65 years or older and adults 19 through 64 years old with certain medical conditions or risk factors.   For those who have previously received PCV13, this should be followed by a dose of PPSV23.     Hepatitis B Shot:  An immunization that helps to protect people from getting Hepatitis B. Hepatitis B is a virus that spreads through contact with infected blood or body fluids. Many people with the virus do not have symptoms.  The virus can lead to serious problems, such as liver disease. Some people are at higher risk than others. Your doctor will tell you if you need this shot.     Diabetes Screening:  A test to measure sugar (glucose) in your blood is called a fasting blood sugar. Fasting means  you cannot have food or drink for at least 8 hours before the test. This test can detect diabetes long before you may notice symptoms.    Glaucoma Screening:  Glaucoma screening is performed by your eye doctor. The test measures the fluid pressure inside your eyes to determine if you have glaucoma.     Hepatitis C Screening:  A blood test to see if you have the hepatitis C virus.  Hepatitis C attacks the liver and is a major cause of chronic liver disease.  Medicare will cover a single screening for all adults born between 1945 & 1965, or high risk patients (people who have injected illegal drugs or people who have had blood transfusions).  High risk patients who continue to inject illegal drugs can be screened for Hepatitis C every year.    Smoking and Tobacco-Use Cessation Counseling:  Tobacco is the single greatest cause of disease and early death in our country today. Medication and counseling together can increase a person’s chance of quitting for good.   Medicare covers two quitting attempts per year, with four counseling sessions per attempt (eight sessions in a 12 month period)    Preventive Screening tests for Women    Screening Mammograms and Breast Exams:  An x-ray of your breasts to check for breast cancer before you or your doctor may be able to feel it.  If breast cancer is found early it can usually be treated with success.    Pelvic Exams and Pap Tests:  An exam to check for cervical and vaginal cancer. A Pap test is a lab test in which cells are taken from your cervix and sent to the lab to look for signs of cervical cancer. If cancer of the cervix is found early, chances for a cure are good. Testing can generally end at age 65, or if a woman has a hysterectomy for a benign condition. Your provider may recommend more frequent testing if certain abnormal results are found.    Bone Mass Measurements:  A painless x-ray of your bone density to see if you are at risk for a broken bone. Bone density  refers to the thickness of bones or how tightly the bone tissue is packed.    Preventive Screening tests for Men    Prostate Screening:  Should you have a prostate cancer test (PSA)?  It is up to you to decide if you want a prostate cancer test. Talk to your clinician to find out if the test is right for you.  Things for you to consider and talk about should include:  Benefits and harms of the test  Your family history  How your race/ethnicity may influence the test  If the test may impact other medical conditions you have  Your values on screenings and treatments    *Medicare pays for many preventive services to keep you healthy. For some of these services, you might have to pay a deductible, coinsurance, and / or copayment.  The amounts vary depending on the type of services you need and the kind of Medicare health plan you have.    For further details on screenings offered by Medicare please visit: https://www.medicare.gov/coverage/preventive-screening-services      Yes...

## 2024-10-10 ENCOUNTER — APPOINTMENT (OUTPATIENT)
Dept: INTERNAL MEDICINE | Facility: CLINIC | Age: 84
End: 2024-10-10
Payer: MEDICARE

## 2024-10-10 VITALS — RESPIRATION RATE: 14 BRPM | HEART RATE: 58 BPM | DIASTOLIC BLOOD PRESSURE: 60 MMHG | SYSTOLIC BLOOD PRESSURE: 115 MMHG

## 2024-10-10 DIAGNOSIS — K92.2 GASTROINTESTINAL HEMORRHAGE, UNSPECIFIED: ICD-10-CM

## 2024-10-10 PROCEDURE — G2211 COMPLEX E/M VISIT ADD ON: CPT

## 2024-10-10 PROCEDURE — 90662 IIV NO PRSV INCREASED AG IM: CPT

## 2024-10-10 PROCEDURE — 36415 COLL VENOUS BLD VENIPUNCTURE: CPT

## 2024-10-10 PROCEDURE — G0008: CPT

## 2024-10-10 PROCEDURE — 99214 OFFICE O/P EST MOD 30 MIN: CPT

## 2024-10-10 RX ORDER — MAGNESIUM 200 MG
1000 TABLET ORAL DAILY
Refills: 0 | Status: ACTIVE | COMMUNITY
Start: 2024-10-10

## 2024-10-11 ENCOUNTER — MED ADMIN CHARGE (OUTPATIENT)
Age: 84
End: 2024-10-11

## 2024-10-11 ENCOUNTER — APPOINTMENT (OUTPATIENT)
Dept: RHEUMATOLOGY | Facility: CLINIC | Age: 84
End: 2024-10-11
Payer: MEDICARE

## 2024-10-11 ENCOUNTER — NON-APPOINTMENT (OUTPATIENT)
Age: 84
End: 2024-10-11

## 2024-10-11 VITALS
DIASTOLIC BLOOD PRESSURE: 67 MMHG | HEART RATE: 57 BPM | OXYGEN SATURATION: 97 % | SYSTOLIC BLOOD PRESSURE: 115 MMHG | TEMPERATURE: 98.1 F

## 2024-10-11 LAB
ALBUMIN SERPL ELPH-MCNC: 3.9 G/DL
ALP BLD-CCNC: 65 U/L
ALT SERPL-CCNC: 20 U/L
ANION GAP SERPL CALC-SCNC: 10 MMOL/L
AST SERPL-CCNC: 20 U/L
BILIRUB SERPL-MCNC: 0.6 MG/DL
BUN SERPL-MCNC: 35 MG/DL
CALCIUM SERPL-MCNC: 9.6 MG/DL
CHLORIDE SERPL-SCNC: 102 MMOL/L
CO2 SERPL-SCNC: 28 MMOL/L
CREAT SERPL-MCNC: 1.03 MG/DL
EGFR: 72 ML/MIN/1.73M2
GLUCOSE SERPL-MCNC: 108 MG/DL
POTASSIUM SERPL-SCNC: 4.2 MMOL/L
PROT SERPL-MCNC: 7.1 G/DL
SODIUM SERPL-SCNC: 140 MMOL/L

## 2024-10-11 PROCEDURE — 96413 CHEMO IV INFUSION 1 HR: CPT

## 2024-10-11 RX ORDER — TOCILIZUMAB 20 MG/ML
80 INJECTION, SOLUTION, CONCENTRATE INTRAVENOUS
Qty: 1 | Refills: 0 | Status: COMPLETED
Start: 2024-03-22

## 2024-10-11 RX ORDER — TOCILIZUMAB 20 MG/ML
200 INJECTION, SOLUTION, CONCENTRATE INTRAVENOUS
Qty: 1 | Refills: 0 | Status: COMPLETED
Start: 2024-06-14

## 2024-10-14 LAB
ERYTHROCYTE [SEDIMENTATION RATE] IN BLOOD BY WESTERGREN METHOD: 8 MM/HR
FERRITIN SERPL-MCNC: 31 NG/ML
IRON SATN MFR SERPL: 13 %
IRON SERPL-MCNC: 44 UG/DL
TIBC SERPL-MCNC: 341 UG/DL
UIBC SERPL-MCNC: 298 UG/DL

## 2024-10-16 ENCOUNTER — APPOINTMENT (OUTPATIENT)
Dept: RHEUMATOLOGY | Facility: CLINIC | Age: 84
End: 2024-10-16
Payer: MEDICARE

## 2024-10-16 VITALS
OXYGEN SATURATION: 98 % | TEMPERATURE: 98.6 F | HEART RATE: 75 BPM | WEIGHT: 170 LBS | BODY MASS INDEX: 23.03 KG/M2 | SYSTOLIC BLOOD PRESSURE: 106 MMHG | DIASTOLIC BLOOD PRESSURE: 68 MMHG | RESPIRATION RATE: 16 BRPM | HEIGHT: 72 IN

## 2024-10-16 DIAGNOSIS — M31.6 OTHER GIANT CELL ARTERITIS: ICD-10-CM

## 2024-10-16 DIAGNOSIS — I63.9 CEREBRAL INFARCTION, UNSPECIFIED: ICD-10-CM

## 2024-10-16 DIAGNOSIS — Z79.899 OTHER LONG TERM (CURRENT) DRUG THERAPY: ICD-10-CM

## 2024-10-16 PROCEDURE — G2211 COMPLEX E/M VISIT ADD ON: CPT

## 2024-10-16 PROCEDURE — 99215 OFFICE O/P EST HI 40 MIN: CPT

## 2024-10-16 NOTE — ED PROVIDER NOTE - CARDIAC, MLM
Referred from:  Karen Blas, * / PCP:  Karen Blas MD  History of Present Illness:  Eufemia Lowe present for full body skin exam. She reports skin tags on the neck.     Intertrigo underarms cleared with Ketoconazole 2% cream twice a day, patient has no concerns states she is using a sensitive antiperspirant that she finds helpful.     Patient also following up for Seborrheic dermatitis, scalp, face, ears, states applies Clobetasol solution and Ketoconazole 2% shampoo as needed on the scalp. Patient states scalp controlled but recurrent.   She is treating her ears with Clobetasol cream prescribed by PCP,  due to not benefiting from Ketoconazole 2% cream. She reports she forgets to apply topicals at times. She would like a refill of clobetasol solution for itchy patches on scalp.       Chief Complaint   Patient presents with    Derm Problem     Skin exam    Office Visit    Follow-up     Past Dermatologic Specific History:  - Dermatitis, Clobetasol cream/ triamcinolone cream  - acne   - Tinea Manus, left palm KOH +  Terbinafine 250mg 1 tab qd x 7 days. Loprox cream BID.    - intertrigo underarms Ketoconazole 2% cream     Risk factors for skin cancer:  - Personal history of skin cancer: no  - Family history of skin cancer: grandfather, mother (unknown)  - Skin type: II Quinones skin type  - Sunscreen use and sun protection: none, not outside  - Outdoor activities: none  - Blistering sunburns in past: none  - Tanning bed use in past: none  - Immunosuppression: none     ROS:   See HPI, no other skin concerns     Records and Labs Reviewed:  -Referral, Karen Blas MD  GOT/AST (Units/L)   Date Value   04/19/2024 12     GPT/ALT (Units/L)   Date Value   04/19/2024 22     No results found for: \"GGTP\"  Alkaline Phosphatase (Units/L)   Date Value   04/19/2024 104     Bilirubin, Total (mg/dL)   Date Value   04/19/2024 0.3       Family History/Social History:   She does  have a family  history of skin cancer.  Social History Main Topics  Smoking Status: Never Smoker  Smokless tobacco: No  Alcohol Use: Yes  Worked for [employer] in the past.     Medications, the past medical and surgical history were reviewed in the electronic medical record and updated as necessary.      has a current medication list which includes the following prescription(s): venlafaxine xr, meloxicam, vienva, ketoconazole, cyclobenzaprine, fluticasone, covid-19 mrna (moderna), loratadine, multiple vitamin, and vitamin d-3.    Physical Examination:    Constitutional:  Well developed, well nourished, in no acute distress.    Neurologic: Alert and oriented x3 with no gross neurological defects.    Psychiatric:  Appropriate mood and affect.    Ocular:  Normal eyelids and conjunctivae.   A full body skin and hair exam of the scalp, face, neck, back, chest, right and left arm and leg, abdomen, buttocks, groin, hands, and feet was performed today.    - hyperpigmented patch on the lower abdomen  -  xerosis left mid palmar hand improved   - diffuse flaking on the nasal labial fold, scalp clear, ears clear   - Stuck on, verrucous papules with gyriform surface change consistent with seborrheic keratoses on the face, trunk, and extremities   - Soft, flesh colored to pigmented pedunculated papule(s) consistent with acrochordon  - multiple benign-appearing brown macules and papules scattered on the trunk and extremities    Assessment and Plan:  Eufemia was seen today for derm problem, office visit and follow-up.    Diagnoses and all orders for this visit:    Skin cancer screening    Family history of skin cancer    Seborrheic dermatitis    Post-inflammatory hyperpigmentation    Seborrheic keratoses    Skin tag    Multiple benign nevi         Tinea Manus Resolved , left palm     Intertrigo underarms clear     Seborrheic dermatitis, chronic stable  scalp  and ears, mild  flaring nasal labial fold   - Continue with Clobetasol solution PRN  on the scalp (refilled today)  - Continue ketoconazole shampoo apply as needed to the scalp, leave on for a few minutes then rinse off.  Advised can  use Ketoconazole 2% shampoo as a facial wash for the nasal labial folds as needed.   - Continue Ketoconazole cream to the face nose and ears twice a day as needed.    Eczema chronic flaring   Lower abdomen and  left hand   - Use vaseline or aquaphor on the abdomen  - Continue Clobetasol cream prescribed by pcp, use twice a day until resolved discussed no safe for every day  use, use sparingly and only when eczema active.   - We discussed the diagnosis of Eczema   - Reviewed dry skin care and the importance of using mild soaps and emollients and a handout was given.    - Recommended using a gentle, fragrance-free soap and frequent moisturization.  - Recommended moisturizers (Vanicream, Vaseline, Cetaphil, Aveeno, Eucerin) were reviewed.  - Discussed avoidance of fragranced products.     - We discussed that this is not a cure, but with good skin care, flares should be reduced.         Return in about 1 year (around 10/16/2025) for Skin exam, Seb derm .      On 10/16/2024, I, Rosi Covarrubias MA scribed the services personally performed by Karla Mazariegos MD    The documentation recorded by the scribe accurately and completely reflects the service(s) I personally performed and the decisions made by me.    Karla Mazariegos MD   Dermatology           Normal rate, regular rhythm.  Heart sounds S1, S2.  No murmurs, rubs or gallops.

## 2024-10-17 ENCOUNTER — RX RENEWAL (OUTPATIENT)
Age: 84
End: 2024-10-17

## 2024-10-25 ENCOUNTER — APPOINTMENT (OUTPATIENT)
Dept: CT IMAGING | Facility: CLINIC | Age: 84
End: 2024-10-25
Payer: MEDICARE

## 2024-10-25 ENCOUNTER — NON-APPOINTMENT (OUTPATIENT)
Age: 84
End: 2024-10-25

## 2024-10-25 ENCOUNTER — OUTPATIENT (OUTPATIENT)
Dept: OUTPATIENT SERVICES | Facility: HOSPITAL | Age: 84
LOS: 1 days | End: 2024-10-25
Payer: MEDICARE

## 2024-10-25 ENCOUNTER — APPOINTMENT (OUTPATIENT)
Dept: ELECTROPHYSIOLOGY | Facility: CLINIC | Age: 84
End: 2024-10-25
Payer: MEDICARE

## 2024-10-25 DIAGNOSIS — I63.9 CEREBRAL INFARCTION, UNSPECIFIED: ICD-10-CM

## 2024-10-25 DIAGNOSIS — Z98.890 OTHER SPECIFIED POSTPROCEDURAL STATES: Chronic | ICD-10-CM

## 2024-10-25 PROCEDURE — 70450 CT HEAD/BRAIN W/O DYE: CPT | Mod: 26,MH

## 2024-10-25 PROCEDURE — 93298 REM INTERROG DEV EVAL SCRMS: CPT

## 2024-10-28 ENCOUNTER — NON-APPOINTMENT (OUTPATIENT)
Age: 84
End: 2024-10-28

## 2024-11-01 ENCOUNTER — APPOINTMENT (OUTPATIENT)
Dept: INTERNAL MEDICINE | Facility: CLINIC | Age: 84
End: 2024-11-01
Payer: MEDICARE

## 2024-11-01 VITALS — DIASTOLIC BLOOD PRESSURE: 70 MMHG | RESPIRATION RATE: 14 BRPM | SYSTOLIC BLOOD PRESSURE: 120 MMHG | HEART RATE: 70 BPM

## 2024-11-01 DIAGNOSIS — C61 MALIGNANT NEOPLASM OF PROSTATE: ICD-10-CM

## 2024-11-01 DIAGNOSIS — E78.5 HYPERLIPIDEMIA, UNSPECIFIED: ICD-10-CM

## 2024-11-01 DIAGNOSIS — D64.9 ANEMIA, UNSPECIFIED: ICD-10-CM

## 2024-11-01 DIAGNOSIS — K92.2 GASTROINTESTINAL HEMORRHAGE, UNSPECIFIED: ICD-10-CM

## 2024-11-01 DIAGNOSIS — M31.6 OTHER GIANT CELL ARTERITIS: ICD-10-CM

## 2024-11-01 PROCEDURE — G2211 COMPLEX E/M VISIT ADD ON: CPT

## 2024-11-01 PROCEDURE — 36415 COLL VENOUS BLD VENIPUNCTURE: CPT

## 2024-11-01 PROCEDURE — 99214 OFFICE O/P EST MOD 30 MIN: CPT

## 2024-11-02 ENCOUNTER — NON-APPOINTMENT (OUTPATIENT)
Age: 84
End: 2024-11-02

## 2024-11-02 LAB
ALBUMIN SERPL ELPH-MCNC: 4.3 G/DL
ALP BLD-CCNC: 59 U/L
ALT SERPL-CCNC: 33 U/L
ANION GAP SERPL CALC-SCNC: 13 MMOL/L
AST SERPL-CCNC: 33 U/L
BASOPHILS # BLD AUTO: 0.04 K/UL
BASOPHILS NFR BLD AUTO: 0.5 %
BILIRUB SERPL-MCNC: 0.6 MG/DL
BUN SERPL-MCNC: 20 MG/DL
CALCIUM SERPL-MCNC: 10 MG/DL
CHLORIDE SERPL-SCNC: 101 MMOL/L
CHOLEST SERPL-MCNC: 162 MG/DL
CK SERPL-CCNC: 171 U/L
CO2 SERPL-SCNC: 28 MMOL/L
CREAT SERPL-MCNC: 0.94 MG/DL
CRP SERPL-MCNC: <3 MG/L
EGFR: 80 ML/MIN/1.73M2
EOSINOPHIL # BLD AUTO: 0.11 K/UL
EOSINOPHIL NFR BLD AUTO: 1.5 %
ERYTHROCYTE [SEDIMENTATION RATE] IN BLOOD BY WESTERGREN METHOD: 22 MM/HR
ESTIMATED AVERAGE GLUCOSE: 114 MG/DL
GLUCOSE SERPL-MCNC: 106 MG/DL
HBA1C MFR BLD HPLC: 5.6 %
HCT VFR BLD CALC: 36.5 %
HDLC SERPL-MCNC: 76 MG/DL
HGB BLD-MCNC: 11.7 G/DL
IMM GRANULOCYTES NFR BLD AUTO: 0.4 %
LDLC SERPL CALC-MCNC: 75 MG/DL
LYMPHOCYTES # BLD AUTO: 1.53 K/UL
LYMPHOCYTES NFR BLD AUTO: 20.5 %
MAN DIFF?: NORMAL
MCHC RBC-ENTMCNC: 32.1 G/DL
MCHC RBC-ENTMCNC: 33.1 PG
MCV RBC AUTO: 103.1 FL
MONOCYTES # BLD AUTO: 0.62 K/UL
MONOCYTES NFR BLD AUTO: 8.3 %
NEUTROPHILS # BLD AUTO: 5.12 K/UL
NEUTROPHILS NFR BLD AUTO: 68.8 %
NONHDLC SERPL-MCNC: 86 MG/DL
PLATELET # BLD AUTO: 205 K/UL
POTASSIUM SERPL-SCNC: 4.2 MMOL/L
PROT SERPL-MCNC: 7.7 G/DL
PSA SERPL-MCNC: 0.06 NG/ML
RBC # BLD: 3.54 M/UL
RBC # FLD: 13.8 %
SODIUM SERPL-SCNC: 141 MMOL/L
TRIGL SERPL-MCNC: 55 MG/DL
WBC # FLD AUTO: 7.45 K/UL

## 2024-11-08 ENCOUNTER — APPOINTMENT (OUTPATIENT)
Dept: RHEUMATOLOGY | Facility: CLINIC | Age: 84
End: 2024-11-08
Payer: MEDICARE

## 2024-11-08 ENCOUNTER — MED ADMIN CHARGE (OUTPATIENT)
Age: 84
End: 2024-11-08

## 2024-11-08 VITALS
OXYGEN SATURATION: 96 % | HEART RATE: 51 BPM | SYSTOLIC BLOOD PRESSURE: 151 MMHG | TEMPERATURE: 98.2 F | DIASTOLIC BLOOD PRESSURE: 76 MMHG

## 2024-11-08 VITALS — HEART RATE: 51 BPM | SYSTOLIC BLOOD PRESSURE: 130 MMHG | OXYGEN SATURATION: 96 % | DIASTOLIC BLOOD PRESSURE: 70 MMHG

## 2024-11-08 PROCEDURE — 36415 COLL VENOUS BLD VENIPUNCTURE: CPT

## 2024-11-08 PROCEDURE — 96413 CHEMO IV INFUSION 1 HR: CPT

## 2024-11-08 RX ORDER — TOCILIZUMAB 20 MG/ML
80 INJECTION, SOLUTION, CONCENTRATE INTRAVENOUS
Qty: 1 | Refills: 0 | Status: COMPLETED
Start: 2024-03-22

## 2024-11-08 RX ORDER — TOCILIZUMAB 20 MG/ML
200 INJECTION, SOLUTION, CONCENTRATE INTRAVENOUS
Qty: 1 | Refills: 0 | Status: COMPLETED
Start: 2024-06-14

## 2024-11-18 ENCOUNTER — APPOINTMENT (OUTPATIENT)
Dept: NEUROLOGY | Facility: CLINIC | Age: 84
End: 2024-11-18
Payer: MEDICARE

## 2024-11-18 VITALS
BODY MASS INDEX: 23.19 KG/M2 | WEIGHT: 175 LBS | DIASTOLIC BLOOD PRESSURE: 73 MMHG | HEART RATE: 59 BPM | SYSTOLIC BLOOD PRESSURE: 127 MMHG | HEIGHT: 73 IN

## 2024-11-18 DIAGNOSIS — I63.9 CEREBRAL INFARCTION, UNSPECIFIED: ICD-10-CM

## 2024-11-18 PROCEDURE — G2211 COMPLEX E/M VISIT ADD ON: CPT

## 2024-11-18 PROCEDURE — 99214 OFFICE O/P EST MOD 30 MIN: CPT

## 2024-11-18 PROCEDURE — 93880 EXTRACRANIAL BILAT STUDY: CPT

## 2024-11-18 PROCEDURE — 93886 INTRACRANIAL COMPLETE STUDY: CPT

## 2024-11-18 PROCEDURE — 93892 TCD EMBOLI DETECT W/O INJ: CPT

## 2024-11-18 RX ORDER — CLOPIDOGREL BISULFATE 75 MG/1
75 TABLET, FILM COATED ORAL DAILY
Qty: 90 | Refills: 3 | Status: ACTIVE | COMMUNITY
Start: 2024-11-18 | End: 1900-01-01

## 2024-11-20 PROCEDURE — 70450 CT HEAD/BRAIN W/O DYE: CPT

## 2024-11-20 NOTE — ASU PATIENT PROFILE, ADULT - TEACHING/LEARNING OTHER LEARNERS
[FreeTextEntry1] : General: this is a pleasant patient in no acute distress  HEENT conjunctiva are normal, no tenderness in head  CV: normal pulses, regular rate and rhythm, no peripheral edema noted  Lungs: breathing is non-labored  abd: soft and non-distended  MSK: SLR: MISSY: range of motion: tinnels: spurling: Occipital nerve tenderness:  Mental status: Alert and oriented to person, place and time, normal speech and comprehension  Cranial Nerves: extra-occular movements in tact without nystagmus, normal saccades and smooth pursuit, Face symmetric and facial strength symmetric, facial sensation symmetric, masked face  Motor: normal bulk throughout. R > L arm and leg rigidity worse with contralateral activation. no abnormal movements. Full 5/5 strength uppers and lower extremities proximally and distally  Sensory: in tact and symmetric to vibration, light tough, temperature  Cerebellar: normal finger-nose-finger bilaterally  Reflexes: 3+ in the upper and lower extremities and symmetric. toes are bilaterally downgoing.  Gait: stopped posture more sitting than standing. stable, improved right arm swing, 4 step turn.  Stances: [place]: normal.  spouse

## 2024-11-25 ENCOUNTER — APPOINTMENT (OUTPATIENT)
Dept: ELECTROPHYSIOLOGY | Facility: CLINIC | Age: 84
End: 2024-11-25
Payer: MEDICARE

## 2024-11-25 ENCOUNTER — NON-APPOINTMENT (OUTPATIENT)
Age: 84
End: 2024-11-25

## 2024-11-25 PROCEDURE — 93298 REM INTERROG DEV EVAL SCRMS: CPT

## 2024-12-04 ENCOUNTER — NON-APPOINTMENT (OUTPATIENT)
Age: 84
End: 2024-12-04

## 2024-12-05 ENCOUNTER — NON-APPOINTMENT (OUTPATIENT)
Age: 84
End: 2024-12-05

## 2024-12-05 ENCOUNTER — APPOINTMENT (OUTPATIENT)
Dept: ELECTROPHYSIOLOGY | Facility: CLINIC | Age: 84
End: 2024-12-05
Payer: MEDICARE

## 2024-12-05 VITALS
HEIGHT: 73 IN | DIASTOLIC BLOOD PRESSURE: 64 MMHG | OXYGEN SATURATION: 98 % | HEART RATE: 62 BPM | BODY MASS INDEX: 23.19 KG/M2 | WEIGHT: 175 LBS | SYSTOLIC BLOOD PRESSURE: 116 MMHG

## 2024-12-05 DIAGNOSIS — I63.9 CEREBRAL INFARCTION, UNSPECIFIED: ICD-10-CM

## 2024-12-05 DIAGNOSIS — Z45.09 ENCOUNTER FOR ADJUSTMENT AND MANAGEMENT OF OTHER CARDIAC DEVICE: ICD-10-CM

## 2024-12-05 PROCEDURE — 93291 INTERROG DEV EVAL SCRMS IP: CPT

## 2024-12-05 PROCEDURE — 99212 OFFICE O/P EST SF 10 MIN: CPT

## 2024-12-06 ENCOUNTER — APPOINTMENT (OUTPATIENT)
Dept: RHEUMATOLOGY | Facility: CLINIC | Age: 84
End: 2024-12-06
Payer: MEDICARE

## 2024-12-06 ENCOUNTER — MED ADMIN CHARGE (OUTPATIENT)
Age: 84
End: 2024-12-06

## 2024-12-06 VITALS
HEART RATE: 61 BPM | TEMPERATURE: 98 F | DIASTOLIC BLOOD PRESSURE: 72 MMHG | OXYGEN SATURATION: 96 % | SYSTOLIC BLOOD PRESSURE: 121 MMHG

## 2024-12-06 PROCEDURE — 96413 CHEMO IV INFUSION 1 HR: CPT

## 2024-12-06 RX ORDER — TOCILIZUMAB 20 MG/ML
80 INJECTION, SOLUTION, CONCENTRATE INTRAVENOUS
Qty: 1 | Refills: 0 | Status: COMPLETED
Start: 2024-03-22

## 2024-12-06 RX ORDER — TOCILIZUMAB 20 MG/ML
200 INJECTION, SOLUTION, CONCENTRATE INTRAVENOUS
Qty: 1 | Refills: 0 | Status: COMPLETED
Start: 2024-06-14

## 2024-12-11 ENCOUNTER — RX RENEWAL (OUTPATIENT)
Age: 84
End: 2024-12-11

## 2024-12-12 ENCOUNTER — APPOINTMENT (OUTPATIENT)
Dept: UROLOGY | Facility: CLINIC | Age: 84
End: 2024-12-12
Payer: MEDICARE

## 2024-12-12 ENCOUNTER — OUTPATIENT (OUTPATIENT)
Dept: OUTPATIENT SERVICES | Facility: HOSPITAL | Age: 84
LOS: 1 days | End: 2024-12-12
Payer: MEDICARE

## 2024-12-12 VITALS — SYSTOLIC BLOOD PRESSURE: 147 MMHG | DIASTOLIC BLOOD PRESSURE: 67 MMHG | HEART RATE: 52 BPM

## 2024-12-12 DIAGNOSIS — R35.0 FREQUENCY OF MICTURITION: ICD-10-CM

## 2024-12-12 DIAGNOSIS — Z98.890 OTHER SPECIFIED POSTPROCEDURAL STATES: Chronic | ICD-10-CM

## 2024-12-12 DIAGNOSIS — C61 MALIGNANT NEOPLASM OF PROSTATE: ICD-10-CM

## 2024-12-12 PROCEDURE — 96402 CHEMO HORMON ANTINEOPL SQ/IM: CPT

## 2024-12-12 PROCEDURE — 96402U: CUSTOM | Mod: NC

## 2024-12-12 PROCEDURE — 99214 OFFICE O/P EST MOD 30 MIN: CPT | Mod: 25

## 2024-12-12 RX ORDER — MIRABEGRON 25 MG/1
25 TABLET, EXTENDED RELEASE ORAL
Qty: 90 | Refills: 3 | Status: ACTIVE | COMMUNITY
Start: 2024-12-12 | End: 1900-01-01

## 2024-12-12 RX ORDER — LEUPROLIDE ACETATE 45 MG/.375ML
45 INJECTION, SUSPENSION, EXTENDED RELEASE SUBCUTANEOUS
Refills: 0 | Status: COMPLETED | OUTPATIENT
Start: 2024-12-12

## 2024-12-12 RX ADMIN — LEUPROLIDE ACETATE 0 MG: 45 INJECTION, SUSPENSION, EXTENDED RELEASE SUBCUTANEOUS at 00:00

## 2024-12-16 DIAGNOSIS — C61 MALIGNANT NEOPLASM OF PROSTATE: ICD-10-CM

## 2024-12-18 ENCOUNTER — APPOINTMENT (OUTPATIENT)
Dept: RHEUMATOLOGY | Facility: CLINIC | Age: 84
End: 2024-12-18
Payer: MEDICARE

## 2024-12-18 VITALS
HEART RATE: 69 BPM | DIASTOLIC BLOOD PRESSURE: 64 MMHG | BODY MASS INDEX: 23.19 KG/M2 | OXYGEN SATURATION: 97 % | SYSTOLIC BLOOD PRESSURE: 112 MMHG | WEIGHT: 175 LBS | HEIGHT: 73 IN

## 2024-12-18 DIAGNOSIS — Z79.899 OTHER LONG TERM (CURRENT) DRUG THERAPY: ICD-10-CM

## 2024-12-18 DIAGNOSIS — R53.82 CHRONIC FATIGUE, UNSPECIFIED: ICD-10-CM

## 2024-12-18 DIAGNOSIS — M31.6 OTHER GIANT CELL ARTERITIS: ICD-10-CM

## 2024-12-18 PROCEDURE — 99214 OFFICE O/P EST MOD 30 MIN: CPT

## 2024-12-18 PROCEDURE — G2211 COMPLEX E/M VISIT ADD ON: CPT

## 2024-12-19 PROBLEM — R53.82 CHRONIC FATIGUE: Status: ACTIVE | Noted: 2024-12-19

## 2024-12-21 ENCOUNTER — INPATIENT (INPATIENT)
Facility: HOSPITAL | Age: 84
LOS: 4 days | Discharge: HOME | DRG: 510 | End: 2024-12-26
Attending: STUDENT IN AN ORGANIZED HEALTH CARE EDUCATION/TRAINING PROGRAM | Admitting: STUDENT IN AN ORGANIZED HEALTH CARE EDUCATION/TRAINING PROGRAM
Payer: MEDICARE

## 2024-12-21 VITALS — HEIGHT: 73 IN | WEIGHT: 175.05 LBS

## 2024-12-21 DIAGNOSIS — Z79.52 LONG TERM (CURRENT) USE OF SYSTEMIC STEROIDS: ICD-10-CM

## 2024-12-21 DIAGNOSIS — Z98.890 OTHER SPECIFIED POSTPROCEDURAL STATES: Chronic | ICD-10-CM

## 2024-12-21 LAB
ALBUMIN SERPL ELPH-MCNC: 3.2 G/DL — LOW (ref 3.3–5)
ALBUMIN SERPL ELPH-MCNC: 3.6 G/DL
ALP BLD-CCNC: 55 U/L
ALP SERPL-CCNC: 53 U/L — SIGNIFICANT CHANGE UP (ref 40–120)
ALT FLD-CCNC: 39 U/L — SIGNIFICANT CHANGE UP (ref 12–78)
ALT SERPL-CCNC: 23 U/L
ANION GAP SERPL CALC-SCNC: 3 MMOL/L — LOW (ref 5–17)
ANION GAP SERPL CALC-SCNC: 8 MMOL/L
APTT BLD: 27.2 SEC — SIGNIFICANT CHANGE UP (ref 24.5–35.6)
AST SERPL-CCNC: 25 U/L
AST SERPL-CCNC: 33 U/L — SIGNIFICANT CHANGE UP (ref 15–37)
BASOPHILS # BLD AUTO: 0.03 K/UL — SIGNIFICANT CHANGE UP (ref 0–0.2)
BASOPHILS # BLD AUTO: 0.04 K/UL
BASOPHILS NFR BLD AUTO: 0.4 % — SIGNIFICANT CHANGE UP (ref 0–2)
BASOPHILS NFR BLD AUTO: 0.7 %
BILIRUB SERPL-MCNC: 0.7 MG/DL — SIGNIFICANT CHANGE UP (ref 0.2–1.2)
BILIRUB SERPL-MCNC: 0.8 MG/DL
BLD GP AB SCN SERPL QL: SIGNIFICANT CHANGE UP
BUN SERPL-MCNC: 24 MG/DL
BUN SERPL-MCNC: 28 MG/DL — HIGH (ref 7–23)
CALCIUM SERPL-MCNC: 9.1 MG/DL — SIGNIFICANT CHANGE UP (ref 8.5–10.1)
CALCIUM SERPL-MCNC: 9.8 MG/DL
CHLORIDE SERPL-SCNC: 108 MMOL/L
CHLORIDE SERPL-SCNC: 109 MMOL/L — HIGH (ref 96–108)
CO2 SERPL-SCNC: 29 MMOL/L — SIGNIFICANT CHANGE UP (ref 22–31)
CO2 SERPL-SCNC: 30 MMOL/L
CORTIS SERPL-MCNC: 15 UG/DL
CREAT SERPL-MCNC: 0.97 MG/DL
CREAT SERPL-MCNC: 0.98 MG/DL — SIGNIFICANT CHANGE UP (ref 0.5–1.3)
CRP SERPL-MCNC: <3 MG/L
EGFR: 76 ML/MIN/1.73M2 — SIGNIFICANT CHANGE UP
EGFR: 77 ML/MIN/1.73M2
EOSINOPHIL # BLD AUTO: 0.1 K/UL — SIGNIFICANT CHANGE UP (ref 0–0.5)
EOSINOPHIL # BLD AUTO: 0.2 K/UL
EOSINOPHIL NFR BLD AUTO: 1.5 % — SIGNIFICANT CHANGE UP (ref 0–6)
EOSINOPHIL NFR BLD AUTO: 3.7 %
ERYTHROCYTE [SEDIMENTATION RATE] IN BLOOD BY WESTERGREN METHOD: 13 MM/HR
GLUCOSE SERPL-MCNC: 167 MG/DL — HIGH (ref 70–99)
GLUCOSE SERPL-MCNC: 96 MG/DL
HCT VFR BLD CALC: 33.8 % — LOW (ref 39–50)
HCT VFR BLD CALC: 36 %
HGB BLD-MCNC: 11 G/DL — LOW (ref 13–17)
HGB BLD-MCNC: 11.4 G/DL
IMM GRANULOCYTES NFR BLD AUTO: 0.2 %
IMM GRANULOCYTES NFR BLD AUTO: 1.7 % — HIGH (ref 0–0.9)
INR BLD: 1.04 RATIO — SIGNIFICANT CHANGE UP (ref 0.85–1.16)
LYMPHOCYTES # BLD AUTO: 1.02 K/UL — SIGNIFICANT CHANGE UP (ref 1–3.3)
LYMPHOCYTES # BLD AUTO: 1.96 K/UL
LYMPHOCYTES # BLD AUTO: 14.8 % — SIGNIFICANT CHANGE UP (ref 13–44)
LYMPHOCYTES NFR BLD AUTO: 36.2 %
MAN DIFF?: NORMAL
MCHC RBC-ENTMCNC: 31.5 PG
MCHC RBC-ENTMCNC: 31.7 G/DL
MCHC RBC-ENTMCNC: 31.8 PG — SIGNIFICANT CHANGE UP (ref 27–34)
MCHC RBC-ENTMCNC: 32.5 G/DL — SIGNIFICANT CHANGE UP (ref 32–36)
MCV RBC AUTO: 97.7 FL — SIGNIFICANT CHANGE UP (ref 80–100)
MCV RBC AUTO: 99.4 FL
MONOCYTES # BLD AUTO: 0.77 K/UL
MONOCYTES # BLD AUTO: 0.8 K/UL — SIGNIFICANT CHANGE UP (ref 0–0.9)
MONOCYTES NFR BLD AUTO: 11.6 % — SIGNIFICANT CHANGE UP (ref 2–14)
MONOCYTES NFR BLD AUTO: 14.2 %
NEUTROPHILS # BLD AUTO: 2.43 K/UL
NEUTROPHILS # BLD AUTO: 4.81 K/UL — SIGNIFICANT CHANGE UP (ref 1.8–7.4)
NEUTROPHILS NFR BLD AUTO: 45 %
NEUTROPHILS NFR BLD AUTO: 70 % — SIGNIFICANT CHANGE UP (ref 43–77)
PLATELET # BLD AUTO: 153 K/UL
PLATELET # BLD AUTO: 153 K/UL — SIGNIFICANT CHANGE UP (ref 150–400)
POTASSIUM SERPL-MCNC: 3.8 MMOL/L — SIGNIFICANT CHANGE UP (ref 3.5–5.3)
POTASSIUM SERPL-SCNC: 3.8 MMOL/L — SIGNIFICANT CHANGE UP (ref 3.5–5.3)
POTASSIUM SERPL-SCNC: 4.5 MMOL/L
PROT SERPL-MCNC: 7 GM/DL — SIGNIFICANT CHANGE UP (ref 6–8.3)
PROT SERPL-MCNC: 7.3 G/DL
PROTHROM AB SERPL-ACNC: 11.9 SEC — SIGNIFICANT CHANGE UP (ref 9.9–13.4)
RBC # BLD: 3.46 M/UL — LOW (ref 4.2–5.8)
RBC # BLD: 3.62 M/UL
RBC # FLD: 14.5 % — SIGNIFICANT CHANGE UP (ref 10.3–14.5)
RBC # FLD: 14.7 %
SODIUM SERPL-SCNC: 141 MMOL/L — SIGNIFICANT CHANGE UP (ref 135–145)
SODIUM SERPL-SCNC: 146 MMOL/L
WBC # BLD: 6.88 K/UL — SIGNIFICANT CHANGE UP (ref 3.8–10.5)
WBC # FLD AUTO: 5.41 K/UL
WBC # FLD AUTO: 6.88 K/UL — SIGNIFICANT CHANGE UP (ref 3.8–10.5)

## 2024-12-21 PROCEDURE — 73552 X-RAY EXAM OF FEMUR 2/>: CPT | Mod: 26,LT

## 2024-12-21 PROCEDURE — 73080 X-RAY EXAM OF ELBOW: CPT | Mod: 26,LT,76

## 2024-12-21 PROCEDURE — 73090 X-RAY EXAM OF FOREARM: CPT | Mod: 26,LT

## 2024-12-21 PROCEDURE — 72192 CT PELVIS W/O DYE: CPT | Mod: 26,MC

## 2024-12-21 PROCEDURE — 73060 X-RAY EXAM OF HUMERUS: CPT | Mod: 26,LT

## 2024-12-21 PROCEDURE — 76376 3D RENDER W/INTRP POSTPROCES: CPT | Mod: 26

## 2024-12-21 PROCEDURE — 99285 EMERGENCY DEPT VISIT HI MDM: CPT

## 2024-12-21 PROCEDURE — 73502 X-RAY EXAM HIP UNI 2-3 VIEWS: CPT | Mod: 26,LT

## 2024-12-21 RX ORDER — ACETAMINOPHEN 80 MG/.8ML
1000 SOLUTION/ DROPS ORAL ONCE
Refills: 0 | Status: COMPLETED | OUTPATIENT
Start: 2024-12-21 | End: 2024-12-21

## 2024-12-21 RX ORDER — SODIUM CHLORIDE 9 MG/ML
3 INJECTION, SOLUTION INTRAMUSCULAR; INTRAVENOUS; SUBCUTANEOUS ONCE
Refills: 0 | Status: COMPLETED | OUTPATIENT
Start: 2024-12-21 | End: 2024-12-21

## 2024-12-21 RX ADMIN — SODIUM CHLORIDE 3 MILLILITER(S): 9 INJECTION, SOLUTION INTRAMUSCULAR; INTRAVENOUS; SUBCUTANEOUS at 22:18

## 2024-12-21 RX ADMIN — ACETAMINOPHEN 1000 MILLIGRAM(S): 80 SOLUTION/ DROPS ORAL at 22:48

## 2024-12-21 RX ADMIN — ACETAMINOPHEN 1000 MILLIGRAM(S): 80 SOLUTION/ DROPS ORAL at 22:32

## 2024-12-21 RX ADMIN — ACETAMINOPHEN 400 MILLIGRAM(S): 80 SOLUTION/ DROPS ORAL at 22:18

## 2024-12-21 NOTE — ED PROVIDER NOTE - MUSCULOSKELETAL, MLM
Swelling and tenderness of the L elbow w/ decreased extension. L hip and buttock TTP. Spine appears normal.

## 2024-12-21 NOTE — ED PROVIDER NOTE - PROGRESS NOTE DETAILS
Nicolasa Sandoval: Imaging shows an olecranon fracture. Results d/w patient and family. Patient states the pain is improving at this time. Nicolasa Sandoval: Spoke to Dr. Wallace who will come to see the patient. Nicolasa Patrick for Dr. Sandoval: Imaging shows an olecranon fracture. Results d/w patient and family. Patient states the pain is improving at this time. Will consult hand.

## 2024-12-21 NOTE — ED ADULT NURSE NOTE - OBJECTIVE STATEMENT
85 y/o male awake alert and oriented x4 presents to ED s/p fall. pt tripped over a bag of rocks in the garage and fell on left side. c/o left hip/buttocks pain, left elbow pain. + hematoma and swelling noted on left elbow. on plavix. Denies head injury or loc. ambulate with no assistance at baseline.

## 2024-12-21 NOTE — ED PROVIDER NOTE - CLINICAL SUMMARY MEDICAL DECISION MAKING FREE TEXT BOX
84y male w/ L elbow and hip pain s/p trip and fall over rocks w/o head strike or LOC. Is non-ambulatory s/p fall. Plan for labs, imaging, and medications.

## 2024-12-21 NOTE — ED ADULT NURSE NOTE - NSFALLHARMRISKINTERV_ED_ALL_ED

## 2024-12-21 NOTE — ED PROVIDER NOTE - NSFOLLOWUPINSTRUCTIONS_ED_ALL_ED_FT
Keep splint clean and dry. Apply ice 15 min on/off for 24 hours, elevate arm above heart and rest. Cant take tylenol 1000 mg every four to six hours for pain. Follow up with Dr. Wallace as directed. Return to ER if worse.  Elbow Fracture    WHAT YOU NEED TO KNOW:    What is an elbow fracture? An elbow fracture is a break in one or more of the 3 bones that form your elbow joint. Osteoporosis (brittle bones) can increase your risk for an elbow fracture.  Adult Arm Bones    What are the types of elbow fracture?    Nondisplaced means the bone cracked or broke but stayed in place.    Displaced means the 2 ends of the broken bone .    Comminuted means the bone cracked or broke into many pieces.    Open means the broken bone went through your skin.  What are the signs and symptoms of an elbow fracture?    Pain and tenderness    Swelling and bruising    Trouble moving your arm or not being able to move your arm at all    Weakness or numbness in your elbow, arm, or hand    Deformity (your arm is shaped differently than normal)  How is an elbow fracture diagnosed? Your healthcare provider will examine your injured elbow and arm. Your provider may check for areas where you have less feeling or problems moving your arm. You may need any of the following:    X-rays are used to check for broken bones.    A CT scan or MRI may show where the bone is broken and if other tissues are involved. You may be given contrast liquid to help healthcare providers see the bones better. Tell the healthcare provider if you have ever had an allergic reaction to contrast liquid. Do not enter the MRI room with anything metal. Metal can cause serious injury. Tell the healthcare provider if you have any metal in or on your body.  How is an elbow fracture treated?    Prescription pain medicine may be given. Ask your healthcare provider how to take this medicine safely. Some prescription pain medicines contain acetaminophen. Do not take other medicines that contain acetaminophen without talking to your healthcare provider. Too much acetaminophen may cause liver damage. Prescription pain medicine may cause constipation. Ask your healthcare provider how to prevent or treat constipation.    NSAIDs, such as ibuprofen, help decrease swelling, pain, and fever. This medicine is available with or without a doctor's order. NSAIDs can cause stomach bleeding or kidney problems in certain people. If you take blood thinner medicine, always ask your healthcare provider if NSAIDs are safe for you. Always read the medicine label and follow directions.    A device such as a brace, cast, sling, or splint may be put on your elbow to limit your arm movement. The device will hold the broken bones in place while they heal, help decrease pain, and prevent more damage.    Ultrasound therapy directs sound waves into your elbow. The sound waves help the bones heal.    Surgery may be needed to hold bones in their normal position with pins, wires, or screws. Surgery may also be done if you have other injuries, such as nerve or blood vessel damage.  What can I do to manage my symptoms?    Elevate your elbow above the level of your heart as often as you can. This will help decrease swelling and pain. Prop your elbow on pillows or blankets to keep it elevated comfortably. While your elbow is elevated, wiggle your fingers and open and close them to prevent hand stiffness.        Apply ice on your elbow on your elbow for 15 to 20 minutes every hour or as directed. Use an ice pack, or put crushed ice in a plastic bag. Cover it with a towel. Ice helps prevent tissue damage and decreases swelling and pain.    Go to physical therapy as directed. A physical therapist can teach you exercises to help improve movement and strength and to decrease pain.  When should I seek immediate care?    Your elbow, arm, or fingers are numb.    Your skin is swollen, cold, or pale.  When should I call my doctor?    You have a fever.    The pain gets worse, even after you rest and take your pain medicine.    You have new or more trouble moving your arm.    You have new sores around the area of your brace, splint, or cast.    Your brace, splint, or cast becomes damaged.    You have questions or concerns about your condition or care.  CARE AGREEMENT:    You have the right to help plan your care. Learn about your health condition and how it may be treated. Discuss treatment options with your healthcare providers to decide what care you want to receive. You always have the right to refuse treatment.

## 2024-12-21 NOTE — ED PROVIDER NOTE - NSICDXPASTMEDICALHX_GEN_ALL_CORE_FT
PAST MEDICAL HISTORY:  BPH (benign prostatic hyperplasia)     GERD (gastroesophageal reflux disease)     H/O constipation     HLD (hyperlipidemia)     [place] (hard of hearing)     Snores

## 2024-12-21 NOTE — ED PROVIDER NOTE - CARE PROVIDER_API CALL
Sha Wallace.  Orthopaedic Surgery  [address] [place] [address]  Phone: [phone]  Fax: [phone]  Follow Up Time:

## 2024-12-21 NOTE — ED PROVIDER NOTE - OBJECTIVE STATEMENT
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 Pt was in his garage when he tripped and fell over rocks, landing on his L hip and shoulder. Denies head strike or LOC. Is non-ambulatory since the fall. Did not take any medications PTA. Allergic to penicillin.

## 2024-12-21 NOTE — ED PROVIDER NOTE - WR INTERPRETATION 2
Needs follow up   
Requested Prescriptions     Pending Prescriptions Disp Refills    WIXELA INHUB 250-50 MCG/ACT Inhalation Aerosol Powder, Breath Activated [Pharmacy Med Name: WIXELA INHUB DISKUS 250/50MCG 60S] 180 each 0     Sig: INHALE 1 PUFF INTO THE LUNGS EVERY 12 HOURS     Last refill 1/16/24 #180  LOV 10/20/23    RTC PRN    Asthma & COPD Medication Protocol [id number]/24/2024 12:33 PM   Protocol Details Asthma Action Score greater than or equal to 20    Appointment in past 6 or next 3 months    AAP/ACT given in last 12 months        
Olecranon fracture.

## 2024-12-21 NOTE — ED PROVIDER NOTE - CARE PLAN
Principal Discharge DX:	Left elbow fracture  Secondary Diagnosis:	Contusion of left hip   1 Principal Discharge DX:	Left elbow fracture  Secondary Diagnosis:	Contusion of left hip  Secondary Diagnosis:	Acetabular fracture

## 2024-12-21 NOTE — ED ADULT TRIAGE NOTE - CHIEF COMPLAINT QUOTE
Pt BIBEMS from home s/p fall. pt reports tripping over rocks in garage when he fell on left hip and shoulder. Unable to ambulate. +use of Plavix, -LOC, -HS. Pt endorsing left hip and elbow pain. denies any other complaints, no other obvious trauma noted. No meds taken PTA. A&O3,GCS 15, +allergies, hx of embolic stroke with left sided deficits.

## 2024-12-22 DIAGNOSIS — S42.402A UNSPECIFIED FRACTURE OF LOWER END OF LEFT HUMERUS, INITIAL ENCOUNTER FOR CLOSED FRACTURE: ICD-10-CM

## 2024-12-22 LAB
ANION GAP SERPL CALC-SCNC: 2 MMOL/L — LOW (ref 5–17)
ANION GAP SERPL CALC-SCNC: 4 MMOL/L — LOW (ref 5–17)
APTT BLD: 28.7 SEC — SIGNIFICANT CHANGE UP (ref 24.5–35.6)
BUN SERPL-MCNC: 21 MG/DL — SIGNIFICANT CHANGE UP (ref 7–23)
BUN SERPL-MCNC: 24 MG/DL — HIGH (ref 7–23)
CALCIUM SERPL-MCNC: 8.8 MG/DL — SIGNIFICANT CHANGE UP (ref 8.5–10.1)
CALCIUM SERPL-MCNC: 8.8 MG/DL — SIGNIFICANT CHANGE UP (ref 8.5–10.1)
CHLORIDE SERPL-SCNC: 107 MMOL/L — SIGNIFICANT CHANGE UP (ref 96–108)
CHLORIDE SERPL-SCNC: 108 MMOL/L — SIGNIFICANT CHANGE UP (ref 96–108)
CO2 SERPL-SCNC: 27 MMOL/L — SIGNIFICANT CHANGE UP (ref 22–31)
CO2 SERPL-SCNC: 29 MMOL/L — SIGNIFICANT CHANGE UP (ref 22–31)
CREAT SERPL-MCNC: 0.82 MG/DL — SIGNIFICANT CHANGE UP (ref 0.5–1.3)
CREAT SERPL-MCNC: 0.84 MG/DL — SIGNIFICANT CHANGE UP (ref 0.5–1.3)
EGFR: 86 ML/MIN/1.73M2 — SIGNIFICANT CHANGE UP
EGFR: 87 ML/MIN/1.73M2 — SIGNIFICANT CHANGE UP
GLUCOSE SERPL-MCNC: 114 MG/DL — HIGH (ref 70–99)
GLUCOSE SERPL-MCNC: 99 MG/DL — SIGNIFICANT CHANGE UP (ref 70–99)
HCT VFR BLD CALC: 32.8 % — LOW (ref 39–50)
HCT VFR BLD CALC: 33.4 % — LOW (ref 39–50)
HGB BLD-MCNC: 10.5 G/DL — LOW (ref 13–17)
HGB BLD-MCNC: 10.6 G/DL — LOW (ref 13–17)
INR BLD: 1.02 RATIO — SIGNIFICANT CHANGE UP (ref 0.85–1.16)
MAGNESIUM SERPL-MCNC: 1.9 MG/DL — SIGNIFICANT CHANGE UP (ref 1.6–2.6)
MCHC RBC-ENTMCNC: 31.4 PG — SIGNIFICANT CHANGE UP (ref 27–34)
MCHC RBC-ENTMCNC: 31.5 PG — SIGNIFICANT CHANGE UP (ref 27–34)
MCHC RBC-ENTMCNC: 31.7 G/DL — LOW (ref 32–36)
MCHC RBC-ENTMCNC: 32 G/DL — SIGNIFICANT CHANGE UP (ref 32–36)
MCV RBC AUTO: 98.2 FL — SIGNIFICANT CHANGE UP (ref 80–100)
MCV RBC AUTO: 99.4 FL — SIGNIFICANT CHANGE UP (ref 80–100)
PHOSPHATE SERPL-MCNC: 3.6 MG/DL — SIGNIFICANT CHANGE UP (ref 2.5–4.5)
PLATELET # BLD AUTO: 125 K/UL — LOW (ref 150–400)
PLATELET # BLD AUTO: 130 K/UL — LOW (ref 150–400)
POTASSIUM SERPL-MCNC: 3.6 MMOL/L — SIGNIFICANT CHANGE UP (ref 3.5–5.3)
POTASSIUM SERPL-MCNC: 3.8 MMOL/L — SIGNIFICANT CHANGE UP (ref 3.5–5.3)
POTASSIUM SERPL-SCNC: 3.6 MMOL/L — SIGNIFICANT CHANGE UP (ref 3.5–5.3)
POTASSIUM SERPL-SCNC: 3.8 MMOL/L — SIGNIFICANT CHANGE UP (ref 3.5–5.3)
PROTHROM AB SERPL-ACNC: 12 SEC — SIGNIFICANT CHANGE UP (ref 9.9–13.4)
RBC # BLD: 3.34 M/UL — LOW (ref 4.2–5.8)
RBC # BLD: 3.36 M/UL — LOW (ref 4.2–5.8)
RBC # FLD: 14.6 % — HIGH (ref 10.3–14.5)
RBC # FLD: 14.6 % — HIGH (ref 10.3–14.5)
SODIUM SERPL-SCNC: 138 MMOL/L — SIGNIFICANT CHANGE UP (ref 135–145)
SODIUM SERPL-SCNC: 139 MMOL/L — SIGNIFICANT CHANGE UP (ref 135–145)
WBC # BLD: 7.89 K/UL — SIGNIFICANT CHANGE UP (ref 3.8–10.5)
WBC # BLD: 7.89 K/UL — SIGNIFICANT CHANGE UP (ref 3.8–10.5)
WBC # FLD AUTO: 7.89 K/UL — SIGNIFICANT CHANGE UP (ref 3.8–10.5)
WBC # FLD AUTO: 7.89 K/UL — SIGNIFICANT CHANGE UP (ref 3.8–10.5)

## 2024-12-22 PROCEDURE — 36415 COLL VENOUS BLD VENIPUNCTURE: CPT

## 2024-12-22 PROCEDURE — 80053 COMPREHEN METABOLIC PANEL: CPT

## 2024-12-22 PROCEDURE — 97116 GAIT TRAINING THERAPY: CPT | Mod: GP

## 2024-12-22 PROCEDURE — 85025 COMPLETE CBC W/AUTO DIFF WBC: CPT

## 2024-12-22 PROCEDURE — 86901 BLOOD TYPING SEROLOGIC RH(D): CPT

## 2024-12-22 PROCEDURE — 86850 RBC ANTIBODY SCREEN: CPT

## 2024-12-22 PROCEDURE — C1713: CPT

## 2024-12-22 PROCEDURE — 97163 PT EVAL HIGH COMPLEX 45 MIN: CPT | Mod: GP

## 2024-12-22 PROCEDURE — 93010 ELECTROCARDIOGRAM REPORT: CPT

## 2024-12-22 PROCEDURE — 71045 X-RAY EXAM CHEST 1 VIEW: CPT | Mod: 26

## 2024-12-22 PROCEDURE — 72170 X-RAY EXAM OF PELVIS: CPT | Mod: 26

## 2024-12-22 PROCEDURE — 86900 BLOOD TYPING SEROLOGIC ABO: CPT

## 2024-12-22 PROCEDURE — 97530 THERAPEUTIC ACTIVITIES: CPT | Mod: GP

## 2024-12-22 PROCEDURE — 72128 CT CHEST SPINE W/O DYE: CPT | Mod: 26,MC

## 2024-12-22 PROCEDURE — 84100 ASSAY OF PHOSPHORUS: CPT

## 2024-12-22 PROCEDURE — 85027 COMPLETE CBC AUTOMATED: CPT

## 2024-12-22 PROCEDURE — 83735 ASSAY OF MAGNESIUM: CPT

## 2024-12-22 PROCEDURE — 97166 OT EVAL MOD COMPLEX 45 MIN: CPT | Mod: GO

## 2024-12-22 PROCEDURE — 97535 SELF CARE MNGMENT TRAINING: CPT | Mod: GO

## 2024-12-22 PROCEDURE — C1889: CPT

## 2024-12-22 PROCEDURE — 85730 THROMBOPLASTIN TIME PARTIAL: CPT

## 2024-12-22 PROCEDURE — 99222 1ST HOSP IP/OBS MODERATE 55: CPT

## 2024-12-22 PROCEDURE — 85610 PROTHROMBIN TIME: CPT

## 2024-12-22 PROCEDURE — 93005 ELECTROCARDIOGRAM TRACING: CPT

## 2024-12-22 PROCEDURE — 97110 THERAPEUTIC EXERCISES: CPT | Mod: GO

## 2024-12-22 PROCEDURE — 71045 X-RAY EXAM CHEST 1 VIEW: CPT

## 2024-12-22 PROCEDURE — 72131 CT LUMBAR SPINE W/O DYE: CPT | Mod: 26,MC

## 2024-12-22 PROCEDURE — 80048 BASIC METABOLIC PNL TOTAL CA: CPT

## 2024-12-22 RX ORDER — PANTOPRAZOLE 40 MG/1
40 TABLET, DELAYED RELEASE ORAL
Refills: 0 | Status: DISCONTINUED | OUTPATIENT
Start: 2024-12-22 | End: 2024-12-26

## 2024-12-22 RX ORDER — OXYCODONE HCL 15 MG
5 TABLET ORAL EVERY 4 HOURS
Refills: 0 | Status: DISCONTINUED | OUTPATIENT
Start: 2024-12-22 | End: 2024-12-26

## 2024-12-22 RX ORDER — TRAMADOL HYDROCHLORIDE 50 MG/1
50 TABLET ORAL EVERY 6 HOURS
Refills: 0 | Status: DISCONTINUED | OUTPATIENT
Start: 2024-12-22 | End: 2024-12-26

## 2024-12-22 RX ORDER — ACETAMINOPHEN 80 MG/.8ML
1000 SOLUTION/ DROPS ORAL ONCE
Refills: 0 | Status: DISCONTINUED | OUTPATIENT
Start: 2024-12-22 | End: 2024-12-26

## 2024-12-22 RX ORDER — PREDNISONE 5 MG
1 TABLET ORAL ONCE
Refills: 0 | Status: COMPLETED | OUTPATIENT
Start: 2024-12-22 | End: 2024-12-22

## 2024-12-22 RX ORDER — SENNOSIDES 8.6 MG/1
2 TABLET, FILM COATED ORAL AT BEDTIME
Refills: 0 | Status: DISCONTINUED | OUTPATIENT
Start: 2024-12-22 | End: 2024-12-26

## 2024-12-22 RX ORDER — HYDROMORPHONE HCL 4 MG
0.5 TABLET ORAL ONCE
Refills: 0 | Status: DISCONTINUED | OUTPATIENT
Start: 2024-12-22 | End: 2024-12-22

## 2024-12-22 RX ORDER — FENTANYL 75 UG/H
50 PATCH, EXTENDED RELEASE TRANSDERMAL
Refills: 0 | Status: DISCONTINUED | OUTPATIENT
Start: 2024-12-22 | End: 2024-12-22

## 2024-12-22 RX ORDER — SODIUM CHLORIDE 9 MG/ML
1000 INJECTION, SOLUTION INTRAMUSCULAR; INTRAVENOUS; SUBCUTANEOUS
Refills: 0 | Status: DISCONTINUED | OUTPATIENT
Start: 2024-12-22 | End: 2024-12-24

## 2024-12-22 RX ORDER — CEFAZOLIN SODIUM 1 G
2000 VIAL (EA) INJECTION EVERY 8 HOURS
Refills: 0 | Status: COMPLETED | OUTPATIENT
Start: 2024-12-22 | End: 2024-12-23

## 2024-12-22 RX ORDER — BISACODYL 5 MG
5 TABLET, DELAYED RELEASE (ENTERIC COATED) ORAL EVERY 12 HOURS
Refills: 0 | Status: DISCONTINUED | OUTPATIENT
Start: 2024-12-22 | End: 2024-12-26

## 2024-12-22 RX ORDER — HYDROMORPHONE HCL 4 MG
0.5 TABLET ORAL EVERY 4 HOURS
Refills: 0 | Status: DISCONTINUED | OUTPATIENT
Start: 2024-12-22 | End: 2024-12-22

## 2024-12-22 RX ORDER — ONDANSETRON 4 MG/1
4 TABLET ORAL EVERY 6 HOURS
Refills: 0 | Status: DISCONTINUED | OUTPATIENT
Start: 2024-12-22 | End: 2024-12-26

## 2024-12-22 RX ORDER — OXYCODONE HCL 15 MG
2.5 TABLET ORAL EVERY 4 HOURS
Refills: 0 | Status: DISCONTINUED | OUTPATIENT
Start: 2024-12-22 | End: 2024-12-26

## 2024-12-22 RX ORDER — ONDANSETRON 4 MG/1
4 TABLET ORAL ONCE
Refills: 0 | Status: DISCONTINUED | OUTPATIENT
Start: 2024-12-22 | End: 2024-12-22

## 2024-12-22 RX ORDER — HEPARIN SODIUM 1000 [USP'U]/ML
5000 INJECTION, SOLUTION INTRAVENOUS; SUBCUTANEOUS EVERY 8 HOURS
Refills: 0 | Status: DISCONTINUED | OUTPATIENT
Start: 2024-12-22 | End: 2024-12-22

## 2024-12-22 RX ORDER — ACETAMINOPHEN 80 MG/.8ML
975 SOLUTION/ DROPS ORAL EVERY 8 HOURS
Refills: 0 | Status: DISCONTINUED | OUTPATIENT
Start: 2024-12-23 | End: 2024-12-26

## 2024-12-22 RX ORDER — MAGNESIUM HYDROXIDE 400 MG/5ML
30 SUSPENSION, ORAL (FINAL DOSE FORM) ORAL DAILY
Refills: 0 | Status: DISCONTINUED | OUTPATIENT
Start: 2024-12-22 | End: 2024-12-26

## 2024-12-22 RX ORDER — CLOPIDOGREL BISULFATE 75 MG/1
75 TABLET, FILM COATED ORAL DAILY
Refills: 0 | Status: DISCONTINUED | OUTPATIENT
Start: 2024-12-23 | End: 2024-12-26

## 2024-12-22 RX ORDER — HEPARIN SODIUM 1000 [USP'U]/ML
5000 INJECTION, SOLUTION INTRAVENOUS; SUBCUTANEOUS EVERY 8 HOURS
Refills: 0 | Status: DISCONTINUED | OUTPATIENT
Start: 2024-12-23 | End: 2024-12-23

## 2024-12-22 RX ORDER — CLOPIDOGREL BISULFATE 75 MG/1
75 TABLET, FILM COATED ORAL DAILY
Refills: 0 | Status: DISCONTINUED | OUTPATIENT
Start: 2024-12-22 | End: 2024-12-22

## 2024-12-22 RX ORDER — CEFAZOLIN SODIUM 1 G
2000 VIAL (EA) INJECTION EVERY 8 HOURS
Refills: 0 | Status: DISCONTINUED | OUTPATIENT
Start: 2024-12-22 | End: 2024-12-22

## 2024-12-22 RX ORDER — HYDROMORPHONE HCL 4 MG
0.5 TABLET ORAL EVERY 4 HOURS
Refills: 0 | Status: DISCONTINUED | OUTPATIENT
Start: 2024-12-22 | End: 2024-12-26

## 2024-12-22 RX ORDER — FENTANYL 75 UG/H
25 PATCH, EXTENDED RELEASE TRANSDERMAL
Refills: 0 | Status: DISCONTINUED | OUTPATIENT
Start: 2024-12-22 | End: 2024-12-22

## 2024-12-22 RX ORDER — PREDNISONE 5 MG
2 TABLET ORAL
Refills: 0 | DISCHARGE

## 2024-12-22 RX ORDER — FAMOTIDINE 20 MG/1
40 TABLET, FILM COATED ORAL
Refills: 0 | Status: DISCONTINUED | OUTPATIENT
Start: 2024-12-22 | End: 2024-12-26

## 2024-12-22 RX ORDER — POLYETHYLENE GLYCOL 3350 17 G/DOSE
17 POWDER (GRAM) ORAL AT BEDTIME
Refills: 0 | Status: DISCONTINUED | OUTPATIENT
Start: 2024-12-22 | End: 2024-12-26

## 2024-12-22 RX ORDER — ATORVASTATIN CALCIUM 40 MG/1
40 TABLET, FILM COATED ORAL AT BEDTIME
Refills: 0 | Status: DISCONTINUED | OUTPATIENT
Start: 2024-12-22 | End: 2024-12-26

## 2024-12-22 RX ORDER — SODIUM CHLORIDE 9 MG/ML
1000 INJECTION, SOLUTION INTRAVENOUS
Refills: 0 | Status: DISCONTINUED | OUTPATIENT
Start: 2024-12-22 | End: 2024-12-22

## 2024-12-22 RX ADMIN — Medication 0.5 MILLIGRAM(S): at 22:01

## 2024-12-22 RX ADMIN — Medication 1 MILLIGRAM(S): at 09:39

## 2024-12-22 RX ADMIN — Medication 2000 MILLIGRAM(S): at 17:41

## 2024-12-22 RX ADMIN — Medication 5 MILLIGRAM(S): at 17:01

## 2024-12-22 RX ADMIN — Medication 0.5 MILLIGRAM(S): at 00:16

## 2024-12-22 RX ADMIN — Medication 30 MILLILITER(S): at 16:25

## 2024-12-22 RX ADMIN — Medication 0.5 MILLIGRAM(S): at 08:44

## 2024-12-22 RX ADMIN — Medication 0.5 MILLIGRAM(S): at 13:50

## 2024-12-22 RX ADMIN — Medication 0.5 MILLIGRAM(S): at 08:29

## 2024-12-22 RX ADMIN — Medication 0.5 MILLIGRAM(S): at 13:35

## 2024-12-22 RX ADMIN — Medication 0.5 MILLIGRAM(S): at 22:30

## 2024-12-22 RX ADMIN — Medication 0.5 MILLIGRAM(S): at 17:42

## 2024-12-22 RX ADMIN — Medication 5 MILLIGRAM(S): at 16:25

## 2024-12-22 RX ADMIN — ATORVASTATIN CALCIUM 40 MILLIGRAM(S): 40 TABLET, FILM COATED ORAL at 21:51

## 2024-12-22 RX ADMIN — Medication 0.5 MILLIGRAM(S): at 04:59

## 2024-12-22 RX ADMIN — SENNOSIDES 2 TABLET(S): 8.6 TABLET, FILM COATED ORAL at 21:51

## 2024-12-22 RX ADMIN — FAMOTIDINE 40 MILLIGRAM(S): 20 TABLET, FILM COATED ORAL at 21:52

## 2024-12-22 RX ADMIN — Medication 17 GRAM(S): at 21:51

## 2024-12-22 RX ADMIN — Medication 0.5 MILLIGRAM(S): at 17:57

## 2024-12-22 RX ADMIN — Medication 0.5 MILLIGRAM(S): at 05:04

## 2024-12-22 NOTE — DISCHARGE NOTE PROVIDER - HOSPITAL COURSE
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 Patient is now POD 4 ORIF left olecranon fracture. Hospital course otherwise unremarkable.   Plavix restarted and is sufficient for DVT ppx. Patient is tolerating diet, voiding without difficulty, passing flatus and ambulating. Will be discharged to [facility] today.

## 2024-12-22 NOTE — DISCHARGE NOTE PROVIDER - PROVIDER TOKENS
PROVIDER:[TOKEN:[[id number]]] PROVIDER:[TOKEN:[[id number]]],PROVIDER:[TOKEN:[[id number]],FOLLOWUP:[2 weeks]]

## 2024-12-22 NOTE — PHARMACOTHERAPY INTERVENTION NOTE - COMMENTS
Medication history complete, reviewed medications with patient spouse and confirmed with doctor first med hx.

## 2024-12-22 NOTE — H&P ADULT - HISTORY OF PRESENT ILLNESS
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 Presented to the ED s/p fall last night in his garage. Fell onto his left hip and left elbow. Denies HT or LOC. Denies lightheadedness or palpitation right before the fall. Complains of Left hip and left elbow pain with movement. 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  PSHx: Left inguinal hernia repair with mesh (3y ago), Rt ankle orif  ALL: PCN (bilateral palm rash/swelling)    Primary Survey   Airway: [intact]   Breathing: [normal, breath sounds equal bilaterally]    Circulation: [skin warm, distal pulses 2+, capillary refill less than 2 seconds globally]   Disability   Pupils: [equal and reactive to light, _2_mm, brisk]   GCS: [15, E=4 V=5 M=6]    Motor Function: [move all extremities]    Sensory: [no deficits]   Exposure: left elbow, left hip pain with movements. T4-5 midline tenderness    Secondary Survey   VS: stable  GEN: [NAD. wears glasses]   HEAD: [NCAT]  EYES: [pupils round reactive to light, conjunctiva clear, extraocular movements intact, no periorbital ecchymosis]  ENT: [no fluid in external acoustic canals, no hemotympanum, no herrera's sign, nares patent, oropharynx clear]  NECK: [no JVD, midline trachea, no cervical spine tenderness ]   HEART: [regular rate and rhythm]   LUNGS: [CTAB]   CHEST: [chest wall non-tender, no bruising/deformity]  ABD: [no Grey-Mcfadden's or Leitchfield's sign, soft, non tender, no rebound or guarding,  ]   PELVIS: [stable, Lt hip pain to rock]   BACK: [no step offs or deformities, midline tenderness to T4-T5 level ]   : [no perineal hematoma, NO BLOOD AT MEATUS]   EXT: [Left upper extremity splinted and on sling.  5/5. left hip pain with movements. No overlying bruises or ecchymosis. ROM of Lt elbow and Lt hip diminished due to pain. 2+ global pulses, moving all other extremities well, +5/5 other muscle strength globally]   NEURO: [, CNII-XII grossly intact, no sensory deficits]   RECTAL: [defered]    < from: CT Pelvis Bony Only No Cont (12.21.24 @ 23:08) >  Nondisplaced fracture in the anterior column of left   acetabulum.  Minimal cortical buckling of the left inferior pubic ramus.  Anterior cortical buckling of left sacral ala likely a nondisplaced   fracture.    < end of copied text >

## 2024-12-22 NOTE — ED ADULT NURSE REASSESSMENT NOTE - NS ED NURSE REASSESS COMMENT FT1
Assumed care at 0200, pt resting in stretcher awake and alert, plan of care discussed, pt states decreased pain after the administration of medications. Pt has no concerns at this time, comfort and safety maintained

## 2024-12-22 NOTE — DISCHARGE NOTE PROVIDER - NSDCFUSCHEDAPPT_GEN_ALL_CORE_FT
[facility]  RHEUM [address]  Scheduled Appointment: 01/03/2025    [facility]  ELECTROPH [address]  Scheduled Appointment: 01/06/2025    [facility]  [facility]  Scheduled Appointment: 01/22/2025    Evelina Hess  [facility]  [facility]  Scheduled Appointment: 01/29/2025    Nicole De La Garza  [facility]  RHEUM [address] [facility]  Scheduled Appointment: 02/12/2025

## 2024-12-22 NOTE — HISTORY OF PRESENT ILLNESS
No [FreeTextEntry1] : Well visit and follow up for management of:\par Hyperlipidemia  -on meds Airway patent. Moist mucous membranes.

## 2024-12-22 NOTE — DISCHARGE NOTE PROVIDER - DETAILS OF MALNUTRITION DIAGNOSIS/DIAGNOSES
This patient has been assessed with a concern for Malnutrition and was treated during this hospitalization for the following Nutrition diagnosis/diagnoses:     -  12/23/2024: Severe protein-calorie malnutrition

## 2024-12-22 NOTE — CONSULT NOTE ADULT - SUBJECTIVE AND OBJECTIVE BOX
HPI  84yMale L-hand dominant c/o L elbow and L groin/hip pain s/p mechanical fall. Denies headstrike or LOC. Denies numbness/tingling in the LUE. Denies any other trauma/injuries at this time.    ROS  Negative unless otherwise specified in HPI.    PAST MEDICAL & SURGICAL Hx  PAST MEDICAL & SURGICAL HISTORY:  HLD (hyperlipidemia)      BPH (benign prostatic hyperplasia)      GERD (gastroesophageal reflux disease)      Snores      [place] (hard of hearing)      H/O constipation      Status post ORIF of fracture of ankle      History of colonoscopy      S/P left inguinal hernia repair          MEDICATIONS  Home Medications:  acetaminophen 500 mg oral tablet: 2 tab(s) orally 2 times a day as needed for  headache (23 Sep 2024 08:08)  alfuzosin 10 mg oral tablet, extended release: 1 tab(s) orally once a day with dinner (23 Sep 2024 08:08)  aspirin 81 mg oral tablet: 1 tab(s) orally once a day (23 Sep 2024 08:08)  Caltrate 600 + D oral tablet: 1 tab(s) orally once a day (23 Sep 2024 08:08)  cyanocobalamin 1000 mcg sublingual tablet: 1 tab(s) sublingually once a day (23 Sep 2024 08:07)  Lupron Depot 45 mg/6 months intramuscular kit: 45 unit(s) intramuscularly every 6 months (23 Sep 2024 08:08)  Multiple Vitamins oral tablet: 1 tab(s) orally once a day (23 Sep 2024 08:08)  Pepcid 40 mg oral tablet: 1 tab(s) orally once a day (23 Sep 2024 08:05)  predniSONE 10 mg oral tablet: 1 tab(s) orally once a day (23 Sep 2024 08:08)  Protonix 40 mg oral delayed release tablet: 1 tab(s) orally once a day (23 Sep 2024 08:04)  tocilizumab 20 mg/mL intravenous solution: 460 milligram(s) intravenous every 4 weeks (23 Sep 2024 08:08)      ALLERGIES  penicillin (Other)      FAMILY Hx  FAMILY HISTORY:  No pertinent family history in first degree relatives        SOCIAL Hx  Social History:      VITALS  Vital Signs Last 24 Hrs  T(C): 36.5 (21 Dec 2024 21:30), Max: 36.5 (21 Dec 2024 21:30)  T(F): 97.7 (21 Dec 2024 21:30), Max: 97.7 (21 Dec 2024 21:30)  HR: 65 (22 Dec 2024 00:19) (65 - 77)  BP: 122/60 (22 Dec 2024 00:19) (110/51 - 122/60)  BP(mean): --  RR: 18 (22 Dec 2024 00:19) (18 - 18)  SpO2: 95% (22 Dec 2024 00:19) (94% - 95%)    Parameters below as of 22 Dec 2024 00:19  Patient On (Oxygen Delivery Method): room air        PHYSICAL EXAM  Gen: Lying in bed, NAD  Resp: No increased WOB  LUE:  Skin intact, +edema and +ecchymosis over elbow  +TTP over elbow, no palpable triceps defect, no TTP along remainder of extremity; compartments soft  Limited elbow ROM 2/2 pain  Motor: Musc/Med/Rad/AIN/PIN/U intact  Sensory: Musc/Med/Rad/U SILT  +Rad pulse, WWP    Secondary Assessment:  NC/AT, NTTP of clavicles, NTTP of C-spine,T-spine, or L-spine in the midline and paraspinal areas  RUE: NTTP of Shoulder, Elbow, Wrist, Hand; NT with AROM/PROM of Shoulder, Elbow, Wrist, Hand; AIN/PIN/Med/Uln/Msc/Rad/Ax intact   LLE: TTP groin; Able to SLR, NT with Log Roll, NT with Heel Strike, NTTP of Hip, Knee, Ankle, Foot; NT with AROM/PROM of Hip, Knee, Ankle, Foot; Q/H/GSC/TA/EHL/FHL intact  RLE: Able to SLR, NT with Log Roll, NT with Heel Strike, NTTP of Hip, Knee, Ankle, Foot; NT with AROM/PROM of Hip, Knee, Ankle, Foot; Q/H/GSC/TA/EHL/FHL intact      LABS                        11.0   6.88  )-----------( 153      ( 21 Dec 2024 22:08 )             33.8     12-21    141  |  109[H]  |  28[H]  ----------------------------<  167[H]  3.8   |  29  |  0.98    Ca    9.1      21 Dec 2024 22:08    TPro  7.0  /  Alb  3.2[L]  /  TBili  0.7  /  DBili  x   /  AST  33  /  ALT  39  /  AlkPhos  53  12-21    PT/INR - ( 21 Dec 2024 22:08 )   PT: 11.9 sec;   INR: 1.04 ratio         PTT - ( 21 Dec 2024 22:08 )  PTT:27.2 sec    IMAGING  XRs/CT: L olecranon fx, L LC1 fx (personal read)    PROCEDURE  A well-padded, well-molded plaster splint was applied, and the patient was neurovascularly intact afterward. The patient tolerated the procedure well without evidence of complications. Post-reduction XRs demonstrated acceptable alignment. The patient was informed about splint precautions (keep dry, do not stick anything inside, monitor for signs/symptoms of increased compartmental pressure: uncontrolled pain, worsening numbness/tingling, severe pain with movement of the fingers/toes) and verbalized understanding.    ASSESSMENT & PLAN  84yMale w/ L olecranon fx s/p immobilization.  -DANIEL TOSCANO in a posterior slab splint  -splint precautions  -pain control  -ice/cold compress, elevation  -no acute ortho surgery at this time  -f/u outpt with Dr. Wallace on Monday, call office for appt

## 2024-12-22 NOTE — ED ADULT NURSE REASSESSMENT NOTE - NS ED NURSE REASSESS COMMENT FT1
ambulation trial performed with a walker. pt unable to bear weight on left foot. pt unable to use walker due to sling in place on left arm. Pt c/o shooting pain radiating to left hip when attempted to ambulate. Dr. Quevedo made aware. pt agrees to be admitted to hospital.

## 2024-12-22 NOTE — H&P ADULT - NSHPLABSRESULTS_GEN_ALL_CORE
Chief complaint:      PMHx:  HLD (hyperlipidemia)    BPH (benign prostatic hyperplasia)    GERD (gastroesophageal reflux disease)    Snores    [place] (hard of hearing)    H/O constipation        PSHx:  Status post ORIF of fracture of ankle    History of colonoscopy    S/P left inguinal hernia repair        FHx:  No pertinent family history in first degree relatives        Vitals:  T(C): 36.5 (12-21 @ 21:30), Max: 36.5 (12-21 @ 21:30)  HR: 65 (12-22 @ 00:19) (65 - 77)  BP: 122/60 (12-22 @ 00:19) (110/51 - 122/60)  RR: 18 (12-22 @ 00:19) (18 - 18)  SpO2: 95% (12-22 @ 00:19) (94% - 95%)      I&Os    .    Labs:  12-21 @ 22:08                    11.0  CBC: 6.88>)-------(<153                     33.8                 141 | 109 | 28    CMP:  ----------------------< 167               3.8 | 29 | 0.98                      Ca:9.1  Phos:-  Mg:-               0.7|      |33        LFTs:  ------|53|-----             -|      |-        Cultures:        Current Inpatient Medications:  atorvastatin 40 milliGRAM(s) Oral at bedtime  clopidogrel Tablet 75 milliGRAM(s) Oral daily  famotidine    Tablet 40 milliGRAM(s) Oral two times a day  heparin   Injectable 5000 Unit(s) SubCutaneous every 8 hours  ondansetron Injectable 4 milliGRAM(s) IV Push every 6 hours PRN  pantoprazole    Tablet 40 milliGRAM(s) Oral before breakfast  predniSONE   Tablet 1 milliGRAM(s) Oral Once

## 2024-12-22 NOTE — CONSULT NOTE ADULT - SUBJECTIVE AND OBJECTIVE BOX
HISTORY OF THE PRESENT ILLNESS:     yo with history of  OA,  with progressive pain with ambulation, climbing stairs and increasing  pain at night, who failed the usual modalities for pain and is now .  Pt was seen in PACU, in NAD. Aicha BERG CP. Hospitalist consulted for post op medical management.   PAST MEDICAL & SURGICAL HISTORY:  HLD (hyperlipidemia)      BPH (benign prostatic hyperplasia)      GERD (gastroesophageal reflux disease)      Snores      [place] (hard of hearing)      H/O constipation      Status post ORIF of fracture of ankle      History of colonoscopy      S/P left inguinal hernia repair        FAMILY HISTORY:  No pertinent family history in first degree relatives      Social History:    Allergies    penicillin (Other)    Intolerances      Home Medications:  acetaminophen 500 mg oral tablet: 2 tab(s) orally 2 times a day as needed for  headache (23 Sep 2024 08:08)  alfuzosin 10 mg oral tablet, extended release: 1 tab(s) orally once a day with dinner (23 Sep 2024 08:08)  aspirin 81 mg oral tablet: 1 tab(s) orally once a day (23 Sep 2024 08:08)  Caltrate 600 + D oral tablet: 1 tab(s) orally once a day (23 Sep 2024 08:08)  cyanocobalamin 1000 mcg sublingual tablet: 1 tab(s) sublingually once a day (23 Sep 2024 08:07)  Lupron Depot 45 mg/6 months intramuscular kit: 45 unit(s) intramuscularly every 6 months (23 Sep 2024 08:08)  Multiple Vitamins oral tablet: 1 tab(s) orally once a day (23 Sep 2024 08:08)  Pepcid 40 mg oral tablet: 1 tab(s) orally once a day (23 Sep 2024 08:05)  predniSONE 10 mg oral tablet: 1 tab(s) orally once a day (23 Sep 2024 08:08)  Protonix 40 mg oral delayed release tablet: 1 tab(s) orally once a day (23 Sep 2024 08:04)  tocilizumab 20 mg/mL intravenous solution: 460 milligram(s) intravenous every 4 weeks (23 Sep 2024 08:08)    Vital Signs Last 24 Hrs  T(C): 36.7 (22 Dec 2024 08:00), Max: 36.8 (22 Dec 2024 03:52)  T(F): 98 (22 Dec 2024 08:00), Max: 98.3 (22 Dec 2024 03:52)  HR: 57 (22 Dec 2024 08:00) (57 - 77)  BP: 134/59 (22 Dec 2024 08:00) (110/51 - 134/59)  BP(mean): 75 (22 Dec 2024 03:52) (75 - 75)  RR: 18 (22 Dec 2024 08:00) (18 - 19)  SpO2: 93% (22 Dec 2024 08:00) (93% - 96%)    Parameters below as of 22 Dec 2024 08:00  Patient On (Oxygen Delivery Method): room air      REVIEW OF SYSTEMS:   All 10 systems reviewed in detailed and found to be negative with the exception of what has already been described above  MEDICATIONS  (STANDING):  atorvastatin 40 milliGRAM(s) Oral at bedtime  famotidine    Tablet 40 milliGRAM(s) Oral two times a day  pantoprazole    Tablet 40 milliGRAM(s) Oral before breakfast  predniSONE   Tablet 1 milliGRAM(s) Oral Once    MEDICATIONS  (PRN):  HYDROmorphone  Injectable 0.5 milliGRAM(s) IV Push every 4 hours PRN Severe Pain (7 - 10)  ondansetron Injectable 4 milliGRAM(s) IV Push every 6 hours PRN Nausea    PE:  Constitutional: NAD, laying in bed  HEENT: NC/AT  Back: no tenderness  Respiratory: respirations even and non labored, LCTA  Cardiovascular: S1S2 regular, no murmurs  Abdomen: soft, not tender, not distended, positive BS  Genitourinary: voiding  Musculoskeletal: no muscle tenderness, no joint swelling or tenderness  Extremities: no pedal edema   Neurological: no focal deficits  LABS:                            10.5   7.89  )-----------( 130      ( 22 Dec 2024 08:05 )             32.8     12-21    141  |  109[H]  |  28[H]  ----------------------------<  167[H]  3.8   |  29  |  0.98    Ca    9.1      21 Dec 2024 22:08    TPro  7.0  /  Alb  3.2[L]  /  TBili  0.7  /  DBili  x   /  AST  33  /  ALT  39  /  AlkPhos  53  12-21      LIVER FUNCTIONS - ( 21 Dec 2024 22:08 )  Alb: 3.2 g/dL / Pro: 7.0 gm/dL / ALK PHOS: 53 U/L / ALT: 39 U/L / AST: 33 U/L / GGT: x           PT/INR - ( 22 Dec 2024 08:05 )   PT: 12.0 sec;   INR: 1.02 ratio         PTT - ( 22 Dec 2024 08:05 )  PTT:28.7 sec  Urinalysis Basic - ( 21 Dec 2024 22:08 )    Color: x / Appearance: x / SG: x / pH: x  Gluc: 167 mg/dL / Ketone: x  / Bili: x / Urobili: x   Blood: x / Protein: x / Nitrite: x   Leuk Esterase: x / RBC: x / WBC x   Sq Epi: x / Non Sq Epi: x / Bacteria: x                                 HISTORY OF THE PRESENT ILLNESS:     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 Fell onto his left hip and left elbow. Complains of Left hip and left elbow pain with movement.  Denies head trauma or LOC, Patient denies fever, chills, headache, dizziness, change in vision, blurry vision,  chest pain, palpitations, shortness of breath, abdominal pain, nausea, vomiting, diarrhea, constipation,  numbness, weakness or tingling     Hospitalist Consult for medical management and optimization     PAST MEDICAL & SURGICAL HISTORY:  Embolic CVA   s/p ILR placement   Giant Cell Arthritis   HLD (hyperlipidemia)  BPH (benign prostatic hyperplasia)  GERD (gastroesophageal reflux disease)  Snores  [place] (hard of hearing)  H/O constipation  Status post ORIF of fracture of ankle  History of colonoscopy  S/P left inguinal hernia repair      FAMILY HISTORY:  No pertinent family history in first degree relatives      Social History:    Allergies    penicillin (Other)    Intolerances      Home Medications:  acetaminophen 500 mg oral tablet: 2 tab(s) orally 2 times a day as needed for  headache (23 Sep 2024 08:08)  alfuzosin 10 mg oral tablet, extended release: 1 tab(s) orally once a day with dinner (23 Sep 2024 08:08)  aspirin 81 mg oral tablet: 1 tab(s) orally once a day (23 Sep 2024 08:08)  Caltrate 600 + D oral tablet: 1 tab(s) orally once a day (23 Sep 2024 08:08)  cyanocobalamin 1000 mcg sublingual tablet: 1 tab(s) sublingually once a day (23 Sep 2024 08:07)  Lupron Depot 45 mg/6 months intramuscular kit: 45 unit(s) intramuscularly every 6 months (23 Sep 2024 08:08)  Multiple Vitamins oral tablet: 1 tab(s) orally once a day (23 Sep 2024 08:08)  Pepcid 40 mg oral tablet: 1 tab(s) orally once a day (23 Sep 2024 08:05)  predniSONE 10 mg oral tablet: 1 tab(s) orally once a day (23 Sep 2024 08:08)  Protonix 40 mg oral delayed release tablet: 1 tab(s) orally once a day (23 Sep 2024 08:04)  tocilizumab 20 mg/mL intravenous solution: 460 milligram(s) intravenous every 4 weeks (23 Sep 2024 08:08)      REVIEW OF SYSTEMS:   All 10 systems reviewed in detailed and found to be negative with the exception of what has already been described above  MEDICATIONS  (STANDING):  atorvastatin 40 milliGRAM(s) Oral at bedtime  famotidine    Tablet 40 milliGRAM(s) Oral two times a day  pantoprazole    Tablet 40 milliGRAM(s) Oral before breakfast  predniSONE   Tablet 1 milliGRAM(s) Oral Once    MEDICATIONS  (PRN):  HYDROmorphone  Injectable 0.5 milliGRAM(s) IV Push every 4 hours PRN Severe Pain (7 - 10)  ondansetron Injectable 4 milliGRAM(s) IV Push every 6 hours PRN Nausea    Vitals  T(F): 98 (12-22-24 @ 08:00), Max: 98.3 (12-22-24 @ 03:52)  HR: 57 (12-22-24 @ 08:00) (57 - 77)  BP: 134/59 (12-22-24 @ 08:00) (110/51 - 134/59)  RR: 18 (12-22-24 @ 08:00) (18 - 19)  SpO2: 93% (12-22-24 @ 08:00) (93% - 96%)    PHYSICAL EXAM   Gen: NAD, comfortable, AA&Ox4  HEENT: head atrumatic and normocephalic, PEERLA, EOMI,  no gross abnormalities of ears, mucous membranes moist, no oral lesions, neck supple without masses/goiter/lymphadenopathy, no JVD  CVS: +s1, s2, regular rate and rhythm, no murmurs, rubs or gallops, no thrill, normal PMI  Pulmonary: normal respiratory effort, clear to auscultation b/l, no wheezes/crackles/rhonchi  Abdomen: soft, non-tender, non-distended, +bowel sounds in all 4 quadrants, no masses noted, no guarding or rigidity   Extremities: no pedal edema, pedal pulses palpable, <2 sec. cap refill, LUE: in a sling   Skin: nl warm and dry, no wounds   Neuro: grossly non-focal                             10.5   7.89  )-----------( 130      ( 22 Dec 2024 08:05 )             32.8     12-21    141  |  109[H]  |  28[H]  ----------------------------<  167[H]  3.8   |  29  |  0.98    Ca    9.1      21 Dec 2024 22:08    TPro  7.0  /  Alb  3.2[L]  /  TBili  0.7  /  DBili  x   /  AST  33  /  ALT  39  /  AlkPhos  53  12-21      LIVER FUNCTIONS - ( 21 Dec 2024 22:08 )  Alb: 3.2 g/dL / Pro: 7.0 gm/dL / ALK PHOS: 53 U/L / ALT: 39 U/L / AST: 33 U/L / GGT: x           PT/INR - ( 22 Dec 2024 08:05 )   PT: 12.0 sec;   INR: 1.02 ratio         PTT - ( 22 Dec 2024 08:05 )  PTT:28.7 sec  Urinalysis Basic - ( 21 Dec 2024 22:08 )    Color: x / Appearance: x / SG: x / pH: x  Gluc: 167 mg/dL / Ketone: x  / Bili: x / Urobili: x   Blood: x / Protein: x / Nitrite: x   Leuk Esterase: x / RBC: x / WBC x   Sq Epi: x / Non Sq Epi: x / Bacteria: x

## 2024-12-22 NOTE — DISCHARGE NOTE PROVIDER - CARE PROVIDERS DIRECT ADDRESSES
,DirectAddress_Unknown ,DirectAddress_Unknown,[email or web address]

## 2024-12-22 NOTE — DISCHARGE NOTE PROVIDER - NSDCCPCAREPLAN_GEN_ALL_CORE_FT
PRINCIPAL DISCHARGE DIAGNOSIS  Diagnosis: Left elbow fracture  Assessment and Plan of Treatment:       SECONDARY DISCHARGE DIAGNOSES  Diagnosis: Contusion of left hip  Assessment and Plan of Treatment:     Diagnosis: Acetabular fracture  Assessment and Plan of Treatment:     Diagnosis: Fracture of multiple pubic rami, left, sequela  Assessment and Plan of Treatment:

## 2024-12-22 NOTE — CONSULT NOTE ADULT - ASSESSMENT
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 and tocilizumab monthly infusions), BPH, prostate CA presents to [facility] status post fall    Admitted: Under Trauma       #Nondisplaced fracture in the anterior column of the left acetabulum, minimal cortical buckling of the left inferior pubic rami, anterior cortical buckling of the left sacral ala, left olecranon fracture s/p mechanical fall  -Admitted under trauma  -Pain management  -bowel regimen   -OOB/PT  -NWB left upper extremity in splint  -Ortho following: Plan for OR today  -Plavix on hold, resume when okay with primary team  -RCRI score: 1, 6.0 % Risk of major cardiac event  -Patient is medically optimized for planned procedure    #Embolic CVA, s/p ILR placement  -Currently on Plavix only, Plavix on hold for planned procedure, resume when feasible  -Atorvastatin 40 mg      #Giant cell arteritis  -Currently on prednisone 1 mg p.o. daily.  No need for stress dose steroids  -On tocilizumab monthly infusion  -Currently no evidence of exacerbation    #GERD  -Pepcid 40 mg  -Protonix 40 mg    #History of GI bleed  -H&H: Stable  -Monitor and trend H&H  -Transfuse as needed      #BPH, history of prostate CA  -Alfuzosin 10 mg  -Lupron    #VTE prophylaxis  -SCDs    Thank you for the consult.  Will follow

## 2024-12-22 NOTE — CONSULT NOTE ADULT - SUBJECTIVE AND OBJECTIVE BOX
HPI  84yMale c/o L groin and elbow pain s/p mechanical fall. Able to bear weight in the LLE since the fall. Denies headstrike or LOC. Denies numbness/tingling in the LLE. Denies any other trauma/injuries at this time. At baseline, community ambulator w/o assistive devices.    ROS  Negative unless otherwise specified in HPI.    PAST MEDICAL & SURGICAL Hx  PAST MEDICAL & SURGICAL HISTORY:  HLD (hyperlipidemia)      BPH (benign prostatic hyperplasia)      GERD (gastroesophageal reflux disease)      Snores      [place] (hard of hearing)      H/O constipation      Status post ORIF of fracture of ankle      History of colonoscopy      S/P left inguinal hernia repair          MEDICATIONS  Home Medications:  acetaminophen 500 mg oral tablet: 2 tab(s) orally 2 times a day as needed for  headache (23 Sep 2024 08:08)  alfuzosin 10 mg oral tablet, extended release: 1 tab(s) orally once a day with dinner (23 Sep 2024 08:08)  aspirin 81 mg oral tablet: 1 tab(s) orally once a day (23 Sep 2024 08:08)  Caltrate 600 + D oral tablet: 1 tab(s) orally once a day (23 Sep 2024 08:08)  cyanocobalamin 1000 mcg sublingual tablet: 1 tab(s) sublingually once a day (23 Sep 2024 08:07)  Lupron Depot 45 mg/6 months intramuscular kit: 45 unit(s) intramuscularly every 6 months (23 Sep 2024 08:08)  Multiple Vitamins oral tablet: 1 tab(s) orally once a day (23 Sep 2024 08:08)  Pepcid 40 mg oral tablet: 1 tab(s) orally once a day (23 Sep 2024 08:05)  predniSONE 10 mg oral tablet: 1 tab(s) orally once a day (23 Sep 2024 08:08)  Protonix 40 mg oral delayed release tablet: 1 tab(s) orally once a day (23 Sep 2024 08:04)  tocilizumab 20 mg/mL intravenous solution: 460 milligram(s) intravenous every 4 weeks (23 Sep 2024 08:08)      ALLERGIES  penicillin (Other)      FAMILY Hx  FAMILY HISTORY:  No pertinent family history in first degree relatives        SOCIAL Hx  Social History:      VITALS  Vital Signs Last 24 Hrs  T(C): 36.5 (21 Dec 2024 21:30), Max: 36.5 (21 Dec 2024 21:30)  T(F): 97.7 (21 Dec 2024 21:30), Max: 97.7 (21 Dec 2024 21:30)  HR: 65 (22 Dec 2024 00:19) (65 - 77)  BP: 122/60 (22 Dec 2024 00:19) (110/51 - 122/60)  BP(mean): --  RR: 18 (22 Dec 2024 00:19) (18 - 18)  SpO2: 95% (22 Dec 2024 00:19) (94% - 95%)    Parameters below as of 22 Dec 2024 00:19  Patient On (Oxygen Delivery Method): room air        PHYSICAL EXAM  Gen: Lying in bed, NAD  Resp: No increased WOB  LLE:  Skin intact  +TTP over L groin, no TTP along remainder of extremity; compartments soft  painless ROM of hip  (-) Log roll test  No pain with axial loading  Motor: TA/EHL/GS/FHL intact  Sensory: DP/SP/Tib/Calos/Saph SILT  +DP pulse, WWP    Secondary Assessment:  NC/AT, NTTP of clavicles, NTTP of C-spine,T-spine, or L-spine in the midline and paraspinal areas; NTTP of pelvis  LUE: TTP elbow; NTTP of Shoulder, Wrist, Hand; NT with AROM/PROM of Shoulder, Wrist, Hand; AIN/PIN/Med/Uln/Msc/Rad/Ax intact  RUE: NTTP of Shoulder, Elbow, Wrist, Hand; NT with AROM/PROM of Shoulder, Elbow, Wrist, Hand; AIN/PIN/Med/Uln/Msc/Rad/Ax intact   LLE: Able to SLR, NT with Log Roll, NT with Heel Strike, NTTP of Hip, Knee, Ankle, Foot; NT with AROM/PROM of Hip, Knee, Ankle, Foot; Q/H/GSC/TA/EHL/FHL intact  RLE: Able to SLR, NT with Log Roll, NT with Heel Strike, NTTP of Hip, Knee, Ankle, Foot; NT with AROM/PROM of Hip, Knee, Ankle, Foot; Q/H/GSC/TA/EHL/FHL intact      LABS                        11.0   6.88  )-----------( 153      ( 21 Dec 2024 22:08 )             33.8     12-21    141  |  109[H]  |  28[H]  ----------------------------<  167[H]  3.8   |  29  |  0.98    Ca    9.1      21 Dec 2024 22:08    TPro  7.0  /  Alb  3.2[L]  /  TBili  0.7  /  DBili  x   /  AST  33  /  ALT  39  /  AlkPhos  53  12-21    PT/INR - ( 21 Dec 2024 22:08 )   PT: 11.9 sec;   INR: 1.04 ratio         PTT - ( 21 Dec 2024 22:08 )  PTT:27.2 sec    IMAGING  XRs/CT: L inf pubic ramus and sacral ala fx, nondisplaced (personal read)    ASSESSMENT & PLAN  84yMale w/ L LC1 fx  -WBAT LLE  -pain control  -ice/cold compress  -PT/OT  -no acute ortho surgery at this time  -f/u outpt with Dr. Spears in 1 week, call office for appt HPI  84yMale c/o L groin and elbow pain s/p mechanical fall. Able to bear weight in the LLE since the fall. Denies headstrike or LOC. Denies numbness/tingling in the LLE. Denies any other trauma/injuries at this time. At baseline, community ambulator w/o assistive devices.    ROS  Negative unless otherwise specified in HPI.    PAST MEDICAL & SURGICAL Hx  PAST MEDICAL & SURGICAL HISTORY:  HLD (hyperlipidemia)      BPH (benign prostatic hyperplasia)      GERD (gastroesophageal reflux disease)      Snores      [place] (hard of hearing)      H/O constipation      Status post ORIF of fracture of ankle      History of colonoscopy      S/P left inguinal hernia repair          MEDICATIONS  Home Medications:  acetaminophen 500 mg oral tablet: 2 tab(s) orally 2 times a day as needed for  headache (23 Sep 2024 08:08)  alfuzosin 10 mg oral tablet, extended release: 1 tab(s) orally once a day with dinner (23 Sep 2024 08:08)  aspirin 81 mg oral tablet: 1 tab(s) orally once a day (23 Sep 2024 08:08)  Caltrate 600 + D oral tablet: 1 tab(s) orally once a day (23 Sep 2024 08:08)  cyanocobalamin 1000 mcg sublingual tablet: 1 tab(s) sublingually once a day (23 Sep 2024 08:07)  Lupron Depot 45 mg/6 months intramuscular kit: 45 unit(s) intramuscularly every 6 months (23 Sep 2024 08:08)  Multiple Vitamins oral tablet: 1 tab(s) orally once a day (23 Sep 2024 08:08)  Pepcid 40 mg oral tablet: 1 tab(s) orally once a day (23 Sep 2024 08:05)  predniSONE 10 mg oral tablet: 1 tab(s) orally once a day (23 Sep 2024 08:08)  Protonix 40 mg oral delayed release tablet: 1 tab(s) orally once a day (23 Sep 2024 08:04)  tocilizumab 20 mg/mL intravenous solution: 460 milligram(s) intravenous every 4 weeks (23 Sep 2024 08:08)      ALLERGIES  penicillin (Other)      FAMILY Hx  FAMILY HISTORY:  No pertinent family history in first degree relatives        SOCIAL Hx  Social History:      VITALS  Vital Signs Last 24 Hrs  T(C): 36.5 (21 Dec 2024 21:30), Max: 36.5 (21 Dec 2024 21:30)  T(F): 97.7 (21 Dec 2024 21:30), Max: 97.7 (21 Dec 2024 21:30)  HR: 65 (22 Dec 2024 00:19) (65 - 77)  BP: 122/60 (22 Dec 2024 00:19) (110/51 - 122/60)  BP(mean): --  RR: 18 (22 Dec 2024 00:19) (18 - 18)  SpO2: 95% (22 Dec 2024 00:19) (94% - 95%)    Parameters below as of 22 Dec 2024 00:19  Patient On (Oxygen Delivery Method): room air        PHYSICAL EXAM  Gen: Lying in bed, NAD  Resp: No increased WOB  LLE:  Skin intact  +TTP over L groin, no TTP along remainder of extremity; compartments soft  painless ROM of hip  (-) Log roll test  No pain with axial loading  Motor: TA/EHL/GS/FHL intact  Sensory: DP/SP/Tib/Calos/Saph SILT  +DP pulse, WWP    Secondary Assessment:  NC/AT, NTTP of clavicles, NTTP of C-spine,T-spine, or L-spine in the midline and paraspinal areas; NTTP of pelvis  LUE: TTP elbow; NTTP of Shoulder, Wrist, Hand; NT with AROM/PROM of Shoulder, Wrist, Hand; AIN/PIN/Med/Uln/Msc/Rad/Ax intact  RUE: NTTP of Shoulder, Elbow, Wrist, Hand; NT with AROM/PROM of Shoulder, Elbow, Wrist, Hand; AIN/PIN/Med/Uln/Msc/Rad/Ax intact   LLE: Able to SLR, NT with Log Roll, NT with Heel Strike, NTTP of Hip, Knee, Ankle, Foot; NT with AROM/PROM of Hip, Knee, Ankle, Foot; Q/H/GSC/TA/EHL/FHL intact  RLE: Able to SLR, NT with Log Roll, NT with Heel Strike, NTTP of Hip, Knee, Ankle, Foot; NT with AROM/PROM of Hip, Knee, Ankle, Foot; Q/H/GSC/TA/EHL/FHL intact      LABS                        11.0   6.88  )-----------( 153      ( 21 Dec 2024 22:08 )             33.8     12-21    141  |  109[H]  |  28[H]  ----------------------------<  167[H]  3.8   |  29  |  0.98    Ca    9.1      21 Dec 2024 22:08    TPro  7.0  /  Alb  3.2[L]  /  TBili  0.7  /  DBili  x   /  AST  33  /  ALT  39  /  AlkPhos  53  12-21    PT/INR - ( 21 Dec 2024 22:08 )   PT: 11.9 sec;   INR: 1.04 ratio         PTT - ( 21 Dec 2024 22:08 )  PTT:27.2 sec    IMAGING  XRs/CT: L inf pubic ramus and superior pubofemoral jxn and sacral ala fx, nondisplaced (personal read)    ASSESSMENT & PLAN  84yMale w/ L LC1 fx  -WBAT LLE  -pain control  -ice/cold compress  -PT/OT  -no acute ortho surgery at this time  -f/u outpt with Dr. Spears in 1 week, call office for appt HPI  84yMale c/o L groin and elbow pain s/p mechanical fall. Able to bear weight in the LLE since the fall. Denies headstrike or LOC. Denies numbness/tingling in the LLE. Denies any other trauma/injuries at this time. At baseline, community ambulator w/o assistive devices.    ROS  Negative unless otherwise specified in HPI.    PAST MEDICAL & SURGICAL Hx  PAST MEDICAL & SURGICAL HISTORY:  HLD (hyperlipidemia)      BPH (benign prostatic hyperplasia)      GERD (gastroesophageal reflux disease)      Snores      [place] (hard of hearing)      H/O constipation      Status post ORIF of fracture of ankle      History of colonoscopy      S/P left inguinal hernia repair          MEDICATIONS  Home Medications:  acetaminophen 500 mg oral tablet: 2 tab(s) orally 2 times a day as needed for  headache (23 Sep 2024 08:08)  alfuzosin 10 mg oral tablet, extended release: 1 tab(s) orally once a day with dinner (23 Sep 2024 08:08)  aspirin 81 mg oral tablet: 1 tab(s) orally once a day (23 Sep 2024 08:08)  Caltrate 600 + D oral tablet: 1 tab(s) orally once a day (23 Sep 2024 08:08)  cyanocobalamin 1000 mcg sublingual tablet: 1 tab(s) sublingually once a day (23 Sep 2024 08:07)  Lupron Depot 45 mg/6 months intramuscular kit: 45 unit(s) intramuscularly every 6 months (23 Sep 2024 08:08)  Multiple Vitamins oral tablet: 1 tab(s) orally once a day (23 Sep 2024 08:08)  Pepcid 40 mg oral tablet: 1 tab(s) orally once a day (23 Sep 2024 08:05)  predniSONE 10 mg oral tablet: 1 tab(s) orally once a day (23 Sep 2024 08:08)  Protonix 40 mg oral delayed release tablet: 1 tab(s) orally once a day (23 Sep 2024 08:04)  tocilizumab 20 mg/mL intravenous solution: 460 milligram(s) intravenous every 4 weeks (23 Sep 2024 08:08)      ALLERGIES  penicillin (Other)      FAMILY Hx  FAMILY HISTORY:  No pertinent family history in first degree relatives        SOCIAL Hx  Social History:      VITALS  Vital Signs Last 24 Hrs  T(C): 36.5 (21 Dec 2024 21:30), Max: 36.5 (21 Dec 2024 21:30)  T(F): 97.7 (21 Dec 2024 21:30), Max: 97.7 (21 Dec 2024 21:30)  HR: 65 (22 Dec 2024 00:19) (65 - 77)  BP: 122/60 (22 Dec 2024 00:19) (110/51 - 122/60)  BP(mean): --  RR: 18 (22 Dec 2024 00:19) (18 - 18)  SpO2: 95% (22 Dec 2024 00:19) (94% - 95%)    Parameters below as of 22 Dec 2024 00:19  Patient On (Oxygen Delivery Method): room air        PHYSICAL EXAM  Gen: Lying in bed, NAD  Resp: No increased WOB  LLE:  Skin intact  +TTP over L groin, no TTP along remainder of extremity; compartments soft  painless ROM of hip  (-) Log roll test  No pain with axial loading  Motor: TA/EHL/GS/FHL intact  Sensory: DP/SP/Tib/Calos/Saph SILT  +DP pulse, WWP    Secondary Assessment:  NC/AT, NTTP of clavicles, NTTP of C-spine,T-spine, or L-spine in the midline and paraspinal areas; NTTP of pelvis  LUE: TTP elbow; NTTP of Shoulder, Wrist, Hand; NT with AROM/PROM of Shoulder, Wrist, Hand; AIN/PIN/Med/Uln/Msc/Rad/Ax intact  RUE: NTTP of Shoulder, Elbow, Wrist, Hand; NT with AROM/PROM of Shoulder, Elbow, Wrist, Hand; AIN/PIN/Med/Uln/Msc/Rad/Ax intact   LLE: Able to SLR, NT with Log Roll, NT with Heel Strike, NTTP of Hip, Knee, Ankle, Foot; NT with AROM/PROM of Hip, Knee, Ankle, Foot; Q/H/GSC/TA/EHL/FHL intact  RLE: Able to SLR, NT with Log Roll, NT with Heel Strike, NTTP of Hip, Knee, Ankle, Foot; NT with AROM/PROM of Hip, Knee, Ankle, Foot; Q/H/GSC/TA/EHL/FHL intact      LABS                        11.0   6.88  )-----------( 153      ( 21 Dec 2024 22:08 )             33.8     12-21    141  |  109[H]  |  28[H]  ----------------------------<  167[H]  3.8   |  29  |  0.98    Ca    9.1      21 Dec 2024 22:08    TPro  7.0  /  Alb  3.2[L]  /  TBili  0.7  /  DBili  x   /  AST  33  /  ALT  39  /  AlkPhos  53  12-21    PT/INR - ( 21 Dec 2024 22:08 )   PT: 11.9 sec;   INR: 1.04 ratio         PTT - ( 21 Dec 2024 22:08 )  PTT:27.2 sec    IMAGING  XRs/CT: L inf pubic ramus and superior puboacetabularl jxn and sacral ala fx, nondisplaced (personal read)    ASSESSMENT & PLAN  84yMale w/ L LC1 fx  -WBAT LLE  -pain control  -ice/cold compress  -PT/OT  -no acute ortho surgery at this time  -f/u outpt with Dr. Spears in 1 week, call office for appt

## 2024-12-22 NOTE — DISCHARGE NOTE PROVIDER - NSDCFUADDINST_GEN_ALL_CORE_FT
Orthopedic Surgery DC Instructions:    1. ACTIVITY: Nonweightbearing left upper extremity  2. DVT/PE Prophylaxis: Continue anticoagulation medication per AC team. See Med Rec for duration and dose.  3. PHYSICAL THERAPY: You should receive physical therapy daily once cleared by Dr Wallace  4. FOLLOW UP: Follow up outpatient with your Orthopedic Surgeon Dr. Wallace in 2 weeks from surgery.  5. STITCHES: To be removed at outpatient office visit 2 weeks post op (1/5)  6. BANDAGE: Change bandage on elbow on POD #7 (12/29) to dry gauze and tegaderm or paper tape. Can also change PRN if saturated. If going to rehab facility, Do NOT remove on arrival to inspect wound. Do NOT remove splint. This will be changed at your postoperative visit.  7. BATHING: Showering is allowed, however sponge baths are recommended. You must keep your dressing and splint clean/dry/and intact. Please cover splint and dressing with plastic bag/wrap to prevent dressing from becoming wet. Do NOT submerge dressing/splint in water. Please avoid baths/pools/hot tubs until cleared by your surgeon.          Orthopedic Surgery DC Instructions:    1. ACTIVITY: Nonweightbearing left upper extremity  2. DVT/PE Prophylaxis: Continue anticoagulation medication per AC team. See Med Rec for duration and dose.  3. PHYSICAL THERAPY: You should receive physical therapy daily once cleared by Dr Wallace  4. FOLLOW UP: Follow up outpatient with your Orthopedic Surgeon Dr. Wallace 2 weeks from surgery.  5. STITCHES: To be removed at outpatient office visit 2 weeks post op (1/5)  6. BANDAGE: Change bandage on elbow on POD #7 (12/29) to dry gauze and tegaderm or paper tape. Can also change PRN if saturated. If going to rehab facility, Do NOT remove on arrival to inspect wound. Do NOT remove splint. This will be changed at your postoperative visit.  7. BATHING: Showering is allowed, however sponge baths are recommended. You must keep your dressing and splint clean/dry/and intact. Please cover splint and dressing with plastic bag/wrap to prevent dressing from becoming wet. Do NOT submerge dressing/splint in water. Please avoid baths/pools/hot tubs until cleared by your surgeon.

## 2024-12-22 NOTE — H&P ADULT - ASSESSMENT
84M s/p fall, landed on the left hip and then left elbow  - HT, - LOC  Complains of left hip and left elbow pain  on Plavix only, last dose 12/21AM    secondary + for midline tenderness at T4-5 level    CT:  Nondisplaced fracture in the anterior column of left acetabulum.  Minimal cortical buckling of the left inferior pubic ramus.  Anterior cortical buckling of left sacral ala likely a nondisplaced   fracture.      Plan:  - Admit under Dr. Martinez's service  - PT  - Pain control PRN  - Monitor vitals  - DASH diet  - Nausea control PRN  - replete electrolytes  - daily labs  - OOB/PT    Plan will be discussed with Trauma & Surgical critical care surgery attending, Dr. Martinez

## 2024-12-22 NOTE — H&P ADULT - NSICDXFAMILYHX_GEN_ALL_CORE_FT
Time out done.  I placed a 26 gauge Argon PICC via the left lower arm vein near antecubital fossa with Betadine prep and sterile technique.  The catheter was cut to 13 cm, and inserted 10 cm. CXR (without IV contrast dye) showed the tip in the SVC. Moderate haze in lung fields.  No complications noted.    FAMILY HISTORY:  No pertinent family history in first degree relatives

## 2024-12-22 NOTE — DISCHARGE NOTE PROVIDER - CARE PROVIDER_API CALL
Sha Wallace.  Orthopaedic Surgery  [address] [place] [address]  Phone: [phone]  Fax: [phone]  Follow Up Time:    Sha Wallace.  Orthopaedic Surgery  [address] [place] [address]  Phone: [phone]  Fax: [phone]  Follow Up Time:     Piter Patricio  Internal Medicine  [address] [facility] [facility], [address]  [place] [address]  Phone: [phone]  Fax: [phone]  Follow Up Time: 2 weeks

## 2024-12-22 NOTE — PATIENT PROFILE ADULT - FALL HARM RISK - ATTEMPT OOB
No [Well Developed] : well developed [Well Nourished] : well nourished [No Acute Distress] : no acute distress [Normal Conjunctiva] : normal conjunctiva [Normal Venous Pressure] : normal venous pressure [No Carotid Bruit] : no carotid bruit [Normal S1, S2] : normal S1, S2 [No Murmur] : no murmur [No Rub] : no rub [No Gallop] : no gallop [Clear Lung Fields] : clear lung fields [Good Air Entry] : good air entry [No Respiratory Distress] : no respiratory distress  [Soft] : abdomen soft [Non Tender] : non-tender [No Masses/organomegaly] : no masses/organomegaly [Normal Bowel Sounds] : normal bowel sounds [Normal Gait] : normal gait [No Edema] : no edema [No Cyanosis] : no cyanosis [No Clubbing] : no clubbing [No Varicosities] : no varicosities [No Rash] : no rash [No Skin Lesions] : no skin lesions [Moves all extremities] : moves all extremities [No Focal Deficits] : no focal deficits [Normal Speech] : normal speech [Alert and Oriented] : alert and oriented [Normal memory] : normal memory

## 2024-12-22 NOTE — H&P ADULT - ATTENDING COMMENTS
84M s/p fall, landed on the left hip and then left elbow  - HT, - LOC  Complains of left hip and left elbow pain  on Plavix only, last dose 12/21AM    secondary + for midline tenderness at T4-5 level    CT:  Nondisplaced fracture in the anterior column of left acetabulum.  Minimal cortical buckling of the left inferior pubic ramus.  Anterior cortical buckling of left sacral ala likely a nondisplaced   fracture.  No evidence fo any further injuries on CT of spine    Plan:  Admit under trauma service, floor  PT  Pain control  Monitor vitals  DASH diet  Daily labs  Replete electrolytes  DVT prophylaxis  Home medications

## 2024-12-22 NOTE — DISCHARGE NOTE PROVIDER - NSDCDCMDCOMP_GEN_ALL_CORE
REFERRAL INFORMATION:  Referring Provider:    Referring Clinic:    Reason for Visit/Diagnosis: ECF        FUTURE VISIT INFORMATION:  Appointment Date: 8/12/2024  Appointment Time: 9:30 AM     NOTES RECORD STATUS  DETAILS   OFFICE NOTE from Referring Provider N/A    OFFICE NOTE from Other Specialists Care Everywhere [facility]:  7/24/24 - GEN SURG OV with Niraj Iqbal NP   HOSPITAL DISCHARGE SUMMARY/ ED VISITS  Care Everywhere [facility]:  6/27/24 - Admission with Dr. Ramos   OPERATIVE REPORT Care Everywhere [facility]:  7/7/24 - OP Note for OPEN EXPLORATION ABDOMEN ADULT SMALL BOWEL RESECTION, BLADDER REPAIR, LYSISS OF ADHESIONS with Dr. Chapman  6/20/24 - OP Note for Exam under anesthesia, diagnostic laparoscopy, mini laparotomy, bilateral tubal occlusion with Filshie clips with Dr. Acosta   PERTINENT LABS Care Everywhere    IMAGING (CT, MRI, US, XR)  Received [facility]:  7/18/24, 7/6/24 7/4/24, 6/28/24 - XR Abdomen  7/17/24, 7/4/24, 6/27/24 - XR Chest  7/15/24, 7/1/24, 6/27/24 - CT Abd/pelvis  7/12/24 - CT Cystogram     Records Requested    Facility  [facility]  Fax: [phone]   Outcome * 7/24/24 2:26 PM Faxed urgent request to [facility] for images to be pushed to [facility]s. - Syl    * 7/25/24 12:08 PM Images received from [facility] and attached to the patient in PACs. - Syl      This document is complete and the patient is ready for discharge.

## 2024-12-22 NOTE — DISCHARGE NOTE PROVIDER - NSDCMRMEDTOKEN_GEN_ALL_CORE_FT
alfuzosin 10 mg oral tablet, extended release: 1 tab(s) orally once a day with dinner  atorvastatin 40 mg oral tablet: 1 tab(s) orally once a day (at bedtime)  Caltrate 600 + D oral tablet: 1 tab(s) orally once a day  Colace 100 mg oral capsule: 3 cap(s) orally once a day  cyanocobalamin 1000 mcg sublingual tablet: 1 tab(s) sublingually once a day  Lupron Depot 45 mg/6 months intramuscular kit: 45 unit(s) intramuscularly every 6 months  magnesium oxide 400 mg oral tablet: 1 tab(s) orally once a day  Milk of Magnesia 8% oral suspension: 15 milliliter(s) orally as needed for  Multiple Vitamins oral tablet: 1 tab(s) orally once a day  Pepcid 40 mg oral tablet: 1 tab(s) orally once a day  predniSONE 1 mg oral tablet: 2 tab(s) orally once a day  Protonix 40 mg oral delayed release tablet: 1 tab(s) orally once a day  tocilizumab 20 mg/mL intravenous solution: 460 milligram(s) intravenous every 4 weeks   acetaminophen 10 mg/mL intravenous solution: 100 milliliter(s) intravenous once  acetaminophen 325 mg oral tablet: 3 tab(s) orally every 8 hours  alfuzosin 10 mg oral tablet, extended release: 1 tab(s) orally once a day with dinner  atorvastatin 40 mg oral tablet: 1 tab(s) orally once a day (at bedtime)  bisacodyl 5 mg oral delayed release tablet: 1 tab(s) orally every 12 hours As needed Constipation  Caltrate 600 + D oral tablet: 1 tab(s) orally once a day  clopidogrel 75 mg oral tablet: 1 tab(s) orally once a day  Colace 100 mg oral capsule: 3 cap(s) orally once a day  cyanocobalamin 1000 mcg sublingual tablet: 1 tab(s) sublingually once a day  gabapentin 100 mg oral capsule: 1 cap(s) orally every 8 hours  Lupron Depot 45 mg/6 months intramuscular kit: 45 unit(s) intramuscularly every 6 months  magnesium oxide 400 mg oral tablet: 1 tab(s) orally once a day  Milk of Magnesia 8% oral suspension: 15 milliliter(s) orally once a day as needed for  constipation  Multiple Vitamins oral tablet: 1 tab(s) orally once a day  ondansetron 2 mg/mL injectable solution: 2 milligram(s) injectable every 6 hours as needed for  nausea  ondansetron 2 mg/mL injectable solution: 2 milligram(s) injectable every 6 hours as needed for  nausea  oxyCODONE 5 mg oral tablet: 1 tab(s) orally every 4 hours As needed Severe Pain (7 - 10)  polyethylene glycol 3350 oral powder for reconstitution: 17 gram(s) orally once a day (at bedtime)  predniSONE 1 mg oral tablet: 2 tab(s) orally once a day  senna leaf extract oral tablet: 2 tab(s) orally once a day (at bedtime)  tocilizumab 20 mg/mL intravenous solution: 460 milligram(s) intravenous every 4 weeks  traMADol 50 mg oral tablet: 1 tab(s) orally every 6 hours As needed Mild Pain (1 - 3)

## 2024-12-22 NOTE — H&P ADULT - NSICDXPASTMEDICALHX_GEN_ALL_CORE_FT
PAST MEDICAL HISTORY:  BPH (benign prostatic hyperplasia)     GERD (gastroesophageal reflux disease)     H/O constipation     HLD (hyperlipidemia)     [place] (hard of hearing)     Snores

## 2024-12-23 LAB
ANION GAP SERPL CALC-SCNC: 3 MMOL/L — LOW (ref 5–17)
BASOPHILS # BLD AUTO: 0.02 K/UL — SIGNIFICANT CHANGE UP (ref 0–0.2)
BASOPHILS NFR BLD AUTO: 0.2 % — SIGNIFICANT CHANGE UP (ref 0–2)
BUN SERPL-MCNC: 16 MG/DL — SIGNIFICANT CHANGE UP (ref 7–23)
CALCIUM SERPL-MCNC: 9 MG/DL — SIGNIFICANT CHANGE UP (ref 8.5–10.1)
CHLORIDE SERPL-SCNC: 105 MMOL/L — SIGNIFICANT CHANGE UP (ref 96–108)
CO2 SERPL-SCNC: 31 MMOL/L — SIGNIFICANT CHANGE UP (ref 22–31)
CREAT SERPL-MCNC: 0.89 MG/DL — SIGNIFICANT CHANGE UP (ref 0.5–1.3)
EGFR: 84 ML/MIN/1.73M2 — SIGNIFICANT CHANGE UP
EOSINOPHIL # BLD AUTO: 0.18 K/UL — SIGNIFICANT CHANGE UP (ref 0–0.5)
EOSINOPHIL NFR BLD AUTO: 2 % — SIGNIFICANT CHANGE UP (ref 0–6)
GLUCOSE SERPL-MCNC: 107 MG/DL — HIGH (ref 70–99)
HCT VFR BLD CALC: 34 % — LOW (ref 39–50)
HGB BLD-MCNC: 11 G/DL — LOW (ref 13–17)
IMM GRANULOCYTES NFR BLD AUTO: 0.4 % — SIGNIFICANT CHANGE UP (ref 0–0.9)
LYMPHOCYTES # BLD AUTO: 1.1 K/UL — SIGNIFICANT CHANGE UP (ref 1–3.3)
LYMPHOCYTES # BLD AUTO: 12.3 % — LOW (ref 13–44)
MAGNESIUM SERPL-MCNC: 1.9 MG/DL — SIGNIFICANT CHANGE UP (ref 1.6–2.6)
MCHC RBC-ENTMCNC: 31.8 PG — SIGNIFICANT CHANGE UP (ref 27–34)
MCHC RBC-ENTMCNC: 32.4 G/DL — SIGNIFICANT CHANGE UP (ref 32–36)
MCV RBC AUTO: 98.3 FL — SIGNIFICANT CHANGE UP (ref 80–100)
MONOCYTES # BLD AUTO: 0.81 K/UL — SIGNIFICANT CHANGE UP (ref 0–0.9)
MONOCYTES NFR BLD AUTO: 9.1 % — SIGNIFICANT CHANGE UP (ref 2–14)
NEUTROPHILS # BLD AUTO: 6.79 K/UL — SIGNIFICANT CHANGE UP (ref 1.8–7.4)
NEUTROPHILS NFR BLD AUTO: 76 % — SIGNIFICANT CHANGE UP (ref 43–77)
PHOSPHATE SERPL-MCNC: 3.1 MG/DL — SIGNIFICANT CHANGE UP (ref 2.5–4.5)
PLATELET # BLD AUTO: 131 K/UL — LOW (ref 150–400)
POTASSIUM SERPL-MCNC: 3.8 MMOL/L — SIGNIFICANT CHANGE UP (ref 3.5–5.3)
POTASSIUM SERPL-SCNC: 3.8 MMOL/L — SIGNIFICANT CHANGE UP (ref 3.5–5.3)
RBC # BLD: 3.46 M/UL — LOW (ref 4.2–5.8)
RBC # FLD: 14.6 % — HIGH (ref 10.3–14.5)
SODIUM SERPL-SCNC: 139 MMOL/L — SIGNIFICANT CHANGE UP (ref 135–145)
WBC # BLD: 8.94 K/UL — SIGNIFICANT CHANGE UP (ref 3.8–10.5)
WBC # FLD AUTO: 8.94 K/UL — SIGNIFICANT CHANGE UP (ref 3.8–10.5)

## 2024-12-23 PROCEDURE — 99232 SBSQ HOSP IP/OBS MODERATE 35: CPT | Mod: FS

## 2024-12-23 PROCEDURE — 99232 SBSQ HOSP IP/OBS MODERATE 35: CPT

## 2024-12-23 RX ORDER — CLONAZEPAM 2 MG
0.5 TABLET ORAL AT BEDTIME
Refills: 0 | Status: DISCONTINUED | OUTPATIENT
Start: 2024-12-23 | End: 2024-12-24

## 2024-12-23 RX ORDER — PREDNISONE 5 MG
2 TABLET ORAL DAILY
Refills: 0 | Status: DISCONTINUED | OUTPATIENT
Start: 2024-12-23 | End: 2024-12-23

## 2024-12-23 RX ORDER — PREDNISONE 5 MG
1 TABLET ORAL DAILY
Refills: 0 | Status: DISCONTINUED | OUTPATIENT
Start: 2024-12-23 | End: 2024-12-26

## 2024-12-23 RX ADMIN — Medication 0.5 MILLIGRAM(S): at 02:29

## 2024-12-23 RX ADMIN — Medication 17 GRAM(S): at 21:20

## 2024-12-23 RX ADMIN — ACETAMINOPHEN 975 MILLIGRAM(S): 80 SOLUTION/ DROPS ORAL at 21:20

## 2024-12-23 RX ADMIN — Medication 0.5 MILLIGRAM(S): at 21:50

## 2024-12-23 RX ADMIN — CLOPIDOGREL BISULFATE 75 MILLIGRAM(S): 75 TABLET, FILM COATED ORAL at 09:54

## 2024-12-23 RX ADMIN — Medication 0.5 MILLIGRAM(S): at 21:20

## 2024-12-23 RX ADMIN — PANTOPRAZOLE 40 MILLIGRAM(S): 40 TABLET, DELAYED RELEASE ORAL at 05:52

## 2024-12-23 RX ADMIN — ACETAMINOPHEN 975 MILLIGRAM(S): 80 SOLUTION/ DROPS ORAL at 05:51

## 2024-12-23 RX ADMIN — Medication 1 MILLIGRAM(S): at 17:20

## 2024-12-23 RX ADMIN — Medication 5 MILLIGRAM(S): at 08:52

## 2024-12-23 RX ADMIN — ACETAMINOPHEN 975 MILLIGRAM(S): 80 SOLUTION/ DROPS ORAL at 06:06

## 2024-12-23 RX ADMIN — SENNOSIDES 2 TABLET(S): 8.6 TABLET, FILM COATED ORAL at 21:21

## 2024-12-23 RX ADMIN — FAMOTIDINE 40 MILLIGRAM(S): 20 TABLET, FILM COATED ORAL at 09:53

## 2024-12-23 RX ADMIN — FAMOTIDINE 40 MILLIGRAM(S): 20 TABLET, FILM COATED ORAL at 21:20

## 2024-12-23 RX ADMIN — ACETAMINOPHEN 975 MILLIGRAM(S): 80 SOLUTION/ DROPS ORAL at 14:04

## 2024-12-23 RX ADMIN — Medication 5 MILLIGRAM(S): at 08:22

## 2024-12-23 RX ADMIN — ACETAMINOPHEN 975 MILLIGRAM(S): 80 SOLUTION/ DROPS ORAL at 21:26

## 2024-12-23 RX ADMIN — Medication 5 MILLIGRAM(S): at 12:42

## 2024-12-23 RX ADMIN — Medication 2000 MILLIGRAM(S): at 01:56

## 2024-12-23 RX ADMIN — ATORVASTATIN CALCIUM 40 MILLIGRAM(S): 40 TABLET, FILM COATED ORAL at 21:21

## 2024-12-23 RX ADMIN — Medication 5 MILLIGRAM(S): at 13:12

## 2024-12-23 RX ADMIN — Medication 0.5 MILLIGRAM(S): at 01:59

## 2024-12-23 NOTE — OCCUPATIONAL THERAPY INITIAL EVALUATION ADULT - PERTINENT HX OF CURRENT PROBLEM, REHAB EVAL
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 Presented to the ED s/p fall last night in his garage. Fell onto his left hip and left elbow. Denies HT or LOC. Denies lightheadedness or palpitation right before the fall. Complains of Left hip and left elbow pain with movement. 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 Presented to the ED s/p fall last night in his garage. Fell onto his left hip and left elbow. Denies HT or LOC. Denies lightheadedness or palpitation right before the fall. Complains of Left hip and left elbow pain with movement. Pt is s/p L olecranon ORIF.

## 2024-12-23 NOTE — PHYSICAL THERAPY INITIAL EVALUATION ADULT - PERTINENT HX OF CURRENT PROBLEM, REHAB EVAL
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 Presented to the ED s/p fall last night in his garage. Fell onto his left hip and left elbow. Denies HT or LOC. Denies lightheadedness or palpitation right before the fall. Complains of Left hip and left elbow pain with movement. 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

## 2024-12-23 NOTE — OCCUPATIONAL THERAPY INITIAL EVALUATION ADULT - ADDITIONAL COMMENTS
Pt reports he resides in a  with elevator assess with his wife and daughter. Pt states PTA he was (I) with all ADL/IADL tasks, walked without AD. Pt has a walk in shower, +shower chair/bench, standard toilet. LHD.

## 2024-12-23 NOTE — DIETITIAN INITIAL EVALUATION ADULT - OTHER INFO
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 Presented to the ED s/p fall last night in his garage. Fell onto his left hip and left elbow. Complains of Left hip and left elbow pain with movement. CT: Nondisplaced fracture in the anterior column of left acetabulum. S/p ORIF on 12/22/24 - tolerated procedure OK.    Known to RD service, has met criteria for PCM on previous admits (March 2024 and Sept 2024). RD visited pt at bed side this morning, very pleasant pt. Currently on regular diet. Reports consuming 100% breakfast tray this morning (yogurt, fruit, blueberry muffin). Denies recent wt changes, reports stable wt of 175# x ~3-6 months. Wt hx reviewed: 168# on 9/6/24 (taken by RD); 169# on 3/21/24 (taken by RD). Was unable to obtain bed scale wt as pt sitting upright in chair this morning. EMR wt doc'd at 178#. C/o constipation, w/o BM x 3 days (normally takes Dulcolax or milk of magnesia to promote BM). NFPE reveals moderate to severe muscle/fat wasting - appeared thin, frail. Will add Ensure + HP shake BID to optimize nutritional needs (provides 350 kcal, 20g protein/ shake) - pt receptive. See other recs below.

## 2024-12-23 NOTE — DIETITIAN INITIAL EVALUATION ADULT - PERTINENT LABORATORY DATA
12-23    139  |  105  |  16  ----------------------------<  107[H]  3.8   |  31  |  0.89    Ca    9.0      23 Dec 2024 08:02  Phos  3.1     12-23  Mg     1.9     12-23    TPro  7.0  /  Alb  3.2[L]  /  TBili  0.7  /  DBili  x   /  AST  33  /  ALT  39  /  AlkPhos  53  12-21  A1C with Estimated Average Glucose Result: 6.2 % (03-21-24 @ 07:56)

## 2024-12-23 NOTE — OCCUPATIONAL THERAPY INITIAL EVALUATION ADULT - VISUAL ASSESSMENT: TRACKING
pt wears glasses, reports in his R eye he has black spots when isolating his vision to that side, pt with h/o macular and cataracts

## 2024-12-23 NOTE — DIETITIAN INITIAL EVALUATION ADULT - NSFNSGIIOFT_GEN_A_CORE
I&O's Detail    22 Dec 2024 07:01  -  23 Dec 2024 07:00  --------------------------------------------------------  IN:    Other (mL): 800 mL  Total IN: 800 mL    OUT:    Blood Loss (mL): 25 mL    Voided (mL): 1200 mL  Total OUT: 1225 mL    Total NET: -425 mL

## 2024-12-23 NOTE — DIETITIAN INITIAL EVALUATION ADULT - ADD RECOMMEND
1) C/w regular diet to maximize caloric and protein intake  2) Will add Ensure + HP shake BID to optimize nutritional needs (provides 350 kcal, 20g protein/ shake)  3) Monitor bowel movements, c/w current bowel regimens as ordered. Consider adding prune juice daily to promote BM  4) MVI w/ minerals daily to ensure 100% RDA met. In v/o malnutrition consider adding 100mg thiamine daily.  5) Obtain weekly wt to track/trend changes  6) Confirm goals of care regarding nutrition support  RD will continue to monitor PO intake, labs, hydration, and wt prn.

## 2024-12-23 NOTE — DIETITIAN INITIAL EVALUATION ADULT - ORAL INTAKE PTA/DIET HISTORY
Lives at home w/ wife who pt reports cooks for him. Endorses "good" appetite at home PTA. Reports consuming 3 meals per day. Does not follow specific diet at home. Denies ONS use. Also denies difficulties w/ chewing and swallowing (was s/p stroke in Feb 2024).

## 2024-12-23 NOTE — PROGRESS NOTE ADULT - NS ATTEND AMEND GEN_ALL_CORE FT
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 Fell onto his left hip and left elbow. Complains of Left hip and left elbow pain with movement.  Denies head trauma or LOC, Patient denies fever, chills, headache, dizziness, change in vision, blurry vision,  chest pain, palpitations, shortness of breath, abdominal pain, nausea, vomiting, diarrhea, constipation,  numbness, weakness or tingling     # left acetabulum, inferior pubic rami, sacral ala fx  # left olecranon fx    Plan  Admitted, trauma, med surge, Dr. Martinez   Hospitalist consulted    Orthopedics consulted  -POD 1 sp L olecranon ORIF  -NWB RUE in splint with sling  -restart plavix today  -recommend plenty of ambulation, h/o GI bleed in the past with subq heparin  -WBAT LLE  -pain control  -ice/cold compress  -PT/OT  -f/u outpt with Dr. Spears in 1 week, call office for appt    #Embolic CVA, s/p ILR placement  -Continue on plavix  -Atorvastatin 40 mg    #GERD  -Pepcid 40 mg  -Protonix 40 mg    #History of GI bleed  -H&H Stable  -Monitor and trend H&H  -Transfuse as needed  -discussed with orthopedics, plavix is sufficient for dvt ppx, encourage ambulation    #BPH, history of prostate CA  -Alfuzosin 10 mg  -Lupron    #VTE prophylaxis  -SCDs/plavix    PT/OT - JEANA, pt will discuss with his wife re: possibly going home with home PT, will need 3 nights before JEANA (earliest DC 12/25)    35 minutes of time spent on pt examination, review of relevant labs and radiologic studies, discussion with relevant services/providers for coordination of pt care and services, time is exclusive of resident and/or physician assistant teaching or supervision time

## 2024-12-23 NOTE — PHYSICAL THERAPY INITIAL EVALUATION ADULT - DIAGNOSIS, PT EVAL
Nondisplaced fracture in the anterior column of left acetabulum: ORIF Nondisplaced fracture in the anterior column of left acetabulum: ORIF L olecranon

## 2024-12-23 NOTE — OCCUPATIONAL THERAPY INITIAL EVALUATION ADULT - LEVEL OF INDEPENDENCE: SIT/STAND, REHAB EVAL
from low chair, (attempted without dalila steady, pt unable to come to full stand, with dalila steady pt performs better)/moderate assist (50% patients effort)

## 2024-12-23 NOTE — PHYSICAL THERAPY INITIAL EVALUATION ADULT - GENERAL OBSERVATIONS, REHAB EVAL
Pre session: supine in bed with bed alarm on. Call bell and phone in reach. +sling/splint L UE. L hand dominant. NWANDIE SUNSHINE WBAT. Tolerated well and would benefit from further skilled PT for functional mobility training.

## 2024-12-23 NOTE — OCCUPATIONAL THERAPY INITIAL EVALUATION ADULT - GENERAL OBSERVATIONS, REHAB EVAL
Pt rec'/left sitting in bedside chair, chair alarmed, +sling/splint L UE, +PIV, lines intact, pt agreeable to OT IE.

## 2024-12-23 NOTE — DIETITIAN NUTRITION RISK NOTIFICATION - TREATMENT: THE FOLLOWING DIET HAS BEEN RECOMMENDED
Diet, Regular (12-22-24 @ 13:25) [Active]  Diet, Clear Liquid (12-22-24 @ 11:59) [Available for Activation]

## 2024-12-23 NOTE — OCCUPATIONAL THERAPY INITIAL EVALUATION ADULT - THERAPY FREQUENCY, OT EVAL
Statement Selected Detail Level: Generalized Sunscreen Recommendations: Patient is to use sunscreen daily on the affected areas 5-7x/week

## 2024-12-23 NOTE — DIETITIAN INITIAL EVALUATION ADULT - PERTINENT MEDS FT
MEDICATIONS  (STANDING):  acetaminophen     Tablet .. 975 milliGRAM(s) Oral every 8 hours  acetaminophen   IVPB .. 1000 milliGRAM(s) IV Intermittent once  atorvastatin 40 milliGRAM(s) Oral at bedtime  clopidogrel Tablet 75 milliGRAM(s) Oral daily  famotidine    Tablet 40 milliGRAM(s) Oral two times a day  pantoprazole    Tablet 40 milliGRAM(s) Oral before breakfast  polyethylene glycol 3350 17 Gram(s) Oral at bedtime  senna 2 Tablet(s) Oral at bedtime  sodium chloride 0.9%. 1000 milliLiter(s) (75 mL/Hr) IV Continuous <Continuous>    MEDICATIONS  (PRN):  bisacodyl 5 milliGRAM(s) Oral every 12 hours PRN Constipation  HYDROmorphone  Injectable 0.5 milliGRAM(s) IV Push every 4 hours PRN Breakthrough  magnesium hydroxide Suspension 30 milliLiter(s) Oral daily PRN Constipation  ondansetron Injectable 4 milliGRAM(s) IV Push every 6 hours PRN Nausea and/or Vomiting  ondansetron Injectable 4 milliGRAM(s) IV Push every 6 hours PRN Nausea  oxyCODONE    IR 2.5 milliGRAM(s) Oral every 4 hours PRN Moderate Pain (4 - 6)  oxyCODONE    IR 5 milliGRAM(s) Oral every 4 hours PRN Severe Pain (7 - 10)  traMADol 50 milliGRAM(s) Oral every 6 hours PRN Mild Pain (1 - 3)

## 2024-12-23 NOTE — OCCUPATIONAL THERAPY INITIAL EVALUATION ADULT - MD ORDER
"OT Evaluate and Treat"- MD orders received. Chart reviewed, contents noted. Please refer to Therapeutic Interventions under Adult A & I for future documentation and treatment notes.

## 2024-12-23 NOTE — DIETITIAN INITIAL EVALUATION ADULT - NSICDXPASTMEDICALHX_GEN_ALL_CORE_FT
PAST MEDICAL HISTORY:  BPH (benign prostatic hyperplasia)     GERD (gastroesophageal reflux disease)     H/O constipation     HLD (hyperlipidemia)     [place] (hard of hearing)     Snores

## 2024-12-24 LAB
ANION GAP SERPL CALC-SCNC: 3 MMOL/L — LOW (ref 5–17)
BUN SERPL-MCNC: 21 MG/DL — SIGNIFICANT CHANGE UP (ref 7–23)
CALCIUM SERPL-MCNC: 9.2 MG/DL — SIGNIFICANT CHANGE UP (ref 8.5–10.1)
CHLORIDE SERPL-SCNC: 108 MMOL/L — SIGNIFICANT CHANGE UP (ref 96–108)
CO2 SERPL-SCNC: 29 MMOL/L — SIGNIFICANT CHANGE UP (ref 22–31)
CREAT SERPL-MCNC: 0.83 MG/DL — SIGNIFICANT CHANGE UP (ref 0.5–1.3)
EGFR: 86 ML/MIN/1.73M2 — SIGNIFICANT CHANGE UP
GLUCOSE SERPL-MCNC: 102 MG/DL — HIGH (ref 70–99)
HCT VFR BLD CALC: 32.9 % — LOW (ref 39–50)
HGB BLD-MCNC: 10.6 G/DL — LOW (ref 13–17)
MAGNESIUM SERPL-MCNC: 2 MG/DL — SIGNIFICANT CHANGE UP (ref 1.6–2.6)
MCHC RBC-ENTMCNC: 31.3 PG — SIGNIFICANT CHANGE UP (ref 27–34)
MCHC RBC-ENTMCNC: 32.2 G/DL — SIGNIFICANT CHANGE UP (ref 32–36)
MCV RBC AUTO: 97.1 FL — SIGNIFICANT CHANGE UP (ref 80–100)
PHOSPHATE SERPL-MCNC: 3.1 MG/DL — SIGNIFICANT CHANGE UP (ref 2.5–4.5)
PLATELET # BLD AUTO: 127 K/UL — LOW (ref 150–400)
POTASSIUM SERPL-MCNC: 3.6 MMOL/L — SIGNIFICANT CHANGE UP (ref 3.5–5.3)
POTASSIUM SERPL-SCNC: 3.6 MMOL/L — SIGNIFICANT CHANGE UP (ref 3.5–5.3)
RBC # BLD: 3.39 M/UL — LOW (ref 4.2–5.8)
RBC # FLD: 14.6 % — HIGH (ref 10.3–14.5)
SODIUM SERPL-SCNC: 140 MMOL/L — SIGNIFICANT CHANGE UP (ref 135–145)
WBC # BLD: 7.94 K/UL — SIGNIFICANT CHANGE UP (ref 3.8–10.5)
WBC # FLD AUTO: 7.94 K/UL — SIGNIFICANT CHANGE UP (ref 3.8–10.5)

## 2024-12-24 PROCEDURE — 99232 SBSQ HOSP IP/OBS MODERATE 35: CPT

## 2024-12-24 PROCEDURE — 71045 X-RAY EXAM CHEST 1 VIEW: CPT | Mod: 26

## 2024-12-24 PROCEDURE — 93010 ELECTROCARDIOGRAM REPORT: CPT

## 2024-12-24 RX ORDER — POTASSIUM CHLORIDE 600 MG/1
20 TABLET, FILM COATED, EXTENDED RELEASE ORAL
Refills: 0 | Status: COMPLETED | OUTPATIENT
Start: 2024-12-24 | End: 2024-12-24

## 2024-12-24 RX ORDER — ALFUZOSIN HYDROCHLORIDE 10 MG/1
10 TABLET, EXTENDED RELEASE ORAL AT BEDTIME
Refills: 0 | Status: DISCONTINUED | OUTPATIENT
Start: 2024-12-24 | End: 2024-12-26

## 2024-12-24 RX ADMIN — FAMOTIDINE 40 MILLIGRAM(S): 20 TABLET, FILM COATED ORAL at 21:06

## 2024-12-24 RX ADMIN — POTASSIUM CHLORIDE 20 MILLIEQUIVALENT(S): 600 TABLET, FILM COATED, EXTENDED RELEASE ORAL at 21:05

## 2024-12-24 RX ADMIN — Medication 17 GRAM(S): at 21:08

## 2024-12-24 RX ADMIN — Medication 5 MILLIGRAM(S): at 14:24

## 2024-12-24 RX ADMIN — POTASSIUM CHLORIDE 20 MILLIEQUIVALENT(S): 600 TABLET, FILM COATED, EXTENDED RELEASE ORAL at 19:04

## 2024-12-24 RX ADMIN — FAMOTIDINE 40 MILLIGRAM(S): 20 TABLET, FILM COATED ORAL at 08:57

## 2024-12-24 RX ADMIN — ACETAMINOPHEN 975 MILLIGRAM(S): 80 SOLUTION/ DROPS ORAL at 14:24

## 2024-12-24 RX ADMIN — Medication 5 MILLIGRAM(S): at 08:57

## 2024-12-24 RX ADMIN — Medication 5 MILLIGRAM(S): at 21:08

## 2024-12-24 RX ADMIN — ACETAMINOPHEN 975 MILLIGRAM(S): 80 SOLUTION/ DROPS ORAL at 21:05

## 2024-12-24 RX ADMIN — ACETAMINOPHEN 975 MILLIGRAM(S): 80 SOLUTION/ DROPS ORAL at 05:38

## 2024-12-24 RX ADMIN — Medication 5 MILLIGRAM(S): at 21:38

## 2024-12-24 RX ADMIN — Medication 1 MILLIGRAM(S): at 08:56

## 2024-12-24 RX ADMIN — Medication 5 MILLIGRAM(S): at 15:20

## 2024-12-24 RX ADMIN — CLOPIDOGREL BISULFATE 75 MILLIGRAM(S): 75 TABLET, FILM COATED ORAL at 08:57

## 2024-12-24 RX ADMIN — Medication 5 MILLIGRAM(S): at 09:56

## 2024-12-24 RX ADMIN — SENNOSIDES 2 TABLET(S): 8.6 TABLET, FILM COATED ORAL at 21:07

## 2024-12-24 RX ADMIN — PANTOPRAZOLE 40 MILLIGRAM(S): 40 TABLET, DELAYED RELEASE ORAL at 05:38

## 2024-12-24 RX ADMIN — ACETAMINOPHEN 975 MILLIGRAM(S): 80 SOLUTION/ DROPS ORAL at 06:09

## 2024-12-24 RX ADMIN — ALFUZOSIN HYDROCHLORIDE 10 MILLIGRAM(S): 10 TABLET, EXTENDED RELEASE ORAL at 19:07

## 2024-12-24 RX ADMIN — ATORVASTATIN CALCIUM 40 MILLIGRAM(S): 40 TABLET, FILM COATED ORAL at 21:05

## 2024-12-25 LAB
ALBUMIN SERPL ELPH-MCNC: 3.2 G/DL — LOW (ref 3.3–5)
ALP SERPL-CCNC: 56 U/L — SIGNIFICANT CHANGE UP (ref 40–120)
ALT FLD-CCNC: 23 U/L — SIGNIFICANT CHANGE UP (ref 12–78)
ANION GAP SERPL CALC-SCNC: 3 MMOL/L — LOW (ref 5–17)
AST SERPL-CCNC: 29 U/L — SIGNIFICANT CHANGE UP (ref 15–37)
BILIRUB SERPL-MCNC: 0.9 MG/DL — SIGNIFICANT CHANGE UP (ref 0.2–1.2)
BUN SERPL-MCNC: 19 MG/DL — SIGNIFICANT CHANGE UP (ref 7–23)
CALCIUM SERPL-MCNC: 9.3 MG/DL — SIGNIFICANT CHANGE UP (ref 8.5–10.1)
CHLORIDE SERPL-SCNC: 108 MMOL/L — SIGNIFICANT CHANGE UP (ref 96–108)
CO2 SERPL-SCNC: 29 MMOL/L — SIGNIFICANT CHANGE UP (ref 22–31)
CREAT SERPL-MCNC: 0.81 MG/DL — SIGNIFICANT CHANGE UP (ref 0.5–1.3)
EGFR: 87 ML/MIN/1.73M2 — SIGNIFICANT CHANGE UP
GLUCOSE SERPL-MCNC: 107 MG/DL — HIGH (ref 70–99)
HCT VFR BLD CALC: 34.3 % — LOW (ref 39–50)
HGB BLD-MCNC: 11.2 G/DL — LOW (ref 13–17)
MAGNESIUM SERPL-MCNC: 1.9 MG/DL — SIGNIFICANT CHANGE UP (ref 1.6–2.6)
MCHC RBC-ENTMCNC: 31.5 PG — SIGNIFICANT CHANGE UP (ref 27–34)
MCHC RBC-ENTMCNC: 32.7 G/DL — SIGNIFICANT CHANGE UP (ref 32–36)
MCV RBC AUTO: 96.3 FL — SIGNIFICANT CHANGE UP (ref 80–100)
PHOSPHATE SERPL-MCNC: 3.6 MG/DL — SIGNIFICANT CHANGE UP (ref 2.5–4.5)
PLATELET # BLD AUTO: 136 K/UL — LOW (ref 150–400)
POTASSIUM SERPL-MCNC: 3.7 MMOL/L — SIGNIFICANT CHANGE UP (ref 3.5–5.3)
POTASSIUM SERPL-SCNC: 3.7 MMOL/L — SIGNIFICANT CHANGE UP (ref 3.5–5.3)
PROT SERPL-MCNC: 7 GM/DL — SIGNIFICANT CHANGE UP (ref 6–8.3)
RBC # BLD: 3.56 M/UL — LOW (ref 4.2–5.8)
RBC # FLD: 14.5 % — SIGNIFICANT CHANGE UP (ref 10.3–14.5)
SODIUM SERPL-SCNC: 140 MMOL/L — SIGNIFICANT CHANGE UP (ref 135–145)
WBC # BLD: 7.5 K/UL — SIGNIFICANT CHANGE UP (ref 3.8–10.5)
WBC # FLD AUTO: 7.5 K/UL — SIGNIFICANT CHANGE UP (ref 3.8–10.5)

## 2024-12-25 PROCEDURE — 99232 SBSQ HOSP IP/OBS MODERATE 35: CPT

## 2024-12-25 RX ORDER — GABAPENTIN 300 MG/1
100 CAPSULE ORAL EVERY 8 HOURS
Refills: 0 | Status: DISCONTINUED | OUTPATIENT
Start: 2024-12-25 | End: 2024-12-26

## 2024-12-25 RX ORDER — DIPHENHYDRAMINE HCL 25 MG
25 TABLET ORAL ONCE
Refills: 0 | Status: COMPLETED | OUTPATIENT
Start: 2024-12-25 | End: 2024-12-25

## 2024-12-25 RX ORDER — MAGNESIUM CITRATE 1.75 G/29.6ML
296 LIQUID ORAL ONCE
Refills: 0 | Status: COMPLETED | OUTPATIENT
Start: 2024-12-25 | End: 2024-12-25

## 2024-12-25 RX ORDER — MAGNESIUM HYDROXIDE 400 MG/5ML
30 SUSPENSION, ORAL (FINAL DOSE FORM) ORAL DAILY
Refills: 0 | Status: DISCONTINUED | OUTPATIENT
Start: 2024-12-25 | End: 2024-12-26

## 2024-12-25 RX ADMIN — SENNOSIDES 2 TABLET(S): 8.6 TABLET, FILM COATED ORAL at 21:47

## 2024-12-25 RX ADMIN — GABAPENTIN 100 MILLIGRAM(S): 300 CAPSULE ORAL at 12:49

## 2024-12-25 RX ADMIN — Medication 25 MILLIGRAM(S): at 01:10

## 2024-12-25 RX ADMIN — ATORVASTATIN CALCIUM 40 MILLIGRAM(S): 40 TABLET, FILM COATED ORAL at 21:46

## 2024-12-25 RX ADMIN — ACETAMINOPHEN 975 MILLIGRAM(S): 80 SOLUTION/ DROPS ORAL at 21:46

## 2024-12-25 RX ADMIN — FAMOTIDINE 40 MILLIGRAM(S): 20 TABLET, FILM COATED ORAL at 21:46

## 2024-12-25 RX ADMIN — Medication 25 MILLIGRAM(S): at 22:28

## 2024-12-25 RX ADMIN — GABAPENTIN 100 MILLIGRAM(S): 300 CAPSULE ORAL at 21:47

## 2024-12-25 RX ADMIN — Medication 5 MILLIGRAM(S): at 22:18

## 2024-12-25 RX ADMIN — Medication 30 MILLILITER(S): at 14:16

## 2024-12-25 RX ADMIN — ACETAMINOPHEN 975 MILLIGRAM(S): 80 SOLUTION/ DROPS ORAL at 22:01

## 2024-12-25 RX ADMIN — Medication 1 MILLIGRAM(S): at 09:30

## 2024-12-25 RX ADMIN — Medication 17 GRAM(S): at 21:46

## 2024-12-25 RX ADMIN — Medication 5 MILLIGRAM(S): at 09:33

## 2024-12-25 RX ADMIN — MAGNESIUM CITRATE 296 MILLILITER(S): 1.75 LIQUID ORAL at 16:19

## 2024-12-25 RX ADMIN — ACETAMINOPHEN 975 MILLIGRAM(S): 80 SOLUTION/ DROPS ORAL at 12:49

## 2024-12-25 RX ADMIN — CLOPIDOGREL BISULFATE 75 MILLIGRAM(S): 75 TABLET, FILM COATED ORAL at 09:30

## 2024-12-25 RX ADMIN — Medication 5 MILLIGRAM(S): at 21:48

## 2024-12-25 RX ADMIN — FAMOTIDINE 40 MILLIGRAM(S): 20 TABLET, FILM COATED ORAL at 09:30

## 2024-12-25 RX ADMIN — Medication 5 MILLIGRAM(S): at 10:03

## 2024-12-25 NOTE — PROGRESS NOTE ADULT - ATTENDING COMMENTS
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 with past medical history of embolic CVA, currently on Plavix only s/p ILR placement, GERD, history of GI bleed, giant cell arthritis *(currently on prednisone 1 mg daily and tocilizumab monthly infusions), BPH, prostate CA presents to [facility] status post fall, Presented to the ED s/p fall last night in his garage. Fell onto his left hip and left elbow. Complains of Left hip and left elbow pain with movement.  Denies head trauma or LOC, Patient denies fever, chills, headache, dizziness, change in vision, blurry vision,  chest pain, palpitations, shortness of breath, abdominal pain, nausea, vomiting, diarrhea, constipation,  numbness, weakness or tingling     # left acetabulum, inferior pubic rami, sacral ala fx  # left olecranon fx    Plan  Adjust bowel regimen  Hospitalist consult appreciated  Orthopedics consult appreciated  WBAT LLE  Adequate pain control, will add Gabapentin  Ice/cold compress  Home medications  SCDs/plavix  PT/OT - JEANA   and  for disco planning
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 Fell onto his left hip and left elbow. Complains of Left hip and left elbow pain with movement.     # left acetabulum, inferior pubic rami, sacral ala fx  # left olecranon fx    Plan  Hospitalist consult appreciated  Ortho recommendations appreciated  NWB RUE in splint with sling  WBAT LLE  Analgesia  Ice/cold compress  Pepcid 40 mg  Daily labs  Home medications as appropriate  SCDs/plavix  PT/OT, JEANA DAVIS and  for placement

## 2024-12-26 ENCOUNTER — TRANSCRIPTION ENCOUNTER (OUTPATIENT)
Age: 84
End: 2024-12-26

## 2024-12-26 VITALS
TEMPERATURE: 98 F | DIASTOLIC BLOOD PRESSURE: 64 MMHG | SYSTOLIC BLOOD PRESSURE: 136 MMHG | OXYGEN SATURATION: 97 % | RESPIRATION RATE: 18 BRPM | HEART RATE: 56 BPM

## 2024-12-26 LAB
ANION GAP SERPL CALC-SCNC: 3 MMOL/L — LOW (ref 5–17)
BUN SERPL-MCNC: 24 MG/DL — HIGH (ref 7–23)
CALCIUM SERPL-MCNC: 9 MG/DL — SIGNIFICANT CHANGE UP (ref 8.5–10.1)
CHLORIDE SERPL-SCNC: 107 MMOL/L — SIGNIFICANT CHANGE UP (ref 96–108)
CO2 SERPL-SCNC: 30 MMOL/L — SIGNIFICANT CHANGE UP (ref 22–31)
CREAT SERPL-MCNC: 0.82 MG/DL — SIGNIFICANT CHANGE UP (ref 0.5–1.3)
EGFR: 87 ML/MIN/1.73M2 — SIGNIFICANT CHANGE UP
GLUCOSE SERPL-MCNC: 98 MG/DL — SIGNIFICANT CHANGE UP (ref 70–99)
HCT VFR BLD CALC: 35.3 % — LOW (ref 39–50)
HGB BLD-MCNC: 11.4 G/DL — LOW (ref 13–17)
MAGNESIUM SERPL-MCNC: 2.3 MG/DL — SIGNIFICANT CHANGE UP (ref 1.6–2.6)
MCHC RBC-ENTMCNC: 31.4 PG — SIGNIFICANT CHANGE UP (ref 27–34)
MCHC RBC-ENTMCNC: 32.3 G/DL — SIGNIFICANT CHANGE UP (ref 32–36)
MCV RBC AUTO: 97.2 FL — SIGNIFICANT CHANGE UP (ref 80–100)
PHOSPHATE SERPL-MCNC: 4.3 MG/DL — SIGNIFICANT CHANGE UP (ref 2.5–4.5)
PLATELET # BLD AUTO: 149 K/UL — LOW (ref 150–400)
POTASSIUM SERPL-MCNC: 4 MMOL/L — SIGNIFICANT CHANGE UP (ref 3.5–5.3)
POTASSIUM SERPL-SCNC: 4 MMOL/L — SIGNIFICANT CHANGE UP (ref 3.5–5.3)
RBC # BLD: 3.63 M/UL — LOW (ref 4.2–5.8)
RBC # FLD: 14.8 % — HIGH (ref 10.3–14.5)
SODIUM SERPL-SCNC: 140 MMOL/L — SIGNIFICANT CHANGE UP (ref 135–145)
WBC # BLD: 6.66 K/UL — SIGNIFICANT CHANGE UP (ref 3.8–10.5)
WBC # FLD AUTO: 6.66 K/UL — SIGNIFICANT CHANGE UP (ref 3.8–10.5)

## 2024-12-26 PROCEDURE — 99231 SBSQ HOSP IP/OBS SF/LOW 25: CPT | Mod: FS

## 2024-12-26 PROCEDURE — 99232 SBSQ HOSP IP/OBS MODERATE 35: CPT

## 2024-12-26 RX ORDER — TRAMADOL HYDROCHLORIDE 50 MG/1
1 TABLET ORAL
Qty: 0 | Refills: 0 | DISCHARGE
Start: 2024-12-26

## 2024-12-26 RX ORDER — ONDANSETRON 4 MG/1
2 TABLET ORAL
Qty: 0 | Refills: 0 | DISCHARGE
Start: 2024-12-26

## 2024-12-26 RX ORDER — BISACODYL 5 MG
1 TABLET, DELAYED RELEASE (ENTERIC COATED) ORAL
Qty: 0 | Refills: 0 | DISCHARGE
Start: 2024-12-26

## 2024-12-26 RX ORDER — POLYETHYLENE GLYCOL 3350 17 G/DOSE
17 POWDER (GRAM) ORAL
Qty: 0 | Refills: 0 | DISCHARGE
Start: 2024-12-26

## 2024-12-26 RX ORDER — GABAPENTIN 300 MG/1
1 CAPSULE ORAL
Qty: 0 | Refills: 0 | DISCHARGE
Start: 2024-12-26

## 2024-12-26 RX ORDER — CLOPIDOGREL BISULFATE 75 MG/1
1 TABLET, FILM COATED ORAL
Qty: 0 | Refills: 0 | DISCHARGE
Start: 2024-12-26

## 2024-12-26 RX ORDER — SENNOSIDES 8.6 MG/1
2 TABLET, FILM COATED ORAL
Qty: 0 | Refills: 0 | DISCHARGE
Start: 2024-12-26

## 2024-12-26 RX ORDER — ACETAMINOPHEN 80 MG/.8ML
100 SOLUTION/ DROPS ORAL
Qty: 0 | Refills: 0 | DISCHARGE
Start: 2024-12-26

## 2024-12-26 RX ORDER — MAGNESIUM HYDROXIDE 400 MG/5ML
15 SUSPENSION, ORAL (FINAL DOSE FORM) ORAL
Qty: 0 | Refills: 0 | DISCHARGE

## 2024-12-26 RX ORDER — OXYCODONE HCL 15 MG
1 TABLET ORAL
Qty: 0 | Refills: 0 | DISCHARGE
Start: 2024-12-26

## 2024-12-26 RX ORDER — ACETAMINOPHEN 80 MG/.8ML
3 SOLUTION/ DROPS ORAL
Qty: 0 | Refills: 0 | DISCHARGE
Start: 2024-12-26

## 2024-12-26 RX ADMIN — ACETAMINOPHEN 975 MILLIGRAM(S): 80 SOLUTION/ DROPS ORAL at 05:28

## 2024-12-26 RX ADMIN — CLOPIDOGREL BISULFATE 75 MILLIGRAM(S): 75 TABLET, FILM COATED ORAL at 09:31

## 2024-12-26 RX ADMIN — ACETAMINOPHEN 975 MILLIGRAM(S): 80 SOLUTION/ DROPS ORAL at 13:02

## 2024-12-26 RX ADMIN — PANTOPRAZOLE 40 MILLIGRAM(S): 40 TABLET, DELAYED RELEASE ORAL at 05:28

## 2024-12-26 RX ADMIN — GABAPENTIN 100 MILLIGRAM(S): 300 CAPSULE ORAL at 05:28

## 2024-12-26 RX ADMIN — Medication 1 MILLIGRAM(S): at 09:31

## 2024-12-26 RX ADMIN — ACETAMINOPHEN 975 MILLIGRAM(S): 80 SOLUTION/ DROPS ORAL at 05:58

## 2024-12-26 RX ADMIN — Medication 5 MILLIGRAM(S): at 14:51

## 2024-12-26 RX ADMIN — GABAPENTIN 100 MILLIGRAM(S): 300 CAPSULE ORAL at 13:02

## 2024-12-26 RX ADMIN — FAMOTIDINE 40 MILLIGRAM(S): 20 TABLET, FILM COATED ORAL at 09:31

## 2024-12-26 NOTE — DISCHARGE NOTE NURSING/CASE MANAGEMENT/SOCIAL WORK - PATIENT PORTAL LINK FT
You can access the FollowMyHealth Patient Portal offered by [facility] by registering at the following website: [email or web address]. By joining [employer]’s FollowMyHealth portal, you will also be able to view your health information using other applications (apps) compatible with our system.

## 2024-12-26 NOTE — DISCHARGE NOTE NURSING/CASE MANAGEMENT/SOCIAL WORK - NSDCPEFALRISK_GEN_ALL_CORE
For information on Fall & Injury Prevention, visit: [email or web address] OR  [email or web address] OR  https://www.cdc.gov/steadi/patient.html

## 2024-12-26 NOTE — PROGRESS NOTE ADULT - PROVIDER SPECIALTY LIST ADULT
Hospitalist
Orthopedics
Trauma Surgery
Hospitalist
Orthopedics
Hospitalist
Orthopedics
Orthopedics
Trauma Surgery
Trauma Surgery
Hospitalist
Trauma Surgery

## 2024-12-26 NOTE — PROGRESS NOTE ADULT - NUTRITIONAL ASSESSMENT
This patient has been assessed with a concern for Malnutrition and has been determined to have a diagnosis/diagnoses of Severe protein-calorie malnutrition.    This patient is being managed with:   Diet Regular-  Entered: Dec 22 2024  1:25PM  

## 2024-12-26 NOTE — PROGRESS NOTE ADULT - ASSESSMENT
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 and tocilizumab monthly infusions), BPH, prostate CA presents to [facility] status post fall    Admitted: Under Trauma     #Nondisplaced fracture in the anterior column of the left acetabulum, minimal cortical buckling of the left inferior pubic rami, anterior cortical buckling of the left sacral ala  #left olecranon fracture s/p mechanical fall S/P ORIF POD #3  -Admitted under trauma  -Pain management  -bowel regimen   -OOB/PT  -NWB left upper extremity in splint  -Ortho consult appreciated   -NWB RUE, in splint/sling  -BL LE WBAT  -keep splint clean/dry and intact  - constipation - MOM today.  s/p mag citrate yesterday. no n/v.  continue current stool softeners    #Embolic CVA, s/p ILR placement  -continue Plavix   -Atorvastatin 40 mg    #Giant cell arteritis  -Currently on prednisone 1 mg p.o. daily.  No need for stress dose steroids  -On tocilizumab monthly infusion (due liss 3) as per family his outpt rheum aware and plan to f/u as outpt after rehab  -Currently no evidence of exacerbation    #GERD  -Pepcid 40 mg  -Protonix 40 mg    #History of GI bleed  -H&H: Stable  -Monitor and trend H&H  -Transfuse as needed    #BPH, history of prostate CA  -Alfuzosin 10 mg  -Lupron    #VTE prophylaxis  -plavix     #Dispo as per trauma -  plan for rehab    Thank you for the consult.  Will follow
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 Fell onto his left hip and left elbow. Complains of Left hip and left elbow pain with movement.  Denies head trauma or LOC, Patient denies fever, chills, headache, dizziness, change in vision, blurry vision,  chest pain, palpitations, shortness of breath, abdominal pain, nausea, vomiting, diarrhea, constipation,  numbness, weakness or tingling     # left acetabulum, inferior pubic rami, sacral ala fx  # left olecranon fx    Plan  Admitted, trauma, med surge, Dr. Martinez   Hospitalist consulted    Orthopedics consulted  -POD 1 sp L olecranon ORIF  -NWB RUE in splint with sling  -restart plavix today  -recommend plenty of ambulation, h/o GI bleed in the past with subq heparin  -WBAT LLE  -pain control  -ice/cold compress  -PT/OT  -f/u outpt with Dr. Spears in 1 week, call office for appt    #Embolic CVA, s/p ILR placement  -Continue on plavix  -Atorvastatin 40 mg    #GERD  -Pepcid 40 mg  -Protonix 40 mg    #History of GI bleed  -H&H Stable  -Monitor and trend H&H  -Transfuse as needed  -discussed with orthopedics, plavix is sufficient for dvt ppx, encourage ambulation    #BPH, history of prostate CA  -Alfuzosin 10 mg  -Lupron    #VTE prophylaxis  -SCDs/plavix    PT/OT - JEANA, pt will discuss with his wife re: possibly going home with home PT, will need 3 nights before JEANA (earliest DC 12/25)    case and plan dw trauma attending  
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 and tocilizumab monthly infusions), BPH, prostate CA presents to [facility] status post fall    Admitted: Under Trauma     #Nondisplaced fracture in the anterior column of the left acetabulum, minimal cortical buckling of the left inferior pubic rami, anterior cortical buckling of the left sacral ala  #left olecranon fracture s/p mechanical fall S/P ORIF POD #3  -Admitted under trauma  -Pain management  -bowel regimen   -OOB/PT  -NWB left upper extremity in splint  -Ortho consult appreciated   -NWB RUE, in splint/sling  -BL LE WBAT  -keep splint clean/dry and intact  - constipation - MOM first if no BM then mag citrate. continue current stool softeners    #Embolic CVA, s/p ILR placement  -continue Plavix   -Atorvastatin 40 mg    #Giant cell arteritis  -Currently on prednisone 1 mg p.o. daily.  No need for stress dose steroids  -On tocilizumab monthly infusion  -Currently no evidence of exacerbation    #GERD  -Pepcid 40 mg  -Protonix 40 mg    #History of GI bleed  -H&H: Stable  -Monitor and trend H&H  -Transfuse as needed    #BPH, history of prostate CA  -Alfuzosin 10 mg  -Lupron    #VTE prophylaxis  -plavix     #Dispo medically Cleared awaiting rehab    Thank you for the consult.  Will follow
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 Fell onto his left hip and left elbow. Complains of Left hip and left elbow pain with movement.  Denies head trauma or LOC, Patient denies fever, chills, headache, dizziness, change in vision, blurry vision,  chest pain, palpitations, shortness of breath, abdominal pain, nausea, vomiting, diarrhea, constipation,  numbness, weakness or tingling     # left acetabulum, inferior pubic rami, sacral ala fx  # left olecranon fx    Plan  Hospitalist consulted  Orthopedics consulted  -POD 3 sp L olecranon ORIF  -NWB RUE in splint with sling  -continue plavix   -WBAT LLE  -pain control  -ice/cold compress  -PT/OT  -f/u outpt with Dr. Spears in 1 week, call office for appt  -Pending JEANA palcement     #Embolic CVA, s/p ILR placement  -Continue on plavix  -Atorvastatin 40 mg    #GERD  -Pepcid 40 mg  -Protonix 40 mg    #History of GI bleed  -H&H Stable  -Monitor and trend H&H  -Transfuse as needed  -discussed with orthopedics, plavix is sufficient for dvt ppx, encourage ambulation    #BPH, history of prostate CA  -Alfuzosin 10 mg  -Lupron    #VTE prophylaxis  -SCDs/plavix    PT/OT - JEANA, pt will discuss with his wife re: possibly going home with home PT, will need 3 nights before JEANA (earliest DC 12/25)    case and plan dw trauma attending  
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 Fell onto his left hip and left elbow. Complains of Left hip and left elbow pain with movement.  Denies head trauma or LOC, Patient denies fever, chills, headache, dizziness, change in vision, blurry vision,  chest pain, palpitations, shortness of breath, abdominal pain, nausea, vomiting, diarrhea, constipation,  numbness, weakness or tingling     # left acetabulum, inferior pubic rami, sacral ala fx  # left olecranon fx    Plan  Admitted, trauma, med surge, Dr. Martinez   Hospitalist consulted    Orthopedics consulted  -POD 2 sp L olecranon ORIF  -NWB RUE in splint with sling  -restart plavix today  -recommend plenty of ambulation, h/o GI bleed in the past with subq heparin  -WBAT LLE  -pain control  -ice/cold compress  -PT/OT  -f/u outpt with Dr. Spears in 1 week, call office for appt    #Embolic CVA, s/p ILR placement  -Continue on plavix  -Atorvastatin 40 mg    #GERD  -Pepcid 40 mg  -Protonix 40 mg    #History of GI bleed  -H&H Stable  -Monitor and trend H&H  -Transfuse as needed  -discussed with orthopedics, plavix is sufficient for dvt ppx, encourage ambulation    #BPH, history of prostate CA  -Alfuzosin 10 mg  -Lupron    #VTE prophylaxis  -SCDs/plavix    PT/OT - JEANA, pt will discuss with his wife re: possibly going home with home PT, will need 3 nights before JEANA (earliest DC 12/25)    case and plan dw trauma attending  
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 Work-up revealed left acetabulum, inferior pubic rami, sacral ala fx and left olecranon fx. POD 4 ORIF left olecranon.     Neuro: continue pain regimen prn  Cardio: continue to monitor vitals, continue home statin  Pulm: Saturating well on RA   GI: reg diet, zofran prn, ppi ppx, bowel regimen   REnal/Gu: continue BPH home meds, continue to monitor I/Os  ID: no abx, no leukocytosis,   Heme: continue home PLavix, SCDs, no furhter chem DVT ppx at this time   Ortho: pelvic fx non-opreative , LE b/l WBAT , OOBAT, LUE maintain sling when possible, elevate extremity, NWB LUE,  f/u outpt with Dr. Spears in 1 week, call office for appt  
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 status post fall    Admitted: Under Trauma     #Nondisplaced fracture in the anterior column of the left acetabulum, minimal cortical buckling of the left inferior pubic rami, anterior cortical buckling of the left sacral ala  #left olecranon fracture s/p mechanical fall S/P ORIF POD #2  -Admitted under trauma  -Pain management  -bowel regimen   -OOB/PT  -NWB left upper extremity in splint  -Ortho consult appreciated   -NWB RUE, in splint/sling  -BL LE WBAT  -keep splint clean/dry and intact    #Embolic CVA, s/p ILR placement  -continue Plavix   -Atorvastatin 40 mg    #Giant cell arteritis  -Currently on prednisone 1 mg p.o. daily.  No need for stress dose steroids  -On tocilizumab monthly infusion  -Currently no evidence of exacerbation    #GERD  -Pepcid 40 mg  -Protonix 40 mg    #History of GI bleed  -H&H: Stable  -Monitor and trend H&H  -Transfuse as needed    #BPH, history of prostate CA  -Alfuzosin 10 mg  -Lupron    #VTE prophylaxis  -plavix     #Dispo medically Cleared awaiting rehab    Thank you for the consult.  Will follow
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 and tocilizumab monthly infusions), BPH, prostate CA presents to [facility] status post fall    Admitted: Under Trauma     #Nondisplaced fracture in the anterior column of the left acetabulum, minimal cortical buckling of the left inferior pubic rami, anterior cortical buckling of the left sacral ala, left olecranon fracture s/p mechanical fall S/P ORIF POD #1  -Admitted under trauma  -Pain management  -bowel regimen   -OOB/PT  -NWB left upper extremity in splint  -Ortho consult appreciated   -NWB RUE, in splint/sling  -BL LE WBAT  -keep splint clean/dry and intact    #Embolic CVA, s/p ILR placement  -continue Plavix   -Atorvastatin 40 mg    #Giant cell arteritis  -Currently on prednisone 1 mg p.o. daily.  No need for stress dose steroids  -On tocilizumab monthly infusion  -Currently no evidence of exacerbation    #GERD  -Pepcid 40 mg  -Protonix 40 mg    #History of GI bleed  -H&H: Stable  -Monitor and trend H&H  -Transfuse as needed    #BPH, history of prostate CA  -Alfuzosin 10 mg  -Lupron    #VTE prophylaxis  -SCDs    Thank you for the consult.  Will follow

## 2024-12-26 NOTE — DISCHARGE NOTE NURSING/CASE MANAGEMENT/SOCIAL WORK - FINANCIAL ASSISTANCE
[facility] provides services at a reduced cost to those who are determined to be eligible through [facility]’s financial assistance program. Information regarding [facility]’s financial assistance program can be found by going to [email or web address] or by calling [phone].

## 2024-12-26 NOTE — PROGRESS NOTE ADULT - SUBJECTIVE AND OBJECTIVE BOX
CC:Patient is a 84y old  Male who presents with a chief complaint of Unspecified fracture of lower end of left humerus, initial encounter for closed fracture     (23 Dec 2024 10:22)    Tertiary Exam    Pt seen and examined, sitting in chair after transferring with PT  Reports pelvic pain with weight bearing and buckling of his left leg when he attempts to stand  + left elbow pain, in splint, LHD, no tingling or numbness  otherwise denies HA, neck pain, back pain, CP, abd pain, SOB, NVD      Vital Signs Last 24 Hrs  T(C): 36.8 (23 Dec 2024 08:00), Max: 37.6 (23 Dec 2024 00:00)  T(F): 98.2 (23 Dec 2024 08:00), Max: 99.6 (23 Dec 2024 00:00)  HR: 71 (23 Dec 2024 08:00) (71 - 77)  BP: 139/60 (23 Dec 2024 08:00) (124/68 - 143/52)  BP(mean): --  RR: 18 (23 Dec 2024 08:00) (18 - 18)  SpO2: 93% (23 Dec 2024 08:00) (92% - 94%)    Parameters below as of 23 Dec 2024 08:00  Patient On (Oxygen Delivery Method): room air        Labs:                            11.0   8.94  )-----------( 131      ( 23 Dec 2024 08:02 )             34.0     CBC Full  -  ( 23 Dec 2024 08:02 )  WBC Count : 8.94 K/uL  RBC Count : 3.46 M/uL  Hemoglobin : 11.0 g/dL  Hematocrit : 34.0 %  Platelet Count - Automated : 131 K/uL  Mean Cell Volume : 98.3 fl  Mean Cell Hemoglobin : 31.8 pg  Mean Cell Hemoglobin Concentration : 32.4 g/dL  Auto Neutrophil # : 6.79 K/uL  Auto Lymphocyte # : 1.10 K/uL  Auto Monocyte # : 0.81 K/uL  Auto Eosinophil # : 0.18 K/uL  Auto Basophil # : 0.02 K/uL  Auto Neutrophil % : 76.0 %  Auto Lymphocyte % : 12.3 %  Auto Monocyte % : 9.1 %  Auto Eosinophil % : 2.0 %  Auto Basophil % : 0.2 %    12-23    139  |  105  |  16  ----------------------------<  107[H]  3.8   |  31  |  0.89    Ca    9.0      23 Dec 2024 08:02  Phos  3.1     12-23  Mg     1.9     12-23    TPro  7.0  /  Alb  3.2[L]  /  TBili  0.7  /  DBili  x   /  AST  33  /  ALT  39  /  AlkPhos  53  12-21    LIVER FUNCTIONS - ( 21 Dec 2024 22:08 )  Alb: 3.2 g/dL / Pro: 7.0 gm/dL / ALK PHOS: 53 U/L / ALT: 39 U/L / AST: 33 U/L / GGT: x           PT/INR - ( 22 Dec 2024 08:05 )   PT: 12.0 sec;   INR: 1.02 ratio         PTT - ( 22 Dec 2024 08:05 )  PTT:28.7 sec      Meds:  acetaminophen     Tablet .. 975 milliGRAM(s) Oral every 8 hours  acetaminophen   IVPB .. 1000 milliGRAM(s) IV Intermittent once  atorvastatin 40 milliGRAM(s) Oral at bedtime  bisacodyl 5 milliGRAM(s) Oral every 12 hours PRN  clopidogrel Tablet 75 milliGRAM(s) Oral daily  famotidine    Tablet 40 milliGRAM(s) Oral two times a day  HYDROmorphone  Injectable 0.5 milliGRAM(s) IV Push every 4 hours PRN  magnesium hydroxide Suspension 30 milliLiter(s) Oral daily PRN  ondansetron Injectable 4 milliGRAM(s) IV Push every 6 hours PRN  ondansetron Injectable 4 milliGRAM(s) IV Push every 6 hours PRN  oxyCODONE    IR 2.5 milliGRAM(s) Oral every 4 hours PRN  oxyCODONE    IR 5 milliGRAM(s) Oral every 4 hours PRN  pantoprazole    Tablet 40 milliGRAM(s) Oral before breakfast  polyethylene glycol 3350 17 Gram(s) Oral at bedtime  senna 2 Tablet(s) Oral at bedtime  sodium chloride 0.9%. 1000 milliLiter(s) IV Continuous <Continuous>  traMADol 50 milliGRAM(s) Oral every 6 hours PRN      Radiology:    12/22 CT t/l-spine    Impression:    No fracture or traumatic malalignment in the thoracic spine.  Multilevel degenerative change. If clinically indicated, an MRI may be   obtained for further evaluation.    12/22 xr LEFT hip, femur, humerus, forearm, elbow; pelvis    IMPRESSION:     Displaced avulsion fracture of the olecranon process   unchanged pre and post reduction with an approximate 1 cm gap at the   fracture line.      12/21 CT pelvis    IMPRESSION:      Nondisplaced fracture in the anterior column of left   acetabulum.  Minimal cortical buckling of the left inferior pubic ramus.  Anterior cortical buckling of left sacral ala likely a nondisplaced   fracture.      Physical exam:  GCS of 15  Pt is aaox3  Pt in no acute distress  Airway is patent  Breathing is symmetric and unlabored  Neuro: CN II-XII grossly intact  Psych: normal affect  HEENT: normocephalic, YUSEF, EOM wnl, no gross craniofacial bony pathology to exam  Neck: No tracheal deviation, no JVD, no crepitus, no ecchymosis, no hematoma  Chest: No gross rib or sternal pathology or tenderness to exam, no crepitus, no ecchymosis, no hematoma  Resp: CTAB  CVS: S1S2(+)  ABD: bowel sounds (+), soft, nontender, non distended, no rebound, no guarding, no rigidity, no pelvic instability to exam  EXT: LUE in long arm splint, CDI, normal cap refill x5, slight swelling of finger tips but able to wiggle fingers, sensation intact.  weakness left LE on hip flexion , SILT throughout.  no calf tenderness or edema to exam b/l, pt has good capillary refill in all digits. Sensoromotor function grossly intact, on VTE prophylaxis  Skin: no adverse skin changes to exam      
CC:Patient is a 84y old  Male who presents with a chief complaint of Unspecified fracture of lower end of left humerus, initial encounter for closed fracture     (24 Dec 2024)     Pt seen and examined, sitting in chair after transferring with PT  Reports pelvic pain with weight bearing and buckling of his left leg when he attempts to stand  + left elbow pain, in splint, LHD, no tingling or numbness  otherwise denies HA, neck pain, back pain, CP, abd pain, SOB, NVD  DC to [facility] or w HC  today    Physical exam:  Gen: NAD  Neuro: CN II-XII grossly intact  Psych: normal affect  HEENT: normocephalic, YUSEF, EOM wnl, no gross craniofacial bony pathology to exam  Neck: No tracheal deviation, no JVD, no crepitus, no ecchymosis, no hematoma  Chest: No gross rib or sternal pathology or tenderness to exam, no crepitus, no ecchymosis, no hematoma  Resp: CTAB  CVS: S1S2(+)  ABD: bowel sounds (+), soft, nontender, non distended, no rebound, no guarding, no rigidity, no pelvic instability to exam  EXT: LUE in long arm splint, CDI, normal cap refill x5, slight swelling of finger tips but able to wiggle fingers, sensation intact.  weakness left LE on hip flexion , SILT throughout.  no calf tenderness or edema to exam b/l, pt has good capillary refill in all digits. Sensoromotor function grossly intact, on VTE prophylaxis  Skin: no adverse skin changes to exam      Chief complaint:      PMHx:  HLD (hyperlipidemia)    BPH (benign prostatic hyperplasia)    GERD (gastroesophageal reflux disease)    Snores    [place] (hard of hearing)    H/O constipation        PSHx:  Status post ORIF of fracture of ankle    History of colonoscopy    S/P left inguinal hernia repair        FHx:  No pertinent family history in first degree relatives        Vitals:  T(C): 36.8 ( @ 04:00), Max: 37 ( @ 16:01)  HR: 62 ( @ 04:00) (62 - 71)  BP: 156/78 ( @ 04:00) (136/70 - 156/78)  RR: 18 ( @ 04:00) (18 - 18)  SpO2: 94% ( @ 04:00) (93% - 95%)      I&Os     @ 07:01  -   @ 07:00  --------------------------------------------------------  IN:    Other (mL): 800 mL  Total IN: 800 mL    OUT:    Blood Loss (mL): 25 mL    Voided (mL): 1200 mL  Total OUT: 1225 mL    Total NET: -425 mL       @ 07:01  -   @ 06:51  --------------------------------------------------------  IN:  Total IN: 0 mL    OUT:    Voided (mL): 1300 mL  Total OUT: 1300 mL    Total NET: -1300 mL        .    Labs:   @ 08:02                    11.0  CBC: 8.94>)-------(<131                     34.0                 139 | 105 | 16    CMP:  ----------------------< 107               3.8 | 31 | 0.89                      Ca:9.0  Phos:3.1  M.9               -|      |-        LFTs:  ------|-|-----             -|      |-        Cultures:        Current Inpatient Medications:  acetaminophen     Tablet .. 975 milliGRAM(s) Oral every 8 hours  acetaminophen   IVPB .. 1000 milliGRAM(s) IV Intermittent once  atorvastatin 40 milliGRAM(s) Oral at bedtime  bisacodyl 5 milliGRAM(s) Oral every 12 hours PRN  clopidogrel Tablet 75 milliGRAM(s) Oral daily  famotidine    Tablet 40 milliGRAM(s) Oral two times a day  HYDROmorphone  Injectable 0.5 milliGRAM(s) IV Push every 4 hours PRN  magnesium hydroxide Suspension 30 milliLiter(s) Oral daily PRN  ondansetron Injectable 4 milliGRAM(s) IV Push every 6 hours PRN  ondansetron Injectable 4 milliGRAM(s) IV Push every 6 hours PRN  oxyCODONE    IR 2.5 milliGRAM(s) Oral every 4 hours PRN  oxyCODONE    IR 5 milliGRAM(s) Oral every 4 hours PRN  pantoprazole    Tablet 40 milliGRAM(s) Oral before breakfast  polyethylene glycol 3350 17 Gram(s) Oral at bedtime  predniSONE   Tablet 1 milliGRAM(s) Oral daily  senna 2 Tablet(s) Oral at bedtime  sodium chloride 0.9%. 1000 milliLiter(s) (75 mL/Hr) IV Continuous <Continuous>  traMADol 50 milliGRAM(s) Oral every 6 hours PRN          Radiology:     CT t/l-spine    Impression:    No fracture or traumatic malalignment in the thoracic spine.  Multilevel degenerative change. If clinically indicated, an MRI may be   obtained for further evaluation.     xr LEFT hip, femur, humerus, forearm, elbow; pelvis    IMPRESSION:     Displaced avulsion fracture of the olecranon process   unchanged pre and post reduction with an approximate 1 cm gap at the   fracture line.       CT pelvis    IMPRESSION:      Nondisplaced fracture in the anterior column of left   acetabulum.  Minimal cortical buckling of the left inferior pubic ramus.  Anterior cortical buckling of left sacral ala likely a nondisplaced   fracture.          
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 status post fall, Presented to the ED s/p fall last night in his garage. Fell onto his left hip and left elbow. Complains of Left hip and left elbow pain with movement.  Denies head trauma or LOC, Patient denies fever, chills, headache, dizziness, change in vision, blurry vision,  chest pain, palpitations, shortness of breath, abdominal pain, nausea, vomiting, diarrhea, constipation,  numbness, weakness or tingling. Hospitalist Consult for medical management and optimization     12/23: Above reviewed; Patient has no complaints; no overnight events     Allergies:  penicillin (Other)    REVIEW OF SYSTEMS:  See HPI. All other review of systems is negative unless indicated above.     OBJECTIVE  Physical Exam:  Vital Signs:    Vital Signs Last 24 Hrs  T(C): 36.8 (23 Dec 2024 08:00), Max: 37.6 (23 Dec 2024 00:00)  T(F): 98.2 (23 Dec 2024 08:00), Max: 99.6 (23 Dec 2024 00:00)  HR: 71 (23 Dec 2024 08:00) (71 - 77)  BP: 139/60 (23 Dec 2024 08:00) (124/68 - 143/52)  BP(mean): --  RR: 18 (23 Dec 2024 08:00) (18 - 18)  SpO2: 93% (23 Dec 2024 08:00) (92% - 94%)    Parameters below as of 23 Dec 2024 08:00  Patient On (Oxygen Delivery Method): room air      I&O's Summary    22 Dec 2024 07:01  -  23 Dec 2024 07:00  --------------------------------------------------------  IN: 800 mL / OUT: 1225 mL / NET: -425 mL    Constitutional: NAD, awake and alert  Neurological: AAO x 3, no focal deficits  HEENT: PERRLA, EOMI, MMM  Neck: Soft and supple, No LAD, No JVD  Respiratory: Breath sounds are clear bilaterally, No wheezing, rales or rhonchi  Cardiovascular: S1 and S2, regular rate and rhythm; no Murmurs, gallops or rubs  Gastrointestinal: Bowel Sounds present, soft, nontender, nondistended, no guarding, no rebound tenderness  Back: No CVA tenderness   Extremities: No peripheral edema  Vascular: 2+ peripheral pulses  Musculoskeletal: 5/5 strength b/l upper and lower extremities  Skin: No rashes  Breast: Deferred  Rectal: Deferred    MEDICATIONS  (STANDING):  acetaminophen     Tablet .. 975 milliGRAM(s) Oral every 8 hours  acetaminophen   IVPB .. 1000 milliGRAM(s) IV Intermittent once  atorvastatin 40 milliGRAM(s) Oral at bedtime  clopidogrel Tablet 75 milliGRAM(s) Oral daily  famotidine    Tablet 40 milliGRAM(s) Oral two times a day  pantoprazole    Tablet 40 milliGRAM(s) Oral before breakfast  polyethylene glycol 3350 17 Gram(s) Oral at bedtime  senna 2 Tablet(s) Oral at bedtime  sodium chloride 0.9%. 1000 milliLiter(s) (75 mL/Hr) IV Continuous <Continuous>      LABS: All Labs Reviewed:                        11.0   8.94  )-----------( 131      ( 23 Dec 2024 08:02 )             34.0     12-23    139  |  105  |  16  ----------------------------<  107[H]  3.8   |  31  |  0.89    Ca    9.0      23 Dec 2024 08:02  Phos  3.1     12-23  Mg     1.9     12-23    TPro  7.0  /  Alb  3.2[L]  /  TBili  0.7  /  DBili  x   /  AST  33  /  ALT  39  /  AlkPhos  53  12-21    PT/INR - ( 22 Dec 2024 08:05 )   PT: 12.0 sec;   INR: 1.02 ratio         PTT - ( 22 Dec 2024 08:05 )  PTT:28.7 sec        Urinalysis Basic - ( 23 Dec 2024 08:02 )    Color: x / Appearance: x / SG: x / pH: x  Gluc: 107 mg/dL / Ketone: x  / Bili: x / Urobili: x   Blood: x / Protein: x / Nitrite: x   Leuk Esterase: x / RBC: x / WBC x   Sq Epi: x / Non Sq Epi: x / Bacteria: x    RADIOLOGY/EKG:      < from: Xray Chest 1 View- PORTABLE-Urgent (Xray Chest 1 View- PORTABLE-Urgent .) (12.22.24 @ 09:30) >    IMPRESSION: Heart not accurately evaluated on this projection. Low lung   volumes with bibasilar atelectasis. Correlate clinically for concomitant   infection. No pleural effusion or pneumothorax. Loop recorder projects   over left base. Distended bowel visualized upper abdomen.    < end of copied text >  < from: CT Lumbar Spine No Cont (12.22.24 @ 02:48) >      Impression:    No fracture or traumatic malalignment in the thoracic spine.  Multilevel degenerative change. If clinically indicated, an MRI may be   obtained for further evaluation.    < end of copied text >  < from: CT Thoracic Spine No Cont (12.22.24 @ 02:48) >    Impression:    No fracture or traumatic malalignment in the thoracic spine.  Multilevel degenerative change. If clinically indicated, an MRI may be   obtained for further evaluation.    < end of copied text >  < from: Xray Pelvis AP only (12.22.24 @ 00:10) >    IMPRESSION: Displaced avulsion fracture of the olecranon process   unchanged pre and post reduction with an approximate 1 cm gap at the   fracture line.    < end of copied text >  < from: Xray Forearm, Left (12.21.24 @ 23:50) >    IMPRESSION: Displaced avulsion fracture of the olecranon process   unchanged pre and post reduction with an approximate 1 cm gap at the   fracture line.    < end of copied text >        
Patient seen and examined at bedside. No acute complaints at this time. Pain well controlled on medication. Denies chest pain, shortness of breath, nausea or vomiting.       LABS:                        10.5   7.89  )-----------( 130      ( 22 Dec 2024 08:05 )             32.8     12-21    141  |  109[H]  |  28[H]  ----------------------------<  167[H]  3.8   |  29  |  0.98    Ca    9.1      21 Dec 2024 22:08    TPro  7.0  /  Alb  3.2[L]  /  TBili  0.7  /  DBili  x   /  AST  33  /  ALT  39  /  AlkPhos  53  12-21    PT/INR - ( 22 Dec 2024 08:05 )   PT: 12.0 sec;   INR: 1.02 ratio         PTT - ( 22 Dec 2024 08:05 )  PTT:28.7 sec  Urinalysis Basic - ( 21 Dec 2024 22:08 )    Color: x / Appearance: x / SG: x / pH: x  Gluc: 167 mg/dL / Ketone: x  / Bili: x / Urobili: x   Blood: x / Protein: x / Nitrite: x   Leuk Esterase: x / RBC: x / WBC x   Sq Epi: x / Non Sq Epi: x / Bacteria: x        VITAL SIGNS:  T(C): 36.7 (12-22-24 @ 08:00), Max: 36.8 (12-22-24 @ 03:52)  HR: 57 (12-22-24 @ 08:00) (57 - 77)  BP: 134/59 (12-22-24 @ 08:00) (110/51 - 134/59)  RR: 18 (12-22-24 @ 08:00) (18 - 19)  SpO2: 93% (12-22-24 @ 08:00) (93% - 96%)      PE:    General: NAD, resting comfortably in bed  LUE:   splint C/D/I  Compartments soft and compressible  +Axillary/Radial/Median/AIN/PIN intact  SILT med/rad/uln  fingers wwp          A/P:  84y M w/ L olecranon fx s/p MF    -plan for OR today, discussed w/ pt and consent obtained  -keep NPO and hold chemical DVT ppx  -medicine consult for preop optimization  -NWB LUE in splint  -Pain Control  -FU preop labs  -discussed with Dr. Wallace who agrees with plan    *see general ortho consult note for management of L pubic rami fx and sacral ala fx
Patient seen and examined at the bedside this AM.  AVSS        PAST MEDICAL & SURGICAL HISTORY:  HLD (hyperlipidemia)      BPH (benign prostatic hyperplasia)      GERD (gastroesophageal reflux disease)      Snores      [place] (hard of hearing)      H/O constipation      Status post ORIF of fracture of ankle      History of colonoscopy      S/P left inguinal hernia repair          MEDICATIONS  (STANDING):  acetaminophen     Tablet .. 975 milliGRAM(s) Oral every 8 hours  acetaminophen   IVPB .. 1000 milliGRAM(s) IV Intermittent once  alfuzosin 10 milliGRAM(s) Oral at bedtime  atorvastatin 40 milliGRAM(s) Oral at bedtime  clopidogrel Tablet 75 milliGRAM(s) Oral daily  famotidine    Tablet 40 milliGRAM(s) Oral two times a day  pantoprazole    Tablet 40 milliGRAM(s) Oral before breakfast  polyethylene glycol 3350 17 Gram(s) Oral at bedtime  predniSONE   Tablet 1 milliGRAM(s) Oral daily  senna 2 Tablet(s) Oral at bedtime    MEDICATIONS  (PRN):  bisacodyl 5 milliGRAM(s) Oral every 12 hours PRN Constipation  HYDROmorphone  Injectable 0.5 milliGRAM(s) IV Push every 4 hours PRN Breakthrough  magnesium hydroxide Suspension 30 milliLiter(s) Oral daily PRN Constipation  ondansetron Injectable 4 milliGRAM(s) IV Push every 6 hours PRN Nausea and/or Vomiting  ondansetron Injectable 4 milliGRAM(s) IV Push every 6 hours PRN Nausea  oxyCODONE    IR 2.5 milliGRAM(s) Oral every 4 hours PRN Moderate Pain (4 - 6)  oxyCODONE    IR 5 milliGRAM(s) Oral every 4 hours PRN Severe Pain (7 - 10)  traMADol 50 milliGRAM(s) Oral every 6 hours PRN Mild Pain (1 - 3)      Allergies    penicillin (Other)    Intolerances        SOCIAL HISTORY:    FAMILY HISTORY:  No pertinent family history in first degree relatives        Physical exam:  Gen: NAD  Neuro: CN II-XII grossly intact  Psych: normal affect  HEENT: normocephalic, YUSEF, EOM wnl, no gross craniofacial bony pathology to exam  Neck: No tracheal deviation, no JVD, no crepitus, no ecchymosis, no hematoma  Chest: No gross rib or sternal pathology or tenderness to exam, no crepitus, no ecchymosis, no hematoma  Resp: CTAB  CVS: S1S2(+)  ABD: bowel sounds (+), soft, nontender, non distended, no rebound, no guarding, no rigidity, no pelvic instability to exam  EXT: LUE in long arm splint, CDI, normal cap refill x5, slight swelling of finger tips but able to wiggle fingers, sensation intact.  weakness left LE on hip flexion , SILT throughout.  no calf tenderness or edema to exam b/l, pt has good capillary refill in all digits. Sensoromotor function grossly intact, on VTE prophylaxis  Skin: no adverse skin changes to        Vital Signs Last 24 Hrs  T(C): 36.8 (25 Dec 2024 00:00), Max: 36.8 (24 Dec 2024 04:00)  T(F): 98.2 (25 Dec 2024 00:00), Max: 98.2 (24 Dec 2024 04:00)  HR: 63 (25 Dec 2024 00:00) (57 - 69)  BP: 151/73 (25 Dec 2024 00:00) (133/62 - 158/66)  BP(mean): --  RR: 18 (25 Dec 2024 00:00) (18 - 18)  SpO2: 95% (25 Dec 2024 00:00) (92% - 95%)    Parameters below as of 25 Dec 2024 00:00  Patient On (Oxygen Delivery Method): room air        I&O's Summary    23 Dec 2024 07:01  -  24 Dec 2024 07:00  --------------------------------------------------------  IN: 0 mL / OUT: 1300 mL / NET: -1300 mL    24 Dec 2024 07:01  -  25 Dec 2024 01:19  --------------------------------------------------------  IN: 0 mL / OUT: 200 mL / NET: -200 mL            LABS:                        10.6   7.94  )-----------( 127      ( 24 Dec 2024 06:36 )             32.9     12-24    140  |  108  |  21  ----------------------------<  102[H]  3.6   |  29  |  0.83    Ca    9.2      24 Dec 2024 06:36  Phos  3.1     12-24  Mg     2.0     12-24        Urinalysis Basic - ( 24 Dec 2024 06:36 )    Color: x / Appearance: x / SG: x / pH: x  Gluc: 102 mg/dL / Ketone: x  / Bili: x / Urobili: x   Blood: x / Protein: x / Nitrite: x   Leuk Esterase: x / RBC: x / WBC x   Sq Epi: x / Non Sq Epi: x / Bacteria: x      CAPILLARY BLOOD GLUCOSE            Cultures:      RADIOLOGY & ADDITIONAL STUDIES:        
CC:Patient is a 84y old  Male who presents with a chief complaint of fall (26 Dec 2024 07:26)      Subjective:  Pt seen and examined at bedside with chaperone. Pt is AAOx3, pt in no acute distress. States he has some throbbing pain at left elbow. Pt tolerating diet, (+) void, has transferred to chair with assisatance, (+) flatus.   Pt denied c/o fever, chills, chest pain, SOB, abd pain, N/V/D, extremity pain or dysfunction, hemoptysis, hematemesis, hematuria, hematochexia, headache, diplopia, vertigo, dizzyness.     ROS:  otherwise as abovementioned ROS    Vital Signs Last 24 Hrs  T(C): 36.9 (26 Dec 2024 07:48), Max: 37.3 (25 Dec 2024 15:33)  T(F): 98.4 (26 Dec 2024 07:48), Max: 99.2 (25 Dec 2024 15:33)  HR: 56 (26 Dec 2024 07:48) (56 - 86)  BP: 136/64 (26 Dec 2024 07:48) (123/55 - 157/61)  BP(mean): 89 (26 Dec 2024 00:00) (78 - 89)  RR: 18 (26 Dec 2024 07:48) (18 - 18)  SpO2: 97% (26 Dec 2024 07:48) (94% - 97%)    Parameters below as of 26 Dec 2024 07:48  Patient On (Oxygen Delivery Method): room air        Labs:                                11.4   6.66  )-----------( 149      ( 26 Dec 2024 07:05 )             35.3     CBC Full  -  ( 26 Dec 2024 07:05 )  WBC Count : 6.66 K/uL  RBC Count : 3.63 M/uL  Hemoglobin : 11.4 g/dL  Hematocrit : 35.3 %  Platelet Count - Automated : 149 K/uL  Mean Cell Volume : 97.2 fl  Mean Cell Hemoglobin : 31.4 pg  Mean Cell Hemoglobin Concentration : 32.3 g/dL  Auto Neutrophil # : x  Auto Lymphocyte # : x  Auto Monocyte # : x  Auto Eosinophil # : x  Auto Basophil # : x  Auto Neutrophil % : x  Auto Lymphocyte % : x  Auto Monocyte % : x  Auto Eosinophil % : x  Auto Basophil % : x    12-26    140  |  107  |  24[H]  ----------------------------<  98  4.0   |  30  |  0.82    Ca    9.0      26 Dec 2024 07:05  Phos  4.3     12-26  Mg     2.3     12-26    TPro  7.0  /  Alb  3.2[L]  /  TBili  0.9  /  DBili  x   /  AST  29  /  ALT  23  /  AlkPhos  56  12-25    LIVER FUNCTIONS - ( 25 Dec 2024 08:11 )  Alb: 3.2 g/dL / Pro: 7.0 gm/dL / ALK PHOS: 56 U/L / ALT: 23 U/L / AST: 29 U/L / GGT: x                 Meds:  acetaminophen     Tablet .. 975 milliGRAM(s) Oral every 8 hours  acetaminophen   IVPB .. 1000 milliGRAM(s) IV Intermittent once  alfuzosin 10 milliGRAM(s) Oral at bedtime  atorvastatin 40 milliGRAM(s) Oral at bedtime  bisacodyl 5 milliGRAM(s) Oral every 12 hours PRN  clopidogrel Tablet 75 milliGRAM(s) Oral daily  famotidine    Tablet 40 milliGRAM(s) Oral two times a day  gabapentin 100 milliGRAM(s) Oral every 8 hours  HYDROmorphone  Injectable 0.5 milliGRAM(s) IV Push every 4 hours PRN  magnesium hydroxide Suspension 30 milliLiter(s) Oral daily PRN  magnesium hydroxide Suspension 30 milliLiter(s) Oral daily PRN  ondansetron Injectable 4 milliGRAM(s) IV Push every 6 hours PRN  ondansetron Injectable 4 milliGRAM(s) IV Push every 6 hours PRN  oxyCODONE    IR 2.5 milliGRAM(s) Oral every 4 hours PRN  oxyCODONE    IR 5 milliGRAM(s) Oral every 4 hours PRN  pantoprazole    Tablet 40 milliGRAM(s) Oral before breakfast  polyethylene glycol 3350 17 Gram(s) Oral at bedtime  predniSONE   Tablet 1 milliGRAM(s) Oral daily  senna 2 Tablet(s) Oral at bedtime  traMADol 50 milliGRAM(s) Oral every 6 hours PRN      Radiology:      Physical exam:  GCS of 15  Pt is aaox3  Pt in no acute distress  Airway is patent  Breathing is symmetric and unlabored  Neuro: CN II-XII grossly intact  Psych: normal affect  HEENT: normocephalic, YUSEF, EOM wnl, no gross craniofacial bony pathology to exam  Neck: No tracheal deviation, no JVD, no crepitus, no ecchymosis, no hematoma  Chest: No gross rib or sternal pathology or tenderness to exam, no crepitus, no ecchymosis, no hematoma  Resp: CTAB  CVS: S1S2(+)  ABD: bowel sounds (+), soft, nontender, non distended, no rebound, no guarding, no rigidity, no pelvic instability to exam  EXT: ;LUE good cap refil, 5/5 hand , ACE wrap c/d/i, LLE 5/5 strength/ sensation intact, no calf tenderness or edema to exam b/l, pt has good capillary refill in all digits. Sensoromotor function grossly intact, on VTE prophylaxis  Skin: no adverse skin changes to exam      
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 Fell onto his left hip and left elbow. Complains of Left hip and left elbow pain with movement.  Denies head trauma or LOC, Patient denies fever, chills, headache, dizziness, change in vision, blurry vision,  chest pain, palpitations, shortness of breath, abdominal pain, nausea, vomiting, diarrhea, constipation,  numbness, weakness or tingling. Hospitalist Consult for medical management and optimization     12/23: Above reviewed; Patient has no complaints; no overnight events   12/24:  Patient has no complaints; no overnight events. no CP or dyspnea     Allergies:  penicillin (Other)    REVIEW OF SYSTEMS:  See HPI. All other review of systems is negative unless indicated above.     OBJECTIVE  Physical Exam:  Vital Signs Last 24 Hrs  T(C): 36.1 (24 Dec 2024 08:00), Max: 37 (23 Dec 2024 16:01)  T(F): 97 (24 Dec 2024 08:00), Max: 98.6 (23 Dec 2024 16:01)  HR: 57 (24 Dec 2024 08:00) (57 - 65)  BP: 151/58 (24 Dec 2024 08:00) (136/70 - 156/78)  BP(mean): 90 (23 Dec 2024 16:01) (90 - 90)  RR: 18 (24 Dec 2024 08:00) (18 - 18)  SpO2: 94% (24 Dec 2024 08:00) (94% - 95%)    Parameters below as of 24 Dec 2024 08:00  Patient On (Oxygen Delivery Method): room air      Constitutional: NAD, awake and alert  Neurological: AAO x 3, no focal deficits  HEENT: PERRLA, EOMI, MMM  Neck: Soft and supple, No LAD, No JVD  Respiratory: Breath sounds are clear bilaterally, No wheezing, rales or rhonchi  Cardiovascular: S1 and S2, regular rate and rhythm; no Murmurs, gallops or rubs  Gastrointestinal: Bowel Sounds present, soft, nontender, nondistended, no guarding, no rebound tenderness  Back: No CVA tenderness   Extremities: No peripheral edema  Vascular: 2+ peripheral pulses  Musculoskeletal: 5/5 strength b/l upper and lower extremities  Skin: No rashes  Breast: Deferred  Rectal: Deferred    MEDICATIONS  (STANDING):  acetaminophen     Tablet .. 975 milliGRAM(s) Oral every 8 hours  acetaminophen   IVPB .. 1000 milliGRAM(s) IV Intermittent once  atorvastatin 40 milliGRAM(s) Oral at bedtime  clopidogrel Tablet 75 milliGRAM(s) Oral daily  famotidine    Tablet 40 milliGRAM(s) Oral two times a day  pantoprazole    Tablet 40 milliGRAM(s) Oral before breakfast  polyethylene glycol 3350 17 Gram(s) Oral at bedtime  senna 2 Tablet(s) Oral at bedtime  sodium chloride 0.9%. 1000 milliLiter(s) (75 mL/Hr) IV Continuous <Continuous>    Lab Results:  CBC  CBC Full  -  ( 24 Dec 2024 06:36 )  WBC Count : 7.94 K/uL  RBC Count : 3.39 M/uL  Hemoglobin : 10.6 g/dL  Hematocrit : 32.9 %  Platelet Count - Automated : 127 K/uL  Mean Cell Volume : 97.1 fl  Mean Cell Hemoglobin : 31.3 pg  Mean Cell Hemoglobin Concentration : 32.2 g/dL  Auto Neutrophil # : x  Auto Lymphocyte # : x  Auto Monocyte # : x  Auto Eosinophil # : x  Auto Basophil # : x  Auto Neutrophil % : x  Auto Lymphocyte % : x  Auto Monocyte % : x  Auto Eosinophil % : x  Auto Basophil % : x    .		Differential:	[] Automated		[] Manual  Chemistry                        10.6   7.94  )-----------( 127      ( 24 Dec 2024 06:36 )             32.9     12-24    140  |  108  |  21  ----------------------------<  102[H]  3.6   |  29  |  0.83    Ca    9.2      24 Dec 2024 06:36  Phos  3.1     12-24  Mg     2.0     12-24          Urinalysis Basic - ( 24 Dec 2024 06:36 )    Color: x / Appearance: x / SG: x / pH: x  Gluc: 102 mg/dL / Ketone: x  / Bili: x / Urobili: x   Blood: x / Protein: x / Nitrite: x   Leuk Esterase: x / RBC: x / WBC x   Sq Epi: x / Non Sq Epi: x / Bacteria: x    RADIOLOGY/EKG:      < from: Xray Chest 1 View- PORTABLE-Urgent (Xray Chest 1 View- PORTABLE-Urgent .) (12.22.24 @ 09:30) >    IMPRESSION: Heart not accurately evaluated on this projection. Low lung   volumes with bibasilar atelectasis. Correlate clinically for concomitant   infection. No pleural effusion or pneumothorax. Loop recorder projects   over left base. Distended bowel visualized upper abdomen.    < end of copied text >  < from: CT Lumbar Spine No Cont (12.22.24 @ 02:48) >      Impression:    No fracture or traumatic malalignment in the thoracic spine.  Multilevel degenerative change. If clinically indicated, an MRI may be   obtained for further evaluation.    < end of copied text >  < from: CT Thoracic Spine No Cont (12.22.24 @ 02:48) >    Impression:    No fracture or traumatic malalignment in the thoracic spine.  Multilevel degenerative change. If clinically indicated, an MRI may be   obtained for further evaluation.    < end of copied text >  < from: Xray Pelvis AP only (12.22.24 @ 00:10) >    IMPRESSION: Displaced avulsion fracture of the olecranon process   unchanged pre and post reduction with an approximate 1 cm gap at the   fracture line.    < end of copied text >  < from: Xray Forearm, Left (12.21.24 @ 23:50) >    IMPRESSION: Displaced avulsion fracture of the olecranon process   unchanged pre and post reduction with an approximate 1 cm gap at the   fracture line.    < end of copied text >        
No acute complaints at this time. Pain well controlled on medication. Denies chest pain, shortness of breath, nausea or vomiting.     Vital Signs (24 Hrs):  T(C): 36.8 (12-24-24 @ 04:00), Max: 37 (12-23-24 @ 16:01)  HR: 62 (12-24-24 @ 04:00) (62 - 71)  BP: 156/78 (12-24-24 @ 04:00) (136/70 - 156/78)  RR: 18 (12-24-24 @ 04:00) (18 - 18)  SpO2: 94% (12-24-24 @ 04:00) (93% - 95%)  Wt(kg): --    LABS:                          11.0   8.94  )-----------( 131      ( 23 Dec 2024 08:02 )             34.0     12-23    139  |  105  |  16  ----------------------------<  107[H]  3.8   |  31  |  0.89    Ca    9.0      23 Dec 2024 08:02  Phos  3.1     12-23  Mg     1.9     12-23        PT/INR - ( 22 Dec 2024 08:05 )   PT: 12.0 sec;   INR: 1.02 ratio         PTT - ( 22 Dec 2024 08:05 )  PTT:28.7 sec      General: NAD, resting comfortably in bed  LUE:   splint C/D/I  Compartments soft and compressible  +Axillary/Radial/Median/AIN/PIN intact  SILT med/rad/uln  fingers wwp    A/P:  84y M POD 2 sp L olecranon ORIF    -PTOT: J[facility]NA per [facility]  -NWB RUE, in splint/sling  -BL LE WBAT  -keep splint clean/dry and intact  -DVT ppx- plavix, per primary  -Pt has hx of GI Bleed with SQH. Recommned plenty ambulation  -Pain Control    -To Discuss with Dr. Wallace and provide further recommendations as needed. 
Pt seen and examined at bedside. No acute complaints at this time. Pain well controlled on medication. Denies chest pain, shortness of breath, nausea or vomiting.       Vital Signs (24 Hrs):  T(C): 36.7 (12-25-24 @ 07:27), Max: 36.8 (12-25-24 @ 00:00)  HR: 72 (12-25-24 @ 07:27) (57 - 72)  BP: 134/62 (12-25-24 @ 07:27) (133/62 - 158/66)  RR: 17 (12-25-24 @ 07:27) (17 - 18)  SpO2: 94% (12-25-24 @ 07:27) (92% - 95%)  Wt(kg): --    LABS:                          10.6   7.94  )-----------( 127      ( 24 Dec 2024 06:36 )             32.9     12-24    140  |  108  |  21  ----------------------------<  102[H]  3.6   |  29  |  0.83    Ca    9.2      24 Dec 2024 06:36  Phos  3.1     12-24  Mg     2.0     12-24        General: NAD, resting comfortably in bed  LUE:   splint C/D/I  Compartments soft and compressible  +Axillary/Radial/Median/AIN/PIN intact  SILT med/rad/uln  fingers wwp    A/P:  84y M POD 3 sp L olecranon ORIF    -PTOT: JEANA per [facility]  -NWB RUE, in splint/sling  -BL LE WBAT  -keep splint clean/dry and intact  -DVT ppx- plavix, per primary  -Pt has hx of GI Bleed with SQH. Recommned plenty ambulation  -Pain Control  - Ortho stable for DC, follow up w Dr Wallace outpatient 1 week after DC    -To Discuss with Dr. Wallace and provide further recommendations as needed. 
Pt seen and examined at bedside. No acute complaints at this time. Pain well controlled on medication. Denies chest pain, shortness of breath, nausea or vomiting.       Vital Signs (24 Hrs):  T(C): 37 (12-26-24 @ 00:00), Max: 37.3 (12-25-24 @ 15:33)  HR: 59 (12-26-24 @ 00:00) (59 - 86)  BP: 157/61 (12-26-24 @ 00:00) (123/55 - 157/61)  RR: 18 (12-26-24 @ 00:00) (18 - 18)  SpO2: 94% (12-26-24 @ 00:00) (94% - 94%)  Wt(kg): --    LABS:                          11.2   7.50  )-----------( 136      ( 25 Dec 2024 08:11 )             34.3     12-25    140  |  108  |  19  ----------------------------<  107[H]  3.7   |  29  |  0.81    Ca    9.3      25 Dec 2024 08:11  Phos  3.6     12-25  Mg     1.9     12-25    TPro  7.0  /  Alb  3.2[L]  /  TBili  0.9  /  DBili  x   /  AST  29  /  ALT  23  /  AlkPhos  56  12-25    LIVER FUNCTIONS - ( 25 Dec 2024 08:11 )  Alb: 3.2 g/dL / Pro: 7.0 gm/dL / ALK PHOS: 56 U/L / ALT: 23 U/L / AST: 29 U/L / GGT: x             General: NAD, resting comfortably in bed  LUE:   splint C/D/I  Compartments soft and compressible  +Axillary/Radial/Median/AIN/PIN intact  SILT med/rad/uln  fingers wwp    A/P:  84y M POD 4 sp L olecranon ORIF    -PTOT: JEANA per PT  -NWB RUE, in splint/sling  -BL LE WBAT  -keep splint clean/dry and intact  -DVT ppx- plavix, per primary  -Pt has hx of GI Bleed with SQH. Recommend plenty ambulation  -Pain Control  - Ortho stable for DC, follow up w Dr Wallace outpatient 1 week after DC    -To Discuss with Dr. Wallace and provide further recommendations as needed. 
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 Fell onto his left hip and left elbow. Complains of Left hip and left elbow pain with movement.  Denies head trauma or LOC, Patient denies fever, chills, headache, dizziness, change in vision, blurry vision,  chest pain, palpitations, shortness of breath, abdominal pain, nausea, vomiting, diarrhea, constipation,  numbness, weakness or tingling. Hospitalist Consult for medical management and optimization     12/23: Above reviewed; Patient has no complaints; no overnight events   12/24:  Patient has no complaints; no overnight events. no CP or dyspnea   12/25 - pt seen and examined and family updated at bedside.  no cp, sob + constipation   12/26 - still feels constipated.  no fever or chills.      ROS:   All 10 systems reviewed and found to be negative with the exception of what has been described above.      Vital Signs Last 24 Hrs  T(C): 36.9 (26 Dec 2024 07:48), Max: 37.3 (25 Dec 2024 15:33)  T(F): 98.4 (26 Dec 2024 07:48), Max: 99.2 (25 Dec 2024 15:33)  HR: 56 (26 Dec 2024 07:48) (56 - 86)  BP: 136/64 (26 Dec 2024 07:48) (123/55 - 157/61)  BP(mean): 89 (26 Dec 2024 00:00) (78 - 89)  RR: 18 (26 Dec 2024 07:48) (18 - 18)  SpO2: 97% (26 Dec 2024 07:48) (94% - 97%)    Parameters below as of 26 Dec 2024 07:48  Patient On (Oxygen Delivery Method): room air        Constitutional: NAD, awake and alert  Neurological: AAO x 3, no focal deficits  HEENT: PERRLA, EOMI, MMM  Neck: Soft and supple, No LAD, No JVD  Respiratory: Breath sounds are clear bilaterally, No wheezing, rales or rhonchi  Cardiovascular: S1 and S2, regular rate and rhythm; no Murmurs, gallops or rubs  Gastrointestinal: Bowel Sounds present, soft, nontender, nondistended, no guarding, no rebound tenderness  Back: No CVA tenderness   Extremities: No peripheral edema  Vascular: 2+ peripheral pulses  Musculoskeletal: 5/5 strength b/l upper and lower extremities  Skin: No rashes                                11.4   6.66  )-----------( 149      ( 26 Dec 2024 07:05 )             35.3     12-26    140  |  107  |  24[H]  ----------------------------<  98  4.0   |  30  |  0.82    Ca    9.0      26 Dec 2024 07:05  Phos  4.3     12-26  Mg     2.3     12-26    TPro  7.0  /  Alb  3.2[L]  /  TBili  0.9  /  DBili  x   /  AST  29  /  ALT  23  /  AlkPhos  56  12-25        LIVER FUNCTIONS - ( 25 Dec 2024 08:11 )  Alb: 3.2 g/dL / Pro: 7.0 gm/dL / ALK PHOS: 56 U/L / ALT: 23 U/L / AST: 29 U/L / GGT: x             Urinalysis Basic - ( 26 Dec 2024 07:05 )    Color: x / Appearance: x / SG: x / pH: x  Gluc: 98 mg/dL / Ketone: x  / Bili: x / Urobili: x   Blood: x / Protein: x / Nitrite: x   Leuk Esterase: x / RBC: x / WBC x   Sq Epi: x / Non Sq Epi: x / Bacteria: x                   RADIOLOGY/EKG:      < from: Xray Chest 1 View- PORTABLE-Urgent (Xray Chest 1 View- PORTABLE-Urgent .) (12.22.24 @ 09:30) >    IMPRESSION: Heart not accurately evaluated on this projection. Low lung   volumes with bibasilar atelectasis. Correlate clinically for concomitant   infection. No pleural effusion or pneumothorax. Loop recorder projects   over left base. Distended bowel visualized upper abdomen.    < end of copied text >  < from: CT Lumbar Spine No Cont (12.22.24 @ 02:48) >      Impression:    No fracture or traumatic malalignment in the thoracic spine.  Multilevel degenerative change. If clinically indicated, an MRI may be   obtained for further evaluation.    < end of copied text >  < from: CT Thoracic Spine No Cont (12.22.24 @ 02:48) >    Impression:    No fracture or traumatic malalignment in the thoracic spine.  Multilevel degenerative change. If clinically indicated, an MRI may be   obtained for further evaluation.    < end of copied text >  < from: Xray Pelvis AP only (12.22.24 @ 00:10) >    IMPRESSION: Displaced avulsion fracture of the olecranon process   unchanged pre and post reduction with an approximate 1 cm gap at the   fracture line.    < end of copied text >  < from: Xray Forearm, Left (12.21.24 @ 23:50) >    IMPRESSION: Displaced avulsion fracture of the olecranon process   unchanged pre and post reduction with an approximate 1 cm gap at the   fracture line.    < end of copied text >        
No acute complaints at this time. Pain well controlled on medication. Denies chest pain, shortness of breath, nausea or vomiting.     LABS:                        10.6   7.89  )-----------( 125      ( 22 Dec 2024 13:18 )             33.4     12-22    138  |  107  |  21  ----------------------------<  114[H]  3.8   |  27  |  0.82    Ca    8.8      22 Dec 2024 13:18  Phos  3.6     12-22  Mg     1.9     12-22    TPro  7.0  /  Alb  3.2[L]  /  TBili  0.7  /  DBili  x   /  AST  33  /  ALT  39  /  AlkPhos  53  12-21    PT/INR - ( 22 Dec 2024 08:05 )   PT: 12.0 sec;   INR: 1.02 ratio         PTT - ( 22 Dec 2024 08:05 )  PTT:28.7 sec  Urinalysis Basic - ( 22 Dec 2024 13:18 )    Color: x / Appearance: x / SG: x / pH: x  Gluc: 114 mg/dL / Ketone: x  / Bili: x / Urobili: x   Blood: x / Protein: x / Nitrite: x   Leuk Esterase: x / RBC: x / WBC x   Sq Epi: x / Non Sq Epi: x / Bacteria: x        VITAL SIGNS:  T(C): 37.1 (12-23-24 @ 04:00), Max: 37.6 (12-23-24 @ 00:00)  HR: 74 (12-23-24 @ 04:00) (57 - 77)  BP: 143/52 (12-23-24 @ 04:00) (124/68 - 151/69)  RR: 18 (12-23-24 @ 04:00) (12 - 18)  SpO2: 93% (12-23-24 @ 04:00) (92% - 97%)    PE:    General: NAD, resting comfortably in bed  LUE:   splint C/D/I  Compartments soft and compressible  +Axillary/Radial/Median/AIN/PIN intact  SILT med/rad/uln  fingers wwp    A/P:  84y M POD 1 sp L olecranon ORIF    -PTOT: FU PT Recs   -NWB RUE, in splint/sling  -BL LE WBAT  -keep splint clean/dry and intact  -Restart Plavix Today.   -Pt has hx of GI Bleed with SQH. Recommned plenty ambulation  -Pain Control    -To Discuss with Dr. Wallace and provide further recommendations as needed. 
Post operative check  Patient seen and examined at bedside. No acute complaints at this time. Pain well controlled on medication. Denies chest pain, shortness of breath, nausea or vomiting.       LABS:                        10.5   7.89  )-----------( 130      ( 22 Dec 2024 08:05 )             32.8     12-21    141  |  109[H]  |  28[H]  ----------------------------<  167[H]  3.8   |  29  |  0.98    Ca    9.1      21 Dec 2024 22:08    TPro  7.0  /  Alb  3.2[L]  /  TBili  0.7  /  DBili  x   /  AST  33  /  ALT  39  /  AlkPhos  53  12-21    PT/INR - ( 22 Dec 2024 08:05 )   PT: 12.0 sec;   INR: 1.02 ratio         PTT - ( 22 Dec 2024 08:05 )  PTT:28.7 sec  Urinalysis Basic - ( 21 Dec 2024 22:08 )    Color: x / Appearance: x / SG: x / pH: x  Gluc: 167 mg/dL / Ketone: x  / Bili: x / Urobili: x   Blood: x / Protein: x / Nitrite: x   Leuk Esterase: x / RBC: x / WBC x   Sq Epi: x / Non Sq Epi: x / Bacteria: x        VITAL SIGNS:  T(C): 36.7 (12-22-24 @ 08:00), Max: 36.8 (12-22-24 @ 03:52)  HR: 57 (12-22-24 @ 08:00) (57 - 77)  BP: 134/59 (12-22-24 @ 08:00) (110/51 - 134/59)  RR: 18 (12-22-24 @ 08:00) (18 - 19)  SpO2: 93% (12-22-24 @ 08:00) (93% - 96%)      PE:    General: NAD, resting comfortably in bed  LUE:   splint C/D/I  Compartments soft and compressible  +Axillary/Radial/Median/AIN/PIN intact  SILT med/rad/uln  fingers wwp    A/P:  84y M POD 0 sp L olecranon fx    -PTOT: FU PT Recs   -NWB RUE, in splint/sling  -BL LE WBAT  -keep splint clean/dry and intact  -HOLD DVT ppx. Restart POD 1  -Pain Control  -FU preop labs  -discussed with Dr. Wallace who agrees with plan
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 Fell onto his left hip and left elbow. Complains of Left hip and left elbow pain with movement.  Denies head trauma or LOC, Patient denies fever, chills, headache, dizziness, change in vision, blurry vision,  chest pain, palpitations, shortness of breath, abdominal pain, nausea, vomiting, diarrhea, constipation,  numbness, weakness or tingling. Hospitalist Consult for medical management and optimization     12/23: Above reviewed; Patient has no complaints; no overnight events   12/24:  Patient has no complaints; no overnight events. no CP or dyspnea   12/25 - pt seen and examined and family updated at bedside.  no cp, sob + constipation     ROS:   All 10 systems reviewed and found to be negative with the exception of what has been described above.    Vital Signs Last 24 Hrs  T(C): 36.7 (25 Dec 2024 07:27), Max: 36.8 (25 Dec 2024 00:00)  T(F): 98.1 (25 Dec 2024 07:27), Max: 98.2 (25 Dec 2024 00:00)  HR: 72 (25 Dec 2024 07:27) (63 - 72)  BP: 134/62 (25 Dec 2024 07:27) (133/62 - 158/66)  BP(mean): 81 (25 Dec 2024 07:27) (81 - 81)  RR: 17 (25 Dec 2024 07:27) (17 - 18)  SpO2: 94% (25 Dec 2024 07:27) (92% - 95%)    Parameters below as of 25 Dec 2024 07:27  Patient On (Oxygen Delivery Method): room air          Constitutional: NAD, awake and alert  Neurological: AAO x 3, no focal deficits  HEENT: PERRLA, EOMI, MMM  Neck: Soft and supple, No LAD, No JVD  Respiratory: Breath sounds are clear bilaterally, No wheezing, rales or rhonchi  Cardiovascular: S1 and S2, regular rate and rhythm; no Murmurs, gallops or rubs  Gastrointestinal: Bowel Sounds present, soft, nontender, nondistended, no guarding, no rebound tenderness  Back: No CVA tenderness   Extremities: No peripheral edema  Vascular: 2+ peripheral pulses  Musculoskeletal: 5/5 strength b/l upper and lower extremities  Skin: No rashes  Breast: Deferred  Rectal: Deferred                              11.2   7.50  )-----------( 136      ( 25 Dec 2024 08:11 )             34.3     12-25    140  |  108  |  19  ----------------------------<  107[H]  3.7   |  29  |  0.81    Ca    9.3      25 Dec 2024 08:11  Phos  3.6     12-25  Mg     1.9     12-25    TPro  7.0  /  Alb  3.2[L]  /  TBili  0.9  /  DBili  x   /  AST  29  /  ALT  23  /  AlkPhos  56  12-25        LIVER FUNCTIONS - ( 25 Dec 2024 08:11 )  Alb: 3.2 g/dL / Pro: 7.0 gm/dL / ALK PHOS: 56 U/L / ALT: 23 U/L / AST: 29 U/L / GGT: x             Urinalysis Basic - ( 25 Dec 2024 08:11 )    Color: x / Appearance: x / SG: x / pH: x  Gluc: 107 mg/dL / Ketone: x  / Bili: x / Urobili: x   Blood: x / Protein: x / Nitrite: x   Leuk Esterase: x / RBC: x / WBC x   Sq Epi: x / Non Sq Epi: x / Bacteria: x        RADIOLOGY/EKG:      < from: Xray Chest 1 View- PORTABLE-Urgent (Xray Chest 1 View- PORTABLE-Urgent .) (12.22.24 @ 09:30) >    IMPRESSION: Heart not accurately evaluated on this projection. Low lung   volumes with bibasilar atelectasis. Correlate clinically for concomitant   infection. No pleural effusion or pneumothorax. Loop recorder projects   over left base. Distended bowel visualized upper abdomen.    < end of copied text >  < from: CT Lumbar Spine No Cont (12.22.24 @ 02:48) >      Impression:    No fracture or traumatic malalignment in the thoracic spine.  Multilevel degenerative change. If clinically indicated, an MRI may be   obtained for further evaluation.    < end of copied text >  < from: CT Thoracic Spine No Cont (12.22.24 @ 02:48) >    Impression:    No fracture or traumatic malalignment in the thoracic spine.  Multilevel degenerative change. If clinically indicated, an MRI may be   obtained for further evaluation.    < end of copied text >  < from: Xray Pelvis AP only (12.22.24 @ 00:10) >    IMPRESSION: Displaced avulsion fracture of the olecranon process   unchanged pre and post reduction with an approximate 1 cm gap at the   fracture line.    < end of copied text >  < from: Xray Forearm, Left (12.21.24 @ 23:50) >    IMPRESSION: Displaced avulsion fracture of the olecranon process   unchanged pre and post reduction with an approximate 1 cm gap at the   fracture line.    < end of copied text >

## 2024-12-27 ENCOUNTER — EMERGENCY (EMERGENCY)
Facility: HOSPITAL | Age: 84
LOS: 0 days | Discharge: HOME | End: 2024-12-27
Attending: STUDENT IN AN ORGANIZED HEALTH CARE EDUCATION/TRAINING PROGRAM
Payer: MEDICARE

## 2024-12-27 VITALS
HEART RATE: 80 BPM | OXYGEN SATURATION: 94 % | SYSTOLIC BLOOD PRESSURE: 106 MMHG | HEIGHT: 73 IN | DIASTOLIC BLOOD PRESSURE: 71 MMHG | TEMPERATURE: 98 F | RESPIRATION RATE: 18 BRPM

## 2024-12-27 VITALS
HEART RATE: 68 BPM | RESPIRATION RATE: 18 BRPM | DIASTOLIC BLOOD PRESSURE: 70 MMHG | SYSTOLIC BLOOD PRESSURE: 149 MMHG | TEMPERATURE: 97 F | OXYGEN SATURATION: 95 %

## 2024-12-27 DIAGNOSIS — Z98.890 OTHER SPECIFIED POSTPROCEDURAL STATES: Chronic | ICD-10-CM

## 2024-12-27 DIAGNOSIS — M25.522 PAIN IN LEFT ELBOW: ICD-10-CM

## 2024-12-27 DIAGNOSIS — Z86.73 PERSONAL HISTORY OF TRANSIENT ISCHEMIC ATTACK (TIA), AND CEREBRAL INFARCTION WITHOUT RESIDUAL DEFICITS: ICD-10-CM

## 2024-12-27 DIAGNOSIS — Z79.02 LONG TERM (CURRENT) USE OF ANTITHROMBOTICS/ANTIPLATELETS: ICD-10-CM
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
## 2024-12-27 DIAGNOSIS — Z88.0 ALLERGY STATUS TO PENICILLIN: ICD-10-CM
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
## 2024-12-27 PROCEDURE — 29125 APPL SHORT ARM SPLINT STATIC: CPT | Mod: LT

## 2024-12-27 PROCEDURE — 73080 X-RAY EXAM OF ELBOW: CPT | Mod: LT

## 2024-12-27 PROCEDURE — 99285 EMERGENCY DEPT VISIT HI MDM: CPT

## 2024-12-27 PROCEDURE — 73080 X-RAY EXAM OF ELBOW: CPT | Mod: 26,LT

## 2024-12-27 PROCEDURE — 99284 EMERGENCY DEPT VISIT MOD MDM: CPT | Mod: 25

## 2024-12-27 RX ORDER — ACETAMINOPHEN 500MG 500 MG/1
975 TABLET, COATED ORAL ONCE
Refills: 0 | Status: COMPLETED | OUTPATIENT
Start: 2024-12-27 | End: 2024-12-27

## 2024-12-27 RX ORDER — OXYCODONE HYDROCHLORIDE 30 MG/1
5 TABLET ORAL ONCE
Refills: 0 | Status: DISCONTINUED | OUTPATIENT
Start: 2024-12-27 | End: 2024-12-27

## 2024-12-27 RX ADMIN — OXYCODONE HYDROCHLORIDE 5 MILLIGRAM(S): 30 TABLET ORAL at 06:52

## 2024-12-27 RX ADMIN — ACETAMINOPHEN 500MG 975 MILLIGRAM(S): 500 TABLET, COATED ORAL at 06:52

## 2024-12-27 NOTE — CONSULT NOTE ADULT - SUBJECTIVE AND OBJECTIVE BOX
HPI  84yMale L-hand dominant c/o L elbow pain s/p olecranon ORIF on 12/22/24 with Dr. Wallace. Pt denies any new injuries but has been using his elbow to lift himself off the bed despite being advised not to. Denies numbness/tingling in the LUE. Denies any other trauma/injuries at this time.    ROS  Negative unless otherwise specified in HPI.    PAST MEDICAL & SURGICAL Hx  PAST MEDICAL & SURGICAL HISTORY:  HLD (hyperlipidemia)      BPH (benign prostatic hyperplasia)      GERD (gastroesophageal reflux disease)      Snores      [place] (hard of hearing)      H/O constipation      Status post ORIF of fracture of ankle      History of colonoscopy      S/P left inguinal hernia repair          MEDICATIONS  Home Medications:  acetaminophen 10 mg/mL intravenous solution: 100 milliliter(s) intravenous once (26 Dec 2024 07:56)  acetaminophen 325 mg oral tablet: 3 tab(s) orally every 8 hours (26 Dec 2024 07:56)  alfuzosin 10 mg oral tablet, extended release: 1 tab(s) orally once a day with dinner (22 Dec 2024 10:37)  bisacodyl 5 mg oral delayed release tablet: 1 tab(s) orally every 12 hours As needed Constipation (26 Dec 2024 07:56)  Caltrate 600 + D oral tablet: 1 tab(s) orally once a day (22 Dec 2024 10:37)  clopidogrel 75 mg oral tablet: 1 tab(s) orally once a day (26 Dec 2024 07:56)  Colace 100 mg oral capsule: 3 cap(s) orally once a day (22 Dec 2024 10:34)  cyanocobalamin 1000 mcg sublingual tablet: 1 tab(s) sublingually once a day (22 Dec 2024 10:37)  gabapentin 100 mg oral capsule: 1 cap(s) orally every 8 hours (26 Dec 2024 07:56)  Lupron Depot 45 mg/6 months intramuscular kit: 45 unit(s) intramuscularly every 6 months (22 Dec 2024 09:58)  magnesium oxide 400 mg oral tablet: 1 tab(s) orally once a day (22 Dec 2024 10:37)  Milk of Magnesia 8% oral suspension: 15 milliliter(s) orally once a day as needed for  constipation (26 Dec 2024 07:56)  Multiple Vitamins oral tablet: 1 tab(s) orally once a day (22 Dec 2024 10:37)  ondansetron 2 mg/mL injectable solution: 2 milligram(s) injectable every 6 hours as needed for  nausea (26 Dec 2024 07:56)  ondansetron 2 mg/mL injectable solution: 2 milligram(s) injectable every 6 hours as needed for  nausea (26 Dec 2024 07:56)  oxyCODONE 5 mg oral tablet: 1 tab(s) orally every 4 hours As needed Severe Pain (7 - 10) (26 Dec 2024 07:56)  polyethylene glycol 3350 oral powder for reconstitution: 17 gram(s) orally once a day (at bedtime) (26 Dec 2024 07:56)  predniSONE 1 mg oral tablet: 2 tab(s) orally once a day (22 Dec 2024 10:01)  senna leaf extract oral tablet: 2 tab(s) orally once a day (at bedtime) (26 Dec 2024 07:56)  tocilizumab 20 mg/mL intravenous solution: 460 milligram(s) intravenous every 4 weeks (22 Dec 2024 09:58)  traMADol 50 mg oral tablet: 1 tab(s) orally every 6 hours As needed Mild Pain (1 - 3) (26 Dec 2024 07:56)      ALLERGIES  penicillin (Other)      FAMILY Hx  FAMILY HISTORY:  No pertinent family history in first degree relatives        SOCIAL Hx  Social History:      VITALS  Vital Signs Last 24 Hrs  T(C): 36.7 (27 Dec 2024 05:28), Max: 36.7 (27 Dec 2024 05:28)  T(F): 98 (27 Dec 2024 05:28), Max: 98 (27 Dec 2024 05:28)  HR: 80 (27 Dec 2024 05:28) (80 - 80)  BP: 106/71 (27 Dec 2024 05:28) (106/71 - 106/71)  BP(mean): --  RR: 18 (27 Dec 2024 05:28) (18 - 18)  SpO2: 94% (27 Dec 2024 05:28) (94% - 94%)    Parameters below as of 27 Dec 2024 05:28  Patient On (Oxygen Delivery Method): room air        PHYSICAL EXAM  Gen: Lying in bed, NAD  Resp: No increased WOB  LUE:  incision CDI  +compartments soft  elbow aROM 20-95, no pain with pronosupination  Motor: Musc/Med/Rad/AIN/PIN/U intact  Sensory: Musc/Med/Rad/U SILT  +Rad pulse, fingers WWP      LABS                        11.4   6.66  )-----------( 149      ( 26 Dec 2024 07:05 )             35.3     12-26    140  |  107  |  24[H]  ----------------------------<  98  4.0   |  30  |  0.82    Ca    9.0      26 Dec 2024 07:05  Phos  4.3     12-26  Mg     2.3     12-26          IMAGING  XRs: L olecranon hardware intact (personal read)    PROCEDURE  The old splint and dressing were taken down and a well-padded, new splint was applied, and the patient was neurovascularly intact afterward. The patient tolerated the procedure well without evidence of complications. The patient was informed about splint precautions (keep dry, do not stick anything inside, monitor for signs/symptoms of increased compartmental pressure: uncontrolled pain, worsening numbness/tingling, severe pain with movement of the fingers/toes) and verbalized understanding.    ASSESSMENT & PLAN  84yMale w/ L elbow pain s/p olecranon ORIF on 12/22/24    -pain due to splint discomfort, new dressing and splint were applied  -NWB LUE in a posterior slab splint, sling prn  -splint precautions  -pt advised not to use elbow for lifting himself off the bed  -pain control  -ice/cold compress prn  -no acute ortho surgery at this time, ortho stable for DC  -f/u outpt with Dr. Wallace next week, call office for appt  -discussed with Dr. Wallace who agrees with plan

## 2024-12-27 NOTE — ED ADULT NURSE NOTE - OBJECTIVE STATEMENT
BIBEMS from [facility] complaining of left elbow pain. Received surgery Dec. 22nd at  and has had increasing pain x 2 days. Left arm splinted, movement of left fingers and sensory in tact. Denies fevers, chest pain, SOB, HA/dizziness at this time.

## 2024-12-27 NOTE — ED PROVIDER NOTE - PHYSICAL EXAMINATION
GEN: Patient awake alert NAD.   HEENT: normocephalic, atraumatic, EOMI, no scleral icterus, moist MM  CARDIAC: RRR, S1, S2, no murmur.   PULM: CTA B/L no wheeze, rhonchi, rales.   ABD: soft NT, ND, no rebound no guarding,   MSK: Moving all extremities, no edema. 5/5 strength and full ROM in all extremities, except LUE with splint in place with mild finger edema NVI, compartments are soft, normal cap refill.   NEURO: A&Ox3,  no focal neurological deficits,  SKIN: warm, dry, no rash.

## 2024-12-27 NOTE — ED ADULT NURSE NOTE - NSICDXPASTMEDICALHX_GEN_ALL_CORE_FT
PAST MEDICAL HISTORY:  BPH (benign prostatic hyperplasia)     GERD (gastroesophageal reflux disease)     H/O constipation     HLD (hyperlipidemia)     [place] (hard of hearing)     Snores

## 2024-12-27 NOTE — ED ADULT NURSE NOTE - NSFALLUNIVINTERV_ED_ALL_ED
Bed/Stretcher in lowest position, wheels locked, appropriate side rails in place/Call bell, personal items and telephone in reach/Instruct patient to call for assistance before getting out of bed/chair/stretcher/Non-slip footwear applied when patient is off stretcher/[place] to call system/Physically safe environment - no spills, clutter or unnecessary equipment/Purposeful proactive rounding/Room/bathroom lighting operational, light cord in reach

## 2024-12-27 NOTE — ED ADULT NURSE NOTE - CHIEF COMPLAINT QUOTE
BIBEMS from [facility] complaining of left elbow pain. Received surgery about 1x week ago, and has had unrelenting pain since. Denies fevers, chest pain, SOB.

## 2024-12-27 NOTE — ED PROVIDER NOTE - NSFOLLOWUPINSTRUCTIONS_ED_ALL_ED_FT
Please follow-up with orthopedics regarding your broken elbow and postsurgical management.    Please return to the ER if develop any new or worsening symptoms.    Please continue your pain management regimen as prescribed by the doctor at the rehab.

## 2024-12-27 NOTE — ED PROVIDER NOTE - PATIENT PORTAL LINK FT
You can access the FollowMyHealth Patient Portal offered by [facility] by registering at the following website: [email or web address]. By joining [facility] [employer]’s FollowMyHealth portal, you will also be able to view your health information using other applications (apps) compatible with our system.

## 2024-12-27 NOTE — ED ADULT TRIAGE NOTE - AS PAIN REST
8 (severe pain) Trilobed Flap Text: The defect edges were debeveled with a #15 scalpel blade.  Given the location of the defect and the proximity to free margins a trilobed flap was deemed most appropriate.  Using a sterile surgical marker, an appropriate trilobed flap drawn around the defect.    The area thus outlined was incised deep to adipose tissue with a #15 scalpel blade.  The skin margins were undermined to an appropriate distance in all directions utilizing iris scissors.

## 2024-12-27 NOTE — ED PROVIDER NOTE - WR INTERPRETATION 1
Olecranon fracture with surgical hardware in good position, superficial surgical staples in place as well.

## 2024-12-27 NOTE — ED PROVIDER NOTE - CLINICAL SUMMARY MEDICAL DECISION MAKING FREE TEXT BOX
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  Patient had a fall was discharged from here yesterday after admission for olecranon fracture as well as pubic fracture to rehab.  Patient developed worsening elbow pain and swelling of the left hand which she states the swelling had resolved prior to being discharged and the pain was well-controlled prior to discharge as well.  Patient denies any new injury, numbness or tingling, chest pain, shortness of breath.   Patient's left fingers are mildly edematous compared to the right, neurovascular intact, compartments are soft, full strength in the hand, normal cap refill, hemodynamically stable and otherwise well-appearing.  No concern for compartment syndrome, more likely to be normal postoperative pain, will evaluate for new fracture or shift in hardware  With x-ray, Ortho consult. reassess

## 2024-12-27 NOTE — ED PROVIDER NOTE - CARE PROVIDER_API CALL
Sha Wallace.  Orthopaedic Surgery  [address] [place] [address]  Phone: [phone]  Fax: [phone]  Follow Up Time: 1-3 Days

## 2024-12-27 NOTE — ED ADULT TRIAGE NOTE - CHIEF COMPLAINT QUOTE
BIBEMS from [facility] complaining of left elbow pain. Received surgery about 1x week ago, and has had unrelenting pain since. Denies fevers, chest pain, SOB.

## 2024-12-27 NOTE — ED PROVIDER NOTE - NSICDXPASTMEDICALHX_GEN_ALL_CORE_FT
PAST MEDICAL HISTORY:  BPH (benign prostatic hyperplasia)     GERD (gastroesophageal reflux disease)     H/O constipation     HLD (hyperlipidemia)     [place] (hard of hearing)     Snores

## 2025-01-02 DIAGNOSIS — S32.502A UNSPECIFIED FRACTURE OF LEFT PUBIS, INITIAL ENCOUNTER FOR CLOSED FRACTURE: ICD-10-CM

## 2025-01-02 DIAGNOSIS — S52.022A DISPLACED FRACTURE OF OLECRANON PROCESS WITHOUT INTRAARTICULAR EXTENSION OF LEFT ULNA, INITIAL ENCOUNTER FOR CLOSED FRACTURE: ICD-10-CM

## 2025-01-02 DIAGNOSIS — Z79.02 LONG TERM (CURRENT) USE OF ANTITHROMBOTICS/ANTIPLATELETS: ICD-10-CM

## 2025-01-02 DIAGNOSIS — S70.02XA CONTUSION OF LEFT HIP, INITIAL ENCOUNTER: ICD-10-CM

## 2025-01-02 DIAGNOSIS — Z79.82 LONG TERM (CURRENT) USE OF ASPIRIN: ICD-10-CM

## 2025-01-02 DIAGNOSIS — Z86.73 PERSONAL HISTORY OF TRANSIENT ISCHEMIC ATTACK (TIA), AND CEREBRAL INFARCTION WITHOUT RESIDUAL DEFICITS: ICD-10-CM

## 2025-01-02 DIAGNOSIS — H91.8X9 OTHER SPECIFIED HEARING LOSS, UNSPECIFIED EAR: ICD-10-CM

## 2025-01-02 DIAGNOSIS — E43 UNSPECIFIED SEVERE PROTEIN-CALORIE MALNUTRITION: ICD-10-CM

## 2025-01-02 DIAGNOSIS — Z85.46 PERSONAL HISTORY OF MALIGNANT NEOPLASM OF PROSTATE: ICD-10-CM

## 2025-01-02 DIAGNOSIS — Z95.818 PRESENCE OF OTHER CARDIAC IMPLANTS AND GRAFTS: ICD-10-CM

## 2025-01-02 DIAGNOSIS — E78.5 HYPERLIPIDEMIA, UNSPECIFIED: ICD-10-CM

## 2025-01-02 DIAGNOSIS — K21.9 GASTRO-ESOPHAGEAL REFLUX DISEASE WITHOUT ESOPHAGITIS: ICD-10-CM

## 2025-01-02 DIAGNOSIS — M31.6 OTHER GIANT CELL ARTERITIS: ICD-10-CM

## 2025-01-02 DIAGNOSIS — N40.0 BENIGN PROSTATIC HYPERPLASIA WITHOUT LOWER URINARY TRACT SYMPTOMS: ICD-10-CM

## 2025-01-02 DIAGNOSIS — Z79.52 LONG TERM (CURRENT) USE OF SYSTEMIC STEROIDS: ICD-10-CM

## 2025-01-02 DIAGNOSIS — Z88.0 ALLERGY STATUS TO PENICILLIN: ICD-10-CM

## 2025-01-02 DIAGNOSIS — S32.110A NONDISPLACED ZONE I FRACTURE OF SACRUM, INITIAL ENCOUNTER FOR CLOSED FRACTURE: ICD-10-CM

## 2025-01-03 ENCOUNTER — APPOINTMENT (OUTPATIENT)
Dept: RHEUMATOLOGY | Facility: CLINIC | Age: 85
End: 2025-01-03

## 2025-01-06 ENCOUNTER — APPOINTMENT (OUTPATIENT)
Dept: ELECTROPHYSIOLOGY | Facility: CLINIC | Age: 85
End: 2025-01-06

## 2025-01-10 ENCOUNTER — NON-APPOINTMENT (OUTPATIENT)
Age: 85
End: 2025-01-10

## 2025-01-15 ENCOUNTER — NON-APPOINTMENT (OUTPATIENT)
Age: 85
End: 2025-01-15

## 2025-01-16 ENCOUNTER — APPOINTMENT (OUTPATIENT)
Dept: RHEUMATOLOGY | Facility: CLINIC | Age: 85
End: 2025-01-16
Payer: MEDICARE

## 2025-01-16 ENCOUNTER — MED ADMIN CHARGE (OUTPATIENT)
Age: 85
End: 2025-01-16

## 2025-01-16 VITALS
HEART RATE: 63 BPM | TEMPERATURE: 97 F | SYSTOLIC BLOOD PRESSURE: 127 MMHG | DIASTOLIC BLOOD PRESSURE: 75 MMHG | OXYGEN SATURATION: 98 %

## 2025-01-16 PROCEDURE — 96413 CHEMO IV INFUSION 1 HR: CPT

## 2025-01-16 RX ORDER — TOCILIZUMAB 20 MG/ML
80 INJECTION, SOLUTION, CONCENTRATE INTRAVENOUS
Qty: 1 | Refills: 0 | Status: COMPLETED
Start: 2024-03-22

## 2025-01-16 RX ORDER — TOCILIZUMAB 20 MG/ML
200 INJECTION, SOLUTION, CONCENTRATE INTRAVENOUS
Qty: 1 | Refills: 0 | Status: COMPLETED
Start: 2024-06-14

## 2025-01-21 ENCOUNTER — APPOINTMENT (OUTPATIENT)
Dept: ELECTROPHYSIOLOGY | Facility: CLINIC | Age: 85
End: 2025-01-21
Payer: MEDICARE

## 2025-01-21 ENCOUNTER — NON-APPOINTMENT (OUTPATIENT)
Age: 85
End: 2025-01-21

## 2025-01-21 PROCEDURE — 93298 REM INTERROG DEV EVAL SCRMS: CPT

## 2025-01-22 ENCOUNTER — APPOINTMENT (OUTPATIENT)
Dept: HEMATOLOGY ONCOLOGY | Facility: CLINIC | Age: 85
End: 2025-01-22

## 2025-01-27 ENCOUNTER — OUTPATIENT (OUTPATIENT)
Dept: OUTPATIENT SERVICES | Facility: HOSPITAL | Age: 85
LOS: 1 days | Discharge: HOME | End: 2025-01-27

## 2025-01-27 DIAGNOSIS — Z98.890 OTHER SPECIFIED POSTPROCEDURAL STATES: Chronic | ICD-10-CM

## 2025-01-27 DIAGNOSIS — D64.9 ANEMIA, UNSPECIFIED: ICD-10-CM

## 2025-01-27 DIAGNOSIS — D47.2 MONOCLONAL GAMMOPATHY: ICD-10-CM

## 2025-01-27 DIAGNOSIS — E53.8 DEFICIENCY OF OTHER SPECIFIED B GROUP VITAMINS: ICD-10-CM

## 2025-01-29 ENCOUNTER — RESULT REVIEW (OUTPATIENT)
Age: 85
End: 2025-01-29

## 2025-01-29 ENCOUNTER — APPOINTMENT (OUTPATIENT)
Dept: HEMATOLOGY ONCOLOGY | Facility: CLINIC | Age: 85
End: 2025-01-29
Payer: MEDICARE

## 2025-01-29 VITALS
DIASTOLIC BLOOD PRESSURE: 62 MMHG | TEMPERATURE: 97.9 F | HEART RATE: 70 BPM | HEIGHT: 73 IN | WEIGHT: 169 LBS | SYSTOLIC BLOOD PRESSURE: 135 MMHG | OXYGEN SATURATION: 97 % | BODY MASS INDEX: 22.4 KG/M2

## 2025-01-29 DIAGNOSIS — K92.2 GASTROINTESTINAL HEMORRHAGE, UNSPECIFIED: ICD-10-CM

## 2025-01-29 DIAGNOSIS — E53.8 DEFICIENCY OF OTHER SPECIFIED B GROUP VITAMINS: ICD-10-CM

## 2025-01-29 DIAGNOSIS — D47.2 MONOCLONAL GAMMOPATHY: ICD-10-CM

## 2025-01-29 DIAGNOSIS — E61.1 IRON DEFICIENCY: ICD-10-CM

## 2025-01-29 LAB
BASOPHILS # BLD AUTO: 0.03 K/UL — SIGNIFICANT CHANGE UP (ref 0–0.2)
BASOPHILS NFR BLD AUTO: 0.5 % — SIGNIFICANT CHANGE UP (ref 0–2)
EOSINOPHIL # BLD AUTO: 0.07 K/UL — SIGNIFICANT CHANGE UP (ref 0–0.5)
EOSINOPHIL NFR BLD AUTO: 1.1 % — SIGNIFICANT CHANGE UP (ref 0–6)
HCT VFR BLD CALC: 35.2 % — LOW (ref 39–50)
HGB BLD-MCNC: 11.5 G/DL — LOW (ref 13–17)
IMM GRANULOCYTES NFR BLD AUTO: 0.8 % — SIGNIFICANT CHANGE UP (ref 0–0.9)
LYMPHOCYTES # BLD AUTO: 1.77 K/UL — SIGNIFICANT CHANGE UP (ref 1–3.3)
LYMPHOCYTES # BLD AUTO: 26.7 % — SIGNIFICANT CHANGE UP (ref 13–44)
MCHC RBC-ENTMCNC: 31.2 PG — SIGNIFICANT CHANGE UP (ref 27–34)
MCHC RBC-ENTMCNC: 32.7 G/DL — SIGNIFICANT CHANGE UP (ref 32–36)
MCV RBC AUTO: 95.4 FL — SIGNIFICANT CHANGE UP (ref 80–100)
MONOCYTES # BLD AUTO: 0.6 K/UL — SIGNIFICANT CHANGE UP (ref 0–0.9)
MONOCYTES NFR BLD AUTO: 9 % — SIGNIFICANT CHANGE UP (ref 2–14)
NEUTROPHILS # BLD AUTO: 4.11 K/UL — SIGNIFICANT CHANGE UP (ref 1.8–7.4)
NEUTROPHILS NFR BLD AUTO: 61.9 % — SIGNIFICANT CHANGE UP (ref 43–77)
NRBC # BLD: 0 /100 WBCS — SIGNIFICANT CHANGE UP (ref 0–0)
NRBC BLD-RTO: 0 /100 WBCS — SIGNIFICANT CHANGE UP (ref 0–0)
PLATELET # BLD AUTO: 214 K/UL — SIGNIFICANT CHANGE UP (ref 150–400)
RBC # BLD: 3.69 M/UL — LOW (ref 4.2–5.8)
RBC # FLD: 15.6 % — HIGH (ref 10.3–14.5)
WBC # BLD: 6.63 K/UL — SIGNIFICANT CHANGE UP (ref 3.8–10.5)
WBC # FLD AUTO: 6.63 K/UL — SIGNIFICANT CHANGE UP (ref 3.8–10.5)

## 2025-01-29 PROCEDURE — 99214 OFFICE O/P EST MOD 30 MIN: CPT

## 2025-01-30 DIAGNOSIS — E61.1 IRON DEFICIENCY: ICD-10-CM

## 2025-01-30 LAB — PROT ?TM UR-MCNC: 16 MG/DL — HIGH (ref 0–12)

## 2025-02-01 LAB
% GAMMA, URINE: 16.2 % — SIGNIFICANT CHANGE UP
% M SPIKE, URINE: 4.8 % — SIGNIFICANT CHANGE UP
ALBUMIN 24H MFR UR ELPH: 21.2 % — SIGNIFICANT CHANGE UP
ALPHA1 GLOB 24H MFR UR ELPH: 27.8 % — SIGNIFICANT CHANGE UP
ALPHA2 GLOB 24H MFR UR ELPH: 18.7 % — SIGNIFICANT CHANGE UP
B-GLOBULIN 24H MFR UR ELPH: 16.1 % — SIGNIFICANT CHANGE UP
INTERPRETATION 24H UR IFE-IMP: SIGNIFICANT CHANGE UP
M PROTEIN 24H UR ELPH-MRATE: PRESENT
PROT ?TM UR-MCNC: 16 MG/DL — HIGH (ref 0–12)
PROT PATTERN 24H UR ELPH-IMP: SIGNIFICANT CHANGE UP

## 2025-02-12 ENCOUNTER — APPOINTMENT (OUTPATIENT)
Dept: RHEUMATOLOGY | Facility: CLINIC | Age: 85
End: 2025-02-12
Payer: MEDICARE

## 2025-02-12 ENCOUNTER — NON-APPOINTMENT (OUTPATIENT)
Age: 85
End: 2025-02-12

## 2025-02-12 VITALS
HEART RATE: 66 BPM | WEIGHT: 169 LBS | OXYGEN SATURATION: 96 % | DIASTOLIC BLOOD PRESSURE: 73 MMHG | BODY MASS INDEX: 22.4 KG/M2 | SYSTOLIC BLOOD PRESSURE: 121 MMHG | HEIGHT: 73 IN | RESPIRATION RATE: 15 BRPM

## 2025-02-12 DIAGNOSIS — R53.83 OTHER FATIGUE: ICD-10-CM

## 2025-02-12 DIAGNOSIS — M31.6 OTHER GIANT CELL ARTERITIS: ICD-10-CM

## 2025-02-12 DIAGNOSIS — Z79.899 OTHER LONG TERM (CURRENT) DRUG THERAPY: ICD-10-CM

## 2025-02-12 DIAGNOSIS — S32.409A UNSPECIFIED FRACTURE OF UNSPECIFIED ACETABULUM, INITIAL ENCOUNTER FOR CLOSED FRACTURE: ICD-10-CM

## 2025-02-12 DIAGNOSIS — S32.9XXA FRACTURE OF UNSPECIFIED PARTS OF LUMBOSACRAL SPINE AND PELVIS, INITIAL ENCOUNTER FOR CLOSED FRACTURE: ICD-10-CM

## 2025-02-12 PROCEDURE — 99215 OFFICE O/P EST HI 40 MIN: CPT

## 2025-02-14 ENCOUNTER — APPOINTMENT (OUTPATIENT)
Dept: RHEUMATOLOGY | Facility: CLINIC | Age: 85
End: 2025-02-14

## 2025-02-14 ENCOUNTER — MED ADMIN CHARGE (OUTPATIENT)
Age: 85
End: 2025-02-14

## 2025-02-14 VITALS
SYSTOLIC BLOOD PRESSURE: 132 MMHG | DIASTOLIC BLOOD PRESSURE: 74 MMHG | OXYGEN SATURATION: 98 % | HEART RATE: 70 BPM | TEMPERATURE: 97 F

## 2025-02-14 PROCEDURE — 36415 COLL VENOUS BLD VENIPUNCTURE: CPT

## 2025-02-14 PROCEDURE — 96413 CHEMO IV INFUSION 1 HR: CPT

## 2025-02-14 RX ORDER — TOCILIZUMAB 20 MG/ML
200 INJECTION, SOLUTION, CONCENTRATE INTRAVENOUS
Qty: 1 | Refills: 0 | Status: COMPLETED
Start: 2024-06-14

## 2025-02-14 RX ORDER — TOCILIZUMAB 20 MG/ML
80 INJECTION, SOLUTION, CONCENTRATE INTRAVENOUS
Qty: 1 | Refills: 0 | Status: COMPLETED
Start: 2024-03-22

## 2025-02-17 ENCOUNTER — LABORATORY RESULT (OUTPATIENT)
Age: 85
End: 2025-02-17

## 2025-02-17 NOTE — REASON FOR VISIT
Patient interested in med management to help w weight  Also trying to go to the gym more often  Dietary habits are work in progress when in comes to food choices  Will try Wegovy if covered by insurance   [Follow-Up Visit] : a follow-up visit for [FreeTextEntry2] : follow up after  consultation for anemia , gammopathy

## 2025-02-18 ENCOUNTER — LABORATORY RESULT (OUTPATIENT)
Age: 85
End: 2025-02-18

## 2025-02-19 ENCOUNTER — LABORATORY RESULT (OUTPATIENT)
Age: 85
End: 2025-02-19

## 2025-02-20 LAB
ALBUMIN SERPL ELPH-MCNC: 4 G/DL
ALP BLD-CCNC: 127 U/L
ALT SERPL-CCNC: 20 U/L
ANION GAP SERPL CALC-SCNC: 11 MMOL/L
AST SERPL-CCNC: 17 U/L
BASOPHILS # BLD AUTO: 0.02 K/UL
BASOPHILS NFR BLD AUTO: 0.3 %
BILIRUB SERPL-MCNC: 0.8 MG/DL
BUN SERPL-MCNC: 20 MG/DL
CALCIUM SERPL-MCNC: 9.6 MG/DL
CHLORIDE SERPL-SCNC: 104 MMOL/L
CO2 SERPL-SCNC: 26 MMOL/L
CORTIS SERPL-MCNC: 5.8 UG/DL
CREAT SERPL-MCNC: 0.83 MG/DL
CRP SERPL-MCNC: <3 MG/L
EGFR: 86 ML/MIN/1.73M2
EOSINOPHIL # BLD AUTO: 0.07 K/UL
EOSINOPHIL NFR BLD AUTO: 1 %
ERYTHROCYTE [SEDIMENTATION RATE] IN BLOOD BY WESTERGREN METHOD: 23 MM/HR
GLUCOSE SERPL-MCNC: 150 MG/DL
HCT VFR BLD CALC: 35.8 %
HGB BLD-MCNC: 11.1 G/DL
IMM GRANULOCYTES NFR BLD AUTO: 0.6 %
LYMPHOCYTES # BLD AUTO: 1.48 K/UL
LYMPHOCYTES NFR BLD AUTO: 21.1 %
MAN DIFF?: NORMAL
MCHC RBC-ENTMCNC: 31 G/DL
MCHC RBC-ENTMCNC: 32.4 PG
MCV RBC AUTO: 104.4 FL
MONOCYTES # BLD AUTO: 0.66 K/UL
MONOCYTES NFR BLD AUTO: 9.4 %
NEUTROPHILS # BLD AUTO: 4.74 K/UL
NEUTROPHILS NFR BLD AUTO: 67.6 %
PLATELET # BLD AUTO: 201 K/UL
POTASSIUM SERPL-SCNC: 4.4 MMOL/L
PROT SERPL-MCNC: 7.2 G/DL
RBC # BLD: 3.43 M/UL
RBC # FLD: 16.7 %
SODIUM SERPL-SCNC: 141 MMOL/L
T4 FREE SERPL-MCNC: 0.9 NG/DL
TSH SERPL-ACNC: 1.85 UIU/ML
WBC # FLD AUTO: 7.01 K/UL

## 2025-02-24 ENCOUNTER — APPOINTMENT (OUTPATIENT)
Dept: ELECTROPHYSIOLOGY | Facility: CLINIC | Age: 85
End: 2025-02-24
Payer: MEDICARE

## 2025-02-24 ENCOUNTER — NON-APPOINTMENT (OUTPATIENT)
Age: 85
End: 2025-02-24

## 2025-02-24 PROCEDURE — 93298 REM INTERROG DEV EVAL SCRMS: CPT

## 2025-02-28 ENCOUNTER — APPOINTMENT (OUTPATIENT)
Dept: ENDOCRINOLOGY | Facility: CLINIC | Age: 85
End: 2025-02-28
Payer: MEDICARE

## 2025-02-28 VITALS
DIASTOLIC BLOOD PRESSURE: 62 MMHG | BODY MASS INDEX: 22.53 KG/M2 | HEIGHT: 73 IN | HEART RATE: 72 BPM | OXYGEN SATURATION: 95 % | WEIGHT: 170 LBS | SYSTOLIC BLOOD PRESSURE: 110 MMHG

## 2025-02-28 DIAGNOSIS — M81.8 OTHER OSTEOPOROSIS W/OUT CURRENT PATHOLOGICAL FRACTURE: ICD-10-CM

## 2025-02-28 DIAGNOSIS — T38.0X5A OTHER OSTEOPOROSIS W/OUT CURRENT PATHOLOGICAL FRACTURE: ICD-10-CM

## 2025-02-28 DIAGNOSIS — R53.83 OTHER FATIGUE: ICD-10-CM

## 2025-02-28 DIAGNOSIS — S32.9XXA FRACTURE OF UNSPECIFIED PARTS OF LUMBOSACRAL SPINE AND PELVIS, INITIAL ENCOUNTER FOR CLOSED FRACTURE: ICD-10-CM

## 2025-02-28 PROCEDURE — G2211 COMPLEX E/M VISIT ADD ON: CPT

## 2025-02-28 PROCEDURE — 99204 OFFICE O/P NEW MOD 45 MIN: CPT

## 2025-03-06 DIAGNOSIS — Z79.899 OTHER LONG TERM (CURRENT) DRUG THERAPY: ICD-10-CM

## 2025-03-07 DIAGNOSIS — M31.6 OTHER GIANT CELL ARTERITIS: ICD-10-CM

## 2025-03-07 LAB
25(OH)D3 SERPL-MCNC: 32.5 NG/ML
ACTH SER-ACNC: 35.1 PG/ML

## 2025-03-07 NOTE — ASU PREOP CHECKLIST - BLOOD AVAILABLE
Discharge instructions provided. Pt was given copy of discharge instructions with 0 paper script(s) and 3 electronic script(s). Pt verbalized understanding of the medication instructions, and the importance of following up as recommended by EDP. Pt has no further questions at this time. Wheelchair offered from treatment area to hospital entrance, pt declined. Pt leaving ED ambulatory and in stable condition.     
n/a

## 2025-03-14 ENCOUNTER — APPOINTMENT (OUTPATIENT)
Dept: RHEUMATOLOGY | Facility: CLINIC | Age: 85
End: 2025-03-14
Payer: MEDICARE

## 2025-03-14 ENCOUNTER — MED ADMIN CHARGE (OUTPATIENT)
Age: 85
End: 2025-03-14

## 2025-03-14 VITALS
SYSTOLIC BLOOD PRESSURE: 120 MMHG | HEART RATE: 60 BPM | DIASTOLIC BLOOD PRESSURE: 72 MMHG | OXYGEN SATURATION: 96 % | TEMPERATURE: 98 F

## 2025-03-14 PROCEDURE — 96413 CHEMO IV INFUSION 1 HR: CPT

## 2025-03-14 RX ORDER — TOCILIZUMAB 20 MG/ML
80 INJECTION, SOLUTION, CONCENTRATE INTRAVENOUS
Qty: 1 | Refills: 0 | Status: COMPLETED
Start: 2024-03-22

## 2025-03-14 RX ORDER — TOCILIZUMAB 20 MG/ML
200 INJECTION, SOLUTION, CONCENTRATE INTRAVENOUS
Qty: 1 | Refills: 0 | Status: COMPLETED
Start: 2024-06-14

## 2025-03-19 NOTE — H&P ADULT - TIME BILLING
5 Pt examination, review of relevant labs and radiologic studies, assured stabilization of pt, discussion with relevant services/providers for coordination of pt care and services, time is exclusive of resident and/or physician assistant teaching or supervision time

## 2025-03-21 ENCOUNTER — LABORATORY RESULT (OUTPATIENT)
Age: 85
End: 2025-03-21

## 2025-03-21 ENCOUNTER — APPOINTMENT (OUTPATIENT)
Dept: INTERNAL MEDICINE | Facility: CLINIC | Age: 85
End: 2025-03-21
Payer: MEDICARE

## 2025-03-21 VITALS — DIASTOLIC BLOOD PRESSURE: 70 MMHG | SYSTOLIC BLOOD PRESSURE: 130 MMHG | RESPIRATION RATE: 14 BRPM | HEART RATE: 68 BPM

## 2025-03-21 DIAGNOSIS — M25.511 PAIN IN RIGHT SHOULDER: ICD-10-CM

## 2025-03-21 DIAGNOSIS — K21.9 GASTRO-ESOPHAGEAL REFLUX DISEASE W/OUT ESOPHAGITIS: ICD-10-CM

## 2025-03-21 DIAGNOSIS — G47.00 INSOMNIA, UNSPECIFIED: ICD-10-CM

## 2025-03-21 DIAGNOSIS — E78.5 HYPERLIPIDEMIA, UNSPECIFIED: ICD-10-CM

## 2025-03-21 DIAGNOSIS — C61 MALIGNANT NEOPLASM OF PROSTATE: ICD-10-CM

## 2025-03-21 DIAGNOSIS — M31.6 OTHER GIANT CELL ARTERITIS: ICD-10-CM

## 2025-03-21 DIAGNOSIS — M62.81 MUSCLE WEAKNESS (GENERALIZED): ICD-10-CM

## 2025-03-21 DIAGNOSIS — M54.50 LOW BACK PAIN, UNSPECIFIED: ICD-10-CM

## 2025-03-21 PROCEDURE — G2211 COMPLEX E/M VISIT ADD ON: CPT

## 2025-03-21 PROCEDURE — 99215 OFFICE O/P EST HI 40 MIN: CPT

## 2025-03-21 PROCEDURE — 36415 COLL VENOUS BLD VENIPUNCTURE: CPT

## 2025-03-21 RX ORDER — PANTOPRAZOLE SODIUM 40 MG/1
40 TABLET, DELAYED RELEASE ORAL
Refills: 0 | Status: ACTIVE | COMMUNITY
Start: 2025-03-21

## 2025-03-21 RX ORDER — CHLORHEXIDINE GLUCONATE 4 %
5 LIQUID (ML) TOPICAL
Qty: 90 | Refills: 2 | Status: ACTIVE | COMMUNITY
Start: 2025-03-21

## 2025-03-24 ENCOUNTER — NON-APPOINTMENT (OUTPATIENT)
Age: 85
End: 2025-03-24

## 2025-03-24 LAB
ALBUMIN SERPL ELPH-MCNC: 4.1 G/DL
ALP BLD-CCNC: 77 U/L
ALT SERPL-CCNC: 18 U/L
ANION GAP SERPL CALC-SCNC: 10 MMOL/L
AST SERPL-CCNC: 22 U/L
BASOPHILS # BLD AUTO: 0.04 K/UL
BASOPHILS NFR BLD AUTO: 0.7 %
BILIRUB SERPL-MCNC: 0.7 MG/DL
BUN SERPL-MCNC: 24 MG/DL
CALCIUM SERPL-MCNC: 9.8 MG/DL
CHLORIDE SERPL-SCNC: 102 MMOL/L
CHOLEST SERPL-MCNC: 142 MG/DL
CK SERPL-CCNC: 113 U/L
CO2 SERPL-SCNC: 27 MMOL/L
CREAT SERPL-MCNC: 0.92 MG/DL
EGFRCR SERPLBLD CKD-EPI 2021: 82 ML/MIN/1.73M2
EOSINOPHIL # BLD AUTO: 0.11 K/UL
EOSINOPHIL NFR BLD AUTO: 2 %
ERYTHROCYTE [SEDIMENTATION RATE] IN BLOOD BY WESTERGREN METHOD: 12 MM/HR
ESTIMATED AVERAGE GLUCOSE: 114 MG/DL
GLUCOSE SERPL-MCNC: 95 MG/DL
HBA1C MFR BLD HPLC: 5.6 %
HCT VFR BLD CALC: 35.3 %
HDLC SERPL-MCNC: 61 MG/DL
HGB BLD-MCNC: 11.6 G/DL
IMM GRANULOCYTES NFR BLD AUTO: 0.2 %
LDLC SERPL-MCNC: 68 MG/DL
LYMPHOCYTES # BLD AUTO: 1.69 K/UL
LYMPHOCYTES NFR BLD AUTO: 31.4 %
MAN DIFF?: NORMAL
MCHC RBC-ENTMCNC: 32.7 PG
MCHC RBC-ENTMCNC: 32.9 G/DL
MCV RBC AUTO: 99.4 FL
MONOCYTES # BLD AUTO: 0.68 K/UL
MONOCYTES NFR BLD AUTO: 12.6 %
NEUTROPHILS # BLD AUTO: 2.86 K/UL
NEUTROPHILS NFR BLD AUTO: 53.1 %
NONHDLC SERPL-MCNC: 81 MG/DL
PLATELET # BLD AUTO: 204 K/UL
POTASSIUM SERPL-SCNC: 4 MMOL/L
PROT SERPL-MCNC: 7.7 G/DL
PSA SERPL-MCNC: 0.12 NG/ML
RBC # BLD: 3.55 M/UL
RBC # FLD: 14.7 %
SODIUM SERPL-SCNC: 139 MMOL/L
TRIGL SERPL-MCNC: 62 MG/DL
VIT B12 SERPL-MCNC: 805 PG/ML
WBC # FLD AUTO: 5.39 K/UL

## 2025-03-25 DIAGNOSIS — Z79.51 LONG TERM (CURRENT) USE OF INHALED STEROIDS: ICD-10-CM

## 2025-03-25 DIAGNOSIS — Z79.52 LONG TERM (CURRENT) USE OF SYSTEMIC STEROIDS: ICD-10-CM

## 2025-03-25 LAB — CORTIS SERPL-MCNC: 7.3 UG/DL

## 2025-03-26 ENCOUNTER — OUTPATIENT (OUTPATIENT)
Dept: OUTPATIENT SERVICES | Facility: HOSPITAL | Age: 85
LOS: 1 days | End: 2025-03-26
Payer: MEDICARE

## 2025-03-26 ENCOUNTER — APPOINTMENT (OUTPATIENT)
Dept: MRI IMAGING | Facility: CLINIC | Age: 85
End: 2025-03-26
Payer: MEDICARE

## 2025-03-26 ENCOUNTER — APPOINTMENT (OUTPATIENT)
Dept: RADIOLOGY | Facility: CLINIC | Age: 85
End: 2025-03-26
Payer: MEDICARE

## 2025-03-26 DIAGNOSIS — M25.511 PAIN IN RIGHT SHOULDER: ICD-10-CM

## 2025-03-26 DIAGNOSIS — M54.50 LOW BACK PAIN, UNSPECIFIED: ICD-10-CM

## 2025-03-26 DIAGNOSIS — M62.81 MUSCLE WEAKNESS (GENERALIZED): ICD-10-CM

## 2025-03-26 DIAGNOSIS — Z98.890 OTHER SPECIFIED POSTPROCEDURAL STATES: Chronic | ICD-10-CM

## 2025-03-26 PROCEDURE — 72148 MRI LUMBAR SPINE W/O DYE: CPT | Mod: 26

## 2025-03-26 PROCEDURE — 73030 X-RAY EXAM OF SHOULDER: CPT | Mod: 26,RT

## 2025-03-26 PROCEDURE — 72148 MRI LUMBAR SPINE W/O DYE: CPT

## 2025-03-26 PROCEDURE — 73030 X-RAY EXAM OF SHOULDER: CPT

## 2025-03-26 NOTE — DISCHARGE NOTE PROVIDER - NSDCHC_MEDRECSTATUS_GEN_ALL_CORE
SPORTS CLEARANCE     Marc Auguste    Telephone: [phone] (home)  [address] [facility] [address]  YOB: 2010   15 year old male      I certify that the above student has been medically evaluated and is deemed to be physically fit to participate in school interscholastic activities as indicated below.    Participation Clearance For:   Collision Sports, YES      Immunizations up to date: Yes     Date of physical exam:  03/26/25         _______________________________________________  Attending Provider Signature     3/26/2025      Jorge Munguia, CYNDY CNP    Electronically signed    Valid for 3 years from above date with a normal Annual Health Questionnaire (all NO responses)     Year 2     Year 3      A sports clearance letter meets the [facility] requirements for sports participation.  If there are concerns about this policy please call [facility] administration office directly at [phone].     Admission Reconciliation is Completed  Discharge Reconciliation is Completed

## 2025-03-28 ENCOUNTER — NON-APPOINTMENT (OUTPATIENT)
Age: 85
End: 2025-03-28

## 2025-03-31 ENCOUNTER — NON-APPOINTMENT (OUTPATIENT)
Age: 85
End: 2025-03-31

## 2025-03-31 ENCOUNTER — MED ADMIN CHARGE (OUTPATIENT)
Age: 85
End: 2025-03-31

## 2025-03-31 ENCOUNTER — APPOINTMENT (OUTPATIENT)
Dept: ELECTROPHYSIOLOGY | Facility: CLINIC | Age: 85
End: 2025-03-31
Payer: MEDICARE

## 2025-03-31 ENCOUNTER — APPOINTMENT (OUTPATIENT)
Dept: ENDOCRINOLOGY | Facility: CLINIC | Age: 85
End: 2025-03-31
Payer: MEDICARE

## 2025-03-31 PROCEDURE — 96372 THER/PROPH/DIAG INJ SC/IM: CPT

## 2025-03-31 PROCEDURE — 93298 REM INTERROG DEV EVAL SCRMS: CPT

## 2025-03-31 RX ORDER — COSYNTROPIN 0.25 MG/ML
0.25 INJECTION, POWDER, LYOPHILIZED, FOR SOLUTION INTRAVENOUS
Qty: 0 | Refills: 0 | Status: COMPLETED | OUTPATIENT
Start: 2025-03-27

## 2025-04-02 LAB
ACTH STIM ACTH BASELINE: 23 PG/ML
ACTH STIM CORTISOL BASELINE: 10.5 UG/DL
ACTH STIMULATION : CORTISOL 60 MIN: 26.3 UG/DL

## 2025-04-04 ENCOUNTER — APPOINTMENT (OUTPATIENT)
Dept: NEUROLOGY | Facility: CLINIC | Age: 85
End: 2025-04-04
Payer: MEDICARE

## 2025-04-04 VITALS
WEIGHT: 170 LBS | HEIGHT: 73 IN | DIASTOLIC BLOOD PRESSURE: 80 MMHG | BODY MASS INDEX: 22.53 KG/M2 | SYSTOLIC BLOOD PRESSURE: 134 MMHG | HEART RATE: 64 BPM

## 2025-04-04 DIAGNOSIS — G20.C PARKINSONISM, UNSPECIFIED: ICD-10-CM

## 2025-04-04 PROCEDURE — 99205 OFFICE O/P NEW HI 60 MIN: CPT

## 2025-04-04 PROCEDURE — 99215 OFFICE O/P EST HI 40 MIN: CPT

## 2025-04-04 RX ORDER — CARBIDOPA AND LEVODOPA 25; 100 MG/1; MG/1
25-100 TABLET, EXTENDED RELEASE ORAL
Qty: 90 | Refills: 3 | Status: ACTIVE | COMMUNITY
Start: 2025-04-04 | End: 1900-01-01

## 2025-04-08 ENCOUNTER — NON-APPOINTMENT (OUTPATIENT)
Age: 85
End: 2025-04-08

## 2025-04-08 ENCOUNTER — APPOINTMENT (OUTPATIENT)
Dept: NEUROLOGY | Facility: CLINIC | Age: 85
End: 2025-04-08

## 2025-04-09 ENCOUNTER — RX RENEWAL (OUTPATIENT)
Age: 85
End: 2025-04-09

## 2025-04-11 ENCOUNTER — MED ADMIN CHARGE (OUTPATIENT)
Age: 85
End: 2025-04-11

## 2025-04-11 ENCOUNTER — APPOINTMENT (OUTPATIENT)
Dept: RHEUMATOLOGY | Facility: CLINIC | Age: 85
End: 2025-04-11
Payer: MEDICARE

## 2025-04-11 VITALS — OXYGEN SATURATION: 97 % | SYSTOLIC BLOOD PRESSURE: 130 MMHG | HEART RATE: 57 BPM | DIASTOLIC BLOOD PRESSURE: 74 MMHG

## 2025-04-11 PROCEDURE — 96413 CHEMO IV INFUSION 1 HR: CPT

## 2025-04-11 RX ORDER — TOCILIZUMAB 20 MG/ML
80 INJECTION, SOLUTION, CONCENTRATE INTRAVENOUS
Qty: 1 | Refills: 0 | Status: COMPLETED
Start: 2024-03-22

## 2025-04-11 RX ORDER — TOCILIZUMAB 20 MG/ML
200 INJECTION, SOLUTION, CONCENTRATE INTRAVENOUS
Qty: 1 | Refills: 0 | Status: COMPLETED
Start: 2024-06-14

## 2025-04-12 ENCOUNTER — TRANSCRIPTION ENCOUNTER (OUTPATIENT)
Age: 85
End: 2025-04-12

## 2025-04-12 ENCOUNTER — RX RENEWAL (OUTPATIENT)
Age: 85
End: 2025-04-12

## 2025-04-21 ENCOUNTER — OUTPATIENT (OUTPATIENT)
Dept: OUTPATIENT SERVICES | Facility: HOSPITAL | Age: 85
LOS: 1 days | End: 2025-04-21
Payer: MEDICARE

## 2025-04-21 ENCOUNTER — RESULT REVIEW (OUTPATIENT)
Age: 85
End: 2025-04-21

## 2025-04-21 ENCOUNTER — APPOINTMENT (OUTPATIENT)
Dept: NUCLEAR MEDICINE | Facility: IMAGING CENTER | Age: 85
End: 2025-04-21
Payer: MEDICARE

## 2025-04-21 DIAGNOSIS — Z98.890 OTHER SPECIFIED POSTPROCEDURAL STATES: Chronic | ICD-10-CM

## 2025-04-21 PROCEDURE — 78814 PET IMAGE W/CT LMTD: CPT | Mod: 26

## 2025-04-21 PROCEDURE — A9602: CPT

## 2025-04-21 PROCEDURE — 78999 UNLISTED MISC PX DX NUC MED: CPT | Mod: 26

## 2025-04-21 PROCEDURE — 78999 UNLISTED MISC PX DX NUC MED: CPT | Mod: MH

## 2025-04-21 PROCEDURE — 78814 PET IMAGE W/CT LMTD: CPT | Mod: MH

## 2025-04-22 ENCOUNTER — NON-APPOINTMENT (OUTPATIENT)
Age: 85
End: 2025-04-22

## 2025-05-02 ENCOUNTER — APPOINTMENT (OUTPATIENT)
Dept: INTERNAL MEDICINE | Facility: CLINIC | Age: 85
End: 2025-05-02
Payer: MEDICARE

## 2025-05-02 VITALS — WEIGHT: 179 LBS | BODY MASS INDEX: 23.62 KG/M2

## 2025-05-02 VITALS — DIASTOLIC BLOOD PRESSURE: 65 MMHG | HEART RATE: 66 BPM | SYSTOLIC BLOOD PRESSURE: 125 MMHG | RESPIRATION RATE: 14 BRPM

## 2025-05-02 DIAGNOSIS — G72.0 DRUG-INDUCED MYOPATHY: ICD-10-CM

## 2025-05-02 DIAGNOSIS — T38.0X5A DRUG-INDUCED MYOPATHY: ICD-10-CM

## 2025-05-02 PROCEDURE — G2211 COMPLEX E/M VISIT ADD ON: CPT

## 2025-05-02 PROCEDURE — 99214 OFFICE O/P EST MOD 30 MIN: CPT

## 2025-05-02 RX ORDER — FAMOTIDINE 40 MG/1
40 TABLET, FILM COATED ORAL
Refills: 0 | Status: ACTIVE | COMMUNITY
Start: 2025-05-02

## 2025-05-05 ENCOUNTER — APPOINTMENT (OUTPATIENT)
Dept: ELECTROPHYSIOLOGY | Facility: CLINIC | Age: 85
End: 2025-05-05
Payer: MEDICARE

## 2025-05-05 ENCOUNTER — NON-APPOINTMENT (OUTPATIENT)
Age: 85
End: 2025-05-05

## 2025-05-05 PROCEDURE — 93298 REM INTERROG DEV EVAL SCRMS: CPT

## 2025-05-07 ENCOUNTER — APPOINTMENT (OUTPATIENT)
Dept: RHEUMATOLOGY | Facility: CLINIC | Age: 85
End: 2025-05-07
Payer: MEDICARE

## 2025-05-07 ENCOUNTER — MED ADMIN CHARGE (OUTPATIENT)
Age: 85
End: 2025-05-07

## 2025-05-07 ENCOUNTER — OUTPATIENT (OUTPATIENT)
Dept: OUTPATIENT SERVICES | Facility: HOSPITAL | Age: 85
LOS: 1 days | Discharge: HOME | End: 2025-05-07

## 2025-05-07 DIAGNOSIS — E61.1 IRON DEFICIENCY: ICD-10-CM

## 2025-05-07 DIAGNOSIS — D47.2 MONOCLONAL GAMMOPATHY: ICD-10-CM

## 2025-05-07 DIAGNOSIS — Z98.890 OTHER SPECIFIED POSTPROCEDURAL STATES: Chronic | ICD-10-CM

## 2025-05-07 DIAGNOSIS — E53.8 DEFICIENCY OF OTHER SPECIFIED B GROUP VITAMINS: ICD-10-CM

## 2025-05-07 DIAGNOSIS — D64.9 ANEMIA, UNSPECIFIED: ICD-10-CM

## 2025-05-07 PROCEDURE — 96413 CHEMO IV INFUSION 1 HR: CPT

## 2025-05-07 RX ORDER — TOCILIZUMAB 20 MG/ML
200 INJECTION, SOLUTION, CONCENTRATE INTRAVENOUS
Qty: 1 | Refills: 0 | Status: COMPLETED
Start: 2024-06-14

## 2025-05-07 RX ORDER — TOCILIZUMAB 20 MG/ML
80 INJECTION, SOLUTION, CONCENTRATE INTRAVENOUS
Qty: 1 | Refills: 0 | Status: COMPLETED
Start: 2024-03-22

## 2025-05-14 ENCOUNTER — APPOINTMENT (OUTPATIENT)
Dept: HEMATOLOGY ONCOLOGY | Facility: CLINIC | Age: 85
End: 2025-05-14

## 2025-05-14 ENCOUNTER — RESULT REVIEW (OUTPATIENT)
Age: 85
End: 2025-05-14

## 2025-05-14 ENCOUNTER — LABORATORY RESULT (OUTPATIENT)
Age: 85
End: 2025-05-14

## 2025-05-14 LAB
BASOPHILS # BLD AUTO: 0.02 K/UL — SIGNIFICANT CHANGE UP (ref 0–0.2)
BASOPHILS NFR BLD AUTO: 0.3 % — SIGNIFICANT CHANGE UP (ref 0–2)
EOSINOPHIL # BLD AUTO: 0.09 K/UL — SIGNIFICANT CHANGE UP (ref 0–0.5)
EOSINOPHIL NFR BLD AUTO: 1.6 % — SIGNIFICANT CHANGE UP (ref 0–6)
HCT VFR BLD CALC: 37.6 % — LOW (ref 39–50)
HGB BLD-MCNC: 12.5 G/DL — LOW (ref 13–17)
IMM GRANULOCYTES NFR BLD AUTO: 0.3 % — SIGNIFICANT CHANGE UP (ref 0–0.9)
LYMPHOCYTES # BLD AUTO: 1.71 K/UL — SIGNIFICANT CHANGE UP (ref 1–3.3)
LYMPHOCYTES # BLD AUTO: 29.5 % — SIGNIFICANT CHANGE UP (ref 13–44)
MCHC RBC-ENTMCNC: 32.7 PG — SIGNIFICANT CHANGE UP (ref 27–34)
MCHC RBC-ENTMCNC: 33.2 G/DL — SIGNIFICANT CHANGE UP (ref 32–36)
MCV RBC AUTO: 98.4 FL — SIGNIFICANT CHANGE UP (ref 80–100)
MONOCYTES # BLD AUTO: 0.59 K/UL — SIGNIFICANT CHANGE UP (ref 0–0.9)
MONOCYTES NFR BLD AUTO: 10.2 % — SIGNIFICANT CHANGE UP (ref 2–14)
NEUTROPHILS # BLD AUTO: 3.36 K/UL — SIGNIFICANT CHANGE UP (ref 1.8–7.4)
NEUTROPHILS NFR BLD AUTO: 58.1 % — SIGNIFICANT CHANGE UP (ref 43–77)
NRBC BLD AUTO-RTO: 0 /100 WBCS — SIGNIFICANT CHANGE UP (ref 0–0)
PLATELET # BLD AUTO: 160 K/UL — SIGNIFICANT CHANGE UP (ref 150–400)
RBC # BLD: 3.82 M/UL — LOW (ref 4.2–5.8)
RBC # FLD: 13 % — SIGNIFICANT CHANGE UP (ref 10.3–14.5)
WBC # BLD: 5.79 K/UL — SIGNIFICANT CHANGE UP (ref 3.8–10.5)
WBC # FLD AUTO: 5.79 K/UL — SIGNIFICANT CHANGE UP (ref 3.8–10.5)

## 2025-05-19 ENCOUNTER — APPOINTMENT (OUTPATIENT)
Dept: NEUROLOGY | Facility: CLINIC | Age: 85
End: 2025-05-19
Payer: MEDICARE

## 2025-05-19 VITALS
HEIGHT: 73 IN | WEIGHT: 179 LBS | HEART RATE: 59 BPM | SYSTOLIC BLOOD PRESSURE: 132 MMHG | BODY MASS INDEX: 23.72 KG/M2 | DIASTOLIC BLOOD PRESSURE: 78 MMHG

## 2025-05-19 DIAGNOSIS — I63.9 CEREBRAL INFARCTION, UNSPECIFIED: ICD-10-CM

## 2025-05-19 DIAGNOSIS — I63.89 OTHER CEREBRAL INFARCTION: ICD-10-CM

## 2025-05-19 PROCEDURE — 99214 OFFICE O/P EST MOD 30 MIN: CPT

## 2025-05-19 PROCEDURE — G2211 COMPLEX E/M VISIT ADD ON: CPT

## 2025-05-21 ENCOUNTER — APPOINTMENT (OUTPATIENT)
Dept: HEMATOLOGY ONCOLOGY | Facility: CLINIC | Age: 85
End: 2025-05-21
Payer: MEDICARE

## 2025-05-21 VITALS
DIASTOLIC BLOOD PRESSURE: 71 MMHG | HEART RATE: 64 BPM | WEIGHT: 180 LBS | OXYGEN SATURATION: 97 % | SYSTOLIC BLOOD PRESSURE: 130 MMHG | TEMPERATURE: 97.7 F | BODY MASS INDEX: 23.75 KG/M2

## 2025-05-21 DIAGNOSIS — E61.1 IRON DEFICIENCY: ICD-10-CM

## 2025-05-21 DIAGNOSIS — D47.2 MONOCLONAL GAMMOPATHY: ICD-10-CM

## 2025-05-21 PROCEDURE — 99214 OFFICE O/P EST MOD 30 MIN: CPT

## 2025-05-29 ENCOUNTER — APPOINTMENT (OUTPATIENT)
Dept: RHEUMATOLOGY | Facility: CLINIC | Age: 85
End: 2025-05-29
Payer: MEDICARE

## 2025-05-29 VITALS
HEIGHT: 73 IN | OXYGEN SATURATION: 96 % | RESPIRATION RATE: 15 BRPM | HEART RATE: 59 BPM | SYSTOLIC BLOOD PRESSURE: 119 MMHG | DIASTOLIC BLOOD PRESSURE: 69 MMHG | WEIGHT: 175 LBS | BODY MASS INDEX: 23.19 KG/M2

## 2025-05-29 DIAGNOSIS — M31.6 OTHER GIANT CELL ARTERITIS: ICD-10-CM

## 2025-05-29 DIAGNOSIS — M62.81 MUSCLE WEAKNESS (GENERALIZED): ICD-10-CM

## 2025-05-29 DIAGNOSIS — Z79.899 OTHER LONG TERM (CURRENT) DRUG THERAPY: ICD-10-CM

## 2025-05-29 PROCEDURE — G2211 COMPLEX E/M VISIT ADD ON: CPT

## 2025-05-29 PROCEDURE — 99215 OFFICE O/P EST HI 40 MIN: CPT

## 2025-05-30 ENCOUNTER — APPOINTMENT (OUTPATIENT)
Dept: ENDOCRINOLOGY | Facility: CLINIC | Age: 85
End: 2025-05-30

## 2025-06-05 ENCOUNTER — OUTPATIENT (OUTPATIENT)
Dept: OUTPATIENT SERVICES | Facility: HOSPITAL | Age: 85
LOS: 1 days | End: 2025-06-05
Payer: MEDICARE

## 2025-06-05 ENCOUNTER — APPOINTMENT (OUTPATIENT)
Dept: UROLOGY | Facility: CLINIC | Age: 85
End: 2025-06-05
Payer: MEDICARE

## 2025-06-05 ENCOUNTER — APPOINTMENT (OUTPATIENT)
Dept: ELECTROPHYSIOLOGY | Facility: CLINIC | Age: 85
End: 2025-06-05

## 2025-06-05 DIAGNOSIS — Z98.890 OTHER SPECIFIED POSTPROCEDURAL STATES: Chronic | ICD-10-CM

## 2025-06-05 DIAGNOSIS — R35.0 FREQUENCY OF MICTURITION: ICD-10-CM

## 2025-06-05 DIAGNOSIS — M85.80 OTHER SPECIFIED DISORDERS OF BONE DENSITY AND STRUCTURE, UNSPECIFIED SITE: ICD-10-CM

## 2025-06-05 DIAGNOSIS — C61 MALIGNANT NEOPLASM OF PROSTATE: ICD-10-CM

## 2025-06-05 PROCEDURE — 99214 OFFICE O/P EST MOD 30 MIN: CPT | Mod: 25

## 2025-06-05 PROCEDURE — 96402U: CUSTOM | Mod: NC

## 2025-06-05 PROCEDURE — 96402 CHEMO HORMON ANTINEOPL SQ/IM: CPT

## 2025-06-05 RX ORDER — LEUPROLIDE ACETATE 45 MG/.375ML
45 INJECTION, SUSPENSION, EXTENDED RELEASE SUBCUTANEOUS
Refills: 0 | Status: COMPLETED | OUTPATIENT
Start: 2025-06-05

## 2025-06-05 RX ADMIN — LEUPROLIDE ACETATE 0 MG: 45 INJECTION, SUSPENSION, EXTENDED RELEASE SUBCUTANEOUS at 00:00

## 2025-06-06 ENCOUNTER — MED ADMIN CHARGE (OUTPATIENT)
Age: 85
End: 2025-06-06

## 2025-06-06 ENCOUNTER — APPOINTMENT (OUTPATIENT)
Dept: RHEUMATOLOGY | Facility: CLINIC | Age: 85
End: 2025-06-06
Payer: MEDICARE

## 2025-06-06 DIAGNOSIS — C61 MALIGNANT NEOPLASM OF PROSTATE: ICD-10-CM

## 2025-06-06 DIAGNOSIS — M85.80 OTHER SPECIFIED DISORDERS OF BONE DENSITY AND STRUCTURE, UNSPECIFIED SITE: ICD-10-CM

## 2025-06-06 PROCEDURE — 36415 COLL VENOUS BLD VENIPUNCTURE: CPT

## 2025-06-06 PROCEDURE — 96413 CHEMO IV INFUSION 1 HR: CPT

## 2025-06-06 RX ORDER — TOCILIZUMAB 20 MG/ML
80 INJECTION, SOLUTION, CONCENTRATE INTRAVENOUS
Qty: 1 | Refills: 0 | Status: COMPLETED
Start: 2024-03-22

## 2025-06-06 RX ORDER — TOCILIZUMAB 20 MG/ML
200 INJECTION, SOLUTION, CONCENTRATE INTRAVENOUS
Qty: 1 | Refills: 0 | Status: COMPLETED
Start: 2024-06-14

## 2025-06-07 LAB
ALBUMIN SERPL ELPH-MCNC: 4.3 G/DL
ALP BLD-CCNC: 81 U/L
ALT SERPL-CCNC: 36 U/L
ANION GAP SERPL CALC-SCNC: 13 MMOL/L
AST SERPL-CCNC: 25 U/L
BILIRUB SERPL-MCNC: 0.9 MG/DL
BUN SERPL-MCNC: 24 MG/DL
CALCIUM SERPL-MCNC: 10.1 MG/DL
CHLORIDE SERPL-SCNC: 101 MMOL/L
CO2 SERPL-SCNC: 25 MMOL/L
CREAT SERPL-MCNC: 0.95 MG/DL
CRP SERPL-MCNC: <3 MG/L
EGFRCR SERPLBLD CKD-EPI 2021: 78 ML/MIN/1.73M2
ERYTHROCYTE [SEDIMENTATION RATE] IN BLOOD BY WESTERGREN METHOD: 16 MM/HR
GLUCOSE SERPL-MCNC: 93 MG/DL
HBV CORE IGG+IGM SER QL: REACTIVE
HBV CORE IGM SER QL: NONREACTIVE
HBV SURFACE AB SER QL: REACTIVE
HBV SURFACE AG SER QL: NONREACTIVE
HCV AB SER QL: NONREACTIVE
HCV S/CO RATIO: 0.05 S/CO
POTASSIUM SERPL-SCNC: 4.3 MMOL/L
PROT SERPL-MCNC: 7.8 G/DL
SODIUM SERPL-SCNC: 140 MMOL/L

## 2025-06-08 LAB
PSA SERPL-MCNC: 0.47 NG/ML
TESTOST SERPL-MCNC: <2.5 NG/DL

## 2025-06-11 PROBLEM — Z86.15 HISTORY OF LATENT TUBERCULOSIS: Status: ACTIVE | Noted: 2025-06-11

## 2025-06-11 LAB
M TB IFN-G BLD-IMP: POSITIVE
QUANTIFERON TB PLUS MITOGEN MINUS NIL: >10 IU/ML
QUANTIFERON TB PLUS NIL: 0.11 IU/ML
QUANTIFERON TB PLUS TB1 MINUS NIL: 1.08 IU/ML
QUANTIFERON TB PLUS TB2 MINUS NIL: 1.24 IU/ML

## 2025-06-15 ENCOUNTER — NON-APPOINTMENT (OUTPATIENT)
Age: 85
End: 2025-06-15

## 2025-06-16 ENCOUNTER — APPOINTMENT (OUTPATIENT)
Dept: RADIOLOGY | Facility: CLINIC | Age: 85
End: 2025-06-16
Payer: MEDICARE

## 2025-06-16 ENCOUNTER — OUTPATIENT (OUTPATIENT)
Dept: OUTPATIENT SERVICES | Facility: HOSPITAL | Age: 85
LOS: 1 days | End: 2025-06-16
Payer: MEDICARE

## 2025-06-16 DIAGNOSIS — Z86.15 PERSONAL HISTORY OF LATENT TUBERCULOSIS INFECTION: ICD-10-CM

## 2025-06-16 DIAGNOSIS — Z98.890 OTHER SPECIFIED POSTPROCEDURAL STATES: Chronic | ICD-10-CM

## 2025-06-16 PROCEDURE — 71046 X-RAY EXAM CHEST 2 VIEWS: CPT

## 2025-06-16 PROCEDURE — 71046 X-RAY EXAM CHEST 2 VIEWS: CPT | Mod: 26

## 2025-06-20 ENCOUNTER — APPOINTMENT (OUTPATIENT)
Dept: INTERNAL MEDICINE | Facility: CLINIC | Age: 85
End: 2025-06-20
Payer: MEDICARE

## 2025-06-20 VITALS
HEIGHT: 72 IN | DIASTOLIC BLOOD PRESSURE: 70 MMHG | HEART RATE: 64 BPM | WEIGHT: 178 LBS | BODY MASS INDEX: 24.11 KG/M2 | SYSTOLIC BLOOD PRESSURE: 130 MMHG | RESPIRATION RATE: 14 BRPM

## 2025-06-20 PROBLEM — Z20.9 EXPOSURE TO POTENTIAL INFECTION: Status: ACTIVE | Noted: 2025-06-20

## 2025-06-20 PROCEDURE — 69209 REMOVE IMPACTED EAR WAX UNI: CPT | Mod: RT,59

## 2025-06-20 PROCEDURE — 99213 OFFICE O/P EST LOW 20 MIN: CPT | Mod: 25

## 2025-06-20 PROCEDURE — 36415 COLL VENOUS BLD VENIPUNCTURE: CPT

## 2025-06-24 ENCOUNTER — NON-APPOINTMENT (OUTPATIENT)
Age: 85
End: 2025-06-24

## 2025-06-24 LAB
M TB IFN-G BLD-IMP: POSITIVE
QUANTIFERON TB PLUS MITOGEN MINUS NIL: >10 IU/ML
QUANTIFERON TB PLUS NIL: 0.1 IU/ML
QUANTIFERON TB PLUS TB1 MINUS NIL: 2.36 IU/ML
QUANTIFERON TB PLUS TB2 MINUS NIL: 2.53 IU/ML

## 2025-06-27 ENCOUNTER — NON-APPOINTMENT (OUTPATIENT)
Age: 85
End: 2025-06-27

## 2025-07-01 ENCOUNTER — MED ADMIN CHARGE (OUTPATIENT)
Age: 85
End: 2025-07-01

## 2025-07-01 ENCOUNTER — APPOINTMENT (OUTPATIENT)
Dept: RHEUMATOLOGY | Facility: CLINIC | Age: 85
End: 2025-07-01
Payer: MEDICARE

## 2025-07-01 VITALS
TEMPERATURE: 97.5 F | HEART RATE: 67 BPM | RESPIRATION RATE: 18 BRPM | DIASTOLIC BLOOD PRESSURE: 53 MMHG | SYSTOLIC BLOOD PRESSURE: 92 MMHG | OXYGEN SATURATION: 94 %

## 2025-07-01 VITALS
SYSTOLIC BLOOD PRESSURE: 109 MMHG | DIASTOLIC BLOOD PRESSURE: 62 MMHG | OXYGEN SATURATION: 95 % | RESPIRATION RATE: 18 BRPM | HEART RATE: 55 BPM

## 2025-07-01 PROCEDURE — 96413 CHEMO IV INFUSION 1 HR: CPT

## 2025-07-01 RX ORDER — TOCILIZUMAB 20 MG/ML
80 INJECTION, SOLUTION, CONCENTRATE INTRAVENOUS
Qty: 1 | Refills: 0 | Status: COMPLETED
Start: 2024-03-22

## 2025-07-01 RX ADMIN — TOCILIZUMAB 480 MG/10ML: 20 INJECTION, SOLUTION, CONCENTRATE INTRAVENOUS at 00:00

## 2025-07-02 LAB
ALBUMIN SERPL ELPH-MCNC: 4 G/DL
ALP BLD-CCNC: 71 U/L
ALT SERPL-CCNC: 45 U/L
ANION GAP SERPL CALC-SCNC: 12 MMOL/L
AST SERPL-CCNC: 31 U/L
BILIRUB SERPL-MCNC: 1 MG/DL
BUN SERPL-MCNC: 28 MG/DL
CALCIUM SERPL-MCNC: 9.5 MG/DL
CHLORIDE SERPL-SCNC: 103 MMOL/L
CO2 SERPL-SCNC: 27 MMOL/L
CREAT SERPL-MCNC: 0.9 MG/DL
CRP SERPL-MCNC: <3 MG/L
EGFRCR SERPLBLD CKD-EPI 2021: 84 ML/MIN/1.73M2
ERYTHROCYTE [SEDIMENTATION RATE] IN BLOOD BY WESTERGREN METHOD: 5 MM/HR
GLUCOSE SERPL-MCNC: 196 MG/DL
POTASSIUM SERPL-SCNC: 4.1 MMOL/L
PROT SERPL-MCNC: 7.3 G/DL
SODIUM SERPL-SCNC: 141 MMOL/L

## 2025-07-09 ENCOUNTER — NON-APPOINTMENT (OUTPATIENT)
Age: 85
End: 2025-07-09

## 2025-07-09 ENCOUNTER — APPOINTMENT (OUTPATIENT)
Dept: ELECTROPHYSIOLOGY | Facility: CLINIC | Age: 85
End: 2025-07-09
Payer: MEDICARE

## 2025-07-09 PROCEDURE — 93298 REM INTERROG DEV EVAL SCRMS: CPT

## 2025-07-25 ENCOUNTER — APPOINTMENT (OUTPATIENT)
Dept: INFECTIOUS DISEASE | Facility: CLINIC | Age: 85
End: 2025-07-25
Payer: MEDICARE

## 2025-07-25 VITALS
TEMPERATURE: 98 F | HEART RATE: 58 BPM | WEIGHT: 180 LBS | SYSTOLIC BLOOD PRESSURE: 154 MMHG | DIASTOLIC BLOOD PRESSURE: 77 MMHG | OXYGEN SATURATION: 96 % | HEIGHT: 72 IN | BODY MASS INDEX: 24.38 KG/M2

## 2025-07-25 DIAGNOSIS — Z22.7 LATENT TUBERCULOSIS: ICD-10-CM

## 2025-07-25 PROCEDURE — 99204 OFFICE O/P NEW MOD 45 MIN: CPT

## 2025-07-29 ENCOUNTER — APPOINTMENT (OUTPATIENT)
Dept: NEUROLOGY | Facility: CLINIC | Age: 85
End: 2025-07-29

## 2025-08-01 ENCOUNTER — MED ADMIN CHARGE (OUTPATIENT)
Age: 85
End: 2025-08-01

## 2025-08-01 ENCOUNTER — APPOINTMENT (OUTPATIENT)
Dept: RHEUMATOLOGY | Facility: CLINIC | Age: 85
End: 2025-08-01
Payer: MEDICARE

## 2025-08-01 VITALS
DIASTOLIC BLOOD PRESSURE: 74 MMHG | RESPIRATION RATE: 18 BRPM | OXYGEN SATURATION: 96 % | HEART RATE: 55 BPM | TEMPERATURE: 97 F | SYSTOLIC BLOOD PRESSURE: 150 MMHG

## 2025-08-01 VITALS
RESPIRATION RATE: 17 BRPM | DIASTOLIC BLOOD PRESSURE: 68 MMHG | HEART RATE: 52 BPM | SYSTOLIC BLOOD PRESSURE: 119 MMHG | OXYGEN SATURATION: 96 %

## 2025-08-01 PROCEDURE — 36415 COLL VENOUS BLD VENIPUNCTURE: CPT

## 2025-08-01 PROCEDURE — 96413 CHEMO IV INFUSION 1 HR: CPT

## 2025-08-01 RX ORDER — TOCILIZUMAB 20 MG/ML
80 INJECTION, SOLUTION, CONCENTRATE INTRAVENOUS
Qty: 1 | Refills: 0 | Status: COMPLETED
Start: 2024-03-22

## 2025-08-07 ENCOUNTER — RX RENEWAL (OUTPATIENT)
Age: 85
End: 2025-08-07

## 2025-08-07 LAB
ALBUMIN SERPL ELPH-MCNC: 4.2 G/DL
ALP BLD-CCNC: 75 U/L
ALT SERPL-CCNC: 42 U/L
ANION GAP SERPL CALC-SCNC: 10 MMOL/L
AST SERPL-CCNC: 36 U/L
BASOPHILS # BLD AUTO: 0.01 K/UL
BASOPHILS NFR BLD AUTO: 0.2 %
BILIRUB SERPL-MCNC: 1 MG/DL
BUN SERPL-MCNC: 26 MG/DL
CALCIUM SERPL-MCNC: 9.7 MG/DL
CHLORIDE SERPL-SCNC: 100 MMOL/L
CO2 SERPL-SCNC: 26 MMOL/L
CREAT SERPL-MCNC: 0.85 MG/DL
CRP SERPL-MCNC: <3 MG/L
EGFRCR SERPLBLD CKD-EPI 2021: 85 ML/MIN/1.73M2
EOSINOPHIL # BLD AUTO: 0.08 K/UL
EOSINOPHIL NFR BLD AUTO: 1.4 %
ERYTHROCYTE [SEDIMENTATION RATE] IN BLOOD BY WESTERGREN METHOD: 2 MM/HR
GLUCOSE SERPL-MCNC: 84 MG/DL
HCT VFR BLD CALC: 42 %
HGB BLD-MCNC: 11.7 G/DL
IMM GRANULOCYTES NFR BLD AUTO: 0.9 %
LYMPHOCYTES # BLD AUTO: 1.81 K/UL
LYMPHOCYTES NFR BLD AUTO: 30.9 %
MAN DIFF?: NORMAL
MCHC RBC-ENTMCNC: 27.9 G/DL
MCHC RBC-ENTMCNC: 32.1 PG
MCV RBC AUTO: 115.1 FL
MONOCYTES # BLD AUTO: 0.62 K/UL
MONOCYTES NFR BLD AUTO: 10.6 %
NEUTROPHILS # BLD AUTO: 3.29 K/UL
NEUTROPHILS NFR BLD AUTO: 56 %
PLATELET # BLD AUTO: 154 K/UL
POTASSIUM SERPL-SCNC: 4.2 MMOL/L
PROT SERPL-MCNC: 7.6 G/DL
RBC # BLD: 3.65 M/UL
RBC # FLD: 15.3 %
SODIUM SERPL-SCNC: 137 MMOL/L
WBC # FLD AUTO: 5.86 K/UL

## 2025-08-12 ENCOUNTER — RX RENEWAL (OUTPATIENT)
Age: 85
End: 2025-08-12

## 2025-08-13 ENCOUNTER — APPOINTMENT (OUTPATIENT)
Dept: ELECTROPHYSIOLOGY | Facility: CLINIC | Age: 85
End: 2025-08-13
Payer: MEDICARE

## 2025-08-13 ENCOUNTER — NON-APPOINTMENT (OUTPATIENT)
Age: 85
End: 2025-08-13

## 2025-08-13 PROCEDURE — 93298 REM INTERROG DEV EVAL SCRMS: CPT

## 2025-08-21 RX ORDER — TOCILIZUMAB 20 MG/ML
200 INJECTION, SOLUTION, CONCENTRATE INTRAVENOUS
Qty: 1 | Refills: 0 | Status: ACTIVE | OUTPATIENT
Start: 2025-08-21 | End: 1900-01-01

## 2025-08-29 ENCOUNTER — APPOINTMENT (OUTPATIENT)
Dept: RHEUMATOLOGY | Facility: CLINIC | Age: 85
End: 2025-08-29
Payer: MEDICARE

## 2025-08-29 ENCOUNTER — MED ADMIN CHARGE (OUTPATIENT)
Age: 85
End: 2025-08-29

## 2025-08-29 VITALS
RESPIRATION RATE: 17 BRPM | SYSTOLIC BLOOD PRESSURE: 110 MMHG | HEART RATE: 60 BPM | DIASTOLIC BLOOD PRESSURE: 66 MMHG | OXYGEN SATURATION: 96 %

## 2025-08-29 VITALS
OXYGEN SATURATION: 96 % | HEART RATE: 60 BPM | TEMPERATURE: 98 F | SYSTOLIC BLOOD PRESSURE: 106 MMHG | RESPIRATION RATE: 18 BRPM | DIASTOLIC BLOOD PRESSURE: 64 MMHG

## 2025-08-29 PROCEDURE — 96413 CHEMO IV INFUSION 1 HR: CPT

## 2025-08-29 RX ORDER — TOCILIZUMAB 20 MG/ML
80 INJECTION, SOLUTION, CONCENTRATE INTRAVENOUS
Qty: 1 | Refills: 0 | Status: COMPLETED
Start: 2024-03-22

## 2025-09-02 LAB
ALBUMIN SERPL ELPH-MCNC: 4.3 G/DL
ALP BLD-CCNC: 73 U/L
ALT SERPL-CCNC: 33 U/L
ANION GAP SERPL CALC-SCNC: 12 MMOL/L
AST SERPL-CCNC: 31 U/L
BASOPHILS # BLD AUTO: 0.03 K/UL
BASOPHILS NFR BLD AUTO: 0.5 %
BILIRUB SERPL-MCNC: 1 MG/DL
BUN SERPL-MCNC: 25 MG/DL
CALCIUM SERPL-MCNC: 10 MG/DL
CHLORIDE SERPL-SCNC: 102 MMOL/L
CO2 SERPL-SCNC: 28 MMOL/L
CREAT SERPL-MCNC: 0.91 MG/DL
CRP SERPL-MCNC: <3 MG/L
EGFRCR SERPLBLD CKD-EPI 2021: 83 ML/MIN/1.73M2
EOSINOPHIL # BLD AUTO: 0.13 K/UL
EOSINOPHIL NFR BLD AUTO: 2 %
ERYTHROCYTE [SEDIMENTATION RATE] IN BLOOD BY WESTERGREN METHOD: 16 MM/HR
GLUCOSE SERPL-MCNC: 95 MG/DL
HCT VFR BLD CALC: 36.9 %
HGB BLD-MCNC: 12.2 G/DL
IMM GRANULOCYTES NFR BLD AUTO: 0.5 %
LYMPHOCYTES # BLD AUTO: 1.99 K/UL
LYMPHOCYTES NFR BLD AUTO: 30.2 %
MAN DIFF?: NORMAL
MCHC RBC-ENTMCNC: 33.1 G/DL
MCHC RBC-ENTMCNC: 33.1 PG
MCV RBC AUTO: 100 FL
MONOCYTES # BLD AUTO: 0.76 K/UL
MONOCYTES NFR BLD AUTO: 11.6 %
NEUTROPHILS # BLD AUTO: 3.64 K/UL
NEUTROPHILS NFR BLD AUTO: 55.2 %
PLATELET # BLD AUTO: 154 K/UL
POTASSIUM SERPL-SCNC: 4.3 MMOL/L
PROT SERPL-MCNC: 7.9 G/DL
RBC # BLD: 3.69 M/UL
RBC # FLD: 13.9 %
SODIUM SERPL-SCNC: 142 MMOL/L
WBC # FLD AUTO: 6.58 K/UL

## 2025-09-02 RX ORDER — TOCILIZUMAB 20 MG/ML
200 INJECTION, SOLUTION, CONCENTRATE INTRAVENOUS
Qty: 1 | Refills: 0 | Status: COMPLETED
Start: 2025-08-21

## 2025-09-16 ENCOUNTER — NON-APPOINTMENT (OUTPATIENT)
Age: 85
End: 2025-09-16

## 2025-09-16 ENCOUNTER — APPOINTMENT (OUTPATIENT)
Dept: ELECTROPHYSIOLOGY | Facility: CLINIC | Age: 85
End: 2025-09-16
Payer: MEDICARE

## 2025-09-16 PROCEDURE — 93298 REM INTERROG DEV EVAL SCRMS: CPT

## 2025-09-17 ENCOUNTER — RESULT REVIEW (OUTPATIENT)
Age: 85
End: 2025-09-17

## 2025-09-17 ENCOUNTER — APPOINTMENT (OUTPATIENT)
Dept: HEMATOLOGY ONCOLOGY | Facility: CLINIC | Age: 85
End: 2025-09-17

## 2025-09-17 LAB — FERRITIN SERPL-MCNC: 95 NG/ML

## 2025-09-18 LAB
ERYTHROCYTE [SEDIMENTATION RATE] IN BLOOD BY WESTERGREN METHOD: 19 MM/HR
FREE KAPPA URINE: 85.05 MG/L
FREE KAPPA/LAMDA RATIO: 30.93 RATIO
FREE LAMDA URINE: 2.75 MG/L
IRON SATN MFR SERPL: 30 %
IRON SERPL-MCNC: 100 UG/DL
TIBC SERPL-MCNC: 328 UG/DL
UIBC SERPL-MCNC: 228 UG/DL

## 2025-09-20 LAB
ALBUMIN MFR SERPL ELPH: 55 %
ALBUMIN SERPL-MCNC: 4 G/DL
ALBUMIN/GLOB SERPL: 1.2 RATIO
ALPHA1 GLOB MFR SERPL ELPH: 2.7 %
ALPHA1 GLOB SERPL ELPH-MCNC: 0.2 G/DL
ALPHA2 GLOB MFR SERPL ELPH: 7 %
ALPHA2 GLOB SERPL ELPH-MCNC: 0.5 G/DL
B-GLOBULIN MFR SERPL ELPH: 7.1 %
B-GLOBULIN SERPL ELPH-MCNC: 0.5 G/DL
DEPRECATED KAPPA LC FREE/LAMBDA SER: 10.49 RATIO
GAMMA GLOB FLD ELPH-MCNC: 2 G/DL
GAMMA GLOB MFR SERPL ELPH: 28.2 %
IGA SERPL-MCNC: 34 MG/DL
IGG SERPL-MCNC: 1859 MG/DL
IGM SERPL-MCNC: 39 MG/DL
INTERPRETATION SERPL IEP-IMP: NORMAL
KAPPA LC CSF-MCNC: 0.71 MG/DL
KAPPA LC SERPL-MCNC: 7.45 MG/DL
M PROTEIN MFR SERPL ELPH: 25.4 %
M PROTEIN SPEC IFE-MCNC: NORMAL
MONOCLON BAND OBS SERPL: 1.8 G/DL
PROT SERPL-MCNC: 7.2 G/DL
PROT SERPL-MCNC: 7.2 G/DL

## (undated) DEVICE — BLADE SCALPEL SAFETYLOCK #10

## (undated) DEVICE — DRAPE MAYO STAND 30"

## (undated) DEVICE — DRAPE MAGNETIC INSTRUMENT MEDIUM

## (undated) DEVICE — PREP BETADINE KIT

## (undated) DEVICE — DRSG STERISTRIPS 0.5 X 4"

## (undated) DEVICE — SYR LUER LOK 10CC

## (undated) DEVICE — GLV 7 PROTEXIS (WHITE)

## (undated) DEVICE — DRSG DERMABOND 0.7ML

## (undated) DEVICE — DRAPE 3/4 SHEET W REINFORCEMENT 56X77"

## (undated) DEVICE — SUT POLYSORB 3-0 30" V-20 UNDYED

## (undated) DEVICE — SUCTION YANKAUER NO CONTROL VENT

## (undated) DEVICE — WARMING BLANKET LOWER ADULT

## (undated) DEVICE — SOL IRR POUR H2O 250ML

## (undated) DEVICE — SUT BIOSYN 4-0 18" P-12

## (undated) DEVICE — STAPLER SKIN VISI-STAT 35 WIDE

## (undated) DEVICE — GLV 6.5 PROTEXIS (WHITE)

## (undated) DEVICE — SUT SOFSILK 3-0 18" TIES

## (undated) DEVICE — SUT SOFSILK 4-0 18" TIES

## (undated) DEVICE — SOL IRR POUR NS 0.9% 500ML

## (undated) DEVICE — VENODYNE/SCD SLEEVE CALF MEDIUM

## (undated) DEVICE — DRAPE INSTRUMENT POUCH 6.75" X 11"

## (undated) DEVICE — DRAPE MINOR PROCEDURE

## (undated) DEVICE — DRSG OPSITE 13.75 X 4"

## (undated) DEVICE — POSITIONER FOAM EGG CRATE ULNAR 2PCS (PINK)

## (undated) DEVICE — BLADE SCALPEL SAFETYLOCK #11

## (undated) DEVICE — DRAPE TOWEL BLUE 17" X 24"

## (undated) DEVICE — DRAPE LIGHT HANDLE COVER (BLUE)

## (undated) DEVICE — SPECIMEN CONTAINER 100ML

## (undated) DEVICE — DRSG TEGADERM 6"X8"

## (undated) DEVICE — PACK GENERAL MINOR

## (undated) DEVICE — MARKING PEN W RULER

## (undated) DEVICE — DRAPE 1/2 SHEET 40X57"

## (undated) DEVICE — BLADE SCALPEL SAFETYLOCK #15

## (undated) DEVICE — MEDICATION LABELS W MARKER